# Patient Record
Sex: MALE | Race: WHITE | NOT HISPANIC OR LATINO | Employment: FULL TIME | URBAN - METROPOLITAN AREA
[De-identification: names, ages, dates, MRNs, and addresses within clinical notes are randomized per-mention and may not be internally consistent; named-entity substitution may affect disease eponyms.]

---

## 2023-07-07 LAB
ALANINE AMINOTRANSFERASE (SGPT) (U/L) IN SER/PLAS: 55 U/L (ref 10–52)
ALBUMIN (G/DL) IN SER/PLAS: 4.2 G/DL (ref 3.4–5)
ALKALINE PHOSPHATASE (U/L) IN SER/PLAS: 76 U/L (ref 33–120)
ASPARTATE AMINOTRANSFERASE (SGOT) (U/L) IN SER/PLAS: 50 U/L (ref 9–39)
BILIRUBIN DIRECT (MG/DL) IN SER/PLAS: 0.2 MG/DL (ref 0–0.3)
BILIRUBIN TOTAL (MG/DL) IN SER/PLAS: 1.4 MG/DL (ref 0–1.2)
C REACTIVE PROTEIN (MG/L) IN SER/PLAS: 1.13 MG/DL
CREATININE (MG/DL) IN SER/PLAS: 0.92 MG/DL (ref 0.5–1.3)
ERYTHROCYTE DISTRIBUTION WIDTH (RATIO) BY AUTOMATED COUNT: 12.8 % (ref 11.5–14.5)
ERYTHROCYTE MEAN CORPUSCULAR HEMOGLOBIN CONCENTRATION (G/DL) BY AUTOMATED: 33.8 G/DL (ref 32–36)
ERYTHROCYTE MEAN CORPUSCULAR VOLUME (FL) BY AUTOMATED COUNT: 92 FL (ref 80–100)
ERYTHROCYTES (10*6/UL) IN BLOOD BY AUTOMATED COUNT: 5.06 X10E12/L (ref 4.5–5.9)
GFR MALE: >90 ML/MIN/1.73M2
HEMATOCRIT (%) IN BLOOD BY AUTOMATED COUNT: 46.5 % (ref 41–52)
HEMOGLOBIN (G/DL) IN BLOOD: 15.7 G/DL (ref 13.5–17.5)
LEUKOCYTES (10*3/UL) IN BLOOD BY AUTOMATED COUNT: 5.9 X10E9/L (ref 4.4–11.3)
PLATELETS (10*3/UL) IN BLOOD AUTOMATED COUNT: 199 X10E9/L (ref 150–450)
PROTEIN TOTAL: 7.5 G/DL (ref 6.4–8.2)
UREA NITROGEN (MG/DL) IN SER/PLAS: 11 MG/DL (ref 6–23)

## 2023-08-25 ENCOUNTER — OFFICE VISIT (OUTPATIENT)
Dept: PRIMARY CARE | Facility: CLINIC | Age: 58
End: 2023-08-25
Payer: COMMERCIAL

## 2023-08-25 VITALS
WEIGHT: 315 LBS | SYSTOLIC BLOOD PRESSURE: 138 MMHG | HEART RATE: 88 BPM | DIASTOLIC BLOOD PRESSURE: 70 MMHG | RESPIRATION RATE: 18 BRPM | TEMPERATURE: 97 F | BODY MASS INDEX: 41.75 KG/M2 | HEIGHT: 73 IN

## 2023-08-25 DIAGNOSIS — E08.22 DIABETES MELLITUS DUE TO UNDERLYING CONDITION WITH DIABETIC CHRONIC KIDNEY DISEASE, UNSPECIFIED CKD STAGE, UNSPECIFIED WHETHER LONG TERM INSULIN USE (MULTI): ICD-10-CM

## 2023-08-25 DIAGNOSIS — Z00.00 PERIODIC HEALTH ASSESSMENT, GENERAL SCREENING, ADULT: Primary | ICD-10-CM

## 2023-08-25 DIAGNOSIS — Z12.11 SCREENING FOR COLORECTAL CANCER: ICD-10-CM

## 2023-08-25 DIAGNOSIS — E66.01 OBESITY, CLASS III, BMI 40-49.9 (MORBID OBESITY) (MULTI): ICD-10-CM

## 2023-08-25 DIAGNOSIS — K76.0 NAFLD (NONALCOHOLIC FATTY LIVER DISEASE): ICD-10-CM

## 2023-08-25 DIAGNOSIS — I10 ESSENTIAL HYPERTENSION, BENIGN: ICD-10-CM

## 2023-08-25 DIAGNOSIS — R73.9 ELEVATED BLOOD SUGAR: ICD-10-CM

## 2023-08-25 DIAGNOSIS — Z12.12 SCREENING FOR COLORECTAL CANCER: ICD-10-CM

## 2023-08-25 DIAGNOSIS — Z12.5 SCREENING FOR PROSTATE CANCER: ICD-10-CM

## 2023-08-25 PROBLEM — B37.9 CANDIDIASIS: Status: ACTIVE | Noted: 2023-08-25

## 2023-08-25 PROBLEM — E87.6 HYPOKALEMIA: Status: ACTIVE | Noted: 2023-08-25

## 2023-08-25 PROBLEM — M46.40 DISCITIS: Status: ACTIVE | Noted: 2023-08-25

## 2023-08-25 PROBLEM — M62.89 MUSCLE TIGHTNESS: Status: ACTIVE | Noted: 2023-08-25

## 2023-08-25 PROBLEM — D64.9 ANEMIA: Status: ACTIVE | Noted: 2023-08-25

## 2023-08-25 PROBLEM — B35.1 FUNGAL NAIL INFECTION: Status: ACTIVE | Noted: 2023-08-25

## 2023-08-25 PROBLEM — E11.9 DIABETES MELLITUS (MULTI): Status: RESOLVED | Noted: 2023-08-25 | Resolved: 2023-08-25

## 2023-08-25 PROBLEM — M46.20 OSTEOMYELITIS OF SPINE (MULTI): Status: ACTIVE | Noted: 2023-08-25

## 2023-08-25 PROBLEM — Z98.890 STATUS POST LUMBAR SPINE SURGERY FOR DECOMPRESSION OF SPINAL CORD: Status: ACTIVE | Noted: 2023-08-25

## 2023-08-25 PROBLEM — G06.2 EPIDURAL ABSCESS (HHS-HCC): Status: ACTIVE | Noted: 2023-08-25

## 2023-08-25 PROBLEM — R29.898 LOWER EXTREMITY WEAKNESS: Status: ACTIVE | Noted: 2023-08-25

## 2023-08-25 PROBLEM — M46.46 DISCITIS OF LUMBAR REGION: Status: ACTIVE | Noted: 2022-05-02

## 2023-08-25 PROBLEM — E11.9 DIABETES MELLITUS (MULTI): Status: ACTIVE | Noted: 2023-08-25

## 2023-08-25 PROBLEM — M79.674 CHRONIC PAIN OF TOE OF RIGHT FOOT: Status: ACTIVE | Noted: 2023-08-25

## 2023-08-25 PROBLEM — R30.0 DYSURIA: Status: ACTIVE | Noted: 2023-08-25

## 2023-08-25 PROBLEM — K59.09 CHRONIC CONSTIPATION: Status: ACTIVE | Noted: 2023-08-25

## 2023-08-25 PROBLEM — N30.01 ACUTE CYSTITIS WITH HEMATURIA: Status: ACTIVE | Noted: 2023-08-25

## 2023-08-25 PROBLEM — R74.8 ELEVATED LIVER ENZYMES: Status: ACTIVE | Noted: 2023-08-25

## 2023-08-25 PROBLEM — S90.31XA CONTUSION OF RIGHT FOOT: Status: ACTIVE | Noted: 2023-08-25

## 2023-08-25 PROBLEM — R00.0 SINUS TACHYCARDIA: Status: ACTIVE | Noted: 2023-08-25

## 2023-08-25 PROBLEM — I95.1 AUTONOMIC ORTHOSTATIC HYPOTENSION: Status: ACTIVE | Noted: 2023-08-25

## 2023-08-25 PROBLEM — G89.29 CHRONIC PAIN OF TOE OF LEFT FOOT: Status: ACTIVE | Noted: 2023-08-25

## 2023-08-25 PROBLEM — R19.7 DIARRHEA IN ADULT PATIENT: Status: ACTIVE | Noted: 2023-08-25

## 2023-08-25 PROBLEM — M96.1 POSTLAMINECTOMY SYNDROME, LUMBAR REGION: Status: ACTIVE | Noted: 2022-05-02

## 2023-08-25 PROBLEM — L02.91 ABSCESS: Status: ACTIVE | Noted: 2023-08-25

## 2023-08-25 PROBLEM — G89.29 CHRONIC PAIN OF TOE OF RIGHT FOOT: Status: ACTIVE | Noted: 2023-08-25

## 2023-08-25 PROBLEM — M79.605 PAIN OF LEFT LOWER EXTREMITY: Status: ACTIVE | Noted: 2023-08-25

## 2023-08-25 PROBLEM — M54.16 LUMBAR RADICULOPATHY: Status: ACTIVE | Noted: 2022-05-02

## 2023-08-25 PROBLEM — G89.29 ACUTE EXACERBATION OF CHRONIC LOW BACK PAIN: Status: ACTIVE | Noted: 2023-08-25

## 2023-08-25 PROBLEM — M54.50 ACUTE EXACERBATION OF CHRONIC LOW BACK PAIN: Status: ACTIVE | Noted: 2023-08-25

## 2023-08-25 PROBLEM — M53.3 SACROILIAC JOINT PAIN: Status: ACTIVE | Noted: 2022-05-02

## 2023-08-25 PROBLEM — M79.675 CHRONIC PAIN OF TOE OF LEFT FOOT: Status: ACTIVE | Noted: 2023-08-25

## 2023-08-25 LAB — POC HEMOGLOBIN A1C: 10.8 % (ref 4.2–6.5)

## 2023-08-25 PROCEDURE — 3075F SYST BP GE 130 - 139MM HG: CPT | Performed by: INTERNAL MEDICINE

## 2023-08-25 PROCEDURE — 83036 HEMOGLOBIN GLYCOSYLATED A1C: CPT | Performed by: INTERNAL MEDICINE

## 2023-08-25 PROCEDURE — 4010F ACE/ARB THERAPY RXD/TAKEN: CPT | Performed by: INTERNAL MEDICINE

## 2023-08-25 PROCEDURE — 4004F PT TOBACCO SCREEN RCVD TLK: CPT | Performed by: INTERNAL MEDICINE

## 2023-08-25 PROCEDURE — 99396 PREV VISIT EST AGE 40-64: CPT | Performed by: INTERNAL MEDICINE

## 2023-08-25 PROCEDURE — 3066F NEPHROPATHY DOC TX: CPT | Performed by: INTERNAL MEDICINE

## 2023-08-25 PROCEDURE — 3078F DIAST BP <80 MM HG: CPT | Performed by: INTERNAL MEDICINE

## 2023-08-25 RX ORDER — CYCLOBENZAPRINE HCL 10 MG
10 TABLET ORAL 2 TIMES DAILY PRN
COMMUNITY

## 2023-08-25 RX ORDER — CARVEDILOL 6.25 MG/1
6.25 TABLET ORAL
COMMUNITY
End: 2023-08-29

## 2023-08-25 RX ORDER — LISINOPRIL 40 MG/1
1 TABLET ORAL DAILY
COMMUNITY
Start: 2021-06-04 | End: 2023-08-25 | Stop reason: SDUPTHER

## 2023-08-25 RX ORDER — PREGABALIN 75 MG/1
75 CAPSULE ORAL DAILY
COMMUNITY

## 2023-08-25 RX ORDER — AMOXICILLIN 500 MG/1
500 CAPSULE ORAL 2 TIMES DAILY
COMMUNITY
Start: 2023-07-07 | End: 2024-01-10

## 2023-08-25 RX ORDER — LISINOPRIL 20 MG/1
20 TABLET ORAL DAILY
Qty: 90 TABLET | Refills: 3 | Status: SHIPPED | OUTPATIENT
Start: 2023-08-25 | End: 2023-12-29 | Stop reason: HOSPADM

## 2023-08-25 RX ORDER — MELOXICAM 15 MG/1
15 TABLET ORAL AS NEEDED
COMMUNITY
End: 2023-12-12 | Stop reason: HOSPADM

## 2023-08-25 RX ORDER — METFORMIN HYDROCHLORIDE 1000 MG/1
1 TABLET ORAL
COMMUNITY
Start: 2018-11-27 | End: 2023-12-29 | Stop reason: HOSPADM

## 2023-08-25 ASSESSMENT — PATIENT HEALTH QUESTIONNAIRE - PHQ9
2. FEELING DOWN, DEPRESSED OR HOPELESS: NOT AT ALL
1. LITTLE INTEREST OR PLEASURE IN DOING THINGS: NOT AT ALL
SUM OF ALL RESPONSES TO PHQ9 QUESTIONS 1 AND 2: 0

## 2023-08-25 ASSESSMENT — ENCOUNTER SYMPTOMS: BACK PAIN: 1

## 2023-08-25 NOTE — PROGRESS NOTES
"Subjective   Behzad Diaz is a 58 y.o. male who presents for Annual Exam (CPE).  CPE  Patient is been out of lisinopril for 3-4 months now  Taking metformin only 1 tab QD , his decision  Paperwork for CPE       Overall doing well overall.  He has not been particularly active, but it is something he is working on.  He denies any issues with CP,SOB or dizzy spells.  No issues with anxiety, depression or sleep related problems. Denies any issues with HA, numbness or tingling.  No issues or changes with bowel or bladder habits.  Chronic back pain issues.         Review of Systems   Musculoskeletal:  Positive for back pain.   All other systems reviewed and are negative.      Objective   Physical Exam  Constitutional:       General: He is not in acute distress.     Appearance: Normal appearance. He is not ill-appearing.   HENT:      Head: Normocephalic and atraumatic.      Nose: Nose normal.   Eyes:      Extraocular Movements: Extraocular movements intact.      Conjunctiva/sclera: Conjunctivae normal.      Pupils: Pupils are equal, round, and reactive to light.   Cardiovascular:      Rate and Rhythm: Normal rate.   Pulmonary:      Effort: Pulmonary effort is normal.   Abdominal:      General: There is no distension.   Musculoskeletal:      Cervical back: Neck supple.      Comments: Uses cane for balance   Neurological:      General: No focal deficit present.      Mental Status: He is alert.      Gait: Gait normal.   Psychiatric:         Mood and Affect: Mood normal.         Behavior: Behavior normal.       /70 (BP Location: Left arm, Patient Position: Sitting)   Pulse 88   Temp 36.1 °C (97 °F)   Resp 18   Ht 1.854 m (6' 1\")   Wt (!) 161 kg (354 lb 6.4 oz)   BMI 46.76 kg/m²       Assessment/Plan   Problem List Items Addressed This Visit       RESOLVED: Diabetes mellitus (CMS/[facility])    Relevant Medications    semaglutide 0.25 mg or 0.5 mg (2 mg/3 mL) pen injector    semaglutide 0.25 mg or 0.5 mg (2 mg/3 mL) pen " injector    semaglutide (OZEMPIC) 1 mg/dose (4 mg/3 mL) pen injector    Essential hypertension, benign    Relevant Medications    lisinopril 20 mg tablet    Obesity, Class III, BMI 40-49.9 (morbid obesity) (CMS/HCC)     Other Visit Diagnoses       Periodic health assessment, general screening, adult    -  Primary    Relevant Orders    Comprehensive Metabolic Panel    Lipid Panel    Thyroid Stimulating Hormone    Hemoglobin A1C    Elevated blood sugar        Relevant Orders    POCT glycosylated hemoglobin (Hb A1C) manually resulted (Completed)    NAFLD (nonalcoholic fatty liver disease)        Screening for colorectal cancer        Relevant Orders    Cologuard® colon cancer screening    Screening for prostate cancer        Relevant Orders    Prostate Specific Antigen        We reviewed and discussed all of the above.    Physical exam is unremarkable.  We reviewed and discussed all the above.  We discussed current medications as well as most recent test results.  He is due for metabolic risk assessment as well as colorectal cancer screening.    We discussed the importance and benefits of a healthy diet that is low in sugars and low in carbohydrates.  We reviewed and discussed the benefits of regular physical exercise especially when at or above a level of 150 minutes/week.  We also discussed the importance of stress management and good sleep hygiene.  We will add Ozempic to his metformin and continue to work on lifestyle improvements and follow-up in 3 months, sooner if any issues should arise.

## 2023-08-29 DIAGNOSIS — I10 ESSENTIAL (PRIMARY) HYPERTENSION: ICD-10-CM

## 2023-08-29 RX ORDER — CARVEDILOL 6.25 MG/1
6.25 TABLET ORAL
Qty: 180 TABLET | Refills: 3 | Status: SHIPPED | OUTPATIENT
Start: 2023-08-29 | End: 2023-12-29 | Stop reason: HOSPADM

## 2023-11-17 ENCOUNTER — APPOINTMENT (OUTPATIENT)
Dept: PRIMARY CARE | Facility: CLINIC | Age: 58
End: 2023-11-17
Payer: COMMERCIAL

## 2023-11-20 PROCEDURE — 87086 URINE CULTURE/COLONY COUNT: CPT

## 2023-11-21 ENCOUNTER — LAB REQUISITION (OUTPATIENT)
Dept: LAB | Facility: HOSPITAL | Age: 58
End: 2023-11-21
Payer: COMMERCIAL

## 2023-11-21 DIAGNOSIS — N39.0 URINARY TRACT INFECTION, SITE NOT SPECIFIED: ICD-10-CM

## 2023-11-22 LAB — BACTERIA UR CULT: NORMAL

## 2023-12-06 PROCEDURE — 87086 URINE CULTURE/COLONY COUNT: CPT

## 2023-12-06 PROCEDURE — 87186 SC STD MICRODIL/AGAR DIL: CPT

## 2023-12-07 ENCOUNTER — LAB REQUISITION (OUTPATIENT)
Dept: LAB | Facility: HOSPITAL | Age: 58
End: 2023-12-07
Payer: COMMERCIAL

## 2023-12-07 DIAGNOSIS — N39.0 URINARY TRACT INFECTION, SITE NOT SPECIFIED: ICD-10-CM

## 2023-12-09 LAB — BACTERIA UR CULT: ABNORMAL

## 2023-12-10 ENCOUNTER — HOSPITAL ENCOUNTER (INPATIENT)
Facility: HOSPITAL | Age: 58
LOS: 2 days | Discharge: ANOTHER HOSPITAL | DRG: 281 | End: 2023-12-12
Attending: EMERGENCY MEDICINE | Admitting: INTERNAL MEDICINE
Payer: COMMERCIAL

## 2023-12-10 ENCOUNTER — APPOINTMENT (OUTPATIENT)
Dept: RADIOLOGY | Facility: HOSPITAL | Age: 58
DRG: 281 | End: 2023-12-10
Payer: COMMERCIAL

## 2023-12-10 DIAGNOSIS — I21.4 NSTEMI (NON-ST ELEVATED MYOCARDIAL INFARCTION) (MULTI): Primary | ICD-10-CM

## 2023-12-10 DIAGNOSIS — I24.9 ACS (ACUTE CORONARY SYNDROME) (MULTI): ICD-10-CM

## 2023-12-10 DIAGNOSIS — R07.9 ACUTE CHEST PAIN: ICD-10-CM

## 2023-12-10 PROBLEM — K76.0 NAFLD (NONALCOHOLIC FATTY LIVER DISEASE): Status: ACTIVE | Noted: 2023-12-10

## 2023-12-10 PROBLEM — N39.0 COMPLICATED UTI (URINARY TRACT INFECTION): Status: ACTIVE | Noted: 2023-12-10

## 2023-12-10 LAB
ALBUMIN SERPL BCP-MCNC: 4.3 G/DL (ref 3.4–5)
ALP SERPL-CCNC: 68 U/L (ref 33–120)
ALT SERPL W P-5'-P-CCNC: 50 U/L (ref 10–52)
ANION GAP SERPL CALC-SCNC: 18 MMOL/L (ref 10–20)
APTT PPP: 33 SECONDS (ref 27–38)
AST SERPL W P-5'-P-CCNC: 43 U/L (ref 9–39)
BASOPHILS # BLD AUTO: 0.05 X10*3/UL (ref 0–0.1)
BASOPHILS NFR BLD AUTO: 0.7 %
BILIRUB SERPL-MCNC: 1.2 MG/DL (ref 0–1.2)
BUN SERPL-MCNC: 17 MG/DL (ref 6–23)
CALCIUM SERPL-MCNC: 9 MG/DL (ref 8.6–10.3)
CARDIAC TROPONIN I PNL SERPL HS: 1626 NG/L (ref 0–20)
CARDIAC TROPONIN I PNL SERPL HS: 322 NG/L (ref 0–20)
CHLORIDE SERPL-SCNC: 97 MMOL/L (ref 98–107)
CHOLEST SERPL-MCNC: 145 MG/DL (ref 0–199)
CHOLESTEROL/HDL RATIO: 5.8
CO2 SERPL-SCNC: 21 MMOL/L (ref 21–32)
CREAT SERPL-MCNC: 1.15 MG/DL (ref 0.5–1.3)
EOSINOPHIL # BLD AUTO: 0.16 X10*3/UL (ref 0–0.7)
EOSINOPHIL NFR BLD AUTO: 2.3 %
ERYTHROCYTE [DISTWIDTH] IN BLOOD BY AUTOMATED COUNT: 12 % (ref 11.5–14.5)
EST. AVERAGE GLUCOSE BLD GHB EST-MCNC: 269 MG/DL
GFR SERPL CREATININE-BSD FRML MDRD: 75 ML/MIN/1.73M*2
GLUCOSE BLD MANUAL STRIP-MCNC: 221 MG/DL (ref 74–99)
GLUCOSE BLD MANUAL STRIP-MCNC: 272 MG/DL (ref 74–99)
GLUCOSE BLD MANUAL STRIP-MCNC: 285 MG/DL (ref 74–99)
GLUCOSE BLD MANUAL STRIP-MCNC: 352 MG/DL (ref 74–99)
GLUCOSE SERPL-MCNC: 353 MG/DL (ref 74–99)
HBA1C MFR BLD: 11 %
HCT VFR BLD AUTO: 47.8 % (ref 41–52)
HDLC SERPL-MCNC: 25.2 MG/DL
HGB BLD-MCNC: 16.3 G/DL (ref 13.5–17.5)
HOLD SPECIMEN: NORMAL
HOLD SPECIMEN: NORMAL
IMM GRANULOCYTES # BLD AUTO: 0.02 X10*3/UL (ref 0–0.7)
IMM GRANULOCYTES NFR BLD AUTO: 0.3 % (ref 0–0.9)
LDLC SERPL CALC-MCNC: 51 MG/DL
LYMPHOCYTES # BLD AUTO: 2.64 X10*3/UL (ref 1.2–4.8)
LYMPHOCYTES NFR BLD AUTO: 38.3 %
MAGNESIUM SERPL-MCNC: 1.63 MG/DL (ref 1.6–2.4)
MCH RBC QN AUTO: 30.6 PG (ref 26–34)
MCHC RBC AUTO-ENTMCNC: 34.1 G/DL (ref 32–36)
MCV RBC AUTO: 90 FL (ref 80–100)
MONOCYTES # BLD AUTO: 0.41 X10*3/UL (ref 0.1–1)
MONOCYTES NFR BLD AUTO: 6 %
NEUTROPHILS # BLD AUTO: 3.61 X10*3/UL (ref 1.2–7.7)
NEUTROPHILS NFR BLD AUTO: 52.4 %
NON HDL CHOLESTEROL: 120 MG/DL (ref 0–149)
NRBC BLD-RTO: 0 /100 WBCS (ref 0–0)
PLATELET # BLD AUTO: 228 X10*3/UL (ref 150–450)
POTASSIUM SERPL-SCNC: 4.7 MMOL/L (ref 3.5–5.3)
PROT SERPL-MCNC: 7.3 G/DL (ref 6.4–8.2)
RBC # BLD AUTO: 5.33 X10*6/UL (ref 4.5–5.9)
SODIUM SERPL-SCNC: 131 MMOL/L (ref 136–145)
TRIGL SERPL-MCNC: 343 MG/DL (ref 0–149)
UFH PPP CHRO-ACNC: 0.1 IU/ML
UFH PPP CHRO-ACNC: 0.3 IU/ML
UFH PPP CHRO-ACNC: <0.1 IU/ML
UFH PPP CHRO-ACNC: >2 IU/ML
VLDL: 69 MG/DL (ref 0–40)
WBC # BLD AUTO: 6.9 X10*3/UL (ref 4.4–11.3)

## 2023-12-10 PROCEDURE — 96366 THER/PROPH/DIAG IV INF ADDON: CPT

## 2023-12-10 PROCEDURE — 83036 HEMOGLOBIN GLYCOSYLATED A1C: CPT | Mod: STJLAB | Performed by: INTERNAL MEDICINE

## 2023-12-10 PROCEDURE — 2060000001 HC INTERMEDIATE ICU ROOM DAILY

## 2023-12-10 PROCEDURE — 84484 ASSAY OF TROPONIN QUANT: CPT | Performed by: EMERGENCY MEDICINE

## 2023-12-10 PROCEDURE — 36415 COLL VENOUS BLD VENIPUNCTURE: CPT | Performed by: EMERGENCY MEDICINE

## 2023-12-10 PROCEDURE — 2500000002 HC RX 250 W HCPCS SELF ADMINISTERED DRUGS (ALT 637 FOR MEDICARE OP, ALT 636 FOR OP/ED): Performed by: INTERNAL MEDICINE

## 2023-12-10 PROCEDURE — 2500000002 HC RX 250 W HCPCS SELF ADMINISTERED DRUGS (ALT 637 FOR MEDICARE OP, ALT 636 FOR OP/ED): Performed by: STUDENT IN AN ORGANIZED HEALTH CARE EDUCATION/TRAINING PROGRAM

## 2023-12-10 PROCEDURE — 85520 HEPARIN ASSAY: CPT | Performed by: STUDENT IN AN ORGANIZED HEALTH CARE EDUCATION/TRAINING PROGRAM

## 2023-12-10 PROCEDURE — 99291 CRITICAL CARE FIRST HOUR: CPT | Mod: 25 | Performed by: EMERGENCY MEDICINE

## 2023-12-10 PROCEDURE — 99285 EMERGENCY DEPT VISIT HI MDM: CPT | Mod: 25 | Performed by: EMERGENCY MEDICINE

## 2023-12-10 PROCEDURE — 2500000004 HC RX 250 GENERAL PHARMACY W/ HCPCS (ALT 636 FOR OP/ED): Performed by: EMERGENCY MEDICINE

## 2023-12-10 PROCEDURE — 2500000004 HC RX 250 GENERAL PHARMACY W/ HCPCS (ALT 636 FOR OP/ED): Performed by: INTERNAL MEDICINE

## 2023-12-10 PROCEDURE — 71045 X-RAY EXAM CHEST 1 VIEW: CPT | Performed by: RADIOLOGY

## 2023-12-10 PROCEDURE — 80053 COMPREHEN METABOLIC PANEL: CPT | Performed by: EMERGENCY MEDICINE

## 2023-12-10 PROCEDURE — 96372 THER/PROPH/DIAG INJ SC/IM: CPT | Mod: 25

## 2023-12-10 PROCEDURE — 99223 1ST HOSP IP/OBS HIGH 75: CPT | Performed by: STUDENT IN AN ORGANIZED HEALTH CARE EDUCATION/TRAINING PROGRAM

## 2023-12-10 PROCEDURE — 83735 ASSAY OF MAGNESIUM: CPT | Performed by: EMERGENCY MEDICINE

## 2023-12-10 PROCEDURE — 93010 ELECTROCARDIOGRAM REPORT: CPT | Performed by: INTERNAL MEDICINE

## 2023-12-10 PROCEDURE — 2500000001 HC RX 250 WO HCPCS SELF ADMINISTERED DRUGS (ALT 637 FOR MEDICARE OP): Performed by: INTERNAL MEDICINE

## 2023-12-10 PROCEDURE — 80061 LIPID PANEL: CPT | Performed by: INTERNAL MEDICINE

## 2023-12-10 PROCEDURE — 85025 COMPLETE CBC W/AUTO DIFF WBC: CPT | Performed by: EMERGENCY MEDICINE

## 2023-12-10 PROCEDURE — 93010 ELECTROCARDIOGRAM REPORT: CPT | Performed by: EMERGENCY MEDICINE

## 2023-12-10 PROCEDURE — 82947 ASSAY GLUCOSE BLOOD QUANT: CPT

## 2023-12-10 PROCEDURE — 99291 CRITICAL CARE FIRST HOUR: CPT | Performed by: EMERGENCY MEDICINE

## 2023-12-10 PROCEDURE — 2500000001 HC RX 250 WO HCPCS SELF ADMINISTERED DRUGS (ALT 637 FOR MEDICARE OP)

## 2023-12-10 PROCEDURE — 71045 X-RAY EXAM CHEST 1 VIEW: CPT | Mod: FY

## 2023-12-10 PROCEDURE — 85730 THROMBOPLASTIN TIME PARTIAL: CPT | Performed by: EMERGENCY MEDICINE

## 2023-12-10 PROCEDURE — 96376 TX/PRO/DX INJ SAME DRUG ADON: CPT

## 2023-12-10 PROCEDURE — 85520 HEPARIN ASSAY: CPT | Performed by: INTERNAL MEDICINE

## 2023-12-10 PROCEDURE — 2500000001 HC RX 250 WO HCPCS SELF ADMINISTERED DRUGS (ALT 637 FOR MEDICARE OP): Performed by: STUDENT IN AN ORGANIZED HEALTH CARE EDUCATION/TRAINING PROGRAM

## 2023-12-10 PROCEDURE — 96365 THER/PROPH/DIAG IV INF INIT: CPT

## 2023-12-10 RX ORDER — HEPARIN SODIUM 10000 [USP'U]/100ML
0-4000 INJECTION, SOLUTION INTRAVENOUS CONTINUOUS
Status: DISCONTINUED | OUTPATIENT
Start: 2023-12-10 | End: 2023-12-12 | Stop reason: HOSPADM

## 2023-12-10 RX ORDER — HEPARIN SODIUM 5000 [USP'U]/ML
3000-4000 INJECTION, SOLUTION INTRAVENOUS; SUBCUTANEOUS EVERY 4 HOURS PRN
Status: DISCONTINUED | OUTPATIENT
Start: 2023-12-10 | End: 2023-12-12 | Stop reason: HOSPADM

## 2023-12-10 RX ORDER — PREGABALIN 75 MG/1
75 CAPSULE ORAL DAILY
Status: DISCONTINUED | OUTPATIENT
Start: 2023-12-10 | End: 2023-12-12 | Stop reason: HOSPADM

## 2023-12-10 RX ORDER — POLYETHYLENE GLYCOL 3350 17 G/17G
17 POWDER, FOR SOLUTION ORAL DAILY PRN
Status: DISCONTINUED | OUTPATIENT
Start: 2023-12-10 | End: 2023-12-12 | Stop reason: HOSPADM

## 2023-12-10 RX ORDER — ACETAMINOPHEN 325 MG/1
650 TABLET ORAL EVERY 4 HOURS PRN
Status: DISCONTINUED | OUTPATIENT
Start: 2023-12-10 | End: 2023-12-12 | Stop reason: HOSPADM

## 2023-12-10 RX ORDER — DEXTROSE MONOHYDRATE 100 MG/ML
0.3 INJECTION, SOLUTION INTRAVENOUS ONCE AS NEEDED
Status: DISCONTINUED | OUTPATIENT
Start: 2023-12-10 | End: 2023-12-12 | Stop reason: HOSPADM

## 2023-12-10 RX ORDER — INSULIN LISPRO 100 [IU]/ML
0-10 INJECTION, SOLUTION INTRAVENOUS; SUBCUTANEOUS
Status: DISCONTINUED | OUTPATIENT
Start: 2023-12-10 | End: 2023-12-11

## 2023-12-10 RX ORDER — ACETAMINOPHEN 160 MG/5ML
650 SOLUTION ORAL EVERY 4 HOURS PRN
Status: DISCONTINUED | OUTPATIENT
Start: 2023-12-10 | End: 2023-12-12 | Stop reason: HOSPADM

## 2023-12-10 RX ORDER — NAPROXEN SODIUM 220 MG/1
81 TABLET, FILM COATED ORAL DAILY
Status: DISCONTINUED | OUTPATIENT
Start: 2023-12-10 | End: 2023-12-12 | Stop reason: HOSPADM

## 2023-12-10 RX ORDER — DEXTROSE 50 % IN WATER (D50W) INTRAVENOUS SYRINGE
25
Status: DISCONTINUED | OUTPATIENT
Start: 2023-12-10 | End: 2023-12-12 | Stop reason: HOSPADM

## 2023-12-10 RX ORDER — CIPROFLOXACIN 500 MG/1
500 TABLET ORAL 2 TIMES DAILY
Status: DISCONTINUED | OUTPATIENT
Start: 2023-12-10 | End: 2023-12-11

## 2023-12-10 RX ORDER — CIPROFLOXACIN 500 MG/1
500 TABLET ORAL 2 TIMES DAILY
COMMUNITY
Start: 2023-12-06 | End: 2023-12-12 | Stop reason: HOSPADM

## 2023-12-10 RX ORDER — LISINOPRIL 20 MG/1
20 TABLET ORAL DAILY
Status: DISCONTINUED | OUTPATIENT
Start: 2023-12-10 | End: 2023-12-12 | Stop reason: HOSPADM

## 2023-12-10 RX ORDER — METOCLOPRAMIDE 10 MG/1
10 TABLET ORAL EVERY 6 HOURS PRN
Status: DISCONTINUED | OUTPATIENT
Start: 2023-12-10 | End: 2023-12-12 | Stop reason: HOSPADM

## 2023-12-10 RX ORDER — ACETAMINOPHEN 650 MG/1
650 SUPPOSITORY RECTAL EVERY 4 HOURS PRN
Status: DISCONTINUED | OUTPATIENT
Start: 2023-12-10 | End: 2023-12-12 | Stop reason: HOSPADM

## 2023-12-10 RX ORDER — SODIUM CHLORIDE 9 MG/ML
75 INJECTION, SOLUTION INTRAVENOUS CONTINUOUS
Status: ACTIVE | OUTPATIENT
Start: 2023-12-10 | End: 2023-12-10

## 2023-12-10 RX ORDER — ONDANSETRON 4 MG/1
4 TABLET, ORALLY DISINTEGRATING ORAL EVERY 8 HOURS PRN
Status: DISCONTINUED | OUTPATIENT
Start: 2023-12-10 | End: 2023-12-12 | Stop reason: HOSPADM

## 2023-12-10 RX ORDER — INSULIN LISPRO 100 [IU]/ML
0-10 INJECTION, SOLUTION INTRAVENOUS; SUBCUTANEOUS EVERY 4 HOURS
Status: DISCONTINUED | OUTPATIENT
Start: 2023-12-10 | End: 2023-12-10

## 2023-12-10 RX ORDER — CARVEDILOL 6.25 MG/1
6.25 TABLET ORAL
Status: DISCONTINUED | OUTPATIENT
Start: 2023-12-10 | End: 2023-12-12 | Stop reason: HOSPADM

## 2023-12-10 RX ORDER — PANTOPRAZOLE SODIUM 40 MG/1
40 TABLET, DELAYED RELEASE ORAL
Status: DISCONTINUED | OUTPATIENT
Start: 2023-12-11 | End: 2023-12-12 | Stop reason: HOSPADM

## 2023-12-10 RX ORDER — ATORVASTATIN CALCIUM 80 MG/1
80 TABLET, FILM COATED ORAL NIGHTLY
Status: DISCONTINUED | OUTPATIENT
Start: 2023-12-10 | End: 2023-12-12 | Stop reason: HOSPADM

## 2023-12-10 RX ORDER — AMOXICILLIN 500 MG/1
500 CAPSULE ORAL EVERY 12 HOURS SCHEDULED
Status: DISCONTINUED | OUTPATIENT
Start: 2023-12-10 | End: 2023-12-12 | Stop reason: HOSPADM

## 2023-12-10 RX ORDER — CLOPIDOGREL BISULFATE 300 MG/1
300 TABLET, FILM COATED ORAL ONCE
Status: COMPLETED | OUTPATIENT
Start: 2023-12-10 | End: 2023-12-10

## 2023-12-10 RX ORDER — HEPARIN SODIUM 5000 [USP'U]/ML
4000 INJECTION, SOLUTION INTRAVENOUS; SUBCUTANEOUS ONCE
Status: COMPLETED | OUTPATIENT
Start: 2023-12-10 | End: 2023-12-10

## 2023-12-10 RX ORDER — METOCLOPRAMIDE HYDROCHLORIDE 5 MG/ML
10 INJECTION INTRAMUSCULAR; INTRAVENOUS EVERY 6 HOURS PRN
Status: DISCONTINUED | OUTPATIENT
Start: 2023-12-10 | End: 2023-12-12 | Stop reason: HOSPADM

## 2023-12-10 RX ORDER — PANTOPRAZOLE SODIUM 40 MG/10ML
40 INJECTION, POWDER, LYOPHILIZED, FOR SOLUTION INTRAVENOUS
Status: DISCONTINUED | OUTPATIENT
Start: 2023-12-11 | End: 2023-12-12 | Stop reason: HOSPADM

## 2023-12-10 RX ORDER — ONDANSETRON HYDROCHLORIDE 2 MG/ML
4 INJECTION, SOLUTION INTRAVENOUS EVERY 8 HOURS PRN
Status: DISCONTINUED | OUTPATIENT
Start: 2023-12-10 | End: 2023-12-12 | Stop reason: HOSPADM

## 2023-12-10 RX ORDER — TALC
3 POWDER (GRAM) TOPICAL NIGHTLY PRN
Status: DISCONTINUED | OUTPATIENT
Start: 2023-12-10 | End: 2023-12-12 | Stop reason: HOSPADM

## 2023-12-10 RX ADMIN — INSULIN LISPRO 6 UNITS: 100 INJECTION, SOLUTION INTRAVENOUS; SUBCUTANEOUS at 16:51

## 2023-12-10 RX ADMIN — AMOXICILLIN 500 MG: 500 CAPSULE ORAL at 20:42

## 2023-12-10 RX ADMIN — HEPARIN SODIUM 4000 UNITS: 5000 INJECTION INTRAVENOUS; SUBCUTANEOUS at 06:07

## 2023-12-10 RX ADMIN — CIPROFLOXACIN 500 MG: 500 TABLET, FILM COATED ORAL at 20:43

## 2023-12-10 RX ADMIN — CARVEDILOL 6.25 MG: 6.25 TABLET, FILM COATED ORAL at 16:51

## 2023-12-10 RX ADMIN — SODIUM CHLORIDE 75 ML/HR: 9 INJECTION, SOLUTION INTRAVENOUS at 11:58

## 2023-12-10 RX ADMIN — HEPARIN SODIUM 1400 UNITS/HR: 10000 INJECTION, SOLUTION INTRAVENOUS at 14:47

## 2023-12-10 RX ADMIN — HEPARIN SODIUM 3000 UNITS: 5000 INJECTION INTRAVENOUS; SUBCUTANEOUS at 22:11

## 2023-12-10 RX ADMIN — ASPIRIN 81 MG CHEWABLE TABLET 81 MG: 81 TABLET CHEWABLE at 11:59

## 2023-12-10 RX ADMIN — ATORVASTATIN CALCIUM 80 MG: 80 TABLET, FILM COATED ORAL at 20:42

## 2023-12-10 RX ADMIN — INSULIN LISPRO 10 UNITS: 100 INJECTION, SOLUTION INTRAVENOUS; SUBCUTANEOUS at 08:13

## 2023-12-10 RX ADMIN — LISINOPRIL 20 MG: 20 TABLET ORAL at 15:45

## 2023-12-10 RX ADMIN — CIPROFLOXACIN 500 MG: 500 TABLET, FILM COATED ORAL at 15:45

## 2023-12-10 RX ADMIN — INSULIN LISPRO 4 UNITS: 100 INJECTION, SOLUTION INTRAVENOUS; SUBCUTANEOUS at 11:59

## 2023-12-10 RX ADMIN — AMOXICILLIN 500 MG: 500 CAPSULE ORAL at 15:45

## 2023-12-10 RX ADMIN — HEPARIN SODIUM 4000 UNITS: 5000 INJECTION INTRAVENOUS; SUBCUTANEOUS at 14:46

## 2023-12-10 RX ADMIN — HEPARIN SODIUM 1000 UNITS/HR: 10000 INJECTION, SOLUTION INTRAVENOUS at 06:10

## 2023-12-10 RX ADMIN — CLOPIDOGREL BISULFATE 300 MG: 300 TABLET, FILM COATED ORAL at 08:06

## 2023-12-10 SDOH — SOCIAL STABILITY: SOCIAL INSECURITY: HAVE YOU HAD THOUGHTS OF HARMING ANYONE ELSE?: NO

## 2023-12-10 SDOH — ECONOMIC STABILITY: HOUSING INSECURITY
IN THE LAST 12 MONTHS, WAS THERE A TIME WHEN YOU DID NOT HAVE A STEADY PLACE TO SLEEP OR SLEPT IN A SHELTER (INCLUDING NOW)?: NO

## 2023-12-10 SDOH — ECONOMIC STABILITY: INCOME INSECURITY: IN THE LAST 12 MONTHS, WAS THERE A TIME WHEN YOU WERE NOT ABLE TO PAY THE MORTGAGE OR RENT ON TIME?: NO

## 2023-12-10 SDOH — ECONOMIC STABILITY: TRANSPORTATION INSECURITY
IN THE PAST 12 MONTHS, HAS THE LACK OF TRANSPORTATION KEPT YOU FROM MEDICAL APPOINTMENTS OR FROM GETTING MEDICATIONS?: NO

## 2023-12-10 SDOH — SOCIAL STABILITY: SOCIAL INSECURITY: DO YOU FEEL ANYONE HAS EXPLOITED OR TAKEN ADVANTAGE OF YOU FINANCIALLY OR OF YOUR PERSONAL PROPERTY?: NO

## 2023-12-10 SDOH — SOCIAL STABILITY: SOCIAL INSECURITY: ARE YOU OR HAVE YOU BEEN THREATENED OR ABUSED PHYSICALLY, EMOTIONALLY, OR SEXUALLY BY ANYONE?: NO

## 2023-12-10 SDOH — ECONOMIC STABILITY: TRANSPORTATION INSECURITY
IN THE PAST 12 MONTHS, HAS LACK OF TRANSPORTATION KEPT YOU FROM MEETINGS, WORK, OR FROM GETTING THINGS NEEDED FOR DAILY LIVING?: NO

## 2023-12-10 SDOH — SOCIAL STABILITY: SOCIAL INSECURITY: ABUSE: ADULT

## 2023-12-10 SDOH — SOCIAL STABILITY: SOCIAL INSECURITY: WERE YOU ABLE TO COMPLETE ALL THE BEHAVIORAL HEALTH SCREENINGS?: YES

## 2023-12-10 SDOH — ECONOMIC STABILITY: INCOME INSECURITY: HOW HARD IS IT FOR YOU TO PAY FOR THE VERY BASICS LIKE FOOD, HOUSING, MEDICAL CARE, AND HEATING?: NOT HARD AT ALL

## 2023-12-10 SDOH — SOCIAL STABILITY: SOCIAL INSECURITY: ARE THERE ANY APPARENT SIGNS OF INJURIES/BEHAVIORS THAT COULD BE RELATED TO ABUSE/NEGLECT?: NO

## 2023-12-10 SDOH — SOCIAL STABILITY: SOCIAL INSECURITY: DO YOU FEEL UNSAFE GOING BACK TO THE PLACE WHERE YOU ARE LIVING?: NO

## 2023-12-10 SDOH — HEALTH STABILITY: PHYSICAL HEALTH: ON AVERAGE, HOW MANY DAYS PER WEEK DO YOU ENGAGE IN MODERATE TO STRENUOUS EXERCISE (LIKE A BRISK WALK)?: 0 DAYS

## 2023-12-10 SDOH — ECONOMIC STABILITY: HOUSING INSECURITY: IN THE LAST 12 MONTHS, HOW MANY PLACES HAVE YOU LIVED?: 1

## 2023-12-10 SDOH — SOCIAL STABILITY: SOCIAL INSECURITY: HAS ANYONE EVER THREATENED TO HURT YOUR FAMILY OR YOUR PETS?: NO

## 2023-12-10 SDOH — SOCIAL STABILITY: SOCIAL INSECURITY: DOES ANYONE TRY TO KEEP YOU FROM HAVING/CONTACTING OTHER FRIENDS OR DOING THINGS OUTSIDE YOUR HOME?: NO

## 2023-12-10 ASSESSMENT — ACTIVITIES OF DAILY LIVING (ADL)
LACK_OF_TRANSPORTATION: NO
PATIENT'S MEMORY ADEQUATE TO SAFELY COMPLETE DAILY ACTIVITIES?: YES
HEARING - RIGHT EAR: FUNCTIONAL
TOILETING: INDEPENDENT
ADEQUATE_TO_COMPLETE_ADL: YES
JUDGMENT_ADEQUATE_SAFELY_COMPLETE_DAILY_ACTIVITIES: YES
DRESSING YOURSELF: INDEPENDENT
FEEDING YOURSELF: INDEPENDENT
BATHING: INDEPENDENT
HEARING - LEFT EAR: FUNCTIONAL
WALKS IN HOME: INDEPENDENT
LACK_OF_TRANSPORTATION: NO
GROOMING: INDEPENDENT

## 2023-12-10 ASSESSMENT — COGNITIVE AND FUNCTIONAL STATUS - GENERAL
PATIENT BASELINE BEDBOUND: NO
MOBILITY SCORE: 24
DAILY ACTIVITIY SCORE: 24

## 2023-12-10 ASSESSMENT — PAIN SCALES - GENERAL
PAINLEVEL_OUTOF10: 0 - NO PAIN
PAINLEVEL_OUTOF10: 1
PAINLEVEL_OUTOF10: 0 - NO PAIN

## 2023-12-10 ASSESSMENT — LIFESTYLE VARIABLES
HOW OFTEN DO YOU HAVE A DRINK CONTAINING ALCOHOL: MONTHLY OR LESS
HAVE YOU EVER FELT YOU SHOULD CUT DOWN ON YOUR DRINKING: NO
AUDIT-C TOTAL SCORE: 1
SKIP TO QUESTIONS 9-10: 1
EVER FELT BAD OR GUILTY ABOUT YOUR DRINKING: NO
AUDIT-C TOTAL SCORE: 1
HAVE PEOPLE ANNOYED YOU BY CRITICIZING YOUR DRINKING: NO
EVER HAD A DRINK FIRST THING IN THE MORNING TO STEADY YOUR NERVES TO GET RID OF A HANGOVER: NO
HOW OFTEN DO YOU HAVE 6 OR MORE DRINKS ON ONE OCCASION: NEVER
HOW MANY STANDARD DRINKS CONTAINING ALCOHOL DO YOU HAVE ON A TYPICAL DAY: 1 OR 2
REASON UNABLE TO ASSESS: NO

## 2023-12-10 ASSESSMENT — PAIN - FUNCTIONAL ASSESSMENT
PAIN_FUNCTIONAL_ASSESSMENT: 0-10

## 2023-12-10 ASSESSMENT — PATIENT HEALTH QUESTIONNAIRE - PHQ9
SUM OF ALL RESPONSES TO PHQ9 QUESTIONS 1 & 2: 0
1. LITTLE INTEREST OR PLEASURE IN DOING THINGS: NOT AT ALL
2. FEELING DOWN, DEPRESSED OR HOPELESS: NOT AT ALL

## 2023-12-10 ASSESSMENT — PAIN DESCRIPTION - DESCRIPTORS
DESCRIPTORS: ACHING
DESCRIPTORS: TIGHTNESS

## 2023-12-10 ASSESSMENT — COLUMBIA-SUICIDE SEVERITY RATING SCALE - C-SSRS
6. HAVE YOU EVER DONE ANYTHING, STARTED TO DO ANYTHING, OR PREPARED TO DO ANYTHING TO END YOUR LIFE?: NO
1. IN THE PAST MONTH, HAVE YOU WISHED YOU WERE DEAD OR WISHED YOU COULD GO TO SLEEP AND NOT WAKE UP?: NO
2. HAVE YOU ACTUALLY HAD ANY THOUGHTS OF KILLING YOURSELF?: NO

## 2023-12-10 NOTE — CONSULTS
Consults  History Of Present Illness:    Behzad Diaz is a 58 y.o. male presenting with acute onset chest pain this morning.  Patient lying in bed in the morning when he developed chest pressure radiating to the left arm.  Pain was persistent later nausea diaphoresis developed had similar episode 2 weeks ago which resolved rapidly.    In the ER troponin 322 jessica to 1600 EKG with ST depressions 2 3 aVF V5 V6 cardiology was notified as well as interventional cardiology who did not feel urgent catheterization required he was started on aspirin Plavix heparin drip admitted for non-STEMI myocardial infarction.    Reviewing care elsewhere he is followed by Jacqueline Feldman type 2 diabetes known to Dr. Dumont infectious disease has been seen for minor issues [facility] and [facility].  Was treated for acute lumbar discitis per Dr. Dumont January 2022 also goes to pain management clinic I did not see any past  or [facility] cardiology encounters does have a history of thoracic disc with myelopathy seen by Dr. George in the spine clinic and [facility] also some cervical spondylopathy myelopathy issues    He has been managed with amlodipine 5 mg lisinopril 5 for hypertension metformin 1000 twice daily for diabetes as needed silendafil for ED    More recently he establish care with Dr. Tomer Arce and is currently on lisinopril 40 Ozempic 1 mg weekly he has a diagnosis of fatty liver blood sugar obesity diabetes hypertension.  He is 350 pounds with a BMI greater than 40 we will continue current medicines ordered an echo and consult interventional cardiology for tomorrow today's interventional cardiologist did not feel urgent cath needed is currently pain-free he has been started on Coreg aspirin high-dose atorvastatin Plavix on the recommendations of interventional cardiology and IV heparin now pain-free no VT arrhythmias Dr. Lim will evaluate for cath tomorrow interventional today  did not feel cath today needed    History reviewed. No pertinent past medical history.    Past Surgical History:   Procedure Laterality Date    CT GUIDED PERCUTANEOUS BIOPSY BONE DEEP  9/10/2021    CT GUIDED PERCUTANEOUS BIOPSY BONE DEEP 9/10/2021 [facility]    OTHER SURGICAL HISTORY  07/30/2020    Scrotal surgery    OTHER SURGICAL HISTORY  07/30/2020    Back surgery       Social History     Socioeconomic History    Marital status: Single     Spouse name: Not on file    Number of children: Not on file    Years of education: Not on file    Highest education level: Not on file   Occupational History    Not on file   Tobacco Use    Smoking status: Some Days     Types: Cigars    Smokeless tobacco: Former   Vaping Use    Vaping Use: Never used   Substance and Sexual Activity    Alcohol use: Yes     Comment: occasional    Drug use: Never    Sexual activity: Not on file   Other Topics Concern    Not on file   Social History Narrative    Not on file     Social Determinants of Health     Financial Resource Strain: Low Risk  (12/10/2023)    Overall Financial Resource Strain (CARDIA)     Difficulty of Paying Living Expenses: Not hard at all   Food Insecurity: Not on file   Transportation Needs: No Transportation Needs (12/10/2023)    PRAPARE - Transportation     Lack of Transportation (Medical): No     Lack of Transportation (Non-Medical): No   Physical Activity: Unknown (12/10/2023)    Exercise Vital Sign     Days of Exercise per Week: 0 days     Minutes of Exercise per Session: Not on file   Stress: Not on file   Social Connections: Not on file   Intimate Partner Violence: Not on file   Housing Stability: Low Risk  (12/10/2023)    Housing Stability Vital Sign     Unable to Pay for Housing in the Last Year: No     Number of Places Lived in the Last Year: 1     Unstable Housing in the Last Year: No       Patient Active Problem List   Diagnosis    Anemia    Type 2 diabetes mellitus, without long-term current use of  "insulin (CMS/HCC)    Epidural abscess    Muscle tightness    Osteomyelitis of spine (CMS/HCC)    Status post lumbar spine surgery for decompression of spinal cord    Abscess    Acute cystitis with hematuria    Autonomic orthostatic hypotension    Candidiasis    Chronic constipation    Acute exacerbation of chronic low back pain    Chronic pain of toe of left foot    Lumbago    Chronic pain of toe of right foot    Pain of left lower extremity    Contusion of right foot    Diarrhea in adult patient    Discitis of lumbar region    Discitis    Dysuria    Elevated liver enzymes    Essential hypertension, benign    Fungal nail infection    Hypokalemia    Lower extremity weakness    Lumbar radiculopathy    Obesity, Class III, BMI 40-49.9 (morbid obesity) (CMS/HCC)    Postlaminectomy syndrome, lumbar region    Sacroiliac joint pain    Sinus tachycardia    Umbilical hernia    ACS (acute coronary syndrome) (CMS/HCC)    NAFLD (nonalcoholic fatty liver disease)    NSTEMI (non-ST elevated myocardial infarction) (CMS/HCC)    Complicated UTI (urinary tract infection)          Last Recorded Vitals:  Vitals:    12/10/23 1130 12/10/23 1200 12/10/23 1300 12/10/23 1609   BP: 116/67 116/57 124/67 (!) 144/93   BP Location:    Left arm   Patient Position:       Pulse: 65 68 79 77   Resp: 18 16 17 20   Temp:  36.4 °C (97.5 °F)  36 °C (96.8 °F)   TempSrc:  Tympanic  Temporal   SpO2: 96% 96% 96% 98%   Weight:    (!) 159 kg (351 lb 6.6 oz)   Height:    1.854 m (6' 0.99\")       Last Labs:  CBC - 12/10/2023:  4:21 AM  6.9 16.3 228    47.8      CMP - 12/10/2023:  4:21 AM  9.0 7.3 43 --- 1.2   _ 4.3 50 68      PTT - 12/10/2023:  4:21 AM  _   _ 33     Troponin I, High Sensitivity   Date/Time Value Ref Range Status   12/10/2023 05:35 AM 1,626 () 0 - 20 ng/L Final     Comment:     Previous result verified on 12/10/2023 0505 on specimen/case [id number] called with component [facility] for procedure Troponin I, High Sensitivity, Initial with value " 322 ng/L.   12/10/2023 04:21  (HH) 0 - 20 ng/L Final     POC HEMOGLOBIN A1c   Date/Time Value Ref Range Status   08/25/2023 10:11 AM 10.8 (A) 4.2 - 6.5 % Final     Hemoglobin A1C   Date/Time Value Ref Range Status   08/27/2021 01:29 PM 6.4 (A) % Final     Comment:          Diagnosis of Diabetes-Adults   Non-Diabetic: < or = 5.6%   Increased risk for developing diabetes: 5.7-6.4%   Diagnostic of diabetes: > or = 6.5%  .       Monitoring of Diabetes                Age (y)     Therapeutic Goal (%)   Adults:          >18           <7.0   Pediatrics:    13-18           <7.5                   7-12           <8.0                   0- 6            7.5-8.5   American Diabetes Association. Diabetes Care 33(S1), Jan 2010.     11/01/2020 07:55 AM 6.0 % Final     Comment:          Diagnosis of Diabetes-Adults   Non-Diabetic: < or = 5.6%   Increased risk for developing diabetes: 5.7-6.4%   Diagnostic of diabetes: > or = 6.5%  .       Monitoring of Diabetes                Age (y)     Therapeutic Goal (%)   Adults:          >18           <7.0   Pediatrics:    13-18           <7.5                   7-12           <8.0                   0- 6            7.5-8.5   American Diabetes Association. Diabetes Care 33(S1), Jan 2010.       LDL Calculated   Date/Time Value Ref Range Status   12/10/2023 05:35 AM 51 <=99 mg/dL Final     Comment:                                 Near   Borderline      AGE      Desirable  Optimal    High     High     Very High     0-19 Y     0 - 109     ---    110-129   >/= 130     ----    20-24 Y     0 - 119     ---    120-159   >/= 160     ----      >24 Y     0 -  99   100-129  130-159   160-189     >/=190       VLDL   Date/Time Value Ref Range Status   12/10/2023 05:35 AM 69 (H) 0 - 40 mg/dL Final   08/27/2021 01:29 PM 24 0 - 40 mg/dL Final   09/17/2020 11:56 AM 30 0 - 40 mg/dL Final      Last I/O:  No intake/output data recorded.    Past Cardiology Tests (Last 3 Years):  EKG:  No results found for this or  "any previous visit from the past 1095 days.    Echo:  No results found for this or any previous visit from the past 1095 days.    Ejection Fractions:  No results found for: \"EF\"  Cath:  No results found for this or any previous visit from the past 1095 days.    Stress Test:  No results found for this or any previous visit from the past 1095 days.    Cardiac Imaging:  No results found for this or any previous visit from the past 1095 days.      Past Medical History:  He has no past medical history on file.    Past Surgical History:  He has a past surgical history that includes Other surgical history (07/30/2020); Other surgical history (07/30/2020); and CT guided percutaneous biopsy bone deep (9/10/2021).      Social History:  He reports that he has been smoking cigars. He has quit using smokeless tobacco. He reports current alcohol use. He reports that he does not use drugs.    Family History:  No family history on file.     Allergies:  Patient has no known allergies.    Inpatient Medications:  Scheduled medications   Medication Dose Route Frequency    amoxicillin  500 mg oral q12h ISAIAH    aspirin  81 mg oral Daily    atorvastatin  80 mg oral Nightly    carvedilol  6.25 mg oral BID with meals    ciprofloxacin  500 mg oral BID    insulin lispro  0-10 Units subcutaneous q4h    insulin lispro  0-10 Units subcutaneous TID with meals    lisinopril  20 mg oral Daily    [START ON 12/11/2023] pantoprazole  40 mg oral Daily before breakfast    Or    [START ON 12/11/2023] pantoprazole  40 mg intravenous Daily before breakfast    perflutren lipid microspheres  0.5-10 mL of dilution intravenous Once in imaging    perflutren protein A microsphere  0.5 mL intravenous Once in imaging    pregabalin  75 mg oral Daily    sulfur hexafluoride microsphr  2 mL intravenous Once in imaging     PRN medications   Medication    acetaminophen    Or    acetaminophen    Or    acetaminophen    acetaminophen    Or    acetaminophen    Or    " acetaminophen    dextrose 10 % in water (D10W)    dextrose    glucagon    heparin    melatonin    metoclopramide    Or    metoclopramide    ondansetron ODT    Or    ondansetron    polyethylene glycol     Continuous Medications   Medication Dose Last Rate    heparin  0-4,000 Units/hr 1,400 Units/hr (12/10/23 1447)    sodium chloride 0.9%  75 mL/hr 75 mL/hr (12/10/23 1158)     Outpatient Medications:  Current Outpatient Medications   Medication Instructions    amoxicillin (AMOXIL) 500 mg, oral, 2 times daily    carvedilol (COREG) 6.25 mg, oral, 2 times daily with meals    ciprofloxacin (CIPRO) 500 mg, oral, 2 times daily    cyclobenzaprine (FLEXERIL) 10 mg, oral, 2 times daily PRN    lisinopril 20 mg, oral, Daily    meloxicam (Mobic) 15 mg tablet Take 1 tablet (15 mg) by mouth if needed for mild pain (1 - 3).    metFORMIN (Glucophage) 1,000 mg tablet 1 tablet, oral, Daily with breakfast, Patient choice    pregabalin (LYRICA) 75 mg, oral, Daily, Per patient statement    semaglutide (OZEMPIC) 1 mg, subcutaneous, Weekly    semaglutide 0.25 mg, subcutaneous, Weekly    semaglutide 0.5 mg, subcutaneous, Weekly     Results for orders placed or performed during the hospital encounter of 12/10/23 (from the past 96 hour(s))   CBC and Auto Differential   Result Value Ref Range    WBC 6.9 4.4 - 11.3 x10*3/uL    nRBC 0.0 0.0 - 0.0 /100 WBCs    RBC 5.33 4.50 - 5.90 x10*6/uL    Hemoglobin 16.3 13.5 - 17.5 g/dL    Hematocrit 47.8 41.0 - 52.0 %    MCV 90 80 - 100 fL    MCH 30.6 26.0 - 34.0 pg    MCHC 34.1 32.0 - 36.0 g/dL    RDW 12.0 11.5 - 14.5 %    Platelets 228 150 - 450 x10*3/uL    Neutrophils % 52.4 40.0 - 80.0 %    Immature Granulocytes %, Automated 0.3 0.0 - 0.9 %    Lymphocytes % 38.3 13.0 - 44.0 %    Monocytes % 6.0 2.0 - 10.0 %    Eosinophils % 2.3 0.0 - 6.0 %    Basophils % 0.7 0.0 - 2.0 %    Neutrophils Absolute 3.61 1.20 - 7.70 x10*3/uL    Immature Granulocytes Absolute, Automated 0.02 0.00 - 0.70 x10*3/uL    Lymphocytes  Absolute 2.64 1.20 - 4.80 x10*3/uL    Monocytes Absolute 0.41 0.10 - 1.00 x10*3/uL    Eosinophils Absolute 0.16 0.00 - 0.70 x10*3/uL    Basophils Absolute 0.05 0.00 - 0.10 x10*3/uL   Comprehensive Metabolic Panel   Result Value Ref Range    Glucose 353 (H) 74 - 99 mg/dL    Sodium 131 (L) 136 - 145 mmol/L    Potassium 4.7 3.5 - 5.3 mmol/L    Chloride 97 (L) 98 - 107 mmol/L    Bicarbonate 21 21 - 32 mmol/L    Anion Gap 18 10 - 20 mmol/L    Urea Nitrogen 17 6 - 23 mg/dL    Creatinine 1.15 0.50 - 1.30 mg/dL    eGFR 75 >60 mL/min/1.73m*2    Calcium 9.0 8.6 - 10.3 mg/dL    Albumin 4.3 3.4 - 5.0 g/dL    Alkaline Phosphatase 68 33 - 120 U/L    Total Protein 7.3 6.4 - 8.2 g/dL    AST 43 (H) 9 - 39 U/L    Bilirubin, Total 1.2 0.0 - 1.2 mg/dL    ALT 50 10 - 52 U/L   Magnesium   Result Value Ref Range    Magnesium 1.63 1.60 - 2.40 mg/dL   Troponin I, High Sensitivity, Initial   Result Value Ref Range    Troponin I, High Sensitivity 322 (HH) 0 - 20 ng/L   Light Blue Top   Result Value Ref Range    Extra Tube Hold for add-ons.    APTT   Result Value Ref Range    aPTT 33 27 - 38 seconds   Troponin, High Sensitivity, 1 Hour   Result Value Ref Range    Troponin I, High Sensitivity 1,626 (HH) 0 - 20 ng/L   Lipid panel   Result Value Ref Range    Cholesterol 145 0 - 199 mg/dL    HDL-Cholesterol 25.2 mg/dL    Cholesterol/HDL Ratio 5.8     LDL Calculated 51 <=99 mg/dL    VLDL 69 (H) 0 - 40 mg/dL    Triglycerides 343 (H) 0 - 149 mg/dL    Non HDL Cholesterol 120 0 - 149 mg/dL   Light Blue Top   Result Value Ref Range    Extra Tube Hold for add-ons.    POCT GLUCOSE   Result Value Ref Range    POCT Glucose 352 (H) 74 - 99 mg/dL   Heparin Assay, UFH   Result Value Ref Range    Heparin Unfractionated >2.0 (HH) See Comment Below for Therapeutic Ranges IU/mL   POCT GLUCOSE   Result Value Ref Range    POCT Glucose 221 (H) 74 - 99 mg/dL   Heparin Assay, UFH   Result Value Ref Range    Heparin Unfractionated <0.1 See Comment Below for Therapeutic  Ranges IU/mL   POCT GLUCOSE   Result Value Ref Range    POCT Glucose 272 (H) 74 - 99 mg/dL          Physical Exam:  Subjective:   Examination:   General Examination:   General Appearance: Well developed, well nourished, in no acute distress. Morbid obesity  Head: normocephalic, atraumatic   Eyes: Anicteric sclera. Pupils are equally round and reactive to light.  Extraocular movements are intact.    Ears: normal   Oral: Cavity: mucosa moist.   Throat: clear.   Neck/Thyroid: neck supple, full range of motion, no cervical lymphadenopathy.   Skin: warm and dry, no suspicious lesions.    Heart: regular rate and rhythm, S1, S2 normal, no murmurs.   Lungs: clear to auscultation bilaterally.   Abdomen: soft, non-tender, non-distended, bowl sound present, normal.   Extremities: no clubbing, no cyanosis, no edema.   Neuro: non-focal, motor strength normal upper lower extremities, sensory exam intact.       Assessment/Plan   I24.2 Non-STEMI myocardial infarction with morbid obesity diabetes hypertension discussed with interventional cardiology on aspirin and Plavix IV heparin will consult interventional cardiology again tomorrow and order echocardiogram  I10.0 Hypertension on lisinopril previously amlodipine  E66.02 morbid obesity  E11.9 type 2 diabetes       Code Status:  Full Code    I spent 45 minutes in the professional and overall care of this patient.        Ramos Post MD

## 2023-12-10 NOTE — H&P (VIEW-ONLY)
Consults  History Of Present Illness:    Behzad Diaz is a 58 y.o. male presenting with acute onset chest pain this morning.  Patient lying in bed in the morning when he developed chest pressure radiating to the left arm.  Pain was persistent later nausea diaphoresis developed had similar episode 2 weeks ago which resolved rapidly.    In the ER troponin 322 jessica to 1600 EKG with ST depressions 2 3 aVF V5 V6 cardiology was notified as well as interventional cardiology who did not feel urgent catheterization required he was started on aspirin Plavix heparin drip admitted for non-STEMI myocardial infarction.    Reviewing care elsewhere he is followed by Jacqueline Feldman type 2 diabetes known to Dr. Dumont infectious disease has been seen for minor issues [facility] and [facility].  Was treated for acute lumbar discitis per Dr. Dumont January 2022 also goes to pain management clinic I did not see any past  or [facility] cardiology encounters does have a history of thoracic disc with myelopathy seen by Dr. George in the spine clinic and [facility] also some cervical spondylopathy myelopathy issues    He has been managed with amlodipine 5 mg lisinopril 5 for hypertension metformin 1000 twice daily for diabetes as needed silendafil for ED    More recently he establish care with Dr. Tomer Arce and is currently on lisinopril 40 Ozempic 1 mg weekly he has a diagnosis of fatty liver blood sugar obesity diabetes hypertension.  He is 350 pounds with a BMI greater than 40 we will continue current medicines ordered an echo and consult interventional cardiology for tomorrow today's interventional cardiologist did not feel urgent cath needed is currently pain-free he has been started on Coreg aspirin high-dose atorvastatin Plavix on the recommendations of interventional cardiology and IV heparin now pain-free no VT arrhythmias Dr. Lim will evaluate for cath tomorrow interventional today  did not feel cath today needed    History reviewed. No pertinent past medical history.    Past Surgical History:   Procedure Laterality Date    CT GUIDED PERCUTANEOUS BIOPSY BONE DEEP  9/10/2021    CT GUIDED PERCUTANEOUS BIOPSY BONE DEEP 9/10/2021 [facility] CLINICAL LEGACY    OTHER SURGICAL HISTORY  07/30/2020    Scrotal surgery    OTHER SURGICAL HISTORY  07/30/2020    Back surgery       Social History     Socioeconomic History    Marital status: Single     Spouse name: Not on file    Number of children: Not on file    Years of education: Not on file    Highest education level: Not on file   Occupational History    Not on file   Tobacco Use    Smoking status: Some Days     Types: Cigars    Smokeless tobacco: Former   Vaping Use    Vaping Use: Never used   Substance and Sexual Activity    Alcohol use: Yes     Comment: occasional    Drug use: Never    Sexual activity: Not on file   Other Topics Concern    Not on file   Social History Narrative    Not on file     Social Determinants of Health     Financial Resource Strain: Low Risk  (12/10/2023)    Overall Financial Resource Strain (CARDIA)     Difficulty of Paying Living Expenses: Not hard at all   Food Insecurity: Not on file   Transportation Needs: No Transportation Needs (12/10/2023)    PRAPARE - Transportation     Lack of Transportation (Medical): No     Lack of Transportation (Non-Medical): No   Physical Activity: Unknown (12/10/2023)    Exercise Vital Sign     Days of Exercise per Week: 0 days     Minutes of Exercise per Session: Not on file   Stress: Not on file   Social Connections: Not on file   Intimate Partner Violence: Not on file   Housing Stability: Low Risk  (12/10/2023)    Housing Stability Vital Sign     Unable to Pay for Housing in the Last Year: No     Number of Places Lived in the Last Year: 1     Unstable Housing in the Last Year: No       Patient Active Problem List   Diagnosis    Anemia    Type 2 diabetes mellitus, without long-term current use of  "insulin (CMS/HCC)    Epidural abscess    Muscle tightness    Osteomyelitis of spine (CMS/HCC)    Status post lumbar spine surgery for decompression of spinal cord    Abscess    Acute cystitis with hematuria    Autonomic orthostatic hypotension    Candidiasis    Chronic constipation    Acute exacerbation of chronic low back pain    Chronic pain of toe of left foot    Lumbago    Chronic pain of toe of right foot    Pain of left lower extremity    Contusion of right foot    Diarrhea in adult patient    Discitis of lumbar region    Discitis    Dysuria    Elevated liver enzymes    Essential hypertension, benign    Fungal nail infection    Hypokalemia    Lower extremity weakness    Lumbar radiculopathy    Obesity, Class III, BMI 40-49.9 (morbid obesity) (CMS/HCC)    Postlaminectomy syndrome, lumbar region    Sacroiliac joint pain    Sinus tachycardia    Umbilical hernia    ACS (acute coronary syndrome) (CMS/HCC)    NAFLD (nonalcoholic fatty liver disease)    NSTEMI (non-ST elevated myocardial infarction) (CMS/HCC)    Complicated UTI (urinary tract infection)          Last Recorded Vitals:  Vitals:    12/10/23 1130 12/10/23 1200 12/10/23 1300 12/10/23 1609   BP: 116/67 116/57 124/67 (!) 144/93   BP Location:    Left arm   Patient Position:       Pulse: 65 68 79 77   Resp: 18 16 17 20   Temp:  36.4 °C (97.5 °F)  36 °C (96.8 °F)   TempSrc:  Tympanic  Temporal   SpO2: 96% 96% 96% 98%   Weight:    (!) 159 kg (351 lb 6.6 oz)   Height:    1.854 m (6' 0.99\")       Last Labs:  CBC - 12/10/2023:  4:21 AM  6.9 16.3 228    47.8      CMP - 12/10/2023:  4:21 AM  9.0 7.3 43 --- 1.2   _ 4.3 50 68      PTT - 12/10/2023:  4:21 AM  _   _ 33     Troponin I, High Sensitivity   Date/Time Value Ref Range Status   12/10/2023 05:35 AM 1,626 () 0 - 20 ng/L Final     Comment:     Previous result verified on 12/10/2023 0505 on specimen/case [id number] called with component [facility] for procedure Troponin I, High Sensitivity, Initial with value " 322 ng/L.   12/10/2023 04:21  (HH) 0 - 20 ng/L Final     POC HEMOGLOBIN A1c   Date/Time Value Ref Range Status   08/25/2023 10:11 AM 10.8 (A) 4.2 - 6.5 % Final     Hemoglobin A1C   Date/Time Value Ref Range Status   08/27/2021 01:29 PM 6.4 (A) % Final     Comment:          Diagnosis of Diabetes-Adults   Non-Diabetic: < or = 5.6%   Increased risk for developing diabetes: 5.7-6.4%   Diagnostic of diabetes: > or = 6.5%  .       Monitoring of Diabetes                Age (y)     Therapeutic Goal (%)   Adults:          >18           <7.0   Pediatrics:    13-18           <7.5                   7-12           <8.0                   0- 6            7.5-8.5   American Diabetes Association. Diabetes Care 33(S1), Jan 2010.     11/01/2020 07:55 AM 6.0 % Final     Comment:          Diagnosis of Diabetes-Adults   Non-Diabetic: < or = 5.6%   Increased risk for developing diabetes: 5.7-6.4%   Diagnostic of diabetes: > or = 6.5%  .       Monitoring of Diabetes                Age (y)     Therapeutic Goal (%)   Adults:          >18           <7.0   Pediatrics:    13-18           <7.5                   7-12           <8.0                   0- 6            7.5-8.5   American Diabetes Association. Diabetes Care 33(S1), Jan 2010.       LDL Calculated   Date/Time Value Ref Range Status   12/10/2023 05:35 AM 51 <=99 mg/dL Final     Comment:                                 Near   Borderline      AGE      Desirable  Optimal    High     High     Very High     0-19 Y     0 - 109     ---    110-129   >/= 130     ----    20-24 Y     0 - 119     ---    120-159   >/= 160     ----      >24 Y     0 -  99   100-129  130-159   160-189     >/=190       VLDL   Date/Time Value Ref Range Status   12/10/2023 05:35 AM 69 (H) 0 - 40 mg/dL Final   08/27/2021 01:29 PM 24 0 - 40 mg/dL Final   09/17/2020 11:56 AM 30 0 - 40 mg/dL Final      Last I/O:  No intake/output data recorded.    Past Cardiology Tests (Last 3 Years):  EKG:  No results found for this or  "any previous visit from the past 1095 days.    Echo:  No results found for this or any previous visit from the past 1095 days.    Ejection Fractions:  No results found for: \"EF\"  Cath:  No results found for this or any previous visit from the past 1095 days.    Stress Test:  No results found for this or any previous visit from the past 1095 days.    Cardiac Imaging:  No results found for this or any previous visit from the past 1095 days.      Past Medical History:  He has no past medical history on file.    Past Surgical History:  He has a past surgical history that includes Other surgical history (07/30/2020); Other surgical history (07/30/2020); and CT guided percutaneous biopsy bone deep (9/10/2021).      Social History:  He reports that he has been smoking cigars. He has quit using smokeless tobacco. He reports current alcohol use. He reports that he does not use drugs.    Family History:  No family history on file.     Allergies:  Patient has no known allergies.    Inpatient Medications:  Scheduled medications   Medication Dose Route Frequency    amoxicillin  500 mg oral q12h ISAIAH    aspirin  81 mg oral Daily    atorvastatin  80 mg oral Nightly    carvedilol  6.25 mg oral BID with meals    ciprofloxacin  500 mg oral BID    insulin lispro  0-10 Units subcutaneous q4h    insulin lispro  0-10 Units subcutaneous TID with meals    lisinopril  20 mg oral Daily    [START ON 12/11/2023] pantoprazole  40 mg oral Daily before breakfast    Or    [START ON 12/11/2023] pantoprazole  40 mg intravenous Daily before breakfast    perflutren lipid microspheres  0.5-10 mL of dilution intravenous Once in imaging    perflutren protein A microsphere  0.5 mL intravenous Once in imaging    pregabalin  75 mg oral Daily    sulfur hexafluoride microsphr  2 mL intravenous Once in imaging     PRN medications   Medication    acetaminophen    Or    acetaminophen    Or    acetaminophen    acetaminophen    Or    acetaminophen    Or    " acetaminophen    dextrose 10 % in water (D10W)    dextrose    glucagon    heparin    melatonin    metoclopramide    Or    metoclopramide    ondansetron ODT    Or    ondansetron    polyethylene glycol     Continuous Medications   Medication Dose Last Rate    heparin  0-4,000 Units/hr 1,400 Units/hr (12/10/23 1447)    sodium chloride 0.9%  75 mL/hr 75 mL/hr (12/10/23 1158)     Outpatient Medications:  Current Outpatient Medications   Medication Instructions    amoxicillin (AMOXIL) 500 mg, oral, 2 times daily    carvedilol (COREG) 6.25 mg, oral, 2 times daily with meals    ciprofloxacin (CIPRO) 500 mg, oral, 2 times daily    cyclobenzaprine (FLEXERIL) 10 mg, oral, 2 times daily PRN    lisinopril 20 mg, oral, Daily    meloxicam (Mobic) 15 mg tablet Take 1 tablet (15 mg) by mouth if needed for mild pain (1 - 3).    metFORMIN (Glucophage) 1,000 mg tablet 1 tablet, oral, Daily with breakfast, Patient choice    pregabalin (LYRICA) 75 mg, oral, Daily, Per patient statement    semaglutide (OZEMPIC) 1 mg, subcutaneous, Weekly    semaglutide 0.25 mg, subcutaneous, Weekly    semaglutide 0.5 mg, subcutaneous, Weekly     Results for orders placed or performed during the hospital encounter of 12/10/23 (from the past 96 hour(s))   CBC and Auto Differential   Result Value Ref Range    WBC 6.9 4.4 - 11.3 x10*3/uL    nRBC 0.0 0.0 - 0.0 /100 WBCs    RBC 5.33 4.50 - 5.90 x10*6/uL    Hemoglobin 16.3 13.5 - 17.5 g/dL    Hematocrit 47.8 41.0 - 52.0 %    MCV 90 80 - 100 fL    MCH 30.6 26.0 - 34.0 pg    MCHC 34.1 32.0 - 36.0 g/dL    RDW 12.0 11.5 - 14.5 %    Platelets 228 150 - 450 x10*3/uL    Neutrophils % 52.4 40.0 - 80.0 %    Immature Granulocytes %, Automated 0.3 0.0 - 0.9 %    Lymphocytes % 38.3 13.0 - 44.0 %    Monocytes % 6.0 2.0 - 10.0 %    Eosinophils % 2.3 0.0 - 6.0 %    Basophils % 0.7 0.0 - 2.0 %    Neutrophils Absolute 3.61 1.20 - 7.70 x10*3/uL    Immature Granulocytes Absolute, Automated 0.02 0.00 - 0.70 x10*3/uL    Lymphocytes  Absolute 2.64 1.20 - 4.80 x10*3/uL    Monocytes Absolute 0.41 0.10 - 1.00 x10*3/uL    Eosinophils Absolute 0.16 0.00 - 0.70 x10*3/uL    Basophils Absolute 0.05 0.00 - 0.10 x10*3/uL   Comprehensive Metabolic Panel   Result Value Ref Range    Glucose 353 (H) 74 - 99 mg/dL    Sodium 131 (L) 136 - 145 mmol/L    Potassium 4.7 3.5 - 5.3 mmol/L    Chloride 97 (L) 98 - 107 mmol/L    Bicarbonate 21 21 - 32 mmol/L    Anion Gap 18 10 - 20 mmol/L    Urea Nitrogen 17 6 - 23 mg/dL    Creatinine 1.15 0.50 - 1.30 mg/dL    eGFR 75 >60 mL/min/1.73m*2    Calcium 9.0 8.6 - 10.3 mg/dL    Albumin 4.3 3.4 - 5.0 g/dL    Alkaline Phosphatase 68 33 - 120 U/L    Total Protein 7.3 6.4 - 8.2 g/dL    AST 43 (H) 9 - 39 U/L    Bilirubin, Total 1.2 0.0 - 1.2 mg/dL    ALT 50 10 - 52 U/L   Magnesium   Result Value Ref Range    Magnesium 1.63 1.60 - 2.40 mg/dL   Troponin I, High Sensitivity, Initial   Result Value Ref Range    Troponin I, High Sensitivity 322 (HH) 0 - 20 ng/L   Light Blue Top   Result Value Ref Range    Extra Tube Hold for add-ons.    APTT   Result Value Ref Range    aPTT 33 27 - 38 seconds   Troponin, High Sensitivity, 1 Hour   Result Value Ref Range    Troponin I, High Sensitivity 1,626 (HH) 0 - 20 ng/L   Lipid panel   Result Value Ref Range    Cholesterol 145 0 - 199 mg/dL    HDL-Cholesterol 25.2 mg/dL    Cholesterol/HDL Ratio 5.8     LDL Calculated 51 <=99 mg/dL    VLDL 69 (H) 0 - 40 mg/dL    Triglycerides 343 (H) 0 - 149 mg/dL    Non HDL Cholesterol 120 0 - 149 mg/dL   Light Blue Top   Result Value Ref Range    Extra Tube Hold for add-ons.    POCT GLUCOSE   Result Value Ref Range    POCT Glucose 352 (H) 74 - 99 mg/dL   Heparin Assay, UFH   Result Value Ref Range    Heparin Unfractionated >2.0 (HH) See Comment Below for Therapeutic Ranges IU/mL   POCT GLUCOSE   Result Value Ref Range    POCT Glucose 221 (H) 74 - 99 mg/dL   Heparin Assay, UFH   Result Value Ref Range    Heparin Unfractionated <0.1 See Comment Below for Therapeutic  Ranges IU/mL   POCT GLUCOSE   Result Value Ref Range    POCT Glucose 272 (H) 74 - 99 mg/dL          Physical Exam:  Subjective:   Examination:   General Examination:   General Appearance: Well developed, well nourished, in no acute distress. Morbid obesity  Head: normocephalic, atraumatic   Eyes: Anicteric sclera. Pupils are equally round and reactive to light.  Extraocular movements are intact.    Ears: normal   Oral: Cavity: mucosa moist.   Throat: clear.   Neck/Thyroid: neck supple, full range of motion, no cervical lymphadenopathy.   Skin: warm and dry, no suspicious lesions.    Heart: regular rate and rhythm, S1, S2 normal, no murmurs.   Lungs: clear to auscultation bilaterally.   Abdomen: soft, non-tender, non-distended, bowl sound present, normal.   Extremities: no clubbing, no cyanosis, no edema.   Neuro: non-focal, motor strength normal upper lower extremities, sensory exam intact.       Assessment/Plan   I24.2 Non-STEMI myocardial infarction with morbid obesity diabetes hypertension discussed with interventional cardiology on aspirin and Plavix IV heparin will consult interventional cardiology again tomorrow and order echocardiogram  I10.0 Hypertension on lisinopril previously amlodipine  E66.02 morbid obesity  E11.9 type 2 diabetes       Code Status:  Full Code    I spent 45 minutes in the professional and overall care of this patient.        Ramos Post MD

## 2023-12-10 NOTE — H&P
History Of Present Illness  Behzad Diaz is a 58 y.o. male presenting with acute onset chest pain this AM. Patient states she was laying in bed this AM, suddenly developed chest pressure with radiation to the left arm this AM. He said pain was persistent, initially no other associated symptoms, but later developed nausea and diaphoresis. Patient states he had similar symptoms about 2 weeks ago, but resolved quickly and spontaneously, so he did not seek medical attention then. Patient denies any fever, chills, SOB, abd pain, diarrhea, or any other recent illness.     While in the ER, vitals were stable. Labs significant for trop of 322 --> 1600. EKG showed ST depression in II, III, AVF, V5 and V6. Case was discussed with cardiology, recommend asp/plavix/heparin drip. Patient will be admitted to the hospital for NSTEMI workup. Patient was chest pain free at the time of my evaluation.      Past Medical History  He has no past medical history on file.    Surgical History  He has a past surgical history that includes Other surgical history (07/30/2020); Other surgical history (07/30/2020); and CT guided percutaneous biopsy bone deep (9/10/2021).     Social History  He reports that he has been smoking cigars. He has quit using smokeless tobacco. He reports current alcohol use. He reports that he does not use drugs.    Family History  No family history on file.     Allergies  Patient has no known allergies.    Review of Systems   ROS: 12 systems reviewed and negative except per HPI above     Physical Exam by System:     Constitutional: Well developed, awake/alert/oriented x3, no distress, alert and cooperative, morbid obese   ENMT: mucous membranes moist   Head/Neck: Neck supple   Respiratory/Thorax: Patent airways, CTAB, normal breath sounds with good chest expansion, thorax symmetric   Cardiovascular: Regular, rate and rhythm, no murmurs, 2+ equal pulses of the extremities, normal S 1and S 2   Gastrointestinal:  "Nondistended, soft, non-tender, no rebound tenderness    Musculoskeletal: ROM intact, no joint swelling, normal strength   Extremities: normal extremities   Neurological: alert and oriented x3, intact senses, motor       Last Recorded Vitals  Blood pressure 116/57, pulse 68, temperature 36.4 °C (97.5 °F), temperature source Tympanic, resp. rate 16, height 1.854 m (6' 1\"), weight (!) 159 kg (350 lb), SpO2 96 %.    Relevant Results  Results for orders placed or performed during the hospital encounter of 12/10/23 (from the past 24 hour(s))   CBC and Auto Differential   Result Value Ref Range    WBC 6.9 4.4 - 11.3 x10*3/uL    nRBC 0.0 0.0 - 0.0 /100 WBCs    RBC 5.33 4.50 - 5.90 x10*6/uL    Hemoglobin 16.3 13.5 - 17.5 g/dL    Hematocrit 47.8 41.0 - 52.0 %    MCV 90 80 - 100 fL    MCH 30.6 26.0 - 34.0 pg    MCHC 34.1 32.0 - 36.0 g/dL    RDW 12.0 11.5 - 14.5 %    Platelets 228 150 - 450 x10*3/uL    Neutrophils % 52.4 40.0 - 80.0 %    Immature Granulocytes %, Automated 0.3 0.0 - 0.9 %    Lymphocytes % 38.3 13.0 - 44.0 %    Monocytes % 6.0 2.0 - 10.0 %    Eosinophils % 2.3 0.0 - 6.0 %    Basophils % 0.7 0.0 - 2.0 %    Neutrophils Absolute 3.61 1.20 - 7.70 x10*3/uL    Immature Granulocytes Absolute, Automated 0.02 0.00 - 0.70 x10*3/uL    Lymphocytes Absolute 2.64 1.20 - 4.80 x10*3/uL    Monocytes Absolute 0.41 0.10 - 1.00 x10*3/uL    Eosinophils Absolute 0.16 0.00 - 0.70 x10*3/uL    Basophils Absolute 0.05 0.00 - 0.10 x10*3/uL   Comprehensive Metabolic Panel   Result Value Ref Range    Glucose 353 (H) 74 - 99 mg/dL    Sodium 131 (L) 136 - 145 mmol/L    Potassium 4.7 3.5 - 5.3 mmol/L    Chloride 97 (L) 98 - 107 mmol/L    Bicarbonate 21 21 - 32 mmol/L    Anion Gap 18 10 - 20 mmol/L    Urea Nitrogen 17 6 - 23 mg/dL    Creatinine 1.15 0.50 - 1.30 mg/dL    eGFR 75 >60 mL/min/1.73m*2    Calcium 9.0 8.6 - 10.3 mg/dL    Albumin 4.3 3.4 - 5.0 g/dL    Alkaline Phosphatase 68 33 - 120 U/L    Total Protein 7.3 6.4 - 8.2 g/dL    AST 43 " (H) 9 - 39 U/L    Bilirubin, Total 1.2 0.0 - 1.2 mg/dL    ALT 50 10 - 52 U/L   Magnesium   Result Value Ref Range    Magnesium 1.63 1.60 - 2.40 mg/dL   Troponin I, High Sensitivity, Initial   Result Value Ref Range    Troponin I, High Sensitivity 322 (HH) 0 - 20 ng/L   Light Blue Top   Result Value Ref Range    Extra Tube Hold for add-ons.    APTT   Result Value Ref Range    aPTT 33 27 - 38 seconds   Troponin, High Sensitivity, 1 Hour   Result Value Ref Range    Troponin I, High Sensitivity 1,626 (HH) 0 - 20 ng/L   Lipid panel   Result Value Ref Range    Cholesterol 145 0 - 199 mg/dL    HDL-Cholesterol 25.2 mg/dL    Cholesterol/HDL Ratio 5.8     LDL Calculated 51 <=99 mg/dL    VLDL 69 (H) 0 - 40 mg/dL    Triglycerides 343 (H) 0 - 149 mg/dL    Non HDL Cholesterol 120 0 - 149 mg/dL   Light Blue Top   Result Value Ref Range    Extra Tube Hold for add-ons.    POCT GLUCOSE   Result Value Ref Range    POCT Glucose 352 (H) 74 - 99 mg/dL   Heparin Assay, UFH   Result Value Ref Range    Heparin Unfractionated >2.0 (HH) See Comment Below for Therapeutic Ranges IU/mL   POCT GLUCOSE   Result Value Ref Range    POCT Glucose 221 (H) 74 - 99 mg/dL      Scheduled medications  aspirin, 81 mg, oral, Daily  atorvastatin, 80 mg, oral, Nightly  insulin lispro, 0-10 Units, subcutaneous, q4h  [START ON 12/11/2023] pantoprazole, 40 mg, oral, Daily before breakfast   Or  [START ON 12/11/2023] pantoprazole, 40 mg, intravenous, Daily before breakfast  perflutren lipid microspheres, 0.5-10 mL of dilution, intravenous, Once in imaging  perflutren protein A microsphere, 0.5 mL, intravenous, Once in imaging  sulfur hexafluoride microsphr, 2 mL, intravenous, Once in imaging      Continuous medications  heparin, 0-4,000 Units/hr, Last Rate: Stopped (12/10/23 1215)  sodium chloride 0.9%, 75 mL/hr, Last Rate: 75 mL/hr (12/10/23 1158)      PRN medications  PRN medications: acetaminophen **OR** acetaminophen **OR** acetaminophen, acetaminophen **OR**  acetaminophen **OR** acetaminophen, dextrose 10 % in water (D10W), dextrose, glucagon, heparin, melatonin, metoclopramide **OR** metoclopramide, ondansetron ODT **OR** ondansetron, polyethylene glycol          Assessment/Plan   Principal Problem:    ACS (acute coronary syndrome) (CMS/[facility])  Active Problems:    Type 2 diabetes mellitus, without long-term current use of insulin (CMS/[facility])    Osteomyelitis of spine (CMS/[facility])    Discitis    Dysuria    Essential hypertension, benign    Obesity, Class III, BMI 40-49.9 (morbid obesity) (CMS/[facility])    NAFLD (nonalcoholic fatty liver disease)    NSTEMI (non-ST elevated myocardial infarction) (CMS/[facility])    Complicated UTI (urinary tract infection)      Plan  -trop 322---> 1626, EKG showed ST depression in II, III, AvF / V5 and V6  -continue asp/statin per cardiology recommendation  -obtain echo  -cardiology consulted, appreciate recommendations  -patient may need LFC for definitive workup  -continue heparin drip  -patient was plavix loaded in ED  -continue SSI coverage  -continue home Amoxicillin (Chronic discitis following ID), and Cipro (complicated UTI)  -other home meds resumed  -patient is full code  -dvt prophylaxis on heparin drip already         I spent 65 minutes in the professional and overall care of this patient.    SIGNATURE: Reg Anton MD PATIENT NAME: Behzad Daiz   DATE: December 10, 2023 MRN: [id number]   TIME: 12:59 PM

## 2023-12-10 NOTE — ED PROVIDER NOTES
EMERGENCY DEPARTMENT ENCOUNTER      Pt Name: Behzad Diaz  MRN: [id number]  Birthdate 1965  Date of evaluation: 12/10/2023    HISTORY OF PRESENT ILLNESS    Behzad Diaz is an 58 y.o. male with history including chronic discitis, hypertension, hyperlipidemia, type 2 diabetes presenting to the emergency department for acute chest pain.  Patient states chest pain started approximately 45 minutes prior to arrival.  He was laying in bed at the time not exerting himself.  He describes it as pressure on the left side of his chest.  Pain radiated to his left arm and up into his jaw.  Patient states that he called EMS because he started to have diaphoresis and felt nauseous.  He looked it up online and was concerned he had signs of a heart attack.  By the time EMS arrived his pain had improved.  Patient was given aspirin and nitro by EMS prior to arrival.  He states the nitro improved his pain now 1/10.  Patient did have an episode of this chest pressure a week ago but only lasted a few seconds so was not concerned at that time.  He has a history of cigar use.  Patient's mother had a heart attack at 61 requiring stents.      PAST MEDICAL HISTORY   History reviewed. No pertinent past medical history.    SURGICAL HISTORY       Past Surgical History:   Procedure Laterality Date    CT GUIDED PERCUTANEOUS BIOPSY BONE DEEP  9/10/2021    CT GUIDED PERCUTANEOUS BIOPSY BONE DEEP 9/10/2021 [facility] CLINICAL LEGACY    OTHER SURGICAL HISTORY  07/30/2020    Scrotal surgery    OTHER SURGICAL HISTORY  07/30/2020    Back surgery       CURRENT MEDICATIONS       Current Discharge Medication List        CONTINUE these medications which have NOT CHANGED    Details   amoxicillin (Amoxil) 500 mg capsule Take 1 capsule (500 mg) by mouth twice a day.      carvedilol (Coreg) 6.25 mg tablet TAKE 1 TABLET BY MOUTH TWICE A DAY WITH MEALS  Qty: 180 tablet, Refills: 3    Associated Diagnoses: Essential (primary) hypertension      ciprofloxacin (Cipro) 500  mg tablet Take 1 tablet (500 mg) by mouth 2 times a day.      cyclobenzaprine (Flexeril) 10 mg tablet Take 1 tablet (10 mg) by mouth 2 times a day as needed for muscle spasms.      lisinopril 20 mg tablet Take 1 tablet (20 mg) by mouth once daily.  Qty: 90 tablet, Refills: 3    Associated Diagnoses: Essential hypertension, benign      meloxicam (Mobic) 15 mg tablet Take 1 tablet (15 mg) by mouth if needed for mild pain (1 - 3).      metFORMIN (Glucophage) 1,000 mg tablet Take 1 tablet (1,000 mg) by mouth once daily with a meal. Patient choice      pregabalin (Lyrica) 75 mg capsule Take 1 capsule (75 mg) by mouth once daily. Per patient statement      semaglutide (OZEMPIC) 1 mg/dose (4 mg/3 mL) pen injector Inject 1 mg under the skin 1 (one) time per week.  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  Qty: 2 mL, Refills: 0    Associated Diagnoses: Diabetes mellitus due to underlying condition with diabetic chronic kidney disease, unspecified CKD stage, unspecified whether long term insulin use (CMS/HCC)      semaglutide 0.25 mg or 0.5 mg (2 mg/3 mL) pen injector Inject 0.5 mg under the skin 1 (one) time per week.  Qty: 2 mL, Refills: 0    Associated Diagnoses: Diabetes mellitus due to underlying condition with diabetic chronic kidney disease, unspecified CKD stage, unspecified whether long term insulin use (CMS/HCC)             ALLERGIES     Patient has no known allergies.    FAMILY HISTORY     No family history on file.     SOCIAL HISTORY       Social History     Socioeconomic History    Marital status: Single     Spouse name: None    Number of children: None    Years of education: None    Highest education level: None   Occupational History     None   Tobacco Use    Smoking status: Some Days     Types: Cigars    Smokeless tobacco: Former   Vaping Use    Vaping Use: Never used   Substance and Sexual Activity    Alcohol use: Yes     Comment: occasional    Drug use: Never    Sexual activity: None   Other Topics Concern    None   Social History Narrative    None     Social Determinants of Health     Financial Resource Strain: Low Risk  (12/10/2023)    Overall Financial Resource Strain (CARDIA)     Difficulty of Paying Living Expenses: Not hard at all   Food Insecurity: Not on file   Transportation Needs: No Transportation Needs (12/10/2023)    PRAPARE - Transportation     Lack of Transportation (Medical): No     Lack of Transportation (Non-Medical): No   Physical Activity: Unknown (12/10/2023)    Exercise Vital Sign     Days of Exercise per Week: 0 days     Minutes of Exercise per Session: Not on file   Stress: Not on file   Social Connections: Not on file   Intimate Partner Violence: Not on file   Housing Stability: Low Risk  (12/10/2023)    Housing Stability Vital Sign     Unable to Pay for Housing in the Last Year: No     Number of Places Lived in the Last Year: 1     Unstable Housing in the Last Year: No       PHYSICAL EXAM       ED Triage Vitals   Temp Heart Rate Resp BP   12/10/23 0406 12/10/23 0409 12/10/23 0409 12/10/23 0409   35.3 °C (95.5 °F) 98 20 132/79      SpO2 Temp Source Heart Rate Source Patient Position   12/10/23 0409 12/10/23 0406 12/10/23 0409 12/10/23 0409   97 % Temporal Monitor Lying      BP Location FiO2 (%)     12/10/23 0409 --     Left arm        Physical Exam  Vitals and nursing note reviewed.   Constitutional:       General: He is not in acute distress.     Appearance: He is well-developed. He is obese.   HENT:      Head: Normocephalic and atraumatic.   Eyes:      Conjunctiva/sclera: Conjunctivae normal.   Cardiovascular:      Rate and Rhythm: Normal rate and regular rhythm.      Pulses:           Radial pulses are 2+ on the right  side and 2+ on the left side.      Heart sounds: No murmur heard.  Pulmonary:      Effort: Pulmonary effort is normal. No respiratory distress.      Breath sounds: Normal breath sounds.   Chest:      Chest wall: No tenderness.   Abdominal:      Palpations: Abdomen is soft.      Tenderness: There is no abdominal tenderness.   Musculoskeletal:         General: No swelling.      Cervical back: Neck supple.   Skin:     General: Skin is warm and dry.      Capillary Refill: Capillary refill takes less than 2 seconds.   Neurological:      General: No focal deficit present.      Mental Status: He is alert and oriented to person, place, and time.   Psychiatric:         Mood and Affect: Mood normal.          DIAGNOSTIC RESULTS     LABS:  Labs Reviewed   COMPREHENSIVE METABOLIC PANEL - Abnormal       Result Value    Glucose 353 (*)     Sodium 131 (*)     Potassium 4.7      Chloride 97 (*)     Bicarbonate 21      Anion Gap 18      Urea Nitrogen 17      Creatinine 1.15      eGFR 75      Calcium 9.0      Albumin 4.3      Alkaline Phosphatase 68      Total Protein 7.3      AST 43 (*)     Bilirubin, Total 1.2      ALT 50     SERIAL TROPONIN-INITIAL - Abnormal    Troponin I, High Sensitivity 322 (*)     Narrative:     Less than 99th percentile of normal range cutoff-  Female and children under 18 years old <14 ng/L; Male <21 ng/L: Negative  Repeat testing should be performed if clinically indicated.     Female and children under 18 years old 14-50 ng/L; Male 21-50 ng/L:  Consistent with possible cardiac damage and possible increased clinical   risk. Serial measurements may help to assess extent of myocardial damage.     >50 ng/L: Consistent with cardiac damage, increased clinical risk and  myocardial infarction. Serial measurements may help assess extent of   myocardial damage.      NOTE: Children less than 1 year old may have higher baseline troponin   levels and results should be interpreted in conjunction with the overall    clinical context.     NOTE: Troponin I testing is performed using a different   testing methodology at [facility] than at other   [facility]. Direct result comparisons should only   be made within the same method.   SERIAL TROPONIN, 1 HOUR - Abnormal    Troponin I, High Sensitivity 1,626 (*)     Narrative:     Less than 99th percentile of normal range cutoff-  Female and children under 18 years old <14 ng/L; Male <21 ng/L: Negative  Repeat testing should be performed if clinically indicated.     Female and children under 18 years old 14-50 ng/L; Male 21-50 ng/L:  Consistent with possible cardiac damage and possible increased clinical   risk. Serial measurements may help to assess extent of myocardial damage.     >50 ng/L: Consistent with cardiac damage, increased clinical risk and  myocardial infarction. Serial measurements may help assess extent of   myocardial damage.      NOTE: Children less than 1 year old may have higher baseline troponin   levels and results should be interpreted in conjunction with the overall   clinical context.     NOTE: Troponin I testing is performed using a different   testing methodology at [facility] than at other   [facility]. Direct result comparisons should only   be made within the same method.   LIPID PANEL - Abnormal    Cholesterol 145      HDL-Cholesterol 25.2      Cholesterol/HDL Ratio 5.8      LDL Calculated 51      VLDL 69 (*)     Triglycerides 343 (*)     Non HDL Cholesterol 120     HEPARIN ASSAY - Abnormal    Heparin Unfractionated >2.0 (*)     Narrative:     The therapeutic reference range for UFH may be either 0.3-0.6 IU/mL or 0.3-0.7 IU/mL based on the clinical setting for anticoagulant therapy and the associated nomogram used. For Heparin dosing guidelines based on clinical scenario and Heparin Assay results, please refer to local Pharmacy and the [facility] Guidelines for Anticoagulation Therapy available on the [facility]  intranet at: [email or web address].\[facility] [place] [facility]\"".[facility]/Pharmacy/[email or web address]_Guidelines_for_Anticoagu.aspx   POCT GLUCOSE - Abnormal    POCT Glucose 352 (*)    POCT GLUCOSE - Abnormal    POCT Glucose 221 (*)    POCT GLUCOSE - Abnormal    POCT Glucose 272 (*)    MAGNESIUM - Normal    Magnesium 1.63     APTT - Normal    aPTT 33      Narrative:     The APTT is no longer used for monitoring Unfractionated Heparin Therapy. For monitoring Heparin Therapy, use the Heparin Assay.   HEPARIN ASSAY - Normal    Heparin Unfractionated <0.1      Narrative:     The therapeutic reference range for UFH may be either 0.3-0.6 IU/mL or 0.3-0.7 IU/mL based on the clinical setting for anticoagulant therapy and the associated nomogram used. For Heparin dosing guidelines based on clinical scenario and Heparin Assay results, please refer to local Pharmacy and the [facility] Guidelines for Anticoagulation Therapy available on the [facility] intranet at: [email or web address].\[facility] [place] [facility]\"".[facility]/Pharmacy/[email or web address]_Guidelines_for_Anticoagu.aspx   HEPARIN ASSAY - Normal    Heparin Unfractionated 0.3      Narrative:     The therapeutic reference range for UFH may be either 0.3-0.6 IU/mL or 0.3-0.7 IU/mL based on the clinical setting for anticoagulant therapy and the associated nomogram used. For Heparin dosing guidelines based on clinical scenario and Heparin Assay results, please refer to local Pharmacy and the [facility] Guidelines for Anticoagulation Therapy available on the [facility] intranet at: [email or web address].\[facility] [place] [facility]\"".[email or web address].[email or web address]   TROPONIN SERIES- (INITIAL, 1 HR)    Narrative:     The following orders were created for panel order Troponin I Series, High Sensitivity (0, 1 HR).  Procedure                               Abnormality         Status                     ---------                               -----------         ------                      Troponin I, High Sensiti...[[id number]]  Abnormal            Final result               Troponin, High Sensitivi...[[id number]]  Abnormal            Final result                 Please view results for these tests on the individual orders.   CBC WITH AUTO DIFFERENTIAL    WBC 6.9      nRBC 0.0      RBC 5.33      Hemoglobin 16.3      Hematocrit 47.8      MCV 90      MCH 30.6      MCHC 34.1      RDW 12.0      Platelets 228      Neutrophils % 52.4      Immature Granulocytes %, Automated 0.3      Lymphocytes % 38.3      Monocytes % 6.0      Eosinophils % 2.3      Basophils % 0.7      Neutrophils Absolute 3.61      Immature Granulocytes Absolute, Automated 0.02      Lymphocytes Absolute 2.64      Monocytes Absolute 0.41      Eosinophils Absolute 0.16      Basophils Absolute 0.05     HEMOGLOBIN A1C   POCT GLUCOSE   POCT GLUCOSE   POCT GLUCOSE   POCT GLUCOSE       All other labs were within normal range or not returned as of this dictation.    Imaging  XR chest 1 view   Final Result   No radiographic evidence of acute cardiopulmonary pathology.        MACRO:   None.        Signed by: Evan Finkelstein 12/10/2023 5:01 AM   Dictation workstation:   [id number]      Transthoracic Echo (TTE) Complete    (Results Pending)   Transthoracic Echo (TTE) Complete    (Results Pending)        Procedures  Critical Care    Performed by: Ramos Holly DO  Authorized by: Ramos Holly DO    Critical care provider statement:     Critical care time (minutes):  40    Critical care time was exclusive of:  Separately billable procedures and treating other patients and teaching time    Critical care was necessary to treat or prevent imminent or life-threatening deterioration of the following conditions:  Cardiac failure    Critical care was time spent personally by me on the following activities:  Development of treatment plan with patient or surrogate, discussions with consultants, discussions with primary provider, evaluation of patient's  response to treatment, ordering and performing treatments and interventions, ordering and review of laboratory studies, ordering and review of radiographic studies and re-evaluation of patient's condition       EMERGENCY DEPARTMENT COURSE/MDM:   Medical Decision Making  Behzad Diaz is an 58 y.o. male with history including chronic discitis, hypertension, hyperlipidemia, type 2 diabetes presenting to the emergency department for acute chest pain.  Patient's history is concerning for ACS.  EKG and cardiac workup with troponins ordered.  Patient had already been given aspirin and a dose of nitro by EMS.    EKG shows changes in the inferior leads there are some slight depression in 2 3 and aVF.  Patient's lab workup reviewed and interpreted independently.Patient's initial lab workup is concerning for troponin of 322 patient has no major electrolyte abnormalities.  His glucose is slightly elevated at 353 has a slight elevation of AST.  This is consistent with patient's history of diabetes and nonalcoholic fatty liver disease.  Repeat EKG and troponin reviewed.  Patient's repeat troponin has bumped to 1626.  Patient was then started on heparin at this time and call was placed to Dr. Spencer.  Patient's EKG still shows those changes and the previous leads mentioned consistent with inferior involvement.    Consult with Dr. Spencer: Discussed case physical exam EKG and lab results.  He agrees with medications already started at this time.  Recommend starting Plavix on patient.  He request interventional cardiology to be consulted in case patient needs a catheterization later today.  Call out was made to Dr. Hickman.  Did not speak to Dr. Hickman.  Informed admitting team Dr. Israel who will relay information to the day team that interventional cardiology has been paged but have not heard back from them at this time.  They will follow-up on this interaction.        ED Course as of 12/10/23 1828   Sun Dec 10, 2023   0419 Sinus  94 bpm, [place] interval 158, QTc 455, baseline artifact, ST changes in the lateral leads [SK]      ED Course User Index  [SK] Umu Veloz,          Diagnoses as of 12/10/23 1828   NSTEMI (non-ST elevated myocardial infarction) (CMS/[facility])   Acute chest pain        External records reviewed: recent inpatient, clinic, and prior ED notes  Labs and Diagnostic imaging independently reviewed/interpreted by me.    Patient plan, care, lab results and imaging were all discussed with attending.    ED Medications administered this visit:    Medications   heparin 25,000 Units in dextrose 5% 250 mL (100 Units/mL) infusion (premix) (1,400 Units/hr intravenous New Bag 12/10/23 1447)   heparin (porcine) injection 3,000-4,000 Units (4,000 Units intravenous Given 12/10/23 1446)   pantoprazole (ProtoNix) EC tablet 40 mg (has no administration in time range)     Or   pantoprazole (ProtoNix) injection 40 mg (has no administration in time range)   acetaminophen (Tylenol) tablet 650 mg (has no administration in time range)     Or   acetaminophen (Tylenol) oral liquid 650 mg (has no administration in time range)     Or   acetaminophen (Tylenol) suppository 650 mg (has no administration in time range)   acetaminophen (Tylenol) tablet 650 mg (has no administration in time range)     Or   acetaminophen (Tylenol) oral liquid 650 mg (has no administration in time range)     Or   acetaminophen (Tylenol) suppository 650 mg (has no administration in time range)   ondansetron ODT (Zofran-ODT) disintegrating tablet 4 mg (has no administration in time range)     Or   ondansetron (Zofran) injection 4 mg (has no administration in time range)   metoclopramide (Reglan) tablet 10 mg (has no administration in time range)     Or   metoclopramide (Reglan) injection 10 mg (has no administration in time range)   melatonin tablet 3 mg (has no administration in time range)   sodium chloride 0.9% infusion (0 mL/hr intravenous Stopped 12/10/23 1800)   polyethylene  glycol (Glycolax, Miralax) packet 17 g (has no administration in time range)   aspirin chewable tablet 81 mg (81 mg oral Given 12/10/23 1159)   atorvastatin (Lipitor) tablet 80 mg (has no administration in time range)   perflutren lipid microspheres (Definity) injection 0.5-10 mL of dilution (has no administration in time range)   sulfur hexafluoride microsphr (Lumason) injection 24.28 mg (has no administration in time range)   perflutren protein A microsphere (Optison) injection 0.5 mL (has no administration in time range)   dextrose 50 % injection 25 g (has no administration in time range)   glucagon (Glucagen) injection 1 mg (has no administration in time range)   dextrose 10 % in water (D10W) infusion (has no administration in time range)   amoxicillin (Amoxil) capsule 500 mg (500 mg oral Given 12/10/23 1545)   ciprofloxacin (Cipro) tablet 500 mg (500 mg oral Given 12/10/23 1545)   carvedilol (Coreg) tablet 6.25 mg (6.25 mg oral Given 12/10/23 1651)   lisinopril tablet 20 mg (20 mg oral Given 12/10/23 1545)   pregabalin (Lyrica) capsule 75 mg (75 mg oral Not Given 12/10/23 1800)   insulin lispro (HumaLOG) injection 0-10 Units (6 Units subcutaneous Given 12/10/23 1651)   heparin (porcine) injection 4,000 Units (4,000 Units intravenous Given 12/10/23 0607)   clopidogrel (Plavix) tablet 300 mg (300 mg oral Given 12/10/23 0806)     New Prescriptions from this visit:    Current Discharge Medication List          (Please note that portions of this note were completed with a voice recognition program.  Efforts were made to edit the dictations but occasionally words are mis-transcribed.)     Umu Veloz DO  Resident  12/10/23 5634    The patient was seen by the resident/fellow.  I have personally performed a substantive portion of the encounter.  I have seen and examined the patient; agree with the workup, evaluation, MDM, management and diagnosis.  The care plan has been discussed with the resident; I have reviewed  the resident’s note and agree with the documented findings.         Ramos Holly, DO  12/14/23 1246

## 2023-12-10 NOTE — ED TRIAGE NOTES
BIB EMS from home 2/2 midsternal CP onset at rest 4 hrs PTA radiating to left arm continuous tightness + diaphoretic & nausea denies SOB denies any aggravating factors, alleviated by NTG x 1 received en route.  Denies cardiac PMH

## 2023-12-10 NOTE — CARE PLAN
Problem: Pain - Adult  Goal: Verbalizes/displays adequate comfort level or baseline comfort level  Outcome: Progressing     Problem: Safety - Adult  Goal: Free from fall injury  Outcome: Progressing     Problem: Discharge Planning  Goal: Discharge to home or other facility with appropriate resources  Outcome: Progressing     Problem: Chronic Conditions and Co-morbidities  Goal: Patient's chronic conditions and co-morbidity symptoms are monitored and maintained or improved  Outcome: Progressing     Problem: Fall/Injury  Goal: Not fall by end of shift  Outcome: Progressing  Goal: Be free from injury by end of the shift  Outcome: Progressing  Goal: Verbalize understanding of personal risk factors for fall in the hospital  Outcome: Progressing  Goal: Verbalize understanding of risk factor reduction measures to prevent injury from fall in the home  Outcome: Progressing  Goal: Use assistive devices by end of the shift  Outcome: Progressing  Goal: Pace activities to prevent fatigue by end of the shift  Outcome: Progressing     Problem: Diabetes  Goal: Achieve decreasing blood glucose levels by end of shift  Outcome: Progressing  Goal: Increase stability of blood glucose readings by end of shift  Outcome: Progressing  Goal: Decrease in ketones present in urine by end of shift  Outcome: Progressing  Goal: Maintain electrolyte levels within acceptable range throughout shift  Outcome: Progressing  Goal: Maintain glucose levels >70mg/dl to <250mg/dl throughout shift  Outcome: Progressing  Goal: No changes in neurological exam by end of shift  Outcome: Progressing  Goal: Learn about and adhere to nutrition recommendations by end of shift  Outcome: Progressing  Goal: Vital signs within normal range for age by end of shift  Outcome: Progressing  Goal: Increase self care and/or family involovement by end of shift  Outcome: Progressing  Goal: Receive DSME education by end of shift  Outcome: Progressing   The patient's goals for  the shift include rule out heart attack    The clinical goals for the shift include rule out heart attack    Pt arrived to the unit after 1600  seen by Dr Spencer  he is going to go NPO at midnight and will have a cardiac cath in the am.  Pt does not have much medical history.

## 2023-12-11 ENCOUNTER — APPOINTMENT (OUTPATIENT)
Dept: CARDIOLOGY | Facility: HOSPITAL | Age: 58
DRG: 281 | End: 2023-12-11
Payer: COMMERCIAL

## 2023-12-11 LAB
ALBUMIN SERPL BCP-MCNC: 3.6 G/DL (ref 3.4–5)
ALP SERPL-CCNC: 52 U/L (ref 33–120)
ALT SERPL W P-5'-P-CCNC: 41 U/L (ref 10–52)
ANION GAP SERPL CALC-SCNC: 12 MMOL/L (ref 10–20)
AST SERPL W P-5'-P-CCNC: 57 U/L (ref 9–39)
BILIRUB SERPL-MCNC: 1.2 MG/DL (ref 0–1.2)
BUN SERPL-MCNC: 11 MG/DL (ref 6–23)
CALCIUM SERPL-MCNC: 8.6 MG/DL (ref 8.6–10.3)
CHLORIDE SERPL-SCNC: 101 MMOL/L (ref 98–107)
CO2 SERPL-SCNC: 23 MMOL/L (ref 21–32)
CREAT SERPL-MCNC: 0.84 MG/DL (ref 0.5–1.3)
ERYTHROCYTE [DISTWIDTH] IN BLOOD BY AUTOMATED COUNT: 12.1 % (ref 11.5–14.5)
ERYTHROCYTE [DISTWIDTH] IN BLOOD BY AUTOMATED COUNT: 12.2 % (ref 11.5–14.5)
GFR SERPL CREATININE-BSD FRML MDRD: >90 ML/MIN/1.73M*2
GLUCOSE BLD MANUAL STRIP-MCNC: 272 MG/DL (ref 74–99)
GLUCOSE BLD MANUAL STRIP-MCNC: 286 MG/DL (ref 74–99)
GLUCOSE BLD MANUAL STRIP-MCNC: 287 MG/DL (ref 74–99)
GLUCOSE BLD MANUAL STRIP-MCNC: 327 MG/DL (ref 74–99)
GLUCOSE SERPL-MCNC: 282 MG/DL (ref 74–99)
HCT VFR BLD AUTO: 42.2 % (ref 41–52)
HCT VFR BLD AUTO: 42.4 % (ref 41–52)
HGB BLD-MCNC: 14.3 G/DL (ref 13.5–17.5)
HGB BLD-MCNC: 14.5 G/DL (ref 13.5–17.5)
MCH RBC QN AUTO: 30.2 PG (ref 26–34)
MCH RBC QN AUTO: 30.5 PG (ref 26–34)
MCHC RBC AUTO-ENTMCNC: 33.9 G/DL (ref 32–36)
MCHC RBC AUTO-ENTMCNC: 34.2 G/DL (ref 32–36)
MCV RBC AUTO: 89 FL (ref 80–100)
MCV RBC AUTO: 89 FL (ref 80–100)
NRBC BLD-RTO: 0 /100 WBCS (ref 0–0)
NRBC BLD-RTO: 0 /100 WBCS (ref 0–0)
PLATELET # BLD AUTO: 206 X10*3/UL (ref 150–450)
PLATELET # BLD AUTO: 209 X10*3/UL (ref 150–450)
POTASSIUM SERPL-SCNC: 4 MMOL/L (ref 3.5–5.3)
PROT SERPL-MCNC: 6.5 G/DL (ref 6.4–8.2)
RBC # BLD AUTO: 4.74 X10*6/UL (ref 4.5–5.9)
RBC # BLD AUTO: 4.75 X10*6/UL (ref 4.5–5.9)
SODIUM SERPL-SCNC: 132 MMOL/L (ref 136–145)
UFH PPP CHRO-ACNC: 0.1 IU/ML
UFH PPP CHRO-ACNC: 0.1 IU/ML
UFH PPP CHRO-ACNC: 0.2 IU/ML
UFH PPP CHRO-ACNC: 0.3 IU/ML
UFH PPP CHRO-ACNC: 0.3 IU/ML
WBC # BLD AUTO: 6.7 X10*3/UL (ref 4.4–11.3)
WBC # BLD AUTO: 7.8 X10*3/UL (ref 4.4–11.3)

## 2023-12-11 PROCEDURE — 36415 COLL VENOUS BLD VENIPUNCTURE: CPT | Performed by: INTERNAL MEDICINE

## 2023-12-11 PROCEDURE — 2060000001 HC INTERMEDIATE ICU ROOM DAILY

## 2023-12-11 PROCEDURE — 99152 MOD SED SAME PHYS/QHP 5/>YRS: CPT | Performed by: INTERNAL MEDICINE

## 2023-12-11 PROCEDURE — 2500000004 HC RX 250 GENERAL PHARMACY W/ HCPCS (ALT 636 FOR OP/ED): Performed by: INTERNAL MEDICINE

## 2023-12-11 PROCEDURE — 2500000005 HC RX 250 GENERAL PHARMACY W/O HCPCS: Performed by: INTERNAL MEDICINE

## 2023-12-11 PROCEDURE — 85520 HEPARIN ASSAY: CPT | Performed by: INTERNAL MEDICINE

## 2023-12-11 PROCEDURE — B2111ZZ FLUOROSCOPY OF MULTIPLE CORONARY ARTERIES USING LOW OSMOLAR CONTRAST: ICD-10-PCS | Performed by: INTERNAL MEDICINE

## 2023-12-11 PROCEDURE — 2720000007 HC OR 272 NO HCPCS: Performed by: INTERNAL MEDICINE

## 2023-12-11 PROCEDURE — 85520 HEPARIN ASSAY: CPT | Performed by: STUDENT IN AN ORGANIZED HEALTH CARE EDUCATION/TRAINING PROGRAM

## 2023-12-11 PROCEDURE — 85027 COMPLETE CBC AUTOMATED: CPT | Performed by: INTERNAL MEDICINE

## 2023-12-11 PROCEDURE — 2500000001 HC RX 250 WO HCPCS SELF ADMINISTERED DRUGS (ALT 637 FOR MEDICARE OP): Performed by: STUDENT IN AN ORGANIZED HEALTH CARE EDUCATION/TRAINING PROGRAM

## 2023-12-11 PROCEDURE — 2500000001 HC RX 250 WO HCPCS SELF ADMINISTERED DRUGS (ALT 637 FOR MEDICARE OP): Performed by: INTERNAL MEDICINE

## 2023-12-11 PROCEDURE — 4A023N7 MEASUREMENT OF CARDIAC SAMPLING AND PRESSURE, LEFT HEART, PERCUTANEOUS APPROACH: ICD-10-PCS | Performed by: INTERNAL MEDICINE

## 2023-12-11 PROCEDURE — 93458 L HRT ARTERY/VENTRICLE ANGIO: CPT | Performed by: INTERNAL MEDICINE

## 2023-12-11 PROCEDURE — 2500000002 HC RX 250 W HCPCS SELF ADMINISTERED DRUGS (ALT 637 FOR MEDICARE OP, ALT 636 FOR OP/ED): Performed by: STUDENT IN AN ORGANIZED HEALTH CARE EDUCATION/TRAINING PROGRAM

## 2023-12-11 PROCEDURE — 84075 ASSAY ALKALINE PHOSPHATASE: CPT | Performed by: INTERNAL MEDICINE

## 2023-12-11 PROCEDURE — 99239 HOSP IP/OBS DSCHRG MGMT >30: CPT | Performed by: STUDENT IN AN ORGANIZED HEALTH CARE EDUCATION/TRAINING PROGRAM

## 2023-12-11 PROCEDURE — 82947 ASSAY GLUCOSE BLOOD QUANT: CPT

## 2023-12-11 PROCEDURE — C1894 INTRO/SHEATH, NON-LASER: HCPCS | Performed by: INTERNAL MEDICINE

## 2023-12-11 PROCEDURE — 93306 TTE W/DOPPLER COMPLETE: CPT

## 2023-12-11 PROCEDURE — B2151ZZ FLUOROSCOPY OF LEFT HEART USING LOW OSMOLAR CONTRAST: ICD-10-PCS | Performed by: INTERNAL MEDICINE

## 2023-12-11 RX ORDER — NITROGLYCERIN 5 MG/ML
INJECTION, SOLUTION INTRAVENOUS AS NEEDED
Status: DISCONTINUED | OUTPATIENT
Start: 2023-12-11 | End: 2023-12-11 | Stop reason: HOSPADM

## 2023-12-11 RX ORDER — FENTANYL CITRATE 50 UG/ML
INJECTION, SOLUTION INTRAMUSCULAR; INTRAVENOUS AS NEEDED
Status: DISCONTINUED | OUTPATIENT
Start: 2023-12-11 | End: 2023-12-11 | Stop reason: HOSPADM

## 2023-12-11 RX ORDER — ATORVASTATIN CALCIUM 80 MG/1
80 TABLET, FILM COATED ORAL NIGHTLY
Qty: 30 TABLET | Refills: 1 | Status: ON HOLD | OUTPATIENT
Start: 2023-12-11 | End: 2023-12-26

## 2023-12-11 RX ORDER — NAPROXEN SODIUM 220 MG/1
81 TABLET, FILM COATED ORAL DAILY
Qty: 30 TABLET | Refills: 1 | Status: ON HOLD | OUTPATIENT
Start: 2023-12-12 | End: 2023-12-26

## 2023-12-11 RX ORDER — INSULIN LISPRO 100 [IU]/ML
0-10 INJECTION, SOLUTION INTRAVENOUS; SUBCUTANEOUS
Status: DISCONTINUED | OUTPATIENT
Start: 2023-12-12 | End: 2023-12-12 | Stop reason: HOSPADM

## 2023-12-11 RX ORDER — MIDAZOLAM HYDROCHLORIDE 1 MG/ML
INJECTION INTRAMUSCULAR; INTRAVENOUS AS NEEDED
Status: DISCONTINUED | OUTPATIENT
Start: 2023-12-11 | End: 2023-12-11 | Stop reason: HOSPADM

## 2023-12-11 RX ORDER — LIDOCAINE HYDROCHLORIDE 20 MG/ML
INJECTION, SOLUTION INFILTRATION; PERINEURAL AS NEEDED
Status: DISCONTINUED | OUTPATIENT
Start: 2023-12-11 | End: 2023-12-11 | Stop reason: HOSPADM

## 2023-12-11 RX ADMIN — LISINOPRIL 20 MG: 20 TABLET ORAL at 08:17

## 2023-12-11 RX ADMIN — ASPIRIN 81 MG CHEWABLE TABLET 81 MG: 81 TABLET CHEWABLE at 08:17

## 2023-12-11 RX ADMIN — CIPROFLOXACIN 500 MG: 500 TABLET, FILM COATED ORAL at 08:17

## 2023-12-11 RX ADMIN — ATORVASTATIN CALCIUM 80 MG: 80 TABLET, FILM COATED ORAL at 20:41

## 2023-12-11 RX ADMIN — AMOXICILLIN 500 MG: 500 CAPSULE ORAL at 08:17

## 2023-12-11 RX ADMIN — HEPARIN SODIUM 3000 UNITS: 5000 INJECTION INTRAVENOUS; SUBCUTANEOUS at 17:39

## 2023-12-11 RX ADMIN — PERFLUTREN 10 ML OF DILUTION: 6.52 INJECTION, SUSPENSION INTRAVENOUS at 11:49

## 2023-12-11 RX ADMIN — HEPARIN SODIUM 2300 UNITS/HR: 10000 INJECTION, SOLUTION INTRAVENOUS at 17:39

## 2023-12-11 RX ADMIN — HEPARIN SODIUM 3000 UNITS: 5000 INJECTION INTRAVENOUS; SUBCUTANEOUS at 10:41

## 2023-12-11 RX ADMIN — PANTOPRAZOLE SODIUM 40 MG: 40 TABLET, DELAYED RELEASE ORAL at 06:10

## 2023-12-11 RX ADMIN — CARVEDILOL 6.25 MG: 6.25 TABLET, FILM COATED ORAL at 08:17

## 2023-12-11 RX ADMIN — INSULIN LISPRO 8 UNITS: 100 INJECTION, SOLUTION INTRAVENOUS; SUBCUTANEOUS at 17:26

## 2023-12-11 RX ADMIN — CARVEDILOL 6.25 MG: 6.25 TABLET, FILM COATED ORAL at 17:23

## 2023-12-11 RX ADMIN — PREGABALIN 75 MG: 75 CAPSULE ORAL at 08:16

## 2023-12-11 RX ADMIN — HEPARIN SODIUM 1700 UNITS/HR: 10000 INJECTION, SOLUTION INTRAVENOUS at 03:06

## 2023-12-11 RX ADMIN — INSULIN LISPRO 6 UNITS: 100 INJECTION, SOLUTION INTRAVENOUS; SUBCUTANEOUS at 08:17

## 2023-12-11 RX ADMIN — AMOXICILLIN 500 MG: 500 CAPSULE ORAL at 20:41

## 2023-12-11 ASSESSMENT — PAIN - FUNCTIONAL ASSESSMENT
PAIN_FUNCTIONAL_ASSESSMENT: 0-10

## 2023-12-11 ASSESSMENT — PAIN SCALES - GENERAL
PAINLEVEL_OUTOF10: 0 - NO PAIN

## 2023-12-11 NOTE — PROGRESS NOTES
"Met with patient. Introduced self and role. Patient lives alone , works and is independent. Patient has not been checking his blood sugars , however plans to start checking his blood sugars. Patient reported \" I did not get the Ozempic due to cost\". Patient has an appointment with Dr. Arce in January. Plan to return home when discharged.  "

## 2023-12-11 NOTE — PROGRESS NOTES
Care Transition  To cardiac cath lab. Occluded  LAD and Lcx with severe disease . Plan to initiate transfer to [facility] for CABG evaluation.  Amparo Ye RN

## 2023-12-11 NOTE — POST-PROCEDURE NOTE
Physician Transition of Care Summary  Invasive Cardiovascular Lab    Procedure Date: 12/11/2023  Attending:    * Kaleb Lim - Primary  Resident/Fellow/Other Assistant: Surgeon(s) and Role:    Indications:   Pre-op Diagnosis     * NSTEMI (non-ST elevated myocardial infarction) (CMS/[facility]) [I21.4]    Post-procedure diagnosis:   Post-op Diagnosis     * NSTEMI (non-ST elevated myocardial infarction) (CMS/[facility]) [I21.4]    Procedure(s):     * Left Heart Cath, With LV      Procedure Findings:   Occluded LAD and LCx, severe diagonal disease, and severe mid dominant RCA.  LVEDP 11 mmHg.  No aortic stenosis.    Description of the Procedure:   Right radial, 6 Polish sheath.  TR band placed at the end of procedure with 15 mm air.    Complications:   None    Estimated Blood Loss:   5 mL    Anesthesia: Moderate Sedation Anesthesia Staff: No anesthesia staff entered.    Any Specimen(s) Removed:   No specimens collected during this procedure.    Disposition:   Initiate transfer to [facility] for CABG evaluation      Electronically signed by: Kaleb Lim MD, 12/11/2023 12:37 PM

## 2023-12-11 NOTE — CARE PLAN
The patient's goals for the shift include rule out heart attack    The clinical goals for the shift include Patient will not complain of any chest pain by end of shift 12/11/23 at 0700.    Patient remains hemodynamically stable and safe throughout shift. No complaints of pain/chest pain overnight. Hourly rounding was performed and patient needs were met. No other acute events, will continue to monitor.     Patient is to undergo a cardiac cath at some point today. No other acute events.

## 2023-12-11 NOTE — DISCHARGE SUMMARY
Discharge Diagnosis  ACS (acute coronary syndrome) (CMS/[facility])    Issues Requiring Follow-Up  Transfer to [facility] for CABG evaluation    Discharge Meds     Your medication list        START taking these medications        Instructions Last Dose Given Next Dose Due   aspirin 81 mg chewable tablet  Start taking on: December 12, 2023      Chew 1 tablet (81 mg) once daily. Do not start before December 12, 2023.       atorvastatin 80 mg tablet  Commonly known as: Lipitor      Take 1 tablet (80 mg) by mouth once daily at bedtime.              CONTINUE taking these medications        Instructions Last Dose Given Next Dose Due   amoxicillin 500 mg capsule  Commonly known as: Amoxil           carvedilol 6.25 mg tablet  Commonly known as: Coreg      TAKE 1 TABLET BY MOUTH TWICE A DAY WITH MEALS       cyclobenzaprine 10 mg tablet  Commonly known as: Flexeril           lisinopril 20 mg tablet      Take 1 tablet (20 mg) by mouth once daily.       metFORMIN 1,000 mg tablet  Commonly known as: Glucophage           pregabalin 75 mg capsule  Commonly known as: Lyrica                  STOP taking these medications      ciprofloxacin 500 mg tablet  Commonly known as: Cipro        meloxicam 15 mg tablet  Commonly known as: Mobic        semaglutide 0.25 mg or 0.5 mg (2 mg/3 mL) pen injector        semaglutide 1 mg/dose (4 mg/3 mL) pen injector  Commonly known as: OZEMPIC                  Where to Get Your Medications        These medications were sent to [facility]/pharmacy #[id number] [address] AT [address] [place] [address]      Phone: [phone]   aspirin 81 mg chewable tablet  atorvastatin 80 mg tablet         Test Results Pending At Discharge  Pending Labs       No current pending labs.            Hospital Course     Behzad Diaz is a 58 y.o. male presenting with acute onset chest pain this AM. Patient states she was laying in bed this AM, suddenly developed chest pressure with radiation to the left  arm this AM. He said pain was persistent, initially no other associated symptoms, but later developed nausea and diaphoresis. Patient states he had similar symptoms about 2 weeks ago, but resolved quickly and spontaneously, so he did not seek medical attention then. Patient denies any fever, chills, SOB, abd pain, diarrhea, or any other recent illness.      While in the ER, vitals were stable. Labs significant for trop of 322 --> 1600. EKG showed ST depression in II, III, AVF, V5 and V6. Case was discussed with cardiology, recommend asp/plavix/heparin drip. Patient will be admitted to the hospital for NSTEMI workup. Patient was chest pain free at the time of my evaluation.        Hospital course  Over the course of hospitalization, patient remained clinically stable.  Chest pain resolved since initial admission.  Patient was seen and evaluated by cardiology team.  Ultimately patient had cardiac cath by Dr. Kaleb Lim on 12/11, which showed occluded LAD and left circumflex with severe diagonal disease, severe mid dominant RCA.  Due to multivessel disease, Dr. Lim reach out to Dr. Tony Krishnan at [facility], who accepted patient for transfer.  Patient will be transferred to [facility] for CABG evaluation pending bed availability.    I have spent > 30min discharging the pt, care time spent include discharge planning, medication reconciliation and discussion of discharge instruction/follow up with the patient.       Pertinent Physical Exam At Time of Discharge    Constitutional: Well developed, awake/alert/oriented x3, no distress, alert and cooperative, morbid obese   ENMT: mucous membranes moist   Head/Neck: Neck supple   Respiratory/Thorax: Patent airways, CTAB, normal breath sounds with good chest expansion, thorax symmetric   Cardiovascular: Regular, rate and rhythm, no murmurs, 2+ equal pulses of the extremities, normal S 1and S 2   Gastrointestinal: Nondistended, soft, non-tender, no rebound tenderness     Musculoskeletal: ROM intact, no joint swelling, normal strength   Extremities: normal extremities   Neurological: alert and oriented x3, intact senses, motor        Outpatient Follow-Up  Future Appointments   Date Time Provider Department Center   1/10/2024  2:40 PM Tomer Arce DO [id number] Diego Anton MD

## 2023-12-12 ENCOUNTER — HOSPITAL ENCOUNTER (INPATIENT)
Facility: HOSPITAL | Age: 58
LOS: 17 days | Discharge: NURSING, REHAB OR LONG-TERM CARE | DRG: 236 | End: 2023-12-29
Attending: INTERNAL MEDICINE | Admitting: INTERNAL MEDICINE
Payer: COMMERCIAL

## 2023-12-12 ENCOUNTER — APPOINTMENT (OUTPATIENT)
Dept: CARDIOLOGY | Facility: HOSPITAL | Age: 58
DRG: 281 | End: 2023-12-12
Payer: COMMERCIAL

## 2023-12-12 VITALS
DIASTOLIC BLOOD PRESSURE: 74 MMHG | RESPIRATION RATE: 22 BRPM | SYSTOLIC BLOOD PRESSURE: 121 MMHG | BODY MASS INDEX: 41.75 KG/M2 | HEIGHT: 73 IN | OXYGEN SATURATION: 96 % | WEIGHT: 315 LBS | HEART RATE: 76 BPM | TEMPERATURE: 96.8 F

## 2023-12-12 DIAGNOSIS — Z01.818 ENCOUNTER FOR OTHER PREPROCEDURAL EXAMINATION: ICD-10-CM

## 2023-12-12 DIAGNOSIS — Z79.4 TYPE 2 DIABETES MELLITUS WITH HYPERGLYCEMIA, WITH LONG-TERM CURRENT USE OF INSULIN (MULTI): ICD-10-CM

## 2023-12-12 DIAGNOSIS — I21.4 NSTEMI (NON-ST ELEVATED MYOCARDIAL INFARCTION) (MULTI): ICD-10-CM

## 2023-12-12 DIAGNOSIS — E11.65 TYPE 2 DIABETES MELLITUS WITH HYPERGLYCEMIA, WITH LONG-TERM CURRENT USE OF INSULIN (MULTI): ICD-10-CM

## 2023-12-12 DIAGNOSIS — Z03.89 CORONARY ARTERY DISEASE (CAD) EXCLUDED: Primary | ICD-10-CM

## 2023-12-12 DIAGNOSIS — R09.89 OTHER SPECIFIED SYMPTOMS AND SIGNS INVOLVING THE CIRCULATORY AND RESPIRATORY SYSTEMS: ICD-10-CM

## 2023-12-12 DIAGNOSIS — Z95.1 S/P CABG X 4: ICD-10-CM

## 2023-12-12 DIAGNOSIS — E11.59 TYPE 2 DIABETES MELLITUS WITH OTHER CIRCULATORY COMPLICATION, WITHOUT LONG-TERM CURRENT USE OF INSULIN (MULTI): ICD-10-CM

## 2023-12-12 DIAGNOSIS — I24.9 ACS (ACUTE CORONARY SYNDROME) (MULTI): ICD-10-CM

## 2023-12-12 DIAGNOSIS — I25.709 ATHEROSCLEROSIS OF CORONARY ARTERY BYPASS GRAFT(S), UNSPECIFIED, WITH UNSPECIFIED ANGINA PECTORIS (CMS-HCC): ICD-10-CM

## 2023-12-12 PROBLEM — I25.5 ISCHEMIC CARDIOMYOPATHY: Status: ACTIVE | Noted: 2023-12-12

## 2023-12-12 PROBLEM — E78.5 HLD (HYPERLIPIDEMIA): Status: ACTIVE | Noted: 2023-12-12

## 2023-12-12 LAB
AORTIC VALVE MEAN GRADIENT: 2
AORTIC VALVE PEAK VELOCITY: 0.83
AV PEAK GRADIENT: 2.7
AVA (PEAK VEL): 3.48
AVA (VTI): 3.69
EJECTION FRACTION APICAL 4 CHAMBER: 33.9
EJECTION FRACTION: 42
GLUCOSE BLD MANUAL STRIP-MCNC: 259 MG/DL (ref 74–99)
GLUCOSE BLD MANUAL STRIP-MCNC: 280 MG/DL (ref 74–99)
GLUCOSE BLD MANUAL STRIP-MCNC: 287 MG/DL (ref 74–99)
GLUCOSE BLD MANUAL STRIP-MCNC: 299 MG/DL (ref 74–99)
LEFT ATRIUM VOLUME AREA LENGTH INDEX BSA: 14
LEFT VENTRICLE INTERNAL DIMENSION DIASTOLE: 4.63 (ref 3.5–6)
LEFT VENTRICULAR OUTFLOW TRACT DIAMETER: 2.5
MITRAL VALVE E/A RATIO: 0.85
MITRAL VALVE E/E' RATIO: 9.92
RIGHT VENTRICLE FREE WALL PEAK S': 7.51
RIGHT VENTRICLE PEAK SYSTOLIC PRESSURE: 5
TRICUSPID ANNULAR PLANE SYSTOLIC EXCURSION: 2
UFH PPP CHRO-ACNC: 0.4 IU/ML

## 2023-12-12 PROCEDURE — 2500000004 HC RX 250 GENERAL PHARMACY W/ HCPCS (ALT 636 FOR OP/ED): Performed by: INTERNAL MEDICINE

## 2023-12-12 PROCEDURE — 82947 ASSAY GLUCOSE BLOOD QUANT: CPT

## 2023-12-12 PROCEDURE — 2500000001 HC RX 250 WO HCPCS SELF ADMINISTERED DRUGS (ALT 637 FOR MEDICARE OP): Performed by: INTERNAL MEDICINE

## 2023-12-12 PROCEDURE — 85520 HEPARIN ASSAY: CPT | Performed by: STUDENT IN AN ORGANIZED HEALTH CARE EDUCATION/TRAINING PROGRAM

## 2023-12-12 PROCEDURE — 0210093 BYPASS CORONARY ARTERY, ONE ARTERY FROM CORONARY ARTERY WITH AUTOLOGOUS VENOUS TISSUE, OPEN APPROACH: ICD-10-PCS | Performed by: STUDENT IN AN ORGANIZED HEALTH CARE EDUCATION/TRAINING PROGRAM

## 2023-12-12 PROCEDURE — 99232 SBSQ HOSP IP/OBS MODERATE 35: CPT | Performed by: STUDENT IN AN ORGANIZED HEALTH CARE EDUCATION/TRAINING PROGRAM

## 2023-12-12 PROCEDURE — 02100Z9 BYPASS CORONARY ARTERY, ONE ARTERY FROM LEFT INTERNAL MAMMARY, OPEN APPROACH: ICD-10-PCS | Performed by: STUDENT IN AN ORGANIZED HEALTH CARE EDUCATION/TRAINING PROGRAM

## 2023-12-12 PROCEDURE — 36415 COLL VENOUS BLD VENIPUNCTURE: CPT | Performed by: STUDENT IN AN ORGANIZED HEALTH CARE EDUCATION/TRAINING PROGRAM

## 2023-12-12 PROCEDURE — 2500000001 HC RX 250 WO HCPCS SELF ADMINISTERED DRUGS (ALT 637 FOR MEDICARE OP)

## 2023-12-12 PROCEDURE — 2500000001 HC RX 250 WO HCPCS SELF ADMINISTERED DRUGS (ALT 637 FOR MEDICARE OP): Performed by: STUDENT IN AN ORGANIZED HEALTH CARE EDUCATION/TRAINING PROGRAM

## 2023-12-12 PROCEDURE — 2500000002 HC RX 250 W HCPCS SELF ADMINISTERED DRUGS (ALT 637 FOR MEDICARE OP, ALT 636 FOR OP/ED): Performed by: INTERNAL MEDICINE

## 2023-12-12 PROCEDURE — 06BQ4ZZ EXCISION OF LEFT SAPHENOUS VEIN, PERCUTANEOUS ENDOSCOPIC APPROACH: ICD-10-PCS | Performed by: STUDENT IN AN ORGANIZED HEALTH CARE EDUCATION/TRAINING PROGRAM

## 2023-12-12 PROCEDURE — 2500000004 HC RX 250 GENERAL PHARMACY W/ HCPCS (ALT 636 FOR OP/ED)

## 2023-12-12 PROCEDURE — 93005 ELECTROCARDIOGRAM TRACING: CPT

## 2023-12-12 PROCEDURE — 1200000002 HC GENERAL ROOM WITH TELEMETRY DAILY

## 2023-12-12 PROCEDURE — 021109W BYPASS CORONARY ARTERY, TWO ARTERIES FROM AORTA WITH AUTOLOGOUS VENOUS TISSUE, OPEN APPROACH: ICD-10-PCS | Performed by: STUDENT IN AN ORGANIZED HEALTH CARE EDUCATION/TRAINING PROGRAM

## 2023-12-12 PROCEDURE — 2500000004 HC RX 250 GENERAL PHARMACY W/ HCPCS (ALT 636 FOR OP/ED): Performed by: STUDENT IN AN ORGANIZED HEALTH CARE EDUCATION/TRAINING PROGRAM

## 2023-12-12 RX ORDER — PANTOPRAZOLE SODIUM 40 MG/1
40 TABLET, DELAYED RELEASE ORAL
Status: DISCONTINUED | OUTPATIENT
Start: 2023-12-13 | End: 2023-12-20

## 2023-12-12 RX ORDER — ACETAMINOPHEN 160 MG/5ML
650 SOLUTION ORAL EVERY 4 HOURS PRN
Status: DISCONTINUED | OUTPATIENT
Start: 2023-12-12 | End: 2023-12-20

## 2023-12-12 RX ORDER — POLYETHYLENE GLYCOL 3350 17 G/17G
17 POWDER, FOR SOLUTION ORAL DAILY
Status: DISCONTINUED | OUTPATIENT
Start: 2023-12-12 | End: 2023-12-20

## 2023-12-12 RX ORDER — ACETAMINOPHEN 650 MG/1
650 SUPPOSITORY RECTAL EVERY 4 HOURS PRN
Status: DISCONTINUED | OUTPATIENT
Start: 2023-12-12 | End: 2023-12-20

## 2023-12-12 RX ORDER — PANTOPRAZOLE SODIUM 40 MG/10ML
40 INJECTION, POWDER, LYOPHILIZED, FOR SOLUTION INTRAVENOUS
Status: DISCONTINUED | OUTPATIENT
Start: 2023-12-13 | End: 2023-12-20

## 2023-12-12 RX ORDER — AMOXICILLIN 500 MG/1
500 CAPSULE ORAL EVERY 12 HOURS SCHEDULED
Status: DISCONTINUED | OUTPATIENT
Start: 2023-12-12 | End: 2023-12-20

## 2023-12-12 RX ORDER — PREGABALIN 75 MG/1
75 CAPSULE ORAL DAILY
Status: DISCONTINUED | OUTPATIENT
Start: 2023-12-13 | End: 2023-12-20

## 2023-12-12 RX ORDER — NAPROXEN SODIUM 220 MG/1
81 TABLET, FILM COATED ORAL DAILY
Status: DISCONTINUED | OUTPATIENT
Start: 2023-12-13 | End: 2023-12-20

## 2023-12-12 RX ORDER — ACETAMINOPHEN 325 MG/1
650 TABLET ORAL EVERY 4 HOURS PRN
Status: DISCONTINUED | OUTPATIENT
Start: 2023-12-12 | End: 2023-12-20

## 2023-12-12 RX ORDER — POLYETHYLENE GLYCOL 3350 17 G/17G
17 POWDER, FOR SOLUTION ORAL DAILY
Status: CANCELLED | OUTPATIENT
Start: 2023-12-12

## 2023-12-12 RX ORDER — HEPARIN SODIUM 10000 [USP'U]/100ML
0-4000 INJECTION, SOLUTION INTRAVENOUS CONTINUOUS
Status: DISCONTINUED | OUTPATIENT
Start: 2023-12-12 | End: 2023-12-17

## 2023-12-12 RX ORDER — ATORVASTATIN CALCIUM 80 MG/1
80 TABLET, FILM COATED ORAL NIGHTLY
Status: DISCONTINUED | OUTPATIENT
Start: 2023-12-12 | End: 2023-12-20

## 2023-12-12 RX ORDER — CARVEDILOL 3.12 MG/1
6.25 TABLET ORAL
Status: DISCONTINUED | OUTPATIENT
Start: 2023-12-13 | End: 2023-12-20

## 2023-12-12 RX ADMIN — AMOXICILLIN 500 MG: 500 CAPSULE ORAL at 09:41

## 2023-12-12 RX ADMIN — PANTOPRAZOLE SODIUM 40 MG: 40 TABLET, DELAYED RELEASE ORAL at 06:11

## 2023-12-12 RX ADMIN — LISINOPRIL 20 MG: 20 TABLET ORAL at 09:41

## 2023-12-12 RX ADMIN — HEPARIN SODIUM 2300 UNITS/HR: 10000 INJECTION, SOLUTION INTRAVENOUS at 13:50

## 2023-12-12 RX ADMIN — PREGABALIN 75 MG: 75 CAPSULE ORAL at 09:41

## 2023-12-12 RX ADMIN — INSULIN LISPRO 6 UNITS: 100 INJECTION, SOLUTION INTRAVENOUS; SUBCUTANEOUS at 13:48

## 2023-12-12 RX ADMIN — ASPIRIN 81 MG CHEWABLE TABLET 81 MG: 81 TABLET CHEWABLE at 09:41

## 2023-12-12 RX ADMIN — CARVEDILOL 6.25 MG: 6.25 TABLET, FILM COATED ORAL at 09:41

## 2023-12-12 RX ADMIN — HEPARIN SODIUM 2300 UNITS/HR: 10000 INJECTION, SOLUTION INTRAVENOUS at 02:53

## 2023-12-12 RX ADMIN — AMOXICILLIN 500 MG: 500 CAPSULE ORAL at 23:08

## 2023-12-12 RX ADMIN — HEPARIN SODIUM 2300 UNITS/HR: 10000 INJECTION, SOLUTION INTRAVENOUS at 23:08

## 2023-12-12 RX ADMIN — HEPARIN SODIUM 2300 UNITS/HR: 10000 INJECTION, SOLUTION INTRAVENOUS at 20:29

## 2023-12-12 RX ADMIN — INSULIN LISPRO 6 UNITS: 100 INJECTION, SOLUTION INTRAVENOUS; SUBCUTANEOUS at 09:41

## 2023-12-12 RX ADMIN — ATORVASTATIN CALCIUM 80 MG: 80 TABLET, FILM COATED ORAL at 23:07

## 2023-12-12 RX ADMIN — INSULIN LISPRO 6 UNITS: 100 INJECTION, SOLUTION INTRAVENOUS; SUBCUTANEOUS at 18:08

## 2023-12-12 RX ADMIN — CARVEDILOL 6.25 MG: 6.25 TABLET, FILM COATED ORAL at 18:08

## 2023-12-12 SDOH — SOCIAL STABILITY: SOCIAL INSECURITY: HAVE YOU HAD THOUGHTS OF HARMING ANYONE ELSE?: NO

## 2023-12-12 SDOH — SOCIAL STABILITY: SOCIAL NETWORK: ARE YOU MARRIED, WIDOWED, DIVORCED, SEPARATED, NEVER MARRIED, OR LIVING WITH A PARTNER?: NEVER MARRIED

## 2023-12-12 SDOH — SOCIAL STABILITY: SOCIAL INSECURITY: DOES ANYONE TRY TO KEEP YOU FROM HAVING/CONTACTING OTHER FRIENDS OR DOING THINGS OUTSIDE YOUR HOME?: NO

## 2023-12-12 SDOH — ECONOMIC STABILITY: FOOD INSECURITY: WITHIN THE PAST 12 MONTHS, YOU WORRIED THAT YOUR FOOD WOULD RUN OUT BEFORE YOU GOT MONEY TO BUY MORE.: NEVER TRUE

## 2023-12-12 SDOH — ECONOMIC STABILITY: FOOD INSECURITY: WITHIN THE PAST 12 MONTHS, THE FOOD YOU BOUGHT JUST DIDN'T LAST AND YOU DIDN'T HAVE MONEY TO GET MORE.: NEVER TRUE

## 2023-12-12 SDOH — SOCIAL STABILITY: SOCIAL NETWORK
DO YOU BELONG TO ANY CLUBS OR ORGANIZATIONS SUCH AS CHURCH GROUPS UNIONS, FRATERNAL OR ATHLETIC GROUPS, OR SCHOOL GROUPS?: NO

## 2023-12-12 SDOH — HEALTH STABILITY: PHYSICAL HEALTH: ON AVERAGE, HOW MANY MINUTES DO YOU ENGAGE IN EXERCISE AT THIS LEVEL?: 0 MIN

## 2023-12-12 SDOH — SOCIAL STABILITY: SOCIAL NETWORK
IN A TYPICAL WEEK, HOW MANY TIMES DO YOU TALK ON THE PHONE WITH FAMILY, FRIENDS, OR NEIGHBORS?: MORE THAN THREE TIMES A WEEK

## 2023-12-12 SDOH — HEALTH STABILITY: PHYSICAL HEALTH: ON AVERAGE, HOW MANY DAYS PER WEEK DO YOU ENGAGE IN MODERATE TO STRENUOUS EXERCISE (LIKE A BRISK WALK)?: 0 DAYS

## 2023-12-12 SDOH — HEALTH STABILITY: MENTAL HEALTH
STRESS IS WHEN SOMEONE FEELS TENSE, NERVOUS, ANXIOUS, OR CAN'T SLEEP AT NIGHT BECAUSE THEIR MIND IS TROUBLED. HOW STRESSED ARE YOU?: TO SOME EXTENT

## 2023-12-12 SDOH — SOCIAL STABILITY: SOCIAL NETWORK: HOW OFTEN DO YOU ATTEND CHURCH OR RELIGIOUS SERVICES?: NEVER

## 2023-12-12 SDOH — SOCIAL STABILITY: SOCIAL INSECURITY: ARE THERE ANY APPARENT SIGNS OF INJURIES/BEHAVIORS THAT COULD BE RELATED TO ABUSE/NEGLECT?: NO

## 2023-12-12 SDOH — SOCIAL STABILITY: SOCIAL INSECURITY: WITHIN THE LAST YEAR, HAVE YOU BEEN HUMILIATED OR EMOTIONALLY ABUSED IN OTHER WAYS BY YOUR PARTNER OR EX-PARTNER?: NO

## 2023-12-12 SDOH — SOCIAL STABILITY: SOCIAL INSECURITY
WITHIN THE LAST YEAR, HAVE TO BEEN RAPED OR FORCED TO HAVE ANY KIND OF SEXUAL ACTIVITY BY YOUR PARTNER OR EX-PARTNER?: NO

## 2023-12-12 SDOH — SOCIAL STABILITY: SOCIAL INSECURITY: DO YOU FEEL ANYONE HAS EXPLOITED OR TAKEN ADVANTAGE OF YOU FINANCIALLY OR OF YOUR PERSONAL PROPERTY?: NO

## 2023-12-12 SDOH — SOCIAL STABILITY: SOCIAL INSECURITY: WITHIN THE LAST YEAR, HAVE YOU BEEN AFRAID OF YOUR PARTNER OR EX-PARTNER?: NO

## 2023-12-12 SDOH — ECONOMIC STABILITY: INCOME INSECURITY: IN THE LAST 12 MONTHS, WAS THERE A TIME WHEN YOU WERE NOT ABLE TO PAY THE MORTGAGE OR RENT ON TIME?: NO

## 2023-12-12 SDOH — SOCIAL STABILITY: SOCIAL NETWORK: HOW OFTEN DO YOU ATTENT MEETINGS OF THE CLUB OR ORGANIZATION YOU BELONG TO?: NEVER

## 2023-12-12 SDOH — SOCIAL STABILITY: SOCIAL INSECURITY: DO YOU FEEL UNSAFE GOING BACK TO THE PLACE WHERE YOU ARE LIVING?: NO

## 2023-12-12 SDOH — SOCIAL STABILITY: SOCIAL INSECURITY: HAS ANYONE EVER THREATENED TO HURT YOUR FAMILY OR YOUR PETS?: NO

## 2023-12-12 SDOH — SOCIAL STABILITY: SOCIAL INSECURITY: ARE YOU OR HAVE YOU BEEN THREATENED OR ABUSED PHYSICALLY, EMOTIONALLY, OR SEXUALLY BY ANYONE?: NO

## 2023-12-12 SDOH — ECONOMIC STABILITY: INCOME INSECURITY: HOW HARD IS IT FOR YOU TO PAY FOR THE VERY BASICS LIKE FOOD, HOUSING, MEDICAL CARE, AND HEATING?: NOT HARD AT ALL

## 2023-12-12 SDOH — SOCIAL STABILITY: SOCIAL NETWORK

## 2023-12-12 SDOH — SOCIAL STABILITY: SOCIAL INSECURITY
WITHIN THE LAST YEAR, HAVE YOU BEEN KICKED, HIT, SLAPPED, OR OTHERWISE PHYSICALLY HURT BY YOUR PARTNER OR EX-PARTNER?: NO

## 2023-12-12 SDOH — SOCIAL STABILITY: SOCIAL INSECURITY: ABUSE: ADULT

## 2023-12-12 ASSESSMENT — COLUMBIA-SUICIDE SEVERITY RATING SCALE - C-SSRS
1. IN THE PAST MONTH, HAVE YOU WISHED YOU WERE DEAD OR WISHED YOU COULD GO TO SLEEP AND NOT WAKE UP?: NO
6. HAVE YOU EVER DONE ANYTHING, STARTED TO DO ANYTHING, OR PREPARED TO DO ANYTHING TO END YOUR LIFE?: NO
2. HAVE YOU ACTUALLY HAD ANY THOUGHTS OF KILLING YOURSELF?: NO

## 2023-12-12 ASSESSMENT — PATIENT HEALTH QUESTIONNAIRE - PHQ9
2. FEELING DOWN, DEPRESSED OR HOPELESS: NOT AT ALL
1. LITTLE INTEREST OR PLEASURE IN DOING THINGS: NOT AT ALL
SUM OF ALL RESPONSES TO PHQ9 QUESTIONS 1 & 2: 0

## 2023-12-12 ASSESSMENT — COGNITIVE AND FUNCTIONAL STATUS - GENERAL
MOBILITY SCORE: 24
MOBILITY SCORE: 24
DAILY ACTIVITIY SCORE: 24
PATIENT BASELINE BEDBOUND: NO
DAILY ACTIVITIY SCORE: 24
MOBILITY SCORE: 24
MOBILITY SCORE: 24
DAILY ACTIVITIY SCORE: 24
DAILY ACTIVITIY SCORE: 24

## 2023-12-12 ASSESSMENT — LIFESTYLE VARIABLES
HOW OFTEN DO YOU HAVE 6 OR MORE DRINKS ON ONE OCCASION: LESS THAN MONTHLY
AUDIT-C TOTAL SCORE: 3
AUDIT-C TOTAL SCORE: 3
SUBSTANCE_ABUSE_PAST_12_MONTHS: NO
HAS A RELATIVE, FRIEND, DOCTOR, OR ANOTHER HEALTH PROFESSIONAL EXPRESSED CONCERN ABOUT YOUR DRINKING OR SUGGESTED YOU CUT DOWN: NO
HOW OFTEN DURING THE LAST YEAR HAVE YOU NEEDED AN ALCOHOLIC DRINK FIRST THING IN THE MORNING TO GET YOURSELF GOING AFTER A NIGHT OF HEAVY DRINKING: NEVER
HOW OFTEN DO YOU HAVE A DRINK CONTAINING ALCOHOL: 2-4 TIMES A MONTH
AUDIT TOTAL SCORE: 3
HOW OFTEN DURING THE LAST YEAR HAVE YOU BEEN UNABLE TO REMEMBER WHAT HAPPENED THE NIGHT BEFORE BECAUSE YOU HAD BEEN DRINKING: NEVER
HAVE YOU OR SOMEONE ELSE BEEN INJURED AS A RESULT OF YOUR DRINKING: NO
PRESCIPTION_ABUSE_PAST_12_MONTHS: NO
AUDIT TOTAL SCORE: 0
SKIP TO QUESTIONS 9-10: 0
HOW MANY STANDARD DRINKS CONTAINING ALCOHOL DO YOU HAVE ON A TYPICAL DAY: 1 OR 2
HOW OFTEN DURING THE LAST YEAR HAVE YOU FAILED TO DO WHAT WAS NORMALLY EXPECTED FROM YOU BECAUSE OF DRINKING: NEVER
HOW OFTEN DURING THE LAST YEAR HAVE YOU FOUND THAT YOU WERE NOT ABLE TO STOP DRINKING ONCE YOU HAD STARTED: NEVER
HOW OFTEN DURING THE LAST YEAR HAVE YOU HAD A FEELING OF GUILT OR REMORSE AFTER DRINKING: NEVER

## 2023-12-12 ASSESSMENT — ENCOUNTER SYMPTOMS
FEVER: 0
NAUSEA: 0
PALPITATIONS: 0
FATIGUE: 0
CONSTIPATION: 0
VOMITING: 0
COUGH: 0
CHEST TIGHTNESS: 0
DIARRHEA: 0
CHILLS: 0
LIGHT-HEADEDNESS: 0
CHOKING: 0

## 2023-12-12 ASSESSMENT — ACTIVITIES OF DAILY LIVING (ADL)
JUDGMENT_ADEQUATE_SAFELY_COMPLETE_DAILY_ACTIVITIES: YES
TOILETING: INDEPENDENT
WALKS IN HOME: INDEPENDENT
PATIENT'S MEMORY ADEQUATE TO SAFELY COMPLETE DAILY ACTIVITIES?: YES
BATHING: INDEPENDENT
HEARING - RIGHT EAR: FUNCTIONAL
ADEQUATE_TO_COMPLETE_ADL: NO
GROOMING: INDEPENDENT
LACK_OF_TRANSPORTATION: NO
FEEDING YOURSELF: INDEPENDENT
DRESSING YOURSELF: INDEPENDENT
HEARING - LEFT EAR: FUNCTIONAL

## 2023-12-12 ASSESSMENT — PAIN - FUNCTIONAL ASSESSMENT: PAIN_FUNCTIONAL_ASSESSMENT: 0-10

## 2023-12-12 ASSESSMENT — PAIN SCALES - GENERAL
PAINLEVEL_OUTOF10: 0 - NO PAIN

## 2023-12-12 NOTE — CARE PLAN
Problem: Pain - Adult  Goal: Verbalizes/displays adequate comfort level or baseline comfort level  Outcome: Progressing  Flowsheets (Taken 12/12/2023 1103)  Verbalizes/displays adequate comfort level or baseline comfort level: Encourage patient to monitor pain and request assistance     Problem: Safety - Adult  Goal: Free from fall injury  Outcome: Progressing  Flowsheets (Taken 12/12/2023 1103)  Free from fall injury: Instruct family/caregiver on patient safety     Problem: Discharge Planning  Goal: Discharge to home or other facility with appropriate resources  Outcome: Progressing  Flowsheets (Taken 12/12/2023 1103)  Discharge to home or other facility with appropriate resources: Identify discharge learning needs (meds, wound care, etc)     Problem: Chronic Conditions and Co-morbidities  Goal: Patient's chronic conditions and co-morbidity symptoms are monitored and maintained or improved  Outcome: Progressing  Flowsheets (Taken 12/12/2023 1103)  Care Plan - Patient's Chronic Conditions and Co-Morbidity Symptoms are Monitored and Maintained or Improved: Monitor and assess patient's chronic conditions and comorbid symptoms for stability, deterioration, or improvement     Problem: Fall/Injury  Goal: Not fall by end of shift  Outcome: Progressing  Goal: Be free from injury by end of the shift  Outcome: Progressing  Goal: Verbalize understanding of personal risk factors for fall in the hospital  Outcome: Progressing  Goal: Verbalize understanding of risk factor reduction measures to prevent injury from fall in the home  Outcome: Progressing  Goal: Use assistive devices by end of the shift  Outcome: Progressing  Goal: Pace activities to prevent fatigue by end of the shift  Outcome: Progressing     Problem: Diabetes  Goal: Achieve decreasing blood glucose levels by end of shift  Outcome: Progressing  Flowsheets (Taken 12/12/2023 1103)  Achieve decreasing blood glucose levels by end of shift: Med  administration/monitoring of effect  Goal: Increase stability of blood glucose readings by end of shift  Outcome: Progressing  Flowsheets (Taken 12/12/2023 1103)  Increase stability of blood glucose readings by end of shift: Med administration/monitoring of effect  Goal: Decrease in ketones present in urine by end of shift  Outcome: Progressing  Flowsheets (Taken 12/12/2023 1103)  Decrease in ketones present in urine by end of shift: Med administration/monitoring of effect  Goal: Maintain electrolyte levels within acceptable range throughout shift  Outcome: Progressing  Flowsheets (Taken 12/12/2023 1103)  Maintain electrolyte levels within acceptable range throughout shift: Med administration/monitoring of effect  Goal: Maintain glucose levels >70mg/dl to <250mg/dl throughout shift  Outcome: Progressing  Flowsheets (Taken 12/12/2023 1103)  Maintain glucose levels >70mg/dl to <250mg/dl throughout shift: Med administration/monitoring of effect  Goal: No changes in neurological exam by end of shift  Outcome: Progressing  Goal: Learn about and adhere to nutrition recommendations by end of shift  Outcome: Progressing  Goal: Vital signs within normal range for age by end of shift  Outcome: Progressing  Flowsheets (Taken 12/12/2023 1103)  Vital signs within normal range for age by end of shift: Med administration/monitoring of effect  Goal: Increase self care and/or family involovement by end of shift  Outcome: Progressing  Flowsheets (Taken 12/12/2023 1103)  Increase self care and/or family involovement by end of shift: Self monitor blood glucose with staff oversight  Goal: Receive DSME education by end of shift  Outcome: Progressing     Problem: Skin  Goal: Decreased wound size/increased tissue granulation at next dressing change  Outcome: Progressing  Goal: Participates in plan/prevention/treatment measures  Outcome: Progressing  Goal: Promote skin healing  Outcome: Progressing   The patient's goals for the shift  include rule out heart attack    The clinical goals for the shift include patient will remain chest pain free this shift    Over the shift, the patient did not make progress toward the following goals. Barriers to progression include waiting for transfer to tertiary facility. Recommendations to address these barriers include bed availability.

## 2023-12-12 NOTE — CARE PLAN
The patient's goals for the shift include   Problem: Diabetes  Goal: Achieve decreasing blood glucose levels by end of shift  Outcome: Progressing       The clinical goals for the shift include no chest pain.     Pt. Went down for a cardiac cath this shift. Pt. To go [place] for open heart. Awaiting bed. Pt. Blood sugar is elevated this shift.

## 2023-12-12 NOTE — PROGRESS NOTES
Care Transition  Patient has been accepted by Dr. Tony Krishnan at [facility] for CABG eval and transfer to [facility] when bed available.        Amparo Ye RN

## 2023-12-12 NOTE — PROGRESS NOTES
Spiritual Care Visit    Clinical Encounter Type  Visited With: Patient  Routine Visit: Introduction  Continue Visiting: No                                            Taxonomy  Intended Effects: Build relationship of care and support, Convey a calming presence, Preserve dignity and respect, Promote sense of peace  Methods: Offer spiritual/Voodoo support  Interventions: Active listening, Share words of hope and inspiration, [place]    Patient shared he is [employer] but does not go to a [employer].  Patient shared his story and the  listened.   prayed at his request.

## 2023-12-12 NOTE — PROGRESS NOTES
"Behzad Diaz is a 58 y.o. male on day 2 of admission presenting with ACS (acute coronary syndrome) (CMS/[facility]).    Subjective   Patient seen and examined, no acute events overnight, laying in bed comfortably denying any chest pain. Aware of cath result and plan to transfer to [facility] for CABG eval.        Objective       Constitutional: Well developed, awake/alert/oriented x3, no distress, alert and cooperative, morbid obese   ENMT: mucous membranes moist   Head/Neck: Neck supple   Respiratory/Thorax: Patent airways, CTAB, normal breath sounds with good chest expansion, thorax symmetric   Cardiovascular: Regular, rate and rhythm, no murmurs, 2+ equal pulses of the extremities, normal S 1and S 2   Gastrointestinal: Nondistended, soft, non-tender, no rebound tenderness    Musculoskeletal: ROM intact, no joint swelling, normal strength   Extremities: normal extremities   Neurological: alert and oriented x3, intact senses, motor       Last Recorded Vitals  Blood pressure 98/53, pulse 66, temperature 35.7 °C (96.3 °F), temperature source Tympanic, resp. rate 18, height 1.854 m (6' 0.99\"), weight 148 kg (325 lb 9.9 oz), SpO2 99 %.  Intake/Output last 3 Shifts:  I/O last 3 completed shifts:  In: 300 (2 mL/kg) [P.O.:300]  Out: 2155 (14.6 mL/kg) [Urine:2150 (0.4 mL/kg/hr); Blood:5]  Weight: 147.7 kg     Relevant Results  Scheduled medications  amoxicillin, 500 mg, oral, q12h ISAIAH  aspirin, 81 mg, oral, Daily  atorvastatin, 80 mg, oral, Nightly  carvedilol, 6.25 mg, oral, BID with meals  insulin lispro, 0-10 Units, subcutaneous, With meals & nightly  lisinopril, 20 mg, oral, Daily  pantoprazole, 40 mg, oral, Daily before breakfast   Or  pantoprazole, 40 mg, intravenous, Daily before breakfast  perflutren lipid microspheres, 0.5-10 mL of dilution, intravenous, Once in imaging  perflutren protein A microsphere, 0.5 mL, intravenous, Once in imaging  perflutren protein A microsphere, 0.5 mL, intravenous, Once in imaging  pregabalin, 75 " mg, oral, Daily  sulfur hexafluoride microsphr, 2 mL, intravenous, Once in imaging  sulfur hexafluoride microsphr, 2 mL, intravenous, Once in imaging      Continuous medications  heparin, 0-4,000 Units/hr, Last Rate: 2,300 Units/hr (12/12/23 0253)      PRN medications  PRN medications: acetaminophen **OR** acetaminophen **OR** acetaminophen, acetaminophen **OR** acetaminophen **OR** acetaminophen, dextrose 10 % in water (D10W), dextrose, glucagon, heparin, melatonin, metoclopramide **OR** metoclopramide, ondansetron ODT **OR** ondansetron, polyethylene glycol  Results from last 7 days   Lab Units 12/11/23  2300 12/11/23  0553 12/10/23  0421   WBC AUTO x10*3/uL 7.8 6.7 6.9   RBC AUTO x10*6/uL 4.74 4.75 5.33   HEMOGLOBIN g/dL 14.3 14.5 16.3     Results from last 7 days   Lab Units 12/11/23  0553 12/10/23  0421   SODIUM mmol/L 132* 131*   POTASSIUM mmol/L 4.0 4.7   CHLORIDE mmol/L 101 97*   CO2 mmol/L 23 21   BUN mg/dL 11 17   CREATININE mg/dL 0.84 1.15   CALCIUM mg/dL 8.6 9.0   MAGNESIUM mg/dL  --  1.63   BILIRUBIN TOTAL mg/dL 1.2 1.2   ALT U/L 41 50   AST U/L 57* 43*         Assessment/Plan   Principal Problem:    ACS (acute coronary syndrome) (CMS/[facility])  Active Problems:    Type 2 diabetes mellitus, without long-term current use of insulin (CMS/[facility])    Osteomyelitis of spine (CMS/[facility])    Discitis    Dysuria    Essential hypertension, benign    Obesity, Class III, BMI 40-49.9 (morbid obesity) (CMS/[facility])    NAFLD (nonalcoholic fatty liver disease)    NSTEMI (non-ST elevated myocardial infarction) (CMS/[facility])    Complicated UTI (urinary tract infection)      -trop 322---> 1626, EKG showed ST depression in II, III, AvF / V5 and V6  -continue asp/statin per cardiology recommendation  -obtain echo, result pending read  -cardiology consulted, appreciate recommendations  -Cath done on 12/11, which showed occluded LAD and left circumflex with severe diagonal disease, severe mid dominant RCA.  Due to multivessel disease,   Raphael reach out to Dr. Tony Krishnan at [facility], who accepted patient for transfer.  Patient will be transferred to [facility] for CABG evaluation pending bed availability.   -continue heparin drip  -patient was plavix loaded in ED  -continue SSI coverage  -continue home Amoxicillin (Chronic discitis following ID).   -finished cipro for UTI on 12/11  -other home meds resumed  -patient is full code  -dvt prophylaxis on heparin drip already     Dispo: awaiting for tele bed at [facility] for transfer for CABG evaluation.          I spent 25 minutes in the professional and overall care of this patient.      Reg Anton MD

## 2023-12-12 NOTE — CARE PLAN
The patient's goals for the shift include rule out heart attack    The clinical goals for the shift include pt will remain chest pain free

## 2023-12-12 NOTE — CARE PLAN
Problem: Pain - Adult  Goal: Verbalizes/displays adequate comfort level or baseline comfort level  Outcome: Progressing     Problem: Safety - Adult  Goal: Free from fall injury  Outcome: Progressing     Problem: Discharge Planning  Goal: Discharge to home or other facility with appropriate resources  Outcome: Progressing     Problem: Chronic Conditions and Co-morbidities  Goal: Patient's chronic conditions and co-morbidity symptoms are monitored and maintained or improved  Outcome: Progressing     Problem: Fall/Injury  Goal: Not fall by end of shift  Outcome: Progressing  Goal: Be free from injury by end of the shift  Outcome: Progressing  Goal: Verbalize understanding of personal risk factors for fall in the hospital  Outcome: Progressing  Goal: Verbalize understanding of risk factor reduction measures to prevent injury from fall in the home  Outcome: Progressing  Goal: Use assistive devices by end of the shift  Outcome: Progressing  Goal: Pace activities to prevent fatigue by end of the shift  Outcome: Progressing     Problem: Diabetes  Goal: Achieve decreasing blood glucose levels by end of shift  Outcome: Progressing  Goal: Increase stability of blood glucose readings by end of shift  Outcome: Progressing  Goal: Decrease in ketones present in urine by end of shift  Outcome: Progressing  Goal: Maintain electrolyte levels within acceptable range throughout shift  Outcome: Progressing  Goal: Maintain glucose levels >70mg/dl to <250mg/dl throughout shift  Outcome: Progressing  Goal: No changes in neurological exam by end of shift  Outcome: Progressing  Goal: Learn about and adhere to nutrition recommendations by end of shift  Outcome: Progressing  Goal: Vital signs within normal range for age by end of shift  Outcome: Progressing  Goal: Increase self care and/or family involovement by end of shift  Outcome: Progressing  Goal: Receive DSME education by end of shift  Outcome: Progressing     Problem: Skin  Goal:  Decreased wound size/increased tissue granulation at next dressing change  Outcome: Progressing  Goal: Participates in plan/prevention/treatment measures  Outcome: Progressing  Goal: Promote skin healing  Outcome: Progressing   The patient's goals for the shift include rule out heart attack    The clinical goals for the shift include pt will remain chest pain free

## 2023-12-12 NOTE — NURSING NOTE
Report given to transport team, IV heparin drip continued on their pump. Patient's home meds retrieved from pharmacy and placed in transfer envelope, patient aware.  Patient took his cell phone, Ipad and two chargers.

## 2023-12-13 ENCOUNTER — APPOINTMENT (OUTPATIENT)
Dept: RADIOLOGY | Facility: HOSPITAL | Age: 58
DRG: 236 | End: 2023-12-13
Payer: COMMERCIAL

## 2023-12-13 ENCOUNTER — APPOINTMENT (OUTPATIENT)
Dept: VASCULAR MEDICINE | Facility: HOSPITAL | Age: 58
DRG: 236 | End: 2023-12-13
Payer: COMMERCIAL

## 2023-12-13 LAB
ABO GROUP (TYPE) IN BLOOD: NORMAL
ALBUMIN SERPL BCP-MCNC: 3.4 G/DL (ref 3.4–5)
ALP SERPL-CCNC: 58 U/L (ref 33–120)
ALT SERPL W P-5'-P-CCNC: 26 U/L (ref 10–52)
ANION GAP SERPL CALC-SCNC: 13 MMOL/L (ref 10–20)
ANTIBODY SCREEN: NORMAL
APPEARANCE UR: CLEAR
APTT PPP: 86 SECONDS (ref 27–38)
AST SERPL W P-5'-P-CCNC: 26 U/L (ref 9–39)
BASOPHILS # BLD AUTO: 0.05 X10*3/UL (ref 0–0.1)
BASOPHILS NFR BLD AUTO: 0.7 %
BILIRUB SERPL-MCNC: 1.3 MG/DL (ref 0–1.2)
BILIRUB UR STRIP.AUTO-MCNC: NEGATIVE MG/DL
BNP SERPL-MCNC: 91 PG/ML (ref 0–99)
BUN SERPL-MCNC: 16 MG/DL (ref 6–23)
CALCIUM SERPL-MCNC: 8.8 MG/DL (ref 8.6–10.6)
CARDIAC TROPONIN I PNL SERPL HS: 5268 NG/L (ref 0–53)
CARDIAC TROPONIN I PNL SERPL HS: 5667 NG/L (ref 0–53)
CHLORIDE SERPL-SCNC: 101 MMOL/L (ref 98–107)
CO2 SERPL-SCNC: 24 MMOL/L (ref 21–32)
COLOR UR: YELLOW
CREAT SERPL-MCNC: 0.93 MG/DL (ref 0.5–1.3)
EOSINOPHIL # BLD AUTO: 0.14 X10*3/UL (ref 0–0.7)
EOSINOPHIL NFR BLD AUTO: 1.9 %
ERYTHROCYTE [DISTWIDTH] IN BLOOD BY AUTOMATED COUNT: 12.1 % (ref 11.5–14.5)
GFR SERPL CREATININE-BSD FRML MDRD: >90 ML/MIN/1.73M*2
GLUCOSE BLD MANUAL STRIP-MCNC: 212 MG/DL (ref 74–99)
GLUCOSE BLD MANUAL STRIP-MCNC: 239 MG/DL (ref 74–99)
GLUCOSE BLD MANUAL STRIP-MCNC: 278 MG/DL (ref 74–99)
GLUCOSE BLD MANUAL STRIP-MCNC: 297 MG/DL (ref 74–99)
GLUCOSE BLD MANUAL STRIP-MCNC: 336 MG/DL (ref 74–99)
GLUCOSE SERPL-MCNC: 271 MG/DL (ref 74–99)
GLUCOSE UR STRIP.AUTO-MCNC: ABNORMAL MG/DL
HCT VFR BLD AUTO: 41.1 % (ref 41–52)
HGB BLD-MCNC: 13.9 G/DL (ref 13.5–17.5)
HOLD SPECIMEN: NORMAL
IMM GRANULOCYTES # BLD AUTO: 0.03 X10*3/UL (ref 0–0.7)
IMM GRANULOCYTES NFR BLD AUTO: 0.4 % (ref 0–0.9)
INR PPP: 1.2 (ref 0.9–1.1)
KETONES UR STRIP.AUTO-MCNC: NEGATIVE MG/DL
LEUKOCYTE ESTERASE UR QL STRIP.AUTO: NEGATIVE
LYMPHOCYTES # BLD AUTO: 3.56 X10*3/UL (ref 1.2–4.8)
LYMPHOCYTES NFR BLD AUTO: 48.8 %
MAGNESIUM SERPL-MCNC: 1.65 MG/DL (ref 1.6–2.4)
MCH RBC QN AUTO: 30.5 PG (ref 26–34)
MCHC RBC AUTO-ENTMCNC: 33.8 G/DL (ref 32–36)
MCV RBC AUTO: 90 FL (ref 80–100)
MONOCYTES # BLD AUTO: 0.46 X10*3/UL (ref 0.1–1)
MONOCYTES NFR BLD AUTO: 6.3 %
NEUTROPHILS # BLD AUTO: 3.06 X10*3/UL (ref 1.2–7.7)
NEUTROPHILS NFR BLD AUTO: 41.9 %
NITRITE UR QL STRIP.AUTO: NEGATIVE
NRBC BLD-RTO: 0 /100 WBCS (ref 0–0)
PH UR STRIP.AUTO: 5 [PH]
PHOSPHATE SERPL-MCNC: 3.4 MG/DL (ref 2.5–4.9)
PLATELET # BLD AUTO: 196 X10*3/UL (ref 150–450)
POTASSIUM SERPL-SCNC: 3.9 MMOL/L (ref 3.5–5.3)
PROT SERPL-MCNC: 6.5 G/DL (ref 6.4–8.2)
PROT UR STRIP.AUTO-MCNC: NEGATIVE MG/DL
PROTHROMBIN TIME: 13.3 SECONDS (ref 9.8–12.8)
RBC # BLD AUTO: 4.56 X10*6/UL (ref 4.5–5.9)
RBC # UR STRIP.AUTO: NEGATIVE /UL
RH FACTOR (ANTIGEN D): NORMAL
SODIUM SERPL-SCNC: 134 MMOL/L (ref 136–145)
SP GR UR STRIP.AUTO: 1.02
UROBILINOGEN UR STRIP.AUTO-MCNC: <2 MG/DL
WBC # BLD AUTO: 7.3 X10*3/UL (ref 4.4–11.3)

## 2023-12-13 PROCEDURE — 2500000004 HC RX 250 GENERAL PHARMACY W/ HCPCS (ALT 636 FOR OP/ED)

## 2023-12-13 PROCEDURE — 85025 COMPLETE CBC W/AUTO DIFF WBC: CPT

## 2023-12-13 PROCEDURE — 93922 UPR/L XTREMITY ART 2 LEVELS: CPT | Performed by: INTERNAL MEDICINE

## 2023-12-13 PROCEDURE — 93880 EXTRACRANIAL BILAT STUDY: CPT

## 2023-12-13 PROCEDURE — 2500000002 HC RX 250 W HCPCS SELF ADMINISTERED DRUGS (ALT 637 FOR MEDICARE OP, ALT 636 FOR OP/ED)

## 2023-12-13 PROCEDURE — 93922 UPR/L XTREMITY ART 2 LEVELS: CPT

## 2023-12-13 PROCEDURE — 99223 1ST HOSP IP/OBS HIGH 75: CPT | Performed by: PHYSICIAN ASSISTANT

## 2023-12-13 PROCEDURE — 83735 ASSAY OF MAGNESIUM: CPT

## 2023-12-13 PROCEDURE — 71046 X-RAY EXAM CHEST 2 VIEWS: CPT | Performed by: RADIOLOGY

## 2023-12-13 PROCEDURE — 93970 EXTREMITY STUDY: CPT | Performed by: INTERNAL MEDICINE

## 2023-12-13 PROCEDURE — 82947 ASSAY GLUCOSE BLOOD QUANT: CPT

## 2023-12-13 PROCEDURE — 81003 URINALYSIS AUTO W/O SCOPE: CPT | Performed by: PHYSICIAN ASSISTANT

## 2023-12-13 PROCEDURE — 85610 PROTHROMBIN TIME: CPT

## 2023-12-13 PROCEDURE — 84484 ASSAY OF TROPONIN QUANT: CPT

## 2023-12-13 PROCEDURE — 83880 ASSAY OF NATRIURETIC PEPTIDE: CPT

## 2023-12-13 PROCEDURE — 87081 CULTURE SCREEN ONLY: CPT | Performed by: PHYSICIAN ASSISTANT

## 2023-12-13 PROCEDURE — 99223 1ST HOSP IP/OBS HIGH 75: CPT | Performed by: INTERNAL MEDICINE

## 2023-12-13 PROCEDURE — 36415 COLL VENOUS BLD VENIPUNCTURE: CPT

## 2023-12-13 PROCEDURE — 1200000002 HC GENERAL ROOM WITH TELEMETRY DAILY

## 2023-12-13 PROCEDURE — 93970 EXTREMITY STUDY: CPT

## 2023-12-13 PROCEDURE — 86901 BLOOD TYPING SEROLOGIC RH(D): CPT

## 2023-12-13 PROCEDURE — 80053 COMPREHEN METABOLIC PANEL: CPT

## 2023-12-13 PROCEDURE — 71250 CT THORAX DX C-: CPT | Performed by: STUDENT IN AN ORGANIZED HEALTH CARE EDUCATION/TRAINING PROGRAM

## 2023-12-13 PROCEDURE — 2500000001 HC RX 250 WO HCPCS SELF ADMINISTERED DRUGS (ALT 637 FOR MEDICARE OP): Performed by: PHYSICIAN ASSISTANT

## 2023-12-13 PROCEDURE — 84100 ASSAY OF PHOSPHORUS: CPT

## 2023-12-13 PROCEDURE — 71250 CT THORAX DX C-: CPT

## 2023-12-13 PROCEDURE — 93010 ELECTROCARDIOGRAM REPORT: CPT | Performed by: INTERNAL MEDICINE

## 2023-12-13 PROCEDURE — 93880 EXTRACRANIAL BILAT STUDY: CPT | Performed by: INTERNAL MEDICINE

## 2023-12-13 PROCEDURE — 71046 X-RAY EXAM CHEST 2 VIEWS: CPT

## 2023-12-13 PROCEDURE — 2500000001 HC RX 250 WO HCPCS SELF ADMINISTERED DRUGS (ALT 637 FOR MEDICARE OP)

## 2023-12-13 RX ORDER — INSULIN GLARGINE 100 [IU]/ML
30 INJECTION, SOLUTION SUBCUTANEOUS NIGHTLY
Status: DISCONTINUED | OUTPATIENT
Start: 2023-12-13 | End: 2023-12-14

## 2023-12-13 RX ORDER — INSULIN LISPRO 100 [IU]/ML
0-10 INJECTION, SOLUTION INTRAVENOUS; SUBCUTANEOUS
Status: DISCONTINUED | OUTPATIENT
Start: 2023-12-13 | End: 2023-12-13

## 2023-12-13 RX ORDER — DEXTROSE MONOHYDRATE 100 MG/ML
0.3 INJECTION, SOLUTION INTRAVENOUS ONCE AS NEEDED
Status: DISCONTINUED | OUTPATIENT
Start: 2023-12-13 | End: 2023-12-20

## 2023-12-13 RX ORDER — INSULIN LISPRO 100 [IU]/ML
0-10 INJECTION, SOLUTION INTRAVENOUS; SUBCUTANEOUS EVERY 4 HOURS
Status: DISCONTINUED | OUTPATIENT
Start: 2023-12-13 | End: 2023-12-14

## 2023-12-13 RX ORDER — MUPIROCIN 20 MG/G
0.5 OINTMENT TOPICAL 2 TIMES DAILY
Status: COMPLETED | OUTPATIENT
Start: 2023-12-13 | End: 2023-12-17

## 2023-12-13 RX ORDER — DEXTROSE 50 % IN WATER (D50W) INTRAVENOUS SYRINGE
25
Status: DISCONTINUED | OUTPATIENT
Start: 2023-12-13 | End: 2023-12-20

## 2023-12-13 RX ADMIN — INSULIN LISPRO 4 UNITS: 100 INJECTION, SOLUTION INTRAVENOUS; SUBCUTANEOUS at 23:05

## 2023-12-13 RX ADMIN — ATORVASTATIN CALCIUM 80 MG: 80 TABLET, FILM COATED ORAL at 20:37

## 2023-12-13 RX ADMIN — INSULIN LISPRO 8 UNITS: 100 INJECTION, SOLUTION INTRAVENOUS; SUBCUTANEOUS at 18:47

## 2023-12-13 RX ADMIN — ASPIRIN 81 MG CHEWABLE TABLET 81 MG: 81 TABLET CHEWABLE at 09:41

## 2023-12-13 RX ADMIN — CARVEDILOL 6.25 MG: 6.25 TABLET, FILM COATED ORAL at 18:46

## 2023-12-13 RX ADMIN — MUPIROCIN 0.5 APPLICATION: 20 OINTMENT TOPICAL at 20:37

## 2023-12-13 RX ADMIN — HEPARIN SODIUM 2300 UNITS/HR: 10000 INJECTION, SOLUTION INTRAVENOUS at 13:20

## 2023-12-13 RX ADMIN — AMOXICILLIN 500 MG: 500 CAPSULE ORAL at 20:37

## 2023-12-13 RX ADMIN — AMOXICILLIN 500 MG: 500 CAPSULE ORAL at 09:41

## 2023-12-13 RX ADMIN — MUPIROCIN 0.5 APPLICATION: 20 OINTMENT TOPICAL at 10:35

## 2023-12-13 RX ADMIN — INSULIN GLARGINE 30 UNITS: 100 INJECTION, SOLUTION SUBCUTANEOUS at 20:36

## 2023-12-13 RX ADMIN — INSULIN LISPRO 6 UNITS: 100 INJECTION, SOLUTION INTRAVENOUS; SUBCUTANEOUS at 10:19

## 2023-12-13 RX ADMIN — PREGABALIN 75 MG: 75 CAPSULE ORAL at 09:41

## 2023-12-13 ASSESSMENT — PAIN - FUNCTIONAL ASSESSMENT: PAIN_FUNCTIONAL_ASSESSMENT: 0-10

## 2023-12-13 ASSESSMENT — ENCOUNTER SYMPTOMS
ALLERGIC/IMMUNOLOGIC NEGATIVE: 1
RESPIRATORY NEGATIVE: 1
CONSTITUTIONAL NEGATIVE: 1
ENDOCRINE NEGATIVE: 1
MUSCULOSKELETAL NEGATIVE: 1
GASTROINTESTINAL NEGATIVE: 1
NEUROLOGICAL NEGATIVE: 1
EYES NEGATIVE: 1
CARDIOVASCULAR NEGATIVE: 1

## 2023-12-13 ASSESSMENT — COGNITIVE AND FUNCTIONAL STATUS - GENERAL
MOBILITY SCORE: 24
DAILY ACTIVITIY SCORE: 24

## 2023-12-13 ASSESSMENT — PAIN SCALES - GENERAL
PAINLEVEL_OUTOF10: 0 - NO PAIN

## 2023-12-13 NOTE — CONSULTS
Reason for Consult: CABG evaluation    CARDIAC SURGERY CONSULT NOTE    HISTORY OF PRESENT ILLNESS  Mr Wen 57 yo M with PMHx HTN,HLD, T2DM (HgbA1c 11%), L4-L5 Discitis with epidural abscess s/p debridement, who was transferred from [facility] to [facility] for CABG evaluation.      The patient presented to [facility] ED 12/10 with left sided chest pain at rest that radiated to his Lt arm, it was associated with diaphoresis and nausea. It was relieved by Nitro, he was given ASA load. Upon presentation to [facility] ED pt was HDS, afebrile, EKG with T-wave inversion infero-lateral leads, Trops 322 with repeat up to 1626, the patient was started on Heparin gtt, and Plavix and admitted to medicine for ACS (NSTEMI), TTE 10/12 with EF 40-45% with global hypokinesis of LV. LHC 12/11 revealed 100% stenosis in mid LAD, 95% stenosis proximal to mid first diagonal branch, occluded LCx after early first OM, OM1 with occluded mid portion, OM2 occluded, and 95% stenosis mid RCA. The patient remained HDS throughout his stay, his chest pain resolved since initial admission, the patient is being transferred to [facility] for CABG eval.     This morning 12/13 troponin up to 5,667.     Cardiac surgery is consulted for CABG evaluation.     Subjective   Past Medical History:   Diagnosis Date    Discitis     Discitis     HLD (hyperlipidemia)     HTN (hypertension)     T2DM (type 2 diabetes mellitus) (CMS/[facility])      Past Surgical History:   Procedure Laterality Date    BACK SURGERY      CARDIAC CATHETERIZATION N/A 12/11/2023    Procedure: Left Heart Cath, With LV;  Surgeon: Kaleb Lim MD;  Location: [facility] Cardiac Cath Lab;  Service: Cardiovascular;  Laterality: N/A;    CT GUIDED PERCUTANEOUS BIOPSY BONE DEEP  09/10/2021    CT GUIDED PERCUTANEOUS BIOPSY BONE DEEP 9/10/2021 [facility] CLINICAL LEGACY    OTHER SURGICAL HISTORY  07/30/2020    Scrotal surgery    OTHER SURGICAL HISTORY  07/30/2020    Back surgery     Social History     Tobacco Use    Smoking status: Some  Days     Types: Cigars    Smokeless tobacco: Former   Vaping Use    Vaping Use: Never used   Substance Use Topics    Alcohol use: Yes     Comment: occasional    Drug use: Never     Family History   Problem Relation Name Age of Onset    Coronary artery disease Mother         Patient has no known allergies.    Prior to Admission medications    Medication Sig Start Date End Date Taking? Authorizing Provider   amoxicillin (Amoxil) 500 mg capsule Take 1 capsule (500 mg) by mouth twice a day. 7/7/23 1/3/24  Historical Provider, MD   aspirin 81 mg chewable tablet Chew 1 tablet (81 mg) once daily. Do not start before December 12, 2023. 12/12/23 2/10/24  Reg Anton MD   atorvastatin (Lipitor) 80 mg tablet Take 1 tablet (80 mg) by mouth once daily at bedtime. 12/11/23 2/9/24  Reg Anton MD   carvedilol (Coreg) 6.25 mg tablet TAKE 1 TABLET BY MOUTH TWICE A DAY WITH MEALS 8/29/23   Tomer Arce DO   cyclobenzaprine (Flexeril) 10 mg tablet Take 1 tablet (10 mg) by mouth 2 times a day as needed for muscle spasms.    Historical Provider, MD   lisinopril 20 mg tablet Take 1 tablet (20 mg) by mouth once daily. 8/25/23 8/24/24  Tomer Arce DO   metFORMIN (Glucophage) 1,000 mg tablet Take 1 tablet (1,000 mg) by mouth once daily with a meal. Patient choice 11/27/18   Historical Provider, MD   pregabalin (Lyrica) 75 mg capsule Take 1 capsule (75 mg) by mouth once daily. Per patient statement    Historical Provider, MD   ciprofloxacin (Cipro) 500 mg tablet Take 1 tablet (500 mg) by mouth 2 times a day. 12/6/23 12/12/23  Historical Provider, MD   meloxicam (Mobic) 15 mg tablet Take 1 tablet (15 mg) by mouth if needed for mild pain (1 - 3).  12/12/23  Historical Provider, MD   semaglutide (OZEMPIC) 1 mg/dose (4 mg/3 mL) pen injector Inject 1 mg under the skin 1 (one) time per week.  Patient not taking: Reported on 12/10/2023 8/25/23 12/12/23  Tomer Arce DO   semaglutide 0.25 mg or 0.5 mg (2 mg/3 mL) pen injector  "Inject 0.25 mg under the skin 1 (one) time per week.  Patient not taking: Reported on 12/10/2023 8/25/23 12/12/23  Tomer Arce DO   semaglutide 0.25 mg or 0.5 mg (2 mg/3 mL) pen injector Inject 0.5 mg under the skin 1 (one) time per week.  Patient not taking: Reported on 12/10/2023 8/25/23 12/12/23  Tomer Arce DO       Review of Systems  Review of Systems   Constitutional: Negative.    HENT: Negative.     Eyes: Negative.    Respiratory: Negative.     Cardiovascular: Negative.    Gastrointestinal: Negative.    Endocrine: Negative.    Genitourinary: Negative.    Musculoskeletal: Negative.    Allergic/Immunologic: Negative.    Neurological: Negative.            Objective   BP 92/60 (BP Location: Right arm, Patient Position: Lying)   Pulse 70   Temp 36.5 °C (97.7 °F) (Temporal)   Resp 16   Ht 1.854 m (6' 0.99\")   Wt (!) 157 kg (345 lb 11.2 oz)   SpO2 96%   BMI 45.62 kg/m²   Pain Score: 0 - No pain   Vitals:    12/12/23 2000   Weight: (!) 157 kg (345 lb 11.2 oz)        No intake or output data in the 24 hours ending 12/13/23 0917    Physical Exam  Physical Exam  Constitutional:       General: He is not in acute distress.     Appearance: He is obese. He is not ill-appearing.   HENT:      Head: Normocephalic.      Mouth/Throat:      Mouth: Mucous membranes are moist.   Cardiovascular:      Rate and Rhythm: Normal rate and regular rhythm.      Heart sounds: No murmur heard.  Pulmonary:      Effort: Pulmonary effort is normal.      Breath sounds: Normal breath sounds.   Abdominal:      General: Bowel sounds are normal. There is no distension.      Palpations: Abdomen is soft.      Tenderness: There is no abdominal tenderness.   Musculoskeletal:         General: Normal range of motion.      Cervical back: Neck supple.      Right lower leg: No edema.      Left lower leg: No edema.   Skin:     General: Skin is warm and dry.   Neurological:      General: No focal deficit present.      Mental Status: He " is alert and oriented to person, place, and time.   Psychiatric:         Mood and Affect: Mood normal.         Behavior: Behavior normal.         Medications  Scheduled medications  amoxicillin, 500 mg, oral, q12h ISAIAH  aspirin, 81 mg, oral, Daily  atorvastatin, 80 mg, oral, Nightly  carvedilol, 6.25 mg, oral, BID with meals  insulin lispro, 0-10 Units, subcutaneous, TID with meals  pantoprazole, 40 mg, oral, Daily before breakfast   Or  pantoprazole, 40 mg, intravenous, Daily before breakfast  polyethylene glycol, 17 g, oral, Daily  pregabalin, 75 mg, oral, Daily    Continuous medications  heparin, 0-4,000 Units/hr, Last Rate: 2,300 Units/hr (12/12/23 2305)    PRN medications  PRN medications: acetaminophen **OR** acetaminophen **OR** acetaminophen, dextrose 10 % in water (D10W), dextrose, glucagon    Labs  Results for orders placed or performed during the hospital encounter of 12/12/23 (from the past 24 hour(s))   POCT GLUCOSE   Result Value Ref Range    POCT Glucose 259 (H) 74 - 99 mg/dL   Comprehensive metabolic panel   Result Value Ref Range    Glucose 271 (H) 74 - 99 mg/dL    Sodium 134 (L) 136 - 145 mmol/L    Potassium 3.9 3.5 - 5.3 mmol/L    Chloride 101 98 - 107 mmol/L    Bicarbonate 24 21 - 32 mmol/L    Anion Gap 13 10 - 20 mmol/L    Urea Nitrogen 16 6 - 23 mg/dL    Creatinine 0.93 0.50 - 1.30 mg/dL    eGFR >90 >60 mL/min/1.73m*2    Calcium 8.8 8.6 - 10.6 mg/dL    Albumin 3.4 3.4 - 5.0 g/dL    Alkaline Phosphatase 58 33 - 120 U/L    Total Protein 6.5 6.4 - 8.2 g/dL    AST 26 9 - 39 U/L    Bilirubin, Total 1.3 (H) 0.0 - 1.2 mg/dL    ALT 26 10 - 52 U/L   Magnesium   Result Value Ref Range    Magnesium 1.65 1.60 - 2.40 mg/dL   CBC and Auto Differential   Result Value Ref Range    WBC 7.3 4.4 - 11.3 x10*3/uL    nRBC 0.0 0.0 - 0.0 /100 WBCs    RBC 4.56 4.50 - 5.90 x10*6/uL    Hemoglobin 13.9 13.5 - 17.5 g/dL    Hematocrit 41.1 41.0 - 52.0 %    MCV 90 80 - 100 fL    MCH 30.5 26.0 - 34.0 pg    MCHC 33.8 32.0 -  36.0 g/dL    RDW 12.1 11.5 - 14.5 %    Platelets 196 150 - 450 x10*3/uL    Neutrophils % 41.9 40.0 - 80.0 %    Immature Granulocytes %, Automated 0.4 0.0 - 0.9 %    Lymphocytes % 48.8 13.0 - 44.0 %    Monocytes % 6.3 2.0 - 10.0 %    Eosinophils % 1.9 0.0 - 6.0 %    Basophils % 0.7 0.0 - 2.0 %    Neutrophils Absolute 3.06 1.20 - 7.70 x10*3/uL    Immature Granulocytes Absolute, Automated 0.03 0.00 - 0.70 x10*3/uL    Lymphocytes Absolute 3.56 1.20 - 4.80 x10*3/uL    Monocytes Absolute 0.46 0.10 - 1.00 x10*3/uL    Eosinophils Absolute 0.14 0.00 - 0.70 x10*3/uL    Basophils Absolute 0.05 0.00 - 0.10 x10*3/uL   Troponin I, High Sensitivity   Result Value Ref Range    Troponin I, High Sensitivity 5,667 (HH) 0 - 53 ng/L   B-type natriuretic peptide   Result Value Ref Range    BNP 91 0 - 99 pg/mL   Phosphorus   Result Value Ref Range    Phosphorus 3.4 2.5 - 4.9 mg/dL   Coagulation Screen   Result Value Ref Range    Protime 13.3 (H) 9.8 - 12.8 seconds    INR 1.2 (H) 0.9 - 1.1    aPTT 86 (H) 27 - 38 seconds   POCT GLUCOSE   Result Value Ref Range    POCT Glucose 297 (H) 74 - 99 mg/dL       Cardiac Testing  [facility]: 12/11/23  Coronary Lesion Summary:  Vessel         Stenosis    Vessel Segment  LAD          100% stenosis       mid  1st Diagonal 95% stenosis  proximal to mid  RCA          95% stenosis        mid    Echo: 12/11/23  CONCLUSIONS:   1. Left ventricular systolic function is mildly decreased with a 40-45% estimated ejection fraction.   2. Multiple segmental abnormalities exist. See findings.   3. Increased LV mass.   4. Aortic valve stenosis is not present.   5. There is global hypokinesis of the left ventricle with minor regional variations.            ASSESSMENT & PLAN  Mr Wen 59 yo M with PMHx HTN,HLD, T2DM (HgbA1c 11%), L4-L5 Discitis with epidural abscess s/p debridement, who was transferred from [facility] to [facility] for CABG evaluation.      The patient presented to [facility] ED 12/10 with left sided chest pain at rest that  radiated to his Lt arm, it was associated with diaphoresis and nausea. It was relieved by Nitro, he was given ASA load. Upon presentation to [facility] ED pt was HDS, afebrile, EKG with T-wave inversion infero-lateral leads, Trops 322 with repeat up to 1626, the patient was started on Heparin gtt, and Plavix and admitted to medicine for ACS (NSTEMI), TTE 10/12 with EF 40-45% with global hypokinesis of LV. LHC 12/11 revealed 100% stenosis in mid LAD, 95% stenosis proximal to mid first diagonal branch, occluded LCx after early first OM, OM1 with occluded mid portion, OM2 occluded, and 95% stenosis mid RCA. The patient remained HDS throughout his stay, his chest pain resolved since initial admission, the patient is being transferred to [facility] for CABG eval.     This morning 12/13 troponin up to 5,667.     Cardiac surgery is consulted for CABG evaluation.       RECOMMENDATIONS  - Dr. Vasquez aware of patient and reviewing imaging and data  - Medical optimization per primary team  - Preop risk stratification studies/labs/UA/PFTs and vascular ultrasounds ordered in EMR by our team  - Dental evaluation not indicated for isolated CAB surgeries   - Recommend endocrinology consult given HgbA1c 11%  - Continue ASA, high-intensity statin, and BB (or document contraindications for use)    When/if goes to surgery:  - No ACEi/ARBs in the pre-op period (at least 48 hours)  - Hold any SGLT2 inhibitors (Farxiga, Jardiance, etc.) for at least 3 days prior to cardiac surgery to prevent euglycemic DKA  - No antiplatelets other than ASA, no anticoagulants other than Heparin  - NPO after midnight, blood on hold/T&S, and preop scrubs only to be ordered once OR date is established    Will continue to follow along.  Thank you for the consultation.   Patient educated and all questions answered.  Please page the cardiac surgery consult pager [phone] with any questions or changes in patient condition.      Patricia Solis PA-C  Cardiac Surgery  Consult GUNNAR  [facility]  Cardiac Surgery Consult Pager [phone]     12/13/2023  9:17 AM

## 2023-12-13 NOTE — CARE PLAN
Problem: Diabetes  Goal: Achieve decreasing blood glucose levels by end of shift  Outcome: Progressing  Goal: Increase stability of blood glucose readings by end of shift  Outcome: Progressing  Goal: Decrease in ketones present in urine by end of shift  Outcome: Progressing  Goal: Maintain electrolyte levels within acceptable range throughout shift  Outcome: Progressing  Goal: Maintain glucose levels >70mg/dl to <250mg/dl throughout shift  Outcome: Progressing  Goal: No changes in neurological exam by end of shift  Outcome: Progressing  Goal: Learn about and adhere to nutrition recommendations by end of shift  Outcome: Progressing  Goal: Vital signs within normal range for age by end of shift  Outcome: Progressing  Goal: Increase self care and/or family involovement by end of shift  Outcome: Progressing  Goal: Receive DSME education by end of shift  Outcome: Progressing   The patient's goals for the shift include Keep informed

## 2023-12-13 NOTE — PROGRESS NOTES
"Behzad Diaz is a 58 y.o. male on day 1 of admission presenting with Coronary artery disease (CAD) excluded.    Subjective   No acute events overnight. Patient denied, nausea, vomiting, fever, and cough. Patient denies chest pain, dyspnea, and palpitations.         Objective     Physical Exam  Constitutional: Well-developed male in no acute distress.  HEENT: Normocephalic, atraumatic. PERRL. EOMI. No cervical lymphadenopathy.  Respiratory: CTA bilaterally. No wheezes, rales, or rhonchi. Normal respiratory effort.  Cardiovascular: RRR. No murmurs, gallops, or rubs. No JVD. Radial pulses 2+.  Abdominal: Soft, nondistended, nontender to palpation. Bowel sounds present. No hepatosplenomegaly or masses. No CVA tenderness. Significantly Obese patient.  Neuro: CN II-XII intact. UE and LE strength 5/5 bilaterally and sensation intact. Normal FTN testing.  MSK: No LE edema bilaterally.  Skin: Warm, dry. No rashes or wounds.  Psych: Appropriate mood and affect.    Last Recorded Vitals  Blood pressure 107/73, pulse 73, temperature 36.4 °C (97.5 °F), temperature source Temporal, resp. rate 18, height 1.854 m (6' 0.99\"), weight (!) 157 kg (345 lb 11.2 oz), SpO2 96 %.  Intake/Output last 3 Shifts:  No intake/output data recorded.    Relevant Results              Active Medications  Scheduled medications  amoxicillin, 500 mg, oral, q12h ISAIAH  aspirin, 81 mg, oral, Daily  atorvastatin, 80 mg, oral, Nightly  carvedilol, 6.25 mg, oral, BID with meals  pantoprazole, 40 mg, oral, Daily before breakfast   Or  pantoprazole, 40 mg, intravenous, Daily before breakfast  polyethylene glycol, 17 g, oral, Daily  pregabalin, 75 mg, oral, Daily      Continuous medications  heparin, 0-4,000 Units/hr, Last Rate: 2,300 Units/hr (12/12/23 2308)      PRN medications  PRN medications: acetaminophen **OR** acetaminophen **OR** acetaminophen     Recent Labs  Results for orders placed or performed during the hospital encounter of 12/12/23 (from the past 24 " hour(s))   POCT GLUCOSE   Result Value Ref Range    POCT Glucose 259 (H) 74 - 99 mg/dL   Comprehensive metabolic panel   Result Value Ref Range    Glucose 271 (H) 74 - 99 mg/dL    Sodium 134 (L) 136 - 145 mmol/L    Potassium 3.9 3.5 - 5.3 mmol/L    Chloride 101 98 - 107 mmol/L    Bicarbonate 24 21 - 32 mmol/L    Anion Gap 13 10 - 20 mmol/L    Urea Nitrogen 16 6 - 23 mg/dL    Creatinine 0.93 0.50 - 1.30 mg/dL    eGFR >90 >60 mL/min/1.73m*2    Calcium 8.8 8.6 - 10.6 mg/dL    Albumin 3.4 3.4 - 5.0 g/dL    Alkaline Phosphatase 58 33 - 120 U/L    Total Protein 6.5 6.4 - 8.2 g/dL    AST 26 9 - 39 U/L    Bilirubin, Total 1.3 (H) 0.0 - 1.2 mg/dL    ALT 26 10 - 52 U/L   Magnesium   Result Value Ref Range    Magnesium 1.65 1.60 - 2.40 mg/dL   CBC and Auto Differential   Result Value Ref Range    WBC 7.3 4.4 - 11.3 x10*3/uL    nRBC 0.0 0.0 - 0.0 /100 WBCs    RBC 4.56 4.50 - 5.90 x10*6/uL    Hemoglobin 13.9 13.5 - 17.5 g/dL    Hematocrit 41.1 41.0 - 52.0 %    MCV 90 80 - 100 fL    MCH 30.5 26.0 - 34.0 pg    MCHC 33.8 32.0 - 36.0 g/dL    RDW 12.1 11.5 - 14.5 %    Platelets 196 150 - 450 x10*3/uL    Neutrophils % 41.9 40.0 - 80.0 %    Immature Granulocytes %, Automated 0.4 0.0 - 0.9 %    Lymphocytes % 48.8 13.0 - 44.0 %    Monocytes % 6.3 2.0 - 10.0 %    Eosinophils % 1.9 0.0 - 6.0 %    Basophils % 0.7 0.0 - 2.0 %    Neutrophils Absolute 3.06 1.20 - 7.70 x10*3/uL    Immature Granulocytes Absolute, Automated 0.03 0.00 - 0.70 x10*3/uL    Lymphocytes Absolute 3.56 1.20 - 4.80 x10*3/uL    Monocytes Absolute 0.46 0.10 - 1.00 x10*3/uL    Eosinophils Absolute 0.14 0.00 - 0.70 x10*3/uL    Basophils Absolute 0.05 0.00 - 0.10 x10*3/uL   Troponin I, High Sensitivity   Result Value Ref Range    Troponin I, High Sensitivity 5,667 (HH) 0 - 53 ng/L   B-type natriuretic peptide   Result Value Ref Range    BNP 91 0 - 99 pg/mL   Phosphorus   Result Value Ref Range    Phosphorus 3.4 2.5 - 4.9 mg/dL   Coagulation Screen   Result Value Ref Range     Protime 13.3 (H) 9.8 - 12.8 seconds    INR 1.2 (H) 0.9 - 1.1    aPTT 86 (H) 27 - 38 seconds   POCT GLUCOSE   Result Value Ref Range    POCT Glucose 297 (H) 74 - 99 mg/dL       Imaging  Transthoracic Echo (TTE) Complete    Result Date: 12/12/2023            [facility] - [place], [place] [address] [facility], [facility] [address]    Tel [phone] Fax [phone] TRANSTHORACIC ECHOCARDIOGRAM REPORT  Patient Name:     DEANNE Oropeza Physician:  Phillip Gomez MD Study Date:       12/11/2023          Ordering Provider:  [id number] GOMEZ MRN/PID:          [id number]            Fellow: Accession#:       [id number]        Nurse: Date of           1965 / 58      Sonographer:        Erika Maldonado RDCS Birth/Age:        years Gender:           M                   Additional Staff: Height:           182.88 cm           Admit Date:         12/9/2023 Weight:           158.76 kg           Admission Status:   Inpatient - Routine BSA:              2.70 m2             Department          [facility] CCU                                       Location: Blood Pressure: 138 /83 mmHg Study Type:    TRANSTHORACIC ECHO (TTE) COMPLETE Diagnosis/ICD: Non ST elevation (NSTEMI) myocardial infarction-I21.4 Indication:    NSTEMI CPT Codes:     Echo Complete w Full Doppler-16785  Study Detail: The following Echo studies were performed: 2D, M-Mode, Doppler and               color flow. Technically challenging study due to poor acoustic               windows and body habitus. Definity used as a contrast agent for               endocardial border definition. Total contrast used for this               procedure was 2 mL via IV push.  PHYSICIAN INTERPRETATION: Left Ventricle: Left ventricular systolic function is mildly decreased, with an estimated ejection fraction of 40-45%. Estimated left ventricular mass is  increased. There is global hypokinesis of the left ventricle with minor regional variations. The left ventricular cavity size is upper limits of normal. The left ventricular septal wall thickness is mildly increased. There is mildly increased left ventricular posterior wall thickness. Left ventricular diastolic filling was indeterminate. There are normal left ventricular filling pressures. LV Wall Scoring: There is diffuse hypokinesis. Left Atrium: The left atrium is upper limits of normal in size. Right Ventricle: The right ventricle is normal in size. There is normal right ventricular global systolic function. Right Atrium: The right atrium is normal in size. Aortic Valve: The aortic valve is trileaflet. There is no evidence of aortic valve stenosis. There is no evidence of aortic valve regurgitation. The peak instantaneous gradient of the aortic valve is 2.7 mmHg. The mean gradient of the aortic valve is 2.0 mmHg. Mitral Valve: The mitral valve is normal in structure. There is trace mitral valve regurgitation. Tricuspid Valve: The tricuspid valve is structurally normal. There is trace tricuspid regurgitation. The right ventricular systolic pressure is unable to be estimated. Pulmonic Valve: The pulmonic valve was not assessed. The pulmonic valve regurgitation was not assessed. Pericardium: There is no pericardial effusion noted. Aorta: The aortic root is normal. Systemic Veins: The inferior vena cava appears to be of normal size. The hepatic vein appears to be of normal size. There is IVC inspiratory collapse greater than 50%.  CONCLUSIONS:  1. Left ventricular systolic function is mildly decreased with a 40-45% estimated ejection fraction.  2. Multiple segmental abnormalities exist. See findings.  3. Increased LV mass.  4. Aortic valve stenosis is not present.  5. There is global hypokinesis of the left ventricle with minor regional variations. QUANTITATIVE DATA SUMMARY: 2D MEASUREMENTS:                           Normal Ranges: IVSd:          1.42 cm   (0.6-1.1cm) LVPWd:         1.39 cm   (0.6-1.1cm) LVIDd:         4.63 cm   (3.9-5.9cm) LVIDs:         3.63 cm LV Mass Index: 96.5 g/m2 LV % FS        21.6 % LA VOLUME:                               Normal Ranges: LA Vol A4C:        40.0 ml    (22+/-6mL/m2) LA Vol A2C:        35.5 ml LA Vol BP:         37.8 ml LA Vol Index A4C:  14.8ml/m2 LA Vol Index A2C:  13.1 ml/m2 LA Vol Index BP:   14.0 ml/m2 LA Area A4C:       16.7 cm2 LA Area A2C:       15.7 cm2 LA Major Axis A4C: 5.9 cm LA Major Axis A2C: 5.9 cm LA Volume Index:   13.0 ml/m2 LA Vol A4C:        37.0 ml LA Vol A2C:        34.0 ml LV SYSTOLIC FUNCTION BY 2D PLANIMETRY (MOD):                     Normal Ranges: EF-A4C View: 33.9 % (>=55%) EF-A2C View: 47.8 % EF-Biplane:  42.0 % LV DIASTOLIC FUNCTION:                        Normal Ranges: MV Peak E:    0.44 m/s (0.7-1.2 m/s) MV Peak A:    0.51 m/s (0.42-0.7 m/s) E/A Ratio:    0.85     (1.0-2.2) MV e'         0.04 m/s (>8.0) MV lateral e' 0.06 m/s MV medial e'  0.03 m/s E/e' Ratio:   9.92     (<8.0) MITRAL VALVE:                 Normal Ranges: MV DT: 211 msec (150-240msec) AORTIC VALVE:                                   Normal Ranges: AoV Vmax:                0.83 m/s (<=1.7m/s) AoV Peak P.7 mmHg (<20mmHg) AoV Mean P.0 mmHg (1.7-11.5mmHg) LVOT Max Sergio:            0.59 m/s (<=1.1m/s) AoV VTI:                 16.50 cm (18-25cm) LVOT VTI:                12.40 cm LVOT Diameter:           2.50 cm  (1.8-2.4cm) AoV Area, VTI:           3.69 cm2 (2.5-5.5cm2) AoV Area,Vmax:           3.48 cm2 (2.5-4.5cm2) AoV Dimensionless Index: 0.75  RIGHT VENTRICLE: RV Basal 2.97 cm RV Mid   2.58 cm RV Major 8.0 cm TAPSE:   20.3 mm RV s'    0.08 m/s TRICUSPID VALVE/RVSP:                            Normal Ranges: Peak TR Velocity: 0.71 m/s RV Syst Pressure: 5.0 mmHg (< 30mmHg)  [id number] Ramos Post MD Electronically signed on 2023 at 5:36:23 PM  Wall Scoring  **  Final **     ECG 12 lead    Result Date: 12/12/2023  Normal sinus rhythm Possible Left atrial enlargement ST & T wave abnormality, consider inferior ischemia Abnormal ECG When compared with ECG of 09-AUG-2021 20:45, QRS voltage has decreased Non-specific change in ST segment in Inferior leads ST now depressed in Anterior leads T wave inversion now evident in Inferior leads    ECG 12 lead    Result Date: 12/12/2023  Normal sinus rhythm ST & T wave abnormality, consider inferior ischemia Abnormal ECG When compared with ECG of 09-AUG-2021 20:45, ST now depressed in Inferior leads ST now depressed in Anterior leads T wave inversion now evident in Inferior leads    Cardiac catheterization - coronary    Result Date: 12/12/2023   [facility] [place]      [address] [address] Cardiovascular Catheterization Report Patient Name:      DEANNE MAYORGA         Performing           [id number] Kaleb Lim                                        Physician:           MD Study Date:        12/11/2023          Verifying Physician: Jessica Lim MD MRN/PID:           [id number]            Cardiologist: Accession#:        [id number]        Ordering Provider:   [id number] LIM Date of Birth/Age: 1965 / 58      Fellow:                    years Gender:            M                   Fellow: Encounter#:        [id number]  Study: Left Heart Cath  Indications: DEANNE MAYORGA is a 58 year old male who presents with dyslipidemia, obesity, hypertension and a chest pain assessment of typical angina. NSTE - ACS.  Procedure Description: After infiltration with 2% Lidocaine, the right radial artery was cannulated with a modified Seldinger technique. Subsequently a 6 Nigerian sheath was placed in the right radial artery. Selective coronary catheterization was performed using a 5 Fr catheter(s) exchanged over a guide wire to cannulate the coronary  arteries. Multiple injections of contrast were made into the left and right coronary arteries with angiograms recorded in multiple projections. After completion of the procedure, the arterial sheath was pulled and a TR Band Radial Compression Device was utilized to obtain patent hemostasis.  Coronary Angiography: The coronary circulation is right dominant.  Left Main Coronary Artery: The left main coronary artery is a normal caliber vessel. The left main arises normally from the left coronary sinus of Valsalva and bifurcates into the LAD and circumflex coronary arteries. The left main coronary artery showed no significant disease or stenosis greater than 30%.  Left Anterior Descending Coronary Artery Distribution: The left anterior descending coronary artery is a normal caliber vessel. The LAD arises normally from the left main coronary artery and gives rise to the 1st diagonal branch. The mid left anterior descending coronary artery showed 100% stenosis. The 1st diagonal branch is a normal caliber vessel. The proximal to mid 1st diagonal branch revealed 95% stenosis.  Circumflex Coronary Artery Distribution: The circumflex coronary artery is a normal caliber vessel. The circumflex arises normally from the left main coronary artery, terminates in the AV groove, giving rise to the first obtuse marginal and second obtuse marginal. LCx occluded after early first OM. OM1 with occluded mid portion with faint left to left collaterals. OM2 ocluded with left to left collaterals.  Right Coronary Artery Distribution: The right coronary artery is a normal caliber vessel. The RCA arises normally from the right sinus of Valsalva, supplies the right posterolateral descending artery and supplies the posterolateral system. The mid right coronary artery showed 95% stenosis.  Valve Findings: No aortic valve stenosis is visualized.  Coronary Lesion Summary: Vessel         Stenosis    Vessel Segment LAD          100% stenosis       mid  1st Diagonal 95% stenosis  proximal to mid RCA          95% stenosis        mid  Hemo Personnel: +----------------+---------+ Name            Duty      +----------------+---------+ Kaleb Lim MD 1 +----------------+---------+  Hemodynamic Pressures:  +----+---------------+----------+-------------+--------------+-------+---------+ Site   Date Time   Phase NameSystolic mmHgDiastolic mmHgED mmHgMean mmHg +----+---------------+----------+-------------+--------------+-------+---------+   LV     12/11/2023  AIR REST          110             1      8                  12:25:29 PM                                                      +----+---------------+----------+-------------+--------------+-------+---------+   LV     12/11/2023  AIR REST          106             2     11                  12:25:38 PM                                                      +----+---------------+----------+-------------+--------------+-------+---------+  LVp     12/11/2023  AIR REST          105            -2      7                  12:25:44 PM                                                      +----+---------------+----------+-------------+--------------+-------+---------+  AOp     12/11/2023  AIR REST          104             0              85         12:25:51 PM                                                      +----+---------------+----------+-------------+--------------+-------+---------+   AO     12/11/2023  AIR REST          111            62              84         12:26:26 PM                                                      +----+---------------+----------+-------------+--------------+-------+---------+   AO     12/11/2023  AIR REST          119            80              93         12:29:00 PM                                                       +----+---------------+----------+-------------+--------------+-------+---------+  Complications: No in-lab complications observed.  Cardiac Cath Post Procedure Notes: Post Procedure Diagnosis: Triple vessel disease. Blood Loss:               Estimated blood loss during the procedure was 0 mls. Specimens Removed:        Number of specimen(s) removed: none.  Recommendations: Maximize medical therapy. Agressive risk factor modification efforts. Consider referral to cardiac rehabilitation. Coronary artery bypass graft surgery. ____________________________________________________________________________________ CONCLUSIONS:  1. Severe 3 vessel CAD in this right dominant system.  2. Normal LVEDP 11mmHg with no AS.  3. Consider for multivessel CABG. ICD 10 Codes: Non ST elevation (NSTEMI) myocardial infarction-I21.4  CPT Codes: Left Heart Cath (visualization of coronaries) and LV-45087  [id number] Kaleb Lim MD Performing Physician Electronically signed by [id number] Kaleb Lim MD on 12/12/2023 at 1:53:57 PM  ** Final **          Assessment/Plan   Principal Problem:    Coronary artery disease (CAD) excluded  Active Problems:    Type 2 diabetes mellitus, without long-term current use of insulin (CMS/[facility])    HTN (hypertension)    NSTEMI (non-ST elevated myocardial infarction) (CMS/[facility])    Ischemic cardiomyopathy    HLD (hyperlipidemia)    Mr Wen 59 yo M with PMHx HTN,HLD, T2DM, L4-L5 Discitis with epidural abscess  s/p debridement, who was transferred from [facility] to [facility] for CABG evaluation. The patient presented with chest pain radiated to Lt arm that occurred at rest improved with NTG, was found to have NSTEMI at [facility] ED (EKG with inferolateral ischemia), trops 322 --1626, was started on ASA,Plavix and Heparin gtt for NSTEMI, LHC with multivessel disease, transferred here for CABG evaluation. Pt currently HDS, pain free, will continue with heparin gtt, ASA, and statin,CT surgery consulted.    Updates 12/13/23   - CT Surgery  consulted pending recs  - Plavix and ACE will be held  - Endocrine consulted per CT surgery recs      #ACS -NSTEMI  #Multivessel CAD   #HTN   #HLD  #ICM   :Trops at [facility] 12/10: 322--1626.   :TTE 12/10: EF 40-45%, no valvular disease, global hypokinesis.      :[facility] 12/11: 100% stenosis in mid LAD, 95% stenosis proximal to mid first diagonal branch, occluded LCx after early first OM, OM1 with occluded mid portion, OM2 occluded, and 95% stenosis mid RCA.  :Pt s/p ASA, Plavix and Heparin gtt since 10/12   :Home Lisniopril 20mg, Atorvastatin 80   -Continue with Heparin gtt, ASA  -Continue with Correg 6.25mg BID  -Continue with Atorvastatin 80mg daily.   -Cardiothoracic surgery consulted for CABG evaluation.   -Will repeat Trops, CBC, CMP   -Will hold ACE/ARBs (Lisinopril) and clopidogrel  i/s/o possible CABG.     #Uncontrolled T2DM   :HgA1c 12/10: 11  :Home Metformin 1000mg  -Holding home meds for now.   -ISS moderate scale.   - Will revise need for up titration tomorrow        #Hx of L4-L5 Discitis c/b epidural abscess   :Home Amoxacillin 500mg BID ppx, follow up with ID    - Continue home Amoxicillin 500mg BID         Disposition: Home   Follow-ups: PCP, Cardiology, Endocrine      N: Cradiac  A: PIV  DVT ppx: Heparin gtt  GI ppx: PPI    Code Status: Full code (confirmed on admission)  Surrogate Medical Decision-maker: Babar Son ([phone])           Lai Calixto MD

## 2023-12-13 NOTE — H&P
History Of Present Illness  Mr Wen 57 yo M with PMHx HTN,HLD, T2DM, L4-L5 Discitis with epidural abscess  s/p debridement, who was transferred from [facility] to [facility] for CABG evaluation.     The patient presented to [facility] ED 12/10 with left sided chest pain at rest that radiated to his Lt arm, it was associated with diaphoresis and nausea. It was relieved by Nitro, he was given ASA load, mother heart attack, smokes cigar, Upon presentation to [facility] ED pt was HDS, afebrile, EKG with T-wave inversion infero-lateral leads, CBC unremarkable, RFP with high BS (353), Cr 1.15, Trops 322 with repeat up to 1626, the patient was started on Heparin gtt, and Plavix and admitted to medicine for ACS (NSTEMI), TTE 10/12 with EF 40-45% with global hypokinesis of LV. LHC 12/11 revealed 100% stenosis in mid LAD, 95% stenosis proximal to mid first diagonal branch, occluded LCx after early first OM, OM1 with occluded mid portion, OM2 occluded, and 95% stenosis mid RCA. The patient remained HDS throughout his stay, his chest pain resolved since initial admission, the patient is being transferred to [facility] for CABG eval. Currently, the patient is HDS, afebrile, he denied any chest pain, SOB, palpitations, nausea, fever, or chills.       Past Medical History  Past Medical History:   Diagnosis Date    Discitis     Discitis     HLD (hyperlipidemia)     HTN (hypertension)     T2DM (type 2 diabetes mellitus) (CMS/[facility])        Surgical History  Past Surgical History:   Procedure Laterality Date    BACK SURGERY      CARDIAC CATHETERIZATION N/A 12/11/2023    Procedure: Left Heart Cath, With LV;  Surgeon: Kaleb Lim MD;  Location: [facility] Cardiac Cath Lab;  Service: Cardiovascular;  Laterality: N/A;    CT GUIDED PERCUTANEOUS BIOPSY BONE DEEP  09/10/2021    CT GUIDED PERCUTANEOUS BIOPSY BONE DEEP 9/10/2021 [facility] CLINICAL LEGACY    OTHER SURGICAL HISTORY  07/30/2020    Scrotal surgery    OTHER SURGICAL HISTORY  07/30/2020    Back surgery        Social  History  He reports that he has been smoking cigars. He has quit using smokeless tobacco. He reports current alcohol use. He reports that he does not use drugs.    Family History  Family History   Problem Relation Name Age of Onset    Coronary artery disease Mother          Allergies  Patient has no known allergies.    Review of Systems   Constitutional:  Negative for chills, fatigue and fever.   Respiratory:  Negative for cough, choking and chest tightness.    Cardiovascular:  Negative for chest pain, palpitations and leg swelling.   Gastrointestinal:  Negative for constipation, diarrhea, nausea and vomiting.   Neurological:  Negative for light-headedness.        Physical Exam  Constitutional:       Appearance: Normal appearance. He is obese.   HENT:      Head: Normocephalic and atraumatic.      Nose: Nose normal.      Mouth/Throat:      Mouth: Mucous membranes are moist.   Eyes:      Extraocular Movements: Extraocular movements intact.      Pupils: Pupils are equal, round, and reactive to light.   Cardiovascular:      Rate and Rhythm: Normal rate and regular rhythm.      Pulses: Normal pulses.      Heart sounds: No murmur heard.  Pulmonary:      Effort: Pulmonary effort is normal. No respiratory distress.      Breath sounds: Normal breath sounds. No rales.   Abdominal:      General: There is distension.      Palpations: Abdomen is soft.      Tenderness: There is no abdominal tenderness.   Musculoskeletal:      Cervical back: Normal range of motion.      Right lower leg: No edema.      Left lower leg: No edema.   Skin:     General: Skin is warm.      Capillary Refill: Capillary refill takes less than 2 seconds.   Neurological:      General: No focal deficit present.      Mental Status: He is alert and oriented to person, place, and time.   Psychiatric:         Mood and Affect: Mood normal.          Last Recorded Vitals  Blood pressure 116/74, pulse 73, temperature 36.5 °C (97.7 °F), resp. rate 16, weight (!) 157 kg  (345 lb 11.2 oz), SpO2 97 %.    Relevant Results    Transthoracic Echo (TTE) Complete    Result Date: 12/12/2023            [facility] [address] [facility], [facility] [address]    Tel [phone] Fax [phone] TRANSTHORACIC ECHOCARDIOGRAM REPORT  Patient Name:     DEANNE MAYORGA         Reading Physician:  Phillip Gomez MD Study Date:       12/11/2023          Ordering Provider:  [id number] TONY GOMEZ MRN/PID:          [id number]            Fellow: Accession#:       [id number]        Nurse: Date of           1965 / 58      Sonographer:        Erika Maldonado RDCS Birth/Age:        years Gender:           M                   Additional Staff: Height:           182.88 cm           Admit Date:         12/9/2023 Weight:           158.76 kg           Admission Status:   Inpatient - Routine BSA:              2.70 m2             Department          [facility] CCU                                       Location: Blood Pressure: 138 /83 mmHg Study Type:    TRANSTHORACIC ECHO (TTE) COMPLETE Diagnosis/ICD: Non ST elevation (NSTEMI) myocardial infarction-I21.4 Indication:    NSTEMI CPT Codes:     Echo Complete w Full Doppler-83326  Study Detail: The following Echo studies were performed: 2D, M-Mode, Doppler and               color flow. Technically challenging study due to poor acoustic               windows and body habitus. Definity used as a contrast agent for               endocardial border definition. Total contrast used for this               procedure was 2 mL via IV push.  PHYSICIAN INTERPRETATION: Left Ventricle: Left ventricular systolic function is mildly decreased, with an estimated ejection fraction of 40-45%. Estimated left ventricular mass is increased. There is global hypokinesis of the left ventricle with minor regional variations. The left ventricular cavity size is upper limits  of normal. The left ventricular septal wall thickness is mildly increased. There is mildly increased left ventricular posterior wall thickness. Left ventricular diastolic filling was indeterminate. There are normal left ventricular filling pressures. LV Wall Scoring: There is diffuse hypokinesis. Left Atrium: The left atrium is upper limits of normal in size. Right Ventricle: The right ventricle is normal in size. There is normal right ventricular global systolic function. Right Atrium: The right atrium is normal in size. Aortic Valve: The aortic valve is trileaflet. There is no evidence of aortic valve stenosis. There is no evidence of aortic valve regurgitation. The peak instantaneous gradient of the aortic valve is 2.7 mmHg. The mean gradient of the aortic valve is 2.0 mmHg. Mitral Valve: The mitral valve is normal in structure. There is trace mitral valve regurgitation. Tricuspid Valve: The tricuspid valve is structurally normal. There is trace tricuspid regurgitation. The right ventricular systolic pressure is unable to be estimated. Pulmonic Valve: The pulmonic valve was not assessed. The pulmonic valve regurgitation was not assessed. Pericardium: There is no pericardial effusion noted. Aorta: The aortic root is normal. Systemic Veins: The inferior vena cava appears to be of normal size. The hepatic vein appears to be of normal size. There is IVC inspiratory collapse greater than 50%.  CONCLUSIONS:  1. Left ventricular systolic function is mildly decreased with a 40-45% estimated ejection fraction.  2. Multiple segmental abnormalities exist. See findings.  3. Increased LV mass.  4. Aortic valve stenosis is not present.  5. There is global hypokinesis of the left ventricle with minor regional variations. QUANTITATIVE DATA SUMMARY: 2D MEASUREMENTS:                          Normal Ranges: IVSd:          1.42 cm   (0.6-1.1cm) LVPWd:         1.39 cm   (0.6-1.1cm) LVIDd:         4.63 cm   (3.9-5.9cm) LVIDs:          3.63 cm LV Mass Index: 96.5 g/m2 LV % FS        21.6 % LA VOLUME:                               Normal Ranges: LA Vol A4C:        40.0 ml    (22+/-6mL/m2) LA Vol A2C:        35.5 ml LA Vol BP:         37.8 ml LA Vol Index A4C:  14.8ml/m2 LA Vol Index A2C:  13.1 ml/m2 LA Vol Index BP:   14.0 ml/m2 LA Area A4C:       16.7 cm2 LA Area A2C:       15.7 cm2 LA Major Axis A4C: 5.9 cm LA Major Axis A2C: 5.9 cm LA Volume Index:   13.0 ml/m2 LA Vol A4C:        37.0 ml LA Vol A2C:        34.0 ml LV SYSTOLIC FUNCTION BY 2D PLANIMETRY (MOD):                     Normal Ranges: EF-A4C View: 33.9 % (>=55%) EF-A2C View: 47.8 % EF-Biplane:  42.0 % LV DIASTOLIC FUNCTION:                        Normal Ranges: MV Peak E:    0.44 m/s (0.7-1.2 m/s) MV Peak A:    0.51 m/s (0.42-0.7 m/s) E/A Ratio:    0.85     (1.0-2.2) MV e'         0.04 m/s (>8.0) MV lateral e' 0.06 m/s MV medial e'  0.03 m/s E/e' Ratio:   9.92     (<8.0) MITRAL VALVE:                 Normal Ranges: MV DT: 211 msec (150-240msec) AORTIC VALVE:                                   Normal Ranges: AoV Vmax:                0.83 m/s (<=1.7m/s) AoV Peak P.7 mmHg (<20mmHg) AoV Mean P.0 mmHg (1.7-11.5mmHg) LVOT Max Sergio:            0.59 m/s (<=1.1m/s) AoV VTI:                 16.50 cm (18-25cm) LVOT VTI:                12.40 cm LVOT Diameter:           2.50 cm  (1.8-2.4cm) AoV Area, VTI:           3.69 cm2 (2.5-5.5cm2) AoV Area,Vmax:           3.48 cm2 (2.5-4.5cm2) AoV Dimensionless Index: 0.75  RIGHT VENTRICLE: RV Basal 2.97 cm RV Mid   2.58 cm RV Major 8.0 cm TAPSE:   20.3 mm RV s'    0.08 m/s TRICUSPID VALVE/RVSP:                            Normal Ranges: Peak TR Velocity: 0.71 m/s RV Syst Pressure: 5.0 mmHg (< 30mmHg)  [id number] Ramos Post MD Electronically signed on 2023 at 5:36:23 PM  Wall Scoring  ** Final **     ECG 12 lead    Result Date: 2023  Normal sinus rhythm Possible Left atrial enlargement ST & T wave abnormality, consider  inferior ischemia Abnormal ECG When compared with ECG of 09-AUG-2021 20:45, QRS voltage has decreased Non-specific change in ST segment in Inferior leads ST now depressed in Anterior leads T wave inversion now evident in Inferior leads    ECG 12 lead    Result Date: 12/12/2023  Normal sinus rhythm ST & T wave abnormality, consider inferior ischemia Abnormal ECG When compared with ECG of 09-AUG-2021 20:45, ST now depressed in Inferior leads ST now depressed in Anterior leads T wave inversion now evident in Inferior leads    Cardiac catheterization - coronary    Result Date: 12/12/2023   [facility]      [id number] [address] Cardiovascular Catheterization Report Patient Name:      DEANNE MAYORGA         Performing           [id number] Kaleb Lim                                        Physician:           MD Study Date:        12/11/2023          Verifying Physician: Jessica Lim MD MRN/PID:           [id number]            Cardiologist: Accession#:        [id number]        Ordering Provider:   [id number] LIM Date of Birth/Age: 1965 / 58      Fellow:                    years Gender:            M                   Fellow: Encounter#:        [id number]  Study: Left Heart Cath  Indications: DEANNE MAYORGA is a 58 year old male who presents with dyslipidemia, obesity, hypertension and a chest pain assessment of typical angina. NSTE - ACS.  Procedure Description: After infiltration with 2% Lidocaine, the right radial artery was cannulated with a modified Seldinger technique. Subsequently a 6 Italian sheath was placed in the right radial artery. Selective coronary catheterization was performed using a 5 Fr catheter(s) exchanged over a guide wire to cannulate the coronary arteries. Multiple injections of contrast were made into the left and right coronary arteries with angiograms recorded in multiple  projections. After completion of the procedure, the arterial sheath was pulled and a TR Band Radial Compression Device was utilized to obtain patent hemostasis.  Coronary Angiography: The coronary circulation is right dominant.  Left Main Coronary Artery: The left main coronary artery is a normal caliber vessel. The left main arises normally from the left coronary sinus of Valsalva and bifurcates into the LAD and circumflex coronary arteries. The left main coronary artery showed no significant disease or stenosis greater than 30%.  Left Anterior Descending Coronary Artery Distribution: The left anterior descending coronary artery is a normal caliber vessel. The LAD arises normally from the left main coronary artery and gives rise to the 1st diagonal branch. The mid left anterior descending coronary artery showed 100% stenosis. The 1st diagonal branch is a normal caliber vessel. The proximal to mid 1st diagonal branch revealed 95% stenosis.  Circumflex Coronary Artery Distribution: The circumflex coronary artery is a normal caliber vessel. The circumflex arises normally from the left main coronary artery, terminates in the AV groove, giving rise to the first obtuse marginal and second obtuse marginal. LCx occluded after early first OM. OM1 with occluded mid portion with faint left to left collaterals. OM2 ocluded with left to left collaterals.  Right Coronary Artery Distribution: The right coronary artery is a normal caliber vessel. The RCA arises normally from the right sinus of Valsalva, supplies the right posterolateral descending artery and supplies the posterolateral system. The mid right coronary artery showed 95% stenosis.  Valve Findings: No aortic valve stenosis is visualized.  Coronary Lesion Summary: Vessel         Stenosis    Vessel Segment LAD          100% stenosis       mid 1st Diagonal 95% stenosis  proximal to mid RCA          95% stenosis        mid  Hemo Personnel: +----------------+---------+  Name            Duty      +----------------+---------+ Kaleb Lim MD 1 +----------------+---------+  Hemodynamic Pressures:  +----+---------------+----------+-------------+--------------+-------+---------+ Site   Date Time   Phase NameSystolic mmHgDiastolic mmHgED mmHgMean mmHg +----+---------------+----------+-------------+--------------+-------+---------+   LV     12/11/2023  AIR REST          110             1      8                  12:25:29 PM                                                      +----+---------------+----------+-------------+--------------+-------+---------+   LV     12/11/2023  AIR REST          106             2     11                  12:25:38 PM                                                      +----+---------------+----------+-------------+--------------+-------+---------+  LVp     12/11/2023  AIR REST          105            -2      7                  12:25:44 PM                                                      +----+---------------+----------+-------------+--------------+-------+---------+  AOp     12/11/2023  AIR REST          104             0              85         12:25:51 PM                                                      +----+---------------+----------+-------------+--------------+-------+---------+   AO     12/11/2023  AIR REST          111            62              84         12:26:26 PM                                                      +----+---------------+----------+-------------+--------------+-------+---------+   AO     12/11/2023  AIR REST          119            80              93         12:29:00 PM                                                      +----+---------------+----------+-------------+--------------+-------+---------+  Complications: No in-lab complications observed.  Cardiac Cath Post Procedure Notes: Post Procedure  Diagnosis: Triple vessel disease. Blood Loss:               Estimated blood loss during the procedure was 0 mls. Specimens Removed:        Number of specimen(s) removed: none.  Recommendations: Maximize medical therapy. Agressive risk factor modification efforts. Consider referral to cardiac rehabilitation. Coronary artery bypass graft surgery. ____________________________________________________________________________________ CONCLUSIONS:  1. Severe 3 vessel CAD in this right dominant system.  2. Normal LVEDP 11mmHg with no AS.  3. Consider for multivessel CABG. ICD 10 Codes: Non ST elevation (NSTEMI) myocardial infarction-I21.4  CPT Codes: Left Heart Cath (visualization of coronaries) and LV-53990  [id number] Kaleb Lim MD Performing Physician Electronically signed by [id number] Kaleb Lim MD on 12/12/2023 at 1:53:57 PM  ** Final **       Results for orders placed or performed during the hospital encounter of 12/12/23 (from the past 96 hour(s))   POCT GLUCOSE   Result Value Ref Range    POCT Glucose 259 (H) 74 - 99 mg/dL         Current Outpatient Medications   Medication Instructions    amoxicillin (AMOXIL) 500 mg, oral, 2 times daily    aspirin 81 mg, oral, Daily    atorvastatin (LIPITOR) 80 mg, oral, Nightly    carvedilol (COREG) 6.25 mg, oral, 2 times daily with meals    cyclobenzaprine (FLEXERIL) 10 mg, oral, 2 times daily PRN    lisinopril 20 mg, oral, Daily    metFORMIN (Glucophage) 1,000 mg tablet 1 tablet, oral, Daily with breakfast, Patient choice    pregabalin (LYRICA) 75 mg, oral, Daily, Per patient statement         Assessment/Plan   Principal Problem:    Coronary artery disease (CAD) excluded  Active Problems:    NSTEMI (non-ST elevated myocardial infarction) (CMS/HCC)    Type 2 diabetes mellitus, without long-term current use of insulin (CMS/HCC)    HTN (hypertension)    Ischemic cardiomyopathy    HLD (hyperlipidemia)    Mr Wen 59 yo M with PMHx HTN,HLD, T2DM, L4-L5 Discitis with epidural abscess  s/p  debridement, who was transferred from [facility] to [facility] for CABG evaluation. The patient presented with chest pain radiated to Lt arm that occurred at rest improved with NTG, was found to have NSTEMI at [facility] ED (EKG with inferolateral ischemia), trops 322 --1626, was started on ASA,Plavix and Heparin gtt for NSTEMI, [facility] with multivessel disease, transferred here for CABG evaluation. Pt currently HDS, pain free, will continue with heparin gtt, plavix, ASA, and statin, will hold ACE/ARBs, will consult cardiothoracic surgery AM (likely will need cMRI, PFTs, and vascular mapping as a part of the work up).     #ACS -NSTEMI  #Multivessel CAD   #HTN   #HLD  #ICM   :Trops at [facility] 12/10: 322--1626.   :TTE 12/10: EF 40-45%, no valvular disease, global hypokinesis.      :[facility] 12/11: 100% stenosis in mid LAD, 95% stenosis proximal to mid first diagonal branch, occluded LCx after early first OM, OM1 with occluded mid portion, OM2 occluded, and 95% stenosis mid RCA.  :Pt s/p ASA, Plavix and Heparin gtt since 10/12   :Home Lisniopril 20mg, Atorvastatin 80   -Admit to medicine with tele.   -Continue with Heparin gtt, ASA, Plavix for now (will likely discuss discontinue Plavix i/s/o possible CABG).  -Will hold ACE/ARBs (Lisinopril) i/s/o possible CABG.  -Continue with Correg 6.25mg BID  -Contineu with Atorvastatin 80mg daily.   -Consult Cardiothoracic AM for CABG evaluation.   -Will likely need cMRI for viability, vascular mapping study, PFTs for eval.   -Will repeat Trops, CBC, CMP     #Uncontrolled T2DM   :HgA1c 12/10: 11  :Home Metformin 1000mg  -Holding home meds for now.   -ISS moderate scale.        #Hx of L4-L5 Discitis c/b epidural abscess   :Home Amoxacillin 500mg BID ppx, follow up with ID    :Contineu to monitor   :Contineu home Amoxicillin 500mg BID       Disposition: Home   Follow-ups: PCP, Cardiology, Endocrine     N: Cradiac  A: PIV  DVT ppx: Heparin gtt  GI ppx: PPI    Code Status: Full code (confirmed on  admission)  Surrogate Medical Decision-maker: Babar Chery ([phone])      I spent 60 minutes in the professional and overall care of this patient.    The patient will be staffed with attending in the AM.     Ganesh Olivares MD  PGY-2 IM resident.

## 2023-12-13 NOTE — PROGRESS NOTES
Subjective Data:  Patient seen on call admitted by me for non-STEMI infarction with inferior ST changes  Troponin 322 escalated to 1626 now pain-free  Cath done by my colleague Kaleb Lim 1211 showed occluded LAD left circumflex with severe disease and severe diagonal disease  Severe mid dominant RCA  Multivessel coronary disease  Echo with 43% ejection fraction  Patient on heparin drip Plavix started in the ER  On aspirin  BMI 40-50 history of osteomyelitis discitis diabetes type 2 on insulin  Due to high risk profile and need for bypass patient being transferred to [facility] pending bed availability    Overnight Events:    No new changes     Objective Data:  Last Recorded Vitals:  There were no vitals filed for this visit.    Last Labs:  CBC - 12/11/2023: 11:00 PM  7.8 14.3 209    42.2      CMP - 12/11/2023:  5:53 AM  8.6 6.5 57 --- 1.2   _ 3.6 41 52      PTT - 12/10/2023:  4:21 AM  _   _ 33     TROPHS   Date/Time Value Ref Range Status   12/10/2023 05:35 AM 1,626 0 - 20 ng/L Final     Comment:     Previous result verified on 12/10/2023 0505 on specimen/case [id number] called with component [facility] for procedure Troponin I, High Sensitivity, Initial with value 322 ng/L.   12/10/2023 04:21  0 - 20 ng/L Final     HGBA1C   Date/Time Value Ref Range Status   12/10/2023 04:21 AM 11.0 see below % Final   08/25/2023 10:11 AM 10.8 4.2 - 6.5 % Final   08/27/2021 01:29 PM 6.4 % Final     Comment:          Diagnosis of Diabetes-Adults   Non-Diabetic: < or = 5.6%   Increased risk for developing diabetes: 5.7-6.4%   Diagnostic of diabetes: > or = 6.5%  .       Monitoring of Diabetes                Age (y)     Therapeutic Goal (%)   Adults:          >18           <7.0   Pediatrics:    13-18           <7.5                   7-12           <8.0                   0- 6            7.5-8.5   American Diabetes Association. Diabetes Care 33(S1), Jan 2010.     11/01/2020 07:55 AM 6.0 % Final     Comment:          Diagnosis of  Diabetes-Adults   Non-Diabetic: < or = 5.6%   Increased risk for developing diabetes: 5.7-6.4%   Diagnostic of diabetes: > or = 6.5%  .       Monitoring of Diabetes                Age (y)     Therapeutic Goal (%)   Adults:          >18           <7.0   Pediatrics:    13-18           <7.5                   7-12           <8.0                   0- 6            7.5-8.5   American Diabetes Association. Diabetes Care 33(S1), Jan 2010.       LDLCALC   Date/Time Value Ref Range Status   12/10/2023 05:35 AM 51 <=99 mg/dL Final     Comment:                                 Near   Borderline      AGE      Desirable  Optimal    High     High     Very High     0-19 Y     0 - 109     ---    110-129   >/= 130     ----    20-24 Y     0 - 119     ---    120-159   >/= 160     ----      >24 Y     0 -  99   100-129  130-159   160-189     >/=190       VLDL   Date/Time Value Ref Range Status   12/10/2023 05:35 AM 69 0 - 40 mg/dL Final   08/27/2021 01:29 PM 24 0 - 40 mg/dL Final   09/17/2020 11:56 AM 30 0 - 40 mg/dL Final      Last I/O:  No intake/output data recorded.    Past Cardiology Tests (Last 3 Years):  EKG:  ECG 12 lead 12/12/2023 (Preliminary)      ECG 12 lead 12/12/2023 (Preliminary)    Echo:  Transthoracic Echo (TTE) Complete 12/11/2023    Ejection Fractions:  EF   Date/Time Value Ref Range Status   12/11/2023 12:18 PM 42       Cath:  Cardiac catheterization - coronary 12/11/2023    Stress Test:  No results found for this or any previous visit from the past 1095 days.    Cardiac Imaging:  No results found for this or any previous visit from the past 1095 days.      Inpatient Medications:  Scheduled medications   Medication Dose Route Frequency    polyethylene glycol  17 g oral Daily     PRN medications   Medication     Continuous Medications   Medication Dose Last Rate    heparin  0-4,000 Units/hr       Results for orders placed or performed during the hospital encounter of 12/10/23 (from the past 96 hour(s))   CBC and Auto  Differential   Result Value Ref Range    WBC 6.9 4.4 - 11.3 x10*3/uL    nRBC 0.0 0.0 - 0.0 /100 WBCs    RBC 5.33 4.50 - 5.90 x10*6/uL    Hemoglobin 16.3 13.5 - 17.5 g/dL    Hematocrit 47.8 41.0 - 52.0 %    MCV 90 80 - 100 fL    MCH 30.6 26.0 - 34.0 pg    MCHC 34.1 32.0 - 36.0 g/dL    RDW 12.0 11.5 - 14.5 %    Platelets 228 150 - 450 x10*3/uL    Neutrophils % 52.4 40.0 - 80.0 %    Immature Granulocytes %, Automated 0.3 0.0 - 0.9 %    Lymphocytes % 38.3 13.0 - 44.0 %    Monocytes % 6.0 2.0 - 10.0 %    Eosinophils % 2.3 0.0 - 6.0 %    Basophils % 0.7 0.0 - 2.0 %    Neutrophils Absolute 3.61 1.20 - 7.70 x10*3/uL    Immature Granulocytes Absolute, Automated 0.02 0.00 - 0.70 x10*3/uL    Lymphocytes Absolute 2.64 1.20 - 4.80 x10*3/uL    Monocytes Absolute 0.41 0.10 - 1.00 x10*3/uL    Eosinophils Absolute 0.16 0.00 - 0.70 x10*3/uL    Basophils Absolute 0.05 0.00 - 0.10 x10*3/uL   Comprehensive Metabolic Panel   Result Value Ref Range    Glucose 353 (H) 74 - 99 mg/dL    Sodium 131 (L) 136 - 145 mmol/L    Potassium 4.7 3.5 - 5.3 mmol/L    Chloride 97 (L) 98 - 107 mmol/L    Bicarbonate 21 21 - 32 mmol/L    Anion Gap 18 10 - 20 mmol/L    Urea Nitrogen 17 6 - 23 mg/dL    Creatinine 1.15 0.50 - 1.30 mg/dL    eGFR 75 >60 mL/min/1.73m*2    Calcium 9.0 8.6 - 10.3 mg/dL    Albumin 4.3 3.4 - 5.0 g/dL    Alkaline Phosphatase 68 33 - 120 U/L    Total Protein 7.3 6.4 - 8.2 g/dL    AST 43 (H) 9 - 39 U/L    Bilirubin, Total 1.2 0.0 - 1.2 mg/dL    ALT 50 10 - 52 U/L   Magnesium   Result Value Ref Range    Magnesium 1.63 1.60 - 2.40 mg/dL   Troponin I, High Sensitivity, Initial   Result Value Ref Range    Troponin I, High Sensitivity 322 (HH) 0 - 20 ng/L   Light Blue Top   Result Value Ref Range    Extra Tube Hold for add-ons.    APTT   Result Value Ref Range    aPTT 33 27 - 38 seconds   Hemoglobin A1c   Result Value Ref Range    Hemoglobin A1C 11.0 (H) see below %    Estimated Average Glucose 269 Not Established mg/dL   Troponin, High  Sensitivity, 1 Hour   Result Value Ref Range    Troponin I, High Sensitivity 1,626 (HH) 0 - 20 ng/L   Lipid panel   Result Value Ref Range    Cholesterol 145 0 - 199 mg/dL    HDL-Cholesterol 25.2 mg/dL    Cholesterol/HDL Ratio 5.8     LDL Calculated 51 <=99 mg/dL    VLDL 69 (H) 0 - 40 mg/dL    Triglycerides 343 (H) 0 - 149 mg/dL    Non HDL Cholesterol 120 0 - 149 mg/dL   Light Blue Top   Result Value Ref Range    Extra Tube Hold for add-ons.    POCT GLUCOSE   Result Value Ref Range    POCT Glucose 352 (H) 74 - 99 mg/dL   Heparin Assay, UFH   Result Value Ref Range    Heparin Unfractionated >2.0 (HH) See Comment Below for Therapeutic Ranges IU/mL   POCT GLUCOSE   Result Value Ref Range    POCT Glucose 221 (H) 74 - 99 mg/dL   Heparin Assay, UFH   Result Value Ref Range    Heparin Unfractionated <0.1 See Comment Below for Therapeutic Ranges IU/mL   POCT GLUCOSE   Result Value Ref Range    POCT Glucose 272 (H) 74 - 99 mg/dL   Heparin Assay   Result Value Ref Range    Heparin Unfractionated 0.3 See Comment Below for Therapeutic Ranges IU/mL   Heparin Assay, UFH   Result Value Ref Range    Heparin Unfractionated 0.1 See Comment Below for Therapeutic Ranges IU/mL   POCT GLUCOSE   Result Value Ref Range    POCT Glucose 285 (H) 74 - 99 mg/dL   Heparin Assay   Result Value Ref Range    Heparin Unfractionated 0.3 See Comment Below for Therapeutic Ranges IU/mL   CBC   Result Value Ref Range    WBC 6.7 4.4 - 11.3 x10*3/uL    nRBC 0.0 0.0 - 0.0 /100 WBCs    RBC 4.75 4.50 - 5.90 x10*6/uL    Hemoglobin 14.5 13.5 - 17.5 g/dL    Hematocrit 42.4 41.0 - 52.0 %    MCV 89 80 - 100 fL    MCH 30.5 26.0 - 34.0 pg    MCHC 34.2 32.0 - 36.0 g/dL    RDW 12.1 11.5 - 14.5 %    Platelets 206 150 - 450 x10*3/uL   Comprehensive metabolic panel   Result Value Ref Range    Glucose 282 (H) 74 - 99 mg/dL    Sodium 132 (L) 136 - 145 mmol/L    Potassium 4.0 3.5 - 5.3 mmol/L    Chloride 101 98 - 107 mmol/L    Bicarbonate 23 21 - 32 mmol/L    Anion Gap 12  10 - 20 mmol/L    Urea Nitrogen 11 6 - 23 mg/dL    Creatinine 0.84 0.50 - 1.30 mg/dL    eGFR >90 >60 mL/min/1.73m*2    Calcium 8.6 8.6 - 10.3 mg/dL    Albumin 3.6 3.4 - 5.0 g/dL    Alkaline Phosphatase 52 33 - 120 U/L    Total Protein 6.5 6.4 - 8.2 g/dL    AST 57 (H) 9 - 39 U/L    Bilirubin, Total 1.2 0.0 - 1.2 mg/dL    ALT 41 10 - 52 U/L   Heparin Assay   Result Value Ref Range    Heparin Unfractionated 0.1 See Comment Below for Therapeutic Ranges IU/mL   POCT GLUCOSE   Result Value Ref Range    POCT Glucose 287 (H) 74 - 99 mg/dL   POCT GLUCOSE   Result Value Ref Range    POCT Glucose 272 (H) 74 - 99 mg/dL   Transthoracic Echo (TTE) Complete   Result Value Ref Range    AV pk yissel 0.83     LV biplane EF 42     LVOT diam 2.50     MV E/A ratio 0.85     MV avg E/e' ratio 9.92     Tricuspid annular plane systolic excursion 2.0     LA vol index A/L 14.0     AV mn grad 2.0     RV free wall pk S' 7.51     RVSP 5.0     LVIDd 4.63     Aortic Valve Area by Continuity of Peak Velocity 3.48     AV pk grad 2.7     Aortic Valve Area by Continuity of VTI 3.69     LV A4C EF 33.9    Heparin Assay   Result Value Ref Range    Heparin Unfractionated 0.1 See Comment Below for Therapeutic Ranges IU/mL   POCT GLUCOSE   Result Value Ref Range    POCT Glucose 327 (H) 74 - 99 mg/dL   Heparin Assay   Result Value Ref Range    Heparin Unfractionated 0.2 See Comment Below for Therapeutic Ranges IU/mL   POCT GLUCOSE   Result Value Ref Range    POCT Glucose 286 (H) 74 - 99 mg/dL   Heparin Assay   Result Value Ref Range    Heparin Unfractionated 0.3 See Comment Below for Therapeutic Ranges IU/mL   CBC   Result Value Ref Range    WBC 7.8 4.4 - 11.3 x10*3/uL    nRBC 0.0 0.0 - 0.0 /100 WBCs    RBC 4.74 4.50 - 5.90 x10*6/uL    Hemoglobin 14.3 13.5 - 17.5 g/dL    Hematocrit 42.2 41.0 - 52.0 %    MCV 89 80 - 100 fL    MCH 30.2 26.0 - 34.0 pg    MCHC 33.9 32.0 - 36.0 g/dL    RDW 12.2 11.5 - 14.5 %    Platelets 209 150 - 450 x10*3/uL   Heparin Assay    Result Value Ref Range    Heparin Unfractionated 0.4 See Comment Below for Therapeutic Ranges IU/mL   POCT GLUCOSE   Result Value Ref Range    POCT Glucose 287 (H) 74 - 99 mg/dL   POCT GLUCOSE   Result Value Ref Range    POCT Glucose 299 (H) 74 - 99 mg/dL   ECG 12 lead   Result Value Ref Range    Ventricular Rate 75 BPM    Atrial Rate 75 BPM    AL Interval 182 ms    QRS Duration 90 ms    QT Interval 366 ms    QTC Calculation(Bazett) 408 ms    P Axis 49 degrees    R Axis 57 degrees    T Axis 259 degrees    QRS Count 13 beats    Q Onset 222 ms    P Onset 131 ms    P Offset 191 ms    T Offset 405 ms    QTC Fredericia 394 ms   ECG 12 lead   Result Value Ref Range    Ventricular Rate 94 BPM    Atrial Rate 94 BPM    AL Interval 158 ms    QRS Duration 104 ms    QT Interval 364 ms    QTC Calculation(Bazett) 455 ms    P Axis 56 degrees    R Axis 39 degrees    T Axis -86 degrees    QRS Count 16 beats    Q Onset 227 ms    P Onset 148 ms    P Offset 199 ms    T Offset 409 ms    QTC Fredericia 422 ms   POCT GLUCOSE   Result Value Ref Range    POCT Glucose 280 (H) 74 - 99 mg/dL       Physical Exam:  Subjective:   Examination:   General Examination:   General Appearance: Well developed, well nourished, in no acute distress.  Very obese  Head: normocephalic, atraumatic   Eyes: Anicteric sclera. Pupils are equally round and reactive to light.  Extraocular movements are intact.    Ears: normal   Oral: Cavity: mucosa moist.   Throat: clear.   Neck/Thyroid: neck supple, full range of motion, no cervical lymphadenopathy.   Skin: warm and dry, no suspicious lesions.    Heart: regular rate and rhythm, S1, S2 normal, no murmurs.   Lungs: clear to auscultation bilaterally.   Abdomen: soft, non-tender, non-distended, bowl sound present, normal.   Extremities: no clubbing, no cyanosis, no edema.   Neuro: non-focal, motor strength normal upper lower extremities, sensory exam intact.       Assessment/Plan   I25.1 Severe multivessel  disease  I20 4.1 non-STEMI myocardial infarction  E 11.9 type 2 diabetes  E78.5 Type 2 diabetes Beatties with dyslipidemia  E66 start O2 obesity 40-50 BMI    Code Status:  Full Code    I spent 30 minutes in the professional and overall care of this patient.        Ramos Post MD

## 2023-12-13 NOTE — H&P (VIEW-ONLY)
Reason for Consult: CABG evaluation    CARDIAC SURGERY CONSULT NOTE    HISTORY OF PRESENT ILLNESS  Mr Wen 59 yo M with PMHx HTN,HLD, T2DM (HgbA1c 11%), L4-L5 Discitis with epidural abscess s/p debridement, who [facility].      The patient presented to [facility] ED 12/10 with left sided chest pain at rest that radiated to his Lt arm, it was associated with diaphoresis and nausea. It was relieved by Nitro, he was given ASA load. Upon presentation to [facility] ED pt was HDS, afebrile, EKG with T-wave inversion infero-lateral leads, Trops 322 with repeat up to 1626, the patient was started on Heparin gtt, and Plavix and admitted to medicine for ACS (NSTEMI), TTE 10/12 with EF 40-45% with global hypokinesis of LV. LHC 12/11 revealed 100% stenosis in mid LAD, 95% stenosis proximal to mid first diagonal branch, occluded LCx after early first OM, OM1 with occluded mid portion, OM2 occluded, and 95% stenosis mid RCA. The patient remained HDS throughout his stay, his chest pain resolved since initial admission, [facility]     This morning 12/13 troponin up to 5,667.     Cardiac surgery is consulted for CABG evaluation.     Subjective   Past Medical History:   Diagnosis Date    Discitis     Discitis     HLD (hyperlipidemia)     HTN (hypertension)     T2DM (type 2 diabetes mellitus) (CMS/[facility])      Past Surgical History:   Procedure Laterality Date    BACK SURGERY      CARDIAC CATHETERIZATION N/A 12/11/2023    Procedure: Left Heart Cath, With LV;  Surgeon: Kaleb Lim MD;  Location: [facility] Cardiac Cath Lab;  Service: Cardiovascular;  Laterality: N/A;    CT GUIDED PERCUTANEOUS BIOPSY BONE DEEP  09/10/2021    CT GUIDED PERCUTANEOUS BIOPSY BONE DEEP 9/10/2021 [facility] CLINICAL LEGACY    OTHER SURGICAL HISTORY  07/30/2020    Scrotal surgery    OTHER SURGICAL HISTORY  07/30/2020    Back surgery     Social History     Tobacco Use    Smoking status: Some  Days     Types: Cigars    Smokeless tobacco: Former   Vaping Use    Vaping Use: Never used   Substance Use Topics    Alcohol use: Yes     Comment: occasional    Drug use: Never     Family History   Problem Relation Name Age of Onset    Coronary artery disease Mother         Patient has no known allergies.    Prior to Admission medications    Medication Sig Start Date End Date Taking? Authorizing Provider   amoxicillin (Amoxil) 500 mg capsule Take 1 capsule (500 mg) by mouth twice a day. 7/7/23 1/3/24  Historical Provider, MD   aspirin 81 mg chewable tablet Chew 1 tablet (81 mg) once daily. Do not start before December 12, 2023. 12/12/23 2/10/24  Reg Anton MD   atorvastatin (Lipitor) 80 mg tablet Take 1 tablet (80 mg) by mouth once daily at bedtime. 12/11/23 2/9/24  Reg Anton MD   carvedilol (Coreg) 6.25 mg tablet TAKE 1 TABLET BY MOUTH TWICE A DAY WITH MEALS 8/29/23   Tomer Arce DO   cyclobenzaprine (Flexeril) 10 mg tablet Take 1 tablet (10 mg) by mouth 2 times a day as needed for muscle spasms.    Historical Provider, MD   lisinopril 20 mg tablet Take 1 tablet (20 mg) by mouth once daily. 8/25/23 8/24/24  Tomer Arce DO   metFORMIN (Glucophage) 1,000 mg tablet Take 1 tablet (1,000 mg) by mouth once daily with a meal. Patient choice 11/27/18   Historical Provider, MD   pregabalin (Lyrica) 75 mg capsule Take 1 capsule (75 mg) by mouth once daily. Per patient statement    Historical Provider, MD   ciprofloxacin (Cipro) 500 mg tablet Take 1 tablet (500 mg) by mouth 2 times a day. 12/6/23 12/12/23  Historical Provider, MD   meloxicam (Mobic) 15 mg tablet Take 1 tablet (15 mg) by mouth if needed for mild pain (1 - 3).  12/12/23  Historical Provider, MD   semaglutide (OZEMPIC) 1 mg/dose (4 mg/3 mL) pen injector Inject 1 mg under the skin 1 (one) time per week.  Patient not taking: Reported on 12/10/2023 8/25/23 12/12/23  Tomer Arce DO   semaglutide 0.25 mg or 0.5 mg (2 mg/3 mL) pen injector  "Inject 0.25 mg under the skin 1 (one) time per week.  Patient not taking: Reported on 12/10/2023 8/25/23 12/12/23  Tomer Arce DO   semaglutide 0.25 mg or 0.5 mg (2 mg/3 mL) pen injector Inject 0.5 mg under the skin 1 (one) time per week.  Patient not taking: Reported on 12/10/2023 8/25/23 12/12/23  Tomer Arce DO       Review of Systems  Review of Systems   Constitutional: Negative.    HENT: Negative.     Eyes: Negative.    Respiratory: Negative.     Cardiovascular: Negative.    Gastrointestinal: Negative.    Endocrine: Negative.    Genitourinary: Negative.    Musculoskeletal: Negative.    Allergic/Immunologic: Negative.    Neurological: Negative.            Objective   BP 92/60 (BP Location: Right arm, Patient Position: Lying)   Pulse 70   Temp 36.5 °C (97.7 °F) (Temporal)   Resp 16   Ht 1.854 m (6' 0.99\")   Wt (!) 157 kg (345 lb 11.2 oz)   SpO2 96%   BMI 45.62 kg/m²   Pain Score: 0 - No pain   Vitals:    12/12/23 2000   Weight: (!) 157 kg (345 lb 11.2 oz)        No intake or output data in the 24 hours ending 12/13/23 0917    Physical Exam  Physical Exam  Constitutional:       General: He is not in acute distress.     Appearance: He is obese. He is not ill-appearing.   HENT:      Head: Normocephalic.      Mouth/Throat:      Mouth: Mucous membranes are moist.   Cardiovascular:      Rate and Rhythm: Normal rate and regular rhythm.      Heart sounds: No murmur heard.  Pulmonary:      Effort: Pulmonary effort is normal.      Breath sounds: Normal breath sounds.   Abdominal:      General: Bowel sounds are normal. There is no distension.      Palpations: Abdomen is soft.      Tenderness: There is no abdominal tenderness.   Musculoskeletal:         General: Normal range of motion.      Cervical back: Neck supple.      Right lower leg: No edema.      Left lower leg: No edema.   Skin:     General: Skin is warm and dry.   Neurological:      General: No focal deficit present.      Mental Status: He " is alert and oriented to person, place, and time.   Psychiatric:         Mood and Affect: Mood normal.         Behavior: Behavior normal.         Medications  Scheduled medications  amoxicillin, 500 mg, oral, q12h ISAIAH  aspirin, 81 mg, oral, Daily  atorvastatin, 80 mg, oral, Nightly  carvedilol, 6.25 mg, oral, BID with meals  insulin lispro, 0-10 Units, subcutaneous, TID with meals  pantoprazole, 40 mg, oral, Daily before breakfast   Or  pantoprazole, 40 mg, intravenous, Daily before breakfast  polyethylene glycol, 17 g, oral, Daily  pregabalin, 75 mg, oral, Daily    Continuous medications  heparin, 0-4,000 Units/hr, Last Rate: 2,300 Units/hr (12/12/23 2300)    PRN medications  PRN medications: acetaminophen **OR** acetaminophen **OR** acetaminophen, dextrose 10 % in water (D10W), dextrose, glucagon    Labs  Results for orders placed or performed during the hospital encounter of 12/12/23 (from the past 24 hour(s))   POCT GLUCOSE   Result Value Ref Range    POCT Glucose 259 (H) 74 - 99 mg/dL   Comprehensive metabolic panel   Result Value Ref Range    Glucose 271 (H) 74 - 99 mg/dL    Sodium 134 (L) 136 - 145 mmol/L    Potassium 3.9 3.5 - 5.3 mmol/L    Chloride 101 98 - 107 mmol/L    Bicarbonate 24 21 - 32 mmol/L    Anion Gap 13 10 - 20 mmol/L    Urea Nitrogen 16 6 - 23 mg/dL    Creatinine 0.93 0.50 - 1.30 mg/dL    eGFR >90 >60 mL/min/1.73m*2    Calcium 8.8 8.6 - 10.6 mg/dL    Albumin 3.4 3.4 - 5.0 g/dL    Alkaline Phosphatase 58 33 - 120 U/L    Total Protein 6.5 6.4 - 8.2 g/dL    AST 26 9 - 39 U/L    Bilirubin, Total 1.3 (H) 0.0 - 1.2 mg/dL    ALT 26 10 - 52 U/L   Magnesium   Result Value Ref Range    Magnesium 1.65 1.60 - 2.40 mg/dL   CBC and Auto Differential   Result Value Ref Range    WBC 7.3 4.4 - 11.3 x10*3/uL    nRBC 0.0 0.0 - 0.0 /100 WBCs    RBC 4.56 4.50 - 5.90 x10*6/uL    Hemoglobin 13.9 13.5 - 17.5 g/dL    Hematocrit 41.1 41.0 - 52.0 %    MCV 90 80 - 100 fL    MCH 30.5 26.0 - 34.0 pg    MCHC 33.8 32.0 -  36.0 g/dL    RDW 12.1 11.5 - 14.5 %    Platelets 196 150 - 450 x10*3/uL    Neutrophils % 41.9 40.0 - 80.0 %    Immature Granulocytes %, Automated 0.4 0.0 - 0.9 %    Lymphocytes % 48.8 13.0 - 44.0 %    Monocytes % 6.3 2.0 - 10.0 %    Eosinophils % 1.9 0.0 - 6.0 %    Basophils % 0.7 0.0 - 2.0 %    Neutrophils Absolute 3.06 1.20 - 7.70 x10*3/uL    Immature Granulocytes Absolute, Automated 0.03 0.00 - 0.70 x10*3/uL    Lymphocytes Absolute 3.56 1.20 - 4.80 x10*3/uL    Monocytes Absolute 0.46 0.10 - 1.00 x10*3/uL    Eosinophils Absolute 0.14 0.00 - 0.70 x10*3/uL    Basophils Absolute 0.05 0.00 - 0.10 x10*3/uL   Troponin I, High Sensitivity   Result Value Ref Range    Troponin I, High Sensitivity 5,667 (HH) 0 - 53 ng/L   B-type natriuretic peptide   Result Value Ref Range    BNP 91 0 - 99 pg/mL   Phosphorus   Result Value Ref Range    Phosphorus 3.4 2.5 - 4.9 mg/dL   Coagulation Screen   Result Value Ref Range    Protime 13.3 (H) 9.8 - 12.8 seconds    INR 1.2 (H) 0.9 - 1.1    aPTT 86 (H) 27 - 38 seconds   POCT GLUCOSE   Result Value Ref Range    POCT Glucose 297 (H) 74 - 99 mg/dL       Cardiac Testing  [facility]: 12/11/23  Coronary Lesion Summary:  Vessel         Stenosis    Vessel Segment  LAD          100% stenosis       mid  1st Diagonal 95% stenosis  proximal to mid  RCA          95% stenosis        mid    Echo: 12/11/23  CONCLUSIONS:   1. Left ventricular systolic function is mildly decreased with a 40-45% estimated ejection fraction.   2. Multiple segmental abnormalities exist. See findings.   3. Increased LV mass.   4. Aortic valve stenosis is not present.   5. There is global hypokinesis of the left ventricle with minor regional variations.            ASSESSMENT & PLAN  Mr Wen 57 yo M with PMHx HTN,HLD, T2DM (HgbA1c 11%), L4-L5 Discitis with epidural abscess s/p debridement, who was transferred from [facility] to [facility] for CABG evaluation.      The patient presented to [facility] ED 12/10 with left sided chest pain at rest that  radiated to his Lt arm, it was associated with diaphoresis and nausea. It was relieved by Nitro, he was given ASA load. Upon presentation to [facility] ED pt was HDS, afebrile, EKG with T-wave inversion infero-lateral leads, Trops 322 with repeat up to 1626, the patient was started on Heparin gtt, and Plavix and admitted to medicine for ACS (NSTEMI), TTE 10/12 with EF 40-45% with global hypokinesis of LV. LHC 12/11 revealed 100% stenosis in mid LAD, 95% stenosis proximal to mid first diagonal branch, occluded LCx after early first OM, OM1 with occluded mid portion, OM2 occluded, and 95% stenosis mid RCA. The patient remained HDS throughout his stay, his chest pain resolved since initial admission, the patient is being transferred to [facility] for CABG eval.     This morning 12/13 troponin up to 5,667.     Cardiac surgery is consulted for CABG evaluation.       RECOMMENDATIONS  - Dr. Vasquez aware of patient and reviewing imaging and data  - Medical optimization per primary team  - Preop risk stratification studies/labs/UA/PFTs and vascular ultrasounds ordered in EMR by our team  - Dental evaluation not indicated for isolated CAB surgeries   - Recommend endocrinology consult given HgbA1c 11%  - Continue ASA, high-intensity statin, and BB (or document contraindications for use)    When/if goes to surgery:  - No ACEi/ARBs in the pre-op period (at least 48 hours)  - Hold any SGLT2 inhibitors (Farxiga, Jardiance, etc.) for at least 3 days prior to cardiac surgery to prevent euglycemic DKA  - No antiplatelets other than ASA, no anticoagulants other than Heparin  - NPO after midnight, blood on hold/T&S, and preop scrubs only to be ordered once OR date is established    Will continue to follow along.  Thank you for the consultation.   Patient educated and all questions answered.  Please page the cardiac surgery consult pager [phone] with any questions or changes in patient condition.      Patricia Solis PA-C  Cardiac Surgery  Consult GUNNAR  [facility]  Cardiac Surgery Consult Pager [phone]     12/13/2023  9:17 AM

## 2023-12-13 NOTE — ED NOTES
Pharmacy Medication History Review    Behzad Diaz is a 58 y.o. male admitted for Coronary artery disease (CAD) excluded. Pharmacy reviewed the patient's [id number] medications and allergies for accuracy.    The list below reflects the updated PTA list. Comments regarding how patient may be taking medications differently can be found in the Admit Orders Activity  Prior to Admission Medications   Prescriptions Last Dose Informant Patient Reported? Taking?   amoxicillin (Amoxil) 500 mg capsule 12/12/2023 Self Yes Yes   Sig: Take 1 capsule (500 mg) by mouth twice a day.   aspirin 81 mg chewable tablet Unknown  No Yes   Sig: Chew 1 tablet (81 mg) once daily. Do not start before December 12, 2023.   atorvastatin (Lipitor) 80 mg tablet Unknown  No Yes   Sig: Take 1 tablet (80 mg) by mouth once daily at bedtime.   carvedilol (Coreg) 6.25 mg tablet Taking differently: Taking 1 tablet by mouth once a day Self Yes Yes   Sig: TAKE 1 TABLET BY MOUTH TWICE A DAY WITH MEALS   cyclobenzaprine (Flexeril) 10 mg tablet Unknown Self Yes Yes   Sig: Take 1 tablet (10 mg) by mouth 2 times a day as needed for muscle spasms.   lisinopril 20 mg tablet 12/12/2023 Self Yes Yes   Sig: Take 1 tablet (20 mg) by mouth once daily.   metFORMIN (Glucophage) 1,000 mg tablet 12/12/2023 Self Yes Yes   Sig: Take 1 tablet (1,000 mg) by mouth once daily with a meal. Patient choice   pregabalin (Lyrica) 75 mg capsule 12/12/2023 Self Yes Yes   Sig: Take 1 capsule (75 mg) by mouth once daily. Per patient statement      Facility-Administered Medications: None        The list below reflects the updated allergy list. Please review each documented allergy for additional clarification and justification.  Allergies  Reviewed by Kaylynn Gregory CPhT on 12/13/2023   No Known Allergies         Patient declines M2B at discharge. Pharmacy has been updated to [facility]/Pharmacy #[id number].    Sources used to complete the med history include:  - [employer] medication  dispense history  - Care Everywhere   - Discharged Summary 12/11/23  - Patient interview    Below are additional concerns with the patient's PTA list.  - Patient was a good historian. He was able to identify each medication dose and frequency with the provided list.  - Patient reported that he was taking carvedilol 6.25 mg differently: Taking 1 tablet by mouth once daily. The appropriate frequency was twice daily with meals.  - Newly prescribed medications include aspirin 81 mg and atorvastatin 80 mg (last fill 12/12/23). Patient reported that he has not started taking these medications.    Kaylynn Gregory   PGY-1 Pharmacy Resident    Meds Amb[facility] Services  Please reach out via [employer] Secure Chat for questions, or if no response call [employer] or [employer] “[facility]Rec”

## 2023-12-14 ENCOUNTER — APPOINTMENT (OUTPATIENT)
Dept: CARDIOLOGY | Facility: HOSPITAL | Age: 58
End: 2023-12-14
Payer: COMMERCIAL

## 2023-12-14 ENCOUNTER — APPOINTMENT (OUTPATIENT)
Dept: RESPIRATORY THERAPY | Facility: HOSPITAL | Age: 58
DRG: 236 | End: 2023-12-14
Payer: COMMERCIAL

## 2023-12-14 LAB
ANION GAP BLDA CALCULATED.4IONS-SCNC: 12 MMO/L (ref 10–25)
ANION GAP SERPL CALC-SCNC: 13 MMOL/L (ref 10–20)
ATRIAL RATE: 75 BPM
ATRIAL RATE: 94 BPM
BASE EXCESS BLDA CALC-SCNC: -1.5 MMOL/L (ref -2–3)
BASOPHILS # BLD AUTO: 0.05 X10*3/UL (ref 0–0.1)
BASOPHILS NFR BLD AUTO: 0.7 %
BODY TEMPERATURE: 37 DEGREES CELSIUS
BUN SERPL-MCNC: 14 MG/DL (ref 6–23)
CA-I BLDA-SCNC: 1.22 MMOL/L (ref 1.1–1.33)
CALCIUM SERPL-MCNC: 8.8 MG/DL (ref 8.6–10.6)
CHLORIDE BLDA-SCNC: 101 MMOL/L (ref 98–107)
CHLORIDE SERPL-SCNC: 100 MMOL/L (ref 98–107)
CO2 SERPL-SCNC: 25 MMOL/L (ref 21–32)
COHGB MFR BLDA: 1.5 %
CREAT SERPL-MCNC: 0.78 MG/DL (ref 0.5–1.3)
DO-HGB MFR BLDA: 1.5 % (ref 0–5)
EOSINOPHIL # BLD AUTO: 0.17 X10*3/UL (ref 0–0.7)
EOSINOPHIL NFR BLD AUTO: 2.5 %
ERYTHROCYTE [DISTWIDTH] IN BLOOD BY AUTOMATED COUNT: 11.9 % (ref 11.5–14.5)
GFR SERPL CREATININE-BSD FRML MDRD: >90 ML/MIN/1.73M*2
GLUCOSE BLD MANUAL STRIP-MCNC: 216 MG/DL (ref 74–99)
GLUCOSE BLD MANUAL STRIP-MCNC: 219 MG/DL (ref 74–99)
GLUCOSE BLD MANUAL STRIP-MCNC: 225 MG/DL (ref 74–99)
GLUCOSE BLD MANUAL STRIP-MCNC: 239 MG/DL (ref 74–99)
GLUCOSE BLD MANUAL STRIP-MCNC: 244 MG/DL (ref 74–99)
GLUCOSE BLDA-MCNC: 251 MG/DL (ref 74–99)
GLUCOSE SERPL-MCNC: 215 MG/DL (ref 74–99)
HCO3 BLDA-SCNC: 22.9 MMOL/L (ref 22–26)
HCT VFR BLD AUTO: 39.7 % (ref 41–52)
HCT VFR BLD EST: 44 % (ref 41–52)
HGB BLD-MCNC: 13.8 G/DL (ref 13.5–17.5)
HGB BLDA-MCNC: 14.6 G/DL (ref 13.5–17.5)
HGB BLDA-MCNC: 14.6 G/DL (ref 13.5–17.5)
IMM GRANULOCYTES # BLD AUTO: 0.03 X10*3/UL (ref 0–0.7)
IMM GRANULOCYTES NFR BLD AUTO: 0.4 % (ref 0–0.9)
LACTATE BLDA-SCNC: 1.3 MMOL/L (ref 0.4–2)
LYMPHOCYTES # BLD AUTO: 3.18 X10*3/UL (ref 1.2–4.8)
LYMPHOCYTES NFR BLD AUTO: 46.8 %
MAGNESIUM SERPL-MCNC: 1.74 MG/DL (ref 1.6–2.4)
MCH RBC QN AUTO: 30.9 PG (ref 26–34)
MCHC RBC AUTO-ENTMCNC: 34.8 G/DL (ref 32–36)
MCV RBC AUTO: 89 FL (ref 80–100)
METHGB MFR BLDA: 0.5 % (ref 0–1.5)
MONOCYTES # BLD AUTO: 0.41 X10*3/UL (ref 0.1–1)
MONOCYTES NFR BLD AUTO: 6 %
NEUTROPHILS # BLD AUTO: 2.95 X10*3/UL (ref 1.2–7.7)
NEUTROPHILS NFR BLD AUTO: 43.6 %
NRBC BLD-RTO: 0 /100 WBCS (ref 0–0)
OXYHGB MFR BLDA: 96.5 % (ref 94–98)
OXYHGB MFR BLDA: 96.5 % (ref 94–98)
P AXIS: 49 DEGREES
P AXIS: 56 DEGREES
P OFFSET: 191 MS
P OFFSET: 199 MS
P ONSET: 131 MS
P ONSET: 148 MS
PCO2 BLDA: 37 MM HG (ref 38–42)
PH BLDA: 7.4 PH (ref 7.38–7.42)
PHOSPHATE SERPL-MCNC: 3.7 MG/DL (ref 2.5–4.9)
PLATELET # BLD AUTO: 205 X10*3/UL (ref 150–450)
PO2 BLDA: 94 MM HG (ref 85–95)
POTASSIUM BLDA-SCNC: 4.7 MMOL/L (ref 3.5–5.3)
POTASSIUM SERPL-SCNC: 3.8 MMOL/L (ref 3.5–5.3)
PR INTERVAL: 158 MS
PR INTERVAL: 182 MS
Q ONSET: 222 MS
Q ONSET: 227 MS
QRS COUNT: 13 BEATS
QRS COUNT: 16 BEATS
QRS DURATION: 104 MS
QRS DURATION: 90 MS
QT INTERVAL: 364 MS
QT INTERVAL: 366 MS
QTC CALCULATION(BAZETT): 408 MS
QTC CALCULATION(BAZETT): 455 MS
QTC FREDERICIA: 394 MS
QTC FREDERICIA: 422 MS
R AXIS: 39 DEGREES
R AXIS: 57 DEGREES
RBC # BLD AUTO: 4.46 X10*6/UL (ref 4.5–5.9)
SAO2 % BLDA: 99 % (ref 94–100)
SODIUM BLDA-SCNC: 131 MMOL/L (ref 136–145)
SODIUM SERPL-SCNC: 134 MMOL/L (ref 136–145)
T AXIS: -86 DEGREES
T AXIS: 259 DEGREES
T OFFSET: 405 MS
T OFFSET: 409 MS
T4 FREE SERPL-MCNC: 1.08 NG/DL (ref 0.78–1.48)
TSH SERPL-ACNC: 5.81 MIU/L (ref 0.44–3.98)
UFH PPP CHRO-ACNC: 0.3 IU/ML
VENTRICULAR RATE: 75 BPM
VENTRICULAR RATE: 94 BPM
WBC # BLD AUTO: 6.8 X10*3/UL (ref 4.4–11.3)

## 2023-12-14 PROCEDURE — 2500000004 HC RX 250 GENERAL PHARMACY W/ HCPCS (ALT 636 FOR OP/ED)

## 2023-12-14 PROCEDURE — 84443 ASSAY THYROID STIM HORMONE: CPT | Performed by: PHYSICIAN ASSISTANT

## 2023-12-14 PROCEDURE — 2500000002 HC RX 250 W HCPCS SELF ADMINISTERED DRUGS (ALT 637 FOR MEDICARE OP, ALT 636 FOR OP/ED): Performed by: PHYSICIAN ASSISTANT

## 2023-12-14 PROCEDURE — 82375 ASSAY CARBOXYHB QUANT: CPT

## 2023-12-14 PROCEDURE — 84100 ASSAY OF PHOSPHORUS: CPT

## 2023-12-14 PROCEDURE — 94010 BREATHING CAPACITY TEST: CPT | Performed by: INTERNAL MEDICINE

## 2023-12-14 PROCEDURE — 99232 SBSQ HOSP IP/OBS MODERATE 35: CPT | Performed by: INTERNAL MEDICINE

## 2023-12-14 PROCEDURE — 84439 ASSAY OF FREE THYROXINE: CPT | Performed by: PHYSICIAN ASSISTANT

## 2023-12-14 PROCEDURE — 2500000001 HC RX 250 WO HCPCS SELF ADMINISTERED DRUGS (ALT 637 FOR MEDICARE OP)

## 2023-12-14 PROCEDURE — 36600 WITHDRAWAL OF ARTERIAL BLOOD: CPT

## 2023-12-14 PROCEDURE — 93005 ELECTROCARDIOGRAM TRACING: CPT

## 2023-12-14 PROCEDURE — 80048 BASIC METABOLIC PNL TOTAL CA: CPT

## 2023-12-14 PROCEDURE — 85520 HEPARIN ASSAY: CPT

## 2023-12-14 PROCEDURE — 85025 COMPLETE CBC W/AUTO DIFF WBC: CPT

## 2023-12-14 PROCEDURE — 1200000002 HC GENERAL ROOM WITH TELEMETRY DAILY

## 2023-12-14 PROCEDURE — 83735 ASSAY OF MAGNESIUM: CPT

## 2023-12-14 PROCEDURE — 84132 ASSAY OF SERUM POTASSIUM: CPT

## 2023-12-14 PROCEDURE — 99221 1ST HOSP IP/OBS SF/LOW 40: CPT | Performed by: STUDENT IN AN ORGANIZED HEALTH CARE EDUCATION/TRAINING PROGRAM

## 2023-12-14 PROCEDURE — 82947 ASSAY GLUCOSE BLOOD QUANT: CPT

## 2023-12-14 PROCEDURE — 36415 COLL VENOUS BLD VENIPUNCTURE: CPT

## 2023-12-14 PROCEDURE — 97161 PT EVAL LOW COMPLEX 20 MIN: CPT | Mod: GP

## 2023-12-14 PROCEDURE — 94010 BREATHING CAPACITY TEST: CPT

## 2023-12-14 RX ORDER — INSULIN LISPRO 100 [IU]/ML
6 INJECTION, SOLUTION INTRAVENOUS; SUBCUTANEOUS
Status: DISCONTINUED | OUTPATIENT
Start: 2023-12-14 | End: 2023-12-14

## 2023-12-14 RX ORDER — INSULIN GLARGINE 100 [IU]/ML
40 INJECTION, SOLUTION SUBCUTANEOUS NIGHTLY
Status: DISCONTINUED | OUTPATIENT
Start: 2023-12-14 | End: 2023-12-15

## 2023-12-14 RX ORDER — INSULIN LISPRO 100 [IU]/ML
0-10 INJECTION, SOLUTION INTRAVENOUS; SUBCUTANEOUS
Status: DISCONTINUED | OUTPATIENT
Start: 2023-12-14 | End: 2023-12-17

## 2023-12-14 RX ORDER — INSULIN LISPRO 100 [IU]/ML
6 INJECTION, SOLUTION INTRAVENOUS; SUBCUTANEOUS
Status: DISCONTINUED | OUTPATIENT
Start: 2023-12-14 | End: 2023-12-15

## 2023-12-14 RX ORDER — POTASSIUM CHLORIDE 750 MG/1
10 TABLET, FILM COATED, EXTENDED RELEASE ORAL ONCE
Status: COMPLETED | OUTPATIENT
Start: 2023-12-14 | End: 2023-12-14

## 2023-12-14 RX ADMIN — CARVEDILOL 6.25 MG: 6.25 TABLET, FILM COATED ORAL at 18:00

## 2023-12-14 RX ADMIN — INSULIN LISPRO 4 UNITS: 100 INJECTION, SOLUTION INTRAVENOUS; SUBCUTANEOUS at 20:34

## 2023-12-14 RX ADMIN — HEPARIN SODIUM 2300 UNITS/HR: 10000 INJECTION, SOLUTION INTRAVENOUS at 23:15

## 2023-12-14 RX ADMIN — HEPARIN SODIUM 2300 UNITS/HR: 10000 INJECTION, SOLUTION INTRAVENOUS at 01:00

## 2023-12-14 RX ADMIN — INSULIN GLARGINE 40 UNITS: 100 INJECTION, SOLUTION SUBCUTANEOUS at 20:34

## 2023-12-14 RX ADMIN — PREGABALIN 75 MG: 75 CAPSULE ORAL at 08:28

## 2023-12-14 RX ADMIN — INSULIN LISPRO 4 UNITS: 100 INJECTION, SOLUTION INTRAVENOUS; SUBCUTANEOUS at 06:27

## 2023-12-14 RX ADMIN — AMOXICILLIN 500 MG: 500 CAPSULE ORAL at 08:28

## 2023-12-14 RX ADMIN — CARVEDILOL 6.25 MG: 6.25 TABLET, FILM COATED ORAL at 08:28

## 2023-12-14 RX ADMIN — HEPARIN SODIUM 2300 UNITS/HR: 10000 INJECTION, SOLUTION INTRAVENOUS at 12:16

## 2023-12-14 RX ADMIN — INSULIN LISPRO 4 UNITS: 100 INJECTION, SOLUTION INTRAVENOUS; SUBCUTANEOUS at 02:29

## 2023-12-14 RX ADMIN — AMOXICILLIN 500 MG: 500 CAPSULE ORAL at 20:35

## 2023-12-14 RX ADMIN — POTASSIUM CHLORIDE 10 MEQ: 750 TABLET, FILM COATED, EXTENDED RELEASE ORAL at 08:28

## 2023-12-14 RX ADMIN — INSULIN LISPRO 6 UNITS: 100 INJECTION, SOLUTION INTRAVENOUS; SUBCUTANEOUS at 18:00

## 2023-12-14 RX ADMIN — INSULIN LISPRO 6 UNITS: 100 INJECTION, SOLUTION INTRAVENOUS; SUBCUTANEOUS at 13:30

## 2023-12-14 RX ADMIN — ASPIRIN 81 MG CHEWABLE TABLET 81 MG: 81 TABLET CHEWABLE at 08:28

## 2023-12-14 RX ADMIN — MUPIROCIN 0.5 APPLICATION: 20 OINTMENT TOPICAL at 08:29

## 2023-12-14 RX ADMIN — ATORVASTATIN CALCIUM 80 MG: 80 TABLET, FILM COATED ORAL at 20:35

## 2023-12-14 RX ADMIN — PANTOPRAZOLE SODIUM 40 MG: 40 TABLET, DELAYED RELEASE ORAL at 06:27

## 2023-12-14 RX ADMIN — INSULIN LISPRO 4 UNITS: 100 INJECTION, SOLUTION INTRAVENOUS; SUBCUTANEOUS at 13:29

## 2023-12-14 RX ADMIN — MUPIROCIN 0.5 APPLICATION: 20 OINTMENT TOPICAL at 20:35

## 2023-12-14 ASSESSMENT — COGNITIVE AND FUNCTIONAL STATUS - GENERAL
MOVING FROM LYING ON BACK TO SITTING ON SIDE OF FLAT BED WITH BEDRAILS: A LITTLE
STANDING UP FROM CHAIR USING ARMS: A LITTLE
TURNING FROM BACK TO SIDE WHILE IN FLAT BAD: A LITTLE
MOVING TO AND FROM BED TO CHAIR: A LITTLE
CLIMB 3 TO 5 STEPS WITH RAILING: A LITTLE
MOBILITY SCORE: 18
WALKING IN HOSPITAL ROOM: A LITTLE

## 2023-12-14 ASSESSMENT — PAIN SCALES - GENERAL
PAINLEVEL_OUTOF10: 0 - NO PAIN

## 2023-12-14 ASSESSMENT — PAIN - FUNCTIONAL ASSESSMENT
PAIN_FUNCTIONAL_ASSESSMENT: 0-10
PAIN_FUNCTIONAL_ASSESSMENT: 0-10

## 2023-12-14 ASSESSMENT — ACTIVITIES OF DAILY LIVING (ADL): ADL_ASSISTANCE: INDEPENDENT

## 2023-12-14 NOTE — CARE PLAN
The patient's goals for the shift include remain safe throughout shift.    The clinical goals for the shift include patient will remain HDS throughout shift.      Problem: Diabetes  Goal: Achieve decreasing blood glucose levels by end of shift  12/14/2023 0741 by Lynne Mckinnon RN  Outcome: Progressing  12/14/2023 0741 by Lynne Mckinnon RN  Outcome: Progressing  Goal: Increase stability of blood glucose readings by end of shift  12/14/2023 0741 by Lynne Mckinnon RN  Outcome: Progressing  12/14/2023 0741 by Lynne Mckinnon RN  Outcome: Progressing  Goal: Decrease in ketones present in urine by end of shift  12/14/2023 0741 by Lynne Mckinnon RN  Outcome: Progressing  12/14/2023 0741 by Lynne Mckinnon RN  Outcome: Progressing  Goal: Maintain electrolyte levels within acceptable range throughout shift  12/14/2023 0741 by Lynne Mckinnon RN  Outcome: Progressing  12/14/2023 0741 by Lynne Mckinnon RN  Outcome: Progressing  Goal: Maintain glucose levels >70mg/dl to <250mg/dl throughout shift  12/14/2023 0741 by Lynne Mckinnon RN  Outcome: Progressing  12/14/2023 0741 by Lynne Mckinnon RN  Outcome: Progressing  Goal: No changes in neurological exam by end of shift  12/14/2023 0741 by Lynne Mckinnon RN  Outcome: Progressing  12/14/2023 0741 by Lynne Mckinnon RN  Outcome: Progressing  Goal: Learn about and adhere to nutrition recommendations by end of shift  12/14/2023 0741 by Lynne Mckinnon RN  Outcome: Progressing  12/14/2023 0741 by Lynne Mckinnon RN  Outcome: Progressing  Goal: Vital signs within normal range for age by end of shift  12/14/2023 0741 by Lynne Mckinnon RN  Outcome: Progressing  12/14/2023 0741 by Lynne Mckinnon RN  Outcome: Progressing  Goal: Increase self care and/or family involovement by end of shift  12/14/2023 0741 by Lynne Mckinnon RN  Outcome: Progressing  12/14/2023 0741 by Lynne Heinbaugh, RN  Outcome: Progressing  Goal: Receive DSME education by  end of shift  12/14/2023 0741 by Lynne Mckinnon RN  Outcome: Progressing  12/14/2023 0741 by Lynne Mckinnon RN  Outcome: Progressing     Problem: Safety  Goal: Patient will be injury free during hospitalization  Outcome: Progressing  Goal: I will remain free of falls  Outcome: Progressing

## 2023-12-14 NOTE — PROGRESS NOTES
"Behzad Diaz is a 58 y.o. male on day 2 of admission presenting with Coronary artery disease (CAD) excluded.    Subjective   No acute events overnight. Patient denied, nausea, vomiting, fever, and cough. Patient denies chest pain, dyspnea, and palpitations.         Objective     Physical Exam  Constitutional: Well-developed male in no acute distress.  HEENT: Normocephalic, atraumatic. PERRL. EOMI. No cervical lymphadenopathy.  Respiratory: CTA bilaterally. No wheezes, rales, or rhonchi. Normal respiratory effort.  Cardiovascular: RRR. No murmurs, gallops, or rubs. No JVD. Radial pulses 2+.  Abdominal: Soft, nondistended, nontender to palpation. Bowel sounds present. No hepatosplenomegaly or masses. No CVA tenderness. Significantly Obese patient.  Neuro: CN II-XII intact. UE and LE strength 5/5 bilaterally and sensation intact. Normal FTN testing.  MSK: No LE edema bilaterally.  Skin: Warm, dry. No rashes or wounds.  Psych: Appropriate mood and affect.    Last Recorded Vitals  Blood pressure 102/65, pulse 70, temperature 36.6 °C (97.9 °F), temperature source Temporal, resp. rate 19, height 1.854 m (6' 0.99\"), weight (!) 157 kg (345 lb 14.4 oz), SpO2 98 %.  Intake/Output last 3 Shifts:  I/O last 3 completed shifts:  In: - (0 mL/kg)   Out: 300 (1.9 mL/kg) [Urine:300 (0.1 mL/kg/hr)]  Weight: 156.9 kg     Relevant Results              Active Medications  Scheduled medications  amoxicillin, 500 mg, oral, q12h ISAIAH  aspirin, 81 mg, oral, Daily  atorvastatin, 80 mg, oral, Nightly  carvedilol, 6.25 mg, oral, BID with meals  insulin glargine, 30 Units, subcutaneous, Nightly  insulin lispro, 0-10 Units, subcutaneous, q4h  mupirocin, 0.5 Application, Topical, BID  pantoprazole, 40 mg, oral, Daily before breakfast   Or  pantoprazole, 40 mg, intravenous, Daily before breakfast  polyethylene glycol, 17 g, oral, Daily  pregabalin, 75 mg, oral, Daily      Continuous medications  heparin, 0-4,000 Units/hr, Last Rate: 2,300 Units/hr " (12/14/23 0100)      PRN medications  PRN medications: acetaminophen **OR** acetaminophen **OR** acetaminophen, dextrose 10 % in water (D10W), dextrose, glucagon     Recent Labs  Results for orders placed or performed during the hospital encounter of 12/12/23 (from the past 24 hour(s))   POCT GLUCOSE   Result Value Ref Range    POCT Glucose 297 (H) 74 - 99 mg/dL   Troponin I, High Sensitivity   Result Value Ref Range    Troponin I, High Sensitivity 5,268 (HH) 0 - 53 ng/L   POCT GLUCOSE   Result Value Ref Range    POCT Glucose 278 (H) 74 - 99 mg/dL   Vascular US ankle brachial index (KHADIJAH) without exercise   Result Value Ref Range    BSA 2.84 m2   Vascular US carotid artery duplex bilateral   Result Value Ref Range    BSA 2.84 m2   Vascular US lower extremity vein mapping bilateral   Result Value Ref Range    BSA 2.84 m2   Urinalysis with Reflex Microscopic and Culture   Result Value Ref Range    Color, Urine Yellow Straw, Yellow    Appearance, Urine Clear Clear    Specific Gravity, Urine 1.023 1.005 - 1.035    pH, Urine 5.0 5.0, 5.5, 6.0, 6.5, 7.0, 7.5, 8.0    Protein, Urine NEGATIVE NEGATIVE mg/dL    Glucose, Urine >=500 (3+) (A) NEGATIVE mg/dL    Blood, Urine NEGATIVE NEGATIVE    Ketones, Urine NEGATIVE NEGATIVE mg/dL    Bilirubin, Urine NEGATIVE NEGATIVE    Urobilinogen, Urine <2.0 <2.0 mg/dL    Nitrite, Urine NEGATIVE NEGATIVE    Leukocyte Esterase, Urine NEGATIVE NEGATIVE   Extra Urine Gray Tube   Result Value Ref Range    Extra Tube Hold for add-ons.    POCT GLUCOSE   Result Value Ref Range    POCT Glucose 336 (H) 74 - 99 mg/dL   POCT GLUCOSE   Result Value Ref Range    POCT Glucose 239 (H) 74 - 99 mg/dL   POCT GLUCOSE   Result Value Ref Range    POCT Glucose 212 (H) 74 - 99 mg/dL   POCT GLUCOSE   Result Value Ref Range    POCT Glucose 225 (H) 74 - 99 mg/dL   CBC and Auto Differential   Result Value Ref Range    WBC 6.8 4.4 - 11.3 x10*3/uL    nRBC 0.0 0.0 - 0.0 /100 WBCs    RBC 4.46 (L) 4.50 - 5.90 x10*6/uL     Hemoglobin 13.8 13.5 - 17.5 g/dL    Hematocrit 39.7 (L) 41.0 - 52.0 %    MCV 89 80 - 100 fL    MCH 30.9 26.0 - 34.0 pg    MCHC 34.8 32.0 - 36.0 g/dL    RDW 11.9 11.5 - 14.5 %    Platelets 205 150 - 450 x10*3/uL    Neutrophils % 43.6 40.0 - 80.0 %    Immature Granulocytes %, Automated 0.4 0.0 - 0.9 %    Lymphocytes % 46.8 13.0 - 44.0 %    Monocytes % 6.0 2.0 - 10.0 %    Eosinophils % 2.5 0.0 - 6.0 %    Basophils % 0.7 0.0 - 2.0 %    Neutrophils Absolute 2.95 1.20 - 7.70 x10*3/uL    Immature Granulocytes Absolute, Automated 0.03 0.00 - 0.70 x10*3/uL    Lymphocytes Absolute 3.18 1.20 - 4.80 x10*3/uL    Monocytes Absolute 0.41 0.10 - 1.00 x10*3/uL    Eosinophils Absolute 0.17 0.00 - 0.70 x10*3/uL    Basophils Absolute 0.05 0.00 - 0.10 x10*3/uL   Basic Metabolic Panel   Result Value Ref Range    Glucose 215 (H) 74 - 99 mg/dL    Sodium 134 (L) 136 - 145 mmol/L    Potassium 3.8 3.5 - 5.3 mmol/L    Chloride 100 98 - 107 mmol/L    Bicarbonate 25 21 - 32 mmol/L    Anion Gap 13 10 - 20 mmol/L    Urea Nitrogen 14 6 - 23 mg/dL    Creatinine 0.78 0.50 - 1.30 mg/dL    eGFR >90 >60 mL/min/1.73m*2    Calcium 8.8 8.6 - 10.6 mg/dL   Magnesium   Result Value Ref Range    Magnesium 1.74 1.60 - 2.40 mg/dL   Phosphorus   Result Value Ref Range    Phosphorus 3.7 2.5 - 4.9 mg/dL   TSH with reflex to Free T4 if abnormal   Result Value Ref Range    Thyroid Stimulating Hormone 5.81 (H) 0.44 - 3.98 mIU/L   Heparin Assay   Result Value Ref Range    Heparin Unfractionated 0.3 See Comment Below for Therapeutic Ranges IU/mL   Thyroxine, Free   Result Value Ref Range    Thyroxine, Free 1.08 0.78 - 1.48 ng/dL   POCT GLUCOSE   Result Value Ref Range    POCT Glucose 216 (H) 74 - 99 mg/dL       Imaging  Transthoracic Echo (TTE) Complete    Result Date: 12/12/2023            [facility] [address], [facility] [address]    Tel [phone] Fax [phone] TRANSTHORACIC ECHOCARDIOGRAM REPORT  Patient Name:     DEANNE MAYORGA          Reading Physician:  [id number] Gomez MD Study Date:       12/11/2023          Ordering Provider:  [id number] TONY GOMEZ MRN/PID:          [id number]            Fellow: Accession#:       [id number]        Nurse: Date of           1965 / 58      Sonographer:        Erika Maldonado RDCS Birth/Age:        years Gender:           M                   Additional Staff: Height:           182.88 cm           Admit Date:         12/9/2023 Weight:           158.76 kg           Admission Status:   Inpatient - Routine BSA:              2.70 m2             Department          [facility] CCU                                       Location: Blood Pressure: 138 /83 mmHg Study Type:    TRANSTHORACIC ECHO (TTE) COMPLETE Diagnosis/ICD: Non ST elevation (NSTEMI) myocardial infarction-I21.4 Indication:    NSTEMI CPT Codes:     Echo Complete w Full Doppler-02968  Study Detail: The following Echo studies were performed: 2D, M-Mode, Doppler and               color flow. Technically challenging study due to poor acoustic               windows and body habitus. Definity used as a contrast agent for               endocardial border definition. Total contrast used for this               procedure was 2 mL via IV push.  PHYSICIAN INTERPRETATION: Left Ventricle: Left ventricular systolic function is mildly decreased, with an estimated ejection fraction of 40-45%. Estimated left ventricular mass is increased. There is global hypokinesis of the left ventricle with minor regional variations. The left ventricular cavity size is upper limits of normal. The left ventricular septal wall thickness is mildly increased. There is mildly increased left ventricular posterior wall thickness. Left ventricular diastolic filling was indeterminate. There are normal left ventricular filling pressures. LV Wall Scoring: There is diffuse hypokinesis.  Left Atrium: The left atrium is upper limits of normal in size. Right Ventricle: The right ventricle is normal in size. There is normal right ventricular global systolic function. Right Atrium: The right atrium is normal in size. Aortic Valve: The aortic valve is trileaflet. There is no evidence of aortic valve stenosis. There is no evidence of aortic valve regurgitation. The peak instantaneous gradient of the aortic valve is 2.7 mmHg. The mean gradient of the aortic valve is 2.0 mmHg. Mitral Valve: The mitral valve is normal in structure. There is trace mitral valve regurgitation. Tricuspid Valve: The tricuspid valve is structurally normal. There is trace tricuspid regurgitation. The right ventricular systolic pressure is unable to be estimated. Pulmonic Valve: The pulmonic valve was not assessed. The pulmonic valve regurgitation was not assessed. Pericardium: There is no pericardial effusion noted. Aorta: The aortic root is normal. Systemic Veins: The inferior vena cava appears to be of normal size. The hepatic vein appears to be of normal size. There is IVC inspiratory collapse greater than 50%.  CONCLUSIONS:  1. Left ventricular systolic function is mildly decreased with a 40-45% estimated ejection fraction.  2. Multiple segmental abnormalities exist. See findings.  3. Increased LV mass.  4. Aortic valve stenosis is not present.  5. There is global hypokinesis of the left ventricle with minor regional variations. QUANTITATIVE DATA SUMMARY: 2D MEASUREMENTS:                          Normal Ranges: IVSd:          1.42 cm   (0.6-1.1cm) LVPWd:         1.39 cm   (0.6-1.1cm) LVIDd:         4.63 cm   (3.9-5.9cm) LVIDs:         3.63 cm LV Mass Index: 96.5 g/m2 LV % FS        21.6 % LA VOLUME:                               Normal Ranges: LA Vol A4C:        40.0 ml    (22+/-6mL/m2) LA Vol A2C:        35.5 ml LA Vol BP:         37.8 ml LA Vol Index A4C:  14.8ml/m2 LA Vol Index A2C:  13.1 ml/m2 LA Vol Index BP:   14.0 ml/m2  LA Area A4C:       16.7 cm2 LA Area A2C:       15.7 cm2 LA Major Axis A4C: 5.9 cm LA Major Axis A2C: 5.9 cm LA Volume Index:   13.0 ml/m2 LA Vol A4C:        37.0 ml LA Vol A2C:        34.0 ml LV SYSTOLIC FUNCTION BY 2D PLANIMETRY (MOD):                     Normal Ranges: EF-A4C View: 33.9 % (>=55%) EF-A2C View: 47.8 % EF-Biplane:  42.0 % LV DIASTOLIC FUNCTION:                        Normal Ranges: MV Peak E:    0.44 m/s (0.7-1.2 m/s) MV Peak A:    0.51 m/s (0.42-0.7 m/s) E/A Ratio:    0.85     (1.0-2.2) MV e'         0.04 m/s (>8.0) MV lateral e' 0.06 m/s MV medial e'  0.03 m/s E/e' Ratio:   9.92     (<8.0) MITRAL VALVE:                 Normal Ranges: MV DT: 211 msec (150-240msec) AORTIC VALVE:                                   Normal Ranges: AoV Vmax:                0.83 m/s (<=1.7m/s) AoV Peak P.7 mmHg (<20mmHg) AoV Mean P.0 mmHg (1.7-11.5mmHg) LVOT Max Sergio:            0.59 m/s (<=1.1m/s) AoV VTI:                 16.50 cm (18-25cm) LVOT VTI:                12.40 cm LVOT Diameter:           2.50 cm  (1.8-2.4cm) AoV Area, VTI:           3.69 cm2 (2.5-5.5cm2) AoV Area,Vmax:           3.48 cm2 (2.5-4.5cm2) AoV Dimensionless Index: 0.75  RIGHT VENTRICLE: RV Basal 2.97 cm RV Mid   2.58 cm RV Major 8.0 cm TAPSE:   20.3 mm RV s'    0.08 m/s TRICUSPID VALVE/RVSP:                            Normal Ranges: Peak TR Velocity: 0.71 m/s RV Syst Pressure: 5.0 mmHg (< 30mmHg)  [id number] Ramos Post MD Electronically signed on 2023 at 5:36:23 PM  Wall Scoring  ** Final **     ECG 12 lead    Result Date: 2023  Normal sinus rhythm Possible Left atrial enlargement ST & T wave abnormality, consider inferior ischemia Abnormal ECG When compared with ECG of 09-AUG-2021 20:45, QRS voltage has decreased Non-specific change in ST segment in Inferior leads ST now depressed in Anterior leads T wave inversion now evident in Inferior leads    ECG 12 lead    Result Date: 2023  Normal sinus rhythm  ST & T wave abnormality, consider inferior ischemia Abnormal ECG When compared with ECG of 09-AUG-2021 20:45, ST now depressed in Inferior leads ST now depressed in Anterior leads T wave inversion now evident in Inferior leads    Cardiac catheterization - coronary    Result Date: 12/12/2023   [facility], Cath Lab      [address] [address], [place] [address] Cardiovascular Catheterization Report Patient Name:      DEANNE MAYORGA         Performing           [id number] Kaleb Lim                                        Physician:           MD Study Date:        12/11/2023          Verifying Physician: Jessica Lim MD MRN/PID:           [id number]            Cardiologist: Accession#:        [id number]        Ordering Provider:   [id number] LIM Date of Birth/Age: 1965 / 58      Fellow:                    years Gender:            M                   Fellow: Encounter#:        [id number]  Study: Left Heart Cath  Indications: DEANNE MAYORGA is a 58 year old male who presents with dyslipidemia, obesity, hypertension and a chest pain assessment of typical angina. NSTE - ACS.  Procedure Description: After infiltration with 2% Lidocaine, the right radial artery was cannulated with a modified Seldinger technique. Subsequently a 6 Croatian sheath was placed in the right radial artery. Selective coronary catheterization was performed using a 5 Fr catheter(s) exchanged over a guide wire to cannulate the coronary arteries. Multiple injections of contrast were made into the left and right coronary arteries with angiograms recorded in multiple projections. After completion of the procedure, the arterial sheath was pulled and a TR Band Radial Compression Device was utilized to obtain patent hemostasis.  Coronary Angiography: The coronary circulation is right dominant.  Left Main Coronary Artery: The left main coronary artery is a normal caliber  vessel. The left main arises normally from the left coronary sinus of Valsalva and bifurcates into the LAD and circumflex coronary arteries. The left main coronary artery showed no significant disease or stenosis greater than 30%.  Left Anterior Descending Coronary Artery Distribution: The left anterior descending coronary artery is a normal caliber vessel. The LAD arises normally from the left main coronary artery and gives rise to the 1st diagonal branch. The mid left anterior descending coronary artery showed 100% stenosis. The 1st diagonal branch is a normal caliber vessel. The proximal to mid 1st diagonal branch revealed 95% stenosis.  Circumflex Coronary Artery Distribution: The circumflex coronary artery is a normal caliber vessel. The circumflex arises normally from the left main coronary artery, terminates in the AV groove, giving rise to the first obtuse marginal and second obtuse marginal. LCx occluded after early first OM. OM1 with occluded mid portion with faint left to left collaterals. OM2 ocluded with left to left collaterals.  Right Coronary Artery Distribution: The right coronary artery is a normal caliber vessel. The RCA arises normally from the right sinus of Valsalva, supplies the right posterolateral descending artery and supplies the posterolateral system. The mid right coronary artery showed 95% stenosis.  Valve Findings: No aortic valve stenosis is visualized.  Coronary Lesion Summary: Vessel         Stenosis    Vessel Segment LAD          100% stenosis       mid 1st Diagonal 95% stenosis  proximal to mid RCA          95% stenosis        mid  Hemo Personnel: +----------------+---------+ Name            Duty      +----------------+---------+ Kaleb Lim MD 1 +----------------+---------+  Hemodynamic Pressures:  +----+---------------+----------+-------------+--------------+-------+---------+ Site   Date Time   Phase NameSystolic mmHgDiastolic mmHgED mmHgMean mmHg  +----+---------------+----------+-------------+--------------+-------+---------+   LV     12/11/2023  AIR REST          110             1      8                  12:25:29 PM                                                      +----+---------------+----------+-------------+--------------+-------+---------+   LV     12/11/2023  AIR REST          106             2     11                  12:25:38 PM                                                      +----+---------------+----------+-------------+--------------+-------+---------+  LVp     12/11/2023  AIR REST          105            -2      7                  12:25:44 PM                                                      +----+---------------+----------+-------------+--------------+-------+---------+  AOp     12/11/2023  AIR REST          104             0              85         12:25:51 PM                                                      +----+---------------+----------+-------------+--------------+-------+---------+   AO     12/11/2023  AIR REST          111            62              84         12:26:26 PM                                                      +----+---------------+----------+-------------+--------------+-------+---------+   AO     12/11/2023  AIR REST          119            80              93         12:29:00 PM                                                      +----+---------------+----------+-------------+--------------+-------+---------+  Complications: No in-lab complications observed.  Cardiac Cath Post Procedure Notes: Post Procedure Diagnosis: Triple vessel disease. Blood Loss:               Estimated blood loss during the procedure was 0 mls. Specimens Removed:        Number of specimen(s) removed: none.  Recommendations: Maximize medical therapy. Agressive risk factor modification efforts. Consider referral to cardiac rehabilitation.  Coronary artery bypass graft surgery. ____________________________________________________________________________________ CONCLUSIONS:  1. Severe 3 vessel CAD in this right dominant system.  2. Normal LVEDP 11mmHg with no AS.  3. Consider for multivessel CABG. ICD 10 Codes: Non ST elevation (NSTEMI) myocardial infarction-I21.4  CPT Codes: Left Heart Cath (visualization of coronaries) and LV-[id number]  [id number] Kaleb Lim MD Performing Physician Electronically signed by [id number] Kaleb Lim MD on 12/12/2023 at 1:53:57 PM  ** Final **          Assessment/Plan   Principal Problem:    Coronary artery disease (CAD) excluded  Active Problems:    Type 2 diabetes mellitus, without long-term current use of insulin (CMS/[facility])    HTN (hypertension)    NSTEMI (non-ST elevated myocardial infarction) (CMS/[facility])    Ischemic cardiomyopathy    HLD (hyperlipidemia)    Mr Wen 57 yo M with PMHx HTN,HLD, T2DM, L4-L5 Discitis with epidural abscess  s/p debridement, who was transferred from [facility] to [facility] for CABG evaluation. The patient presented with chest pain radiated to Lt arm that occurred at rest improved with NTG, was found to have NSTEMI at [facility] ED (EKG with inferolateral ischemia), trops 322 --[phone], was started on ASA,Plavix and Heparin gtt for NSTEMI, [facility] with multivessel disease, transferred here for CABG evaluation. Pt currently HDS, pain free, will continue with heparin gtt, ASA, and statin,CT surgery consulted.    Updates 12/14/23   - CT Surgery consulted and case will be discussed with Dr. Vasquez   - Endocrine consulted and recommended 30 units glargine nightly and lispro corrective scale #2  - TSH 5.81 but thyroxin normal ( 1.08)  - CT chest w/ contrast read pending    #ACS -NSTEMI  #Multivessel CAD   #HTN   #HLD  #ICM   :Trops at [facility] 12/10: 322--1626.   :TTE 12/10: EF 40-45%, no valvular disease, global hypokinesis.      :LHC 12/11: 100% stenosis in mid LAD, 95% stenosis proximal to mid first diagonal branch, occluded LCx  after early first OM, OM1 with occluded mid portion, OM2 occluded, and 95% stenosis mid RCA.  :Pt s/p ASA, Plavix and Heparin gtt since 10/12   :Home Lisniopril 20mg, Atorvastatin 80   -Continue with Heparin gtt, ASA  -Continue with Correg 6.25mg BID  -Continue with Atorvastatin 80mg daily.   -Cardiothoracic surgery consulted for CABG evaluation.   -Will repeat Trops, CBC, CMP   -Will hold ACE/ARBs (Lisinopril) and clopidogrel  i/s/o possible CABG.     #Uncontrolled T2DM   :HgA1c 12/10: 11  :Home Metformin 1000mg  -Holding home meds for now.   - Endocrine consulted and recommended 30 units glargine nightly and lispro corrective scale #2  - Endo following  - TSH 5.81 but thyroxin normal ( 1.08)        #Hx of L4-L5 Discitis c/b epidural abscess   :Home Amoxacillin 500mg BID ppx, follow up with ID    - Continue home Amoxicillin 500mg BID         Disposition: Home   Follow-ups: PCP, Cardiology, Endocrine      N: Cradiac  A: PIV  DVT ppx: Heparin gtt  GI ppx: PPI    Code Status: Full code (confirmed on admission)  Surrogate Medical Decision-maker: Babar Chery ([phone])           Lai Calixto MD

## 2023-12-14 NOTE — PROGRESS NOTES
Physical Therapy    Physical Therapy Evaluation    Patient Name: Behzad Diaz  MRN: [id number]  Today's Date: 12/14/2023   Time Calculation  Start Time: 1211  Stop Time: 1231  Time Calculation (min): 20 min    Assessment/Plan   PT Assessment  PT Assessment Results: Decreased strength, Decreased range of motion, Decreased endurance, Impaired balance, Decreased mobility  Rehab Prognosis: Good  Barriers to Discharge: none  Evaluation/Treatment Tolerance: Patient limited by fatigue  Medical Staff Made Aware: Yes  Strengths: Attitude of self, Coping skills  Barriers to Participation:  (none)  End of Session Communication: Bedside nurse  Assessment Comment: The pt demonstrated safe, but slightly unsteady mobility without an assistive device.  End of Session Patient Position: Bed, 2 rail up, Alarm off, not on at start of session  IP OR SWING BED PT PLAN  Inpatient or Swing Bed: Inpatient  PT Plan  Treatment/Interventions: Bed mobility, Transfer training, Gait training, Balance training, Strengthening, Endurance training, Therapeutic exercise, Therapeutic activity, Home exercise program  PT Plan: Skilled PT  PT Frequency: 3 times per week  PT Discharge Recommendations: Low intensity level of continued care (outpatient physical therapy)  Equipment Recommended upon Discharge:  (none)  PT Recommended Transfer Status: Stand by assist  PT - OK to Discharge: Yes      Subjective   General Visit Information:  General  Reason for Referral: NSTEMI s/p left heart catheterization with LV  Past Medical History Relevant to Rehab: none  Prior to Session Communication: Bedside nurse  Patient Position Received: Bed, 2 rail up, Alarm off, not on at start of session  Preferred Learning Style: verbal, visual, written  General Comment: The pt was pleasant, cooperative and willing to participate in therapy.  Home Living:  Home Living  Type of Home: Apartment  Lives With: Alone  Home Adaptive Equipment: Walker rolling or standard, Cane  (rollator)  Home Layout: One level  Home Access: No concerns  Prior Level of Function:  Prior Function Per Pt/Caregiver Report  Level of [place]: Independent with ADLs and functional transfers, Independent with homemaking with ambulation  Receives Help From: Family  ADL Assistance: Independent  Homemaking Assistance: Independent  Ambulatory Assistance: Independent  Prior Function Comments: The pt is independent using a standard cane as needed in the community and no device in the household.  Precautions:  Precautions  Hearing/Visual Limitations: hearing WFL and vision WFL with reading glasses  Medical Precautions: Fall precautions  Precautions Comment: Pt in compliance with precautions throughout PT evaluation.     Objective   Pain:  Pain Assessment  Pain Assessment: 0-10  Pain Score: 0 - No pain  Cognition:  Cognition  Overall Cognitive Status: Within Functional Limits    General Assessments:  General Observation  General Observation: The pt presented with good sitting posture and slightly impaired standing posture without an assistive device.     Activity Tolerance  Endurance: Decreased tolerance for upright activites    Coordination  Movements are Fluid and Coordinated: Yes    Postural Control  Postural Control: Within Functional Limits    Static Sitting Balance  Static Sitting-Balance Support: Bilateral upper extremity supported, Feet supported  Static Sitting-Level of Assistance: Independent  Dynamic Sitting Balance  Dynamic Sitting-Balance Support: Bilateral upper extremity supported, Feet supported  Dynamic Sitting-Comments: independent    Static Standing Balance  Static Standing-Balance Support: No upper extremity supported  Static Standing-Level of Assistance: Close supervision  Static Standing-Comment/Number of Minutes: no device  Dynamic Standing Balance  Dynamic Standing-Balance Support: No upper extremity supported  Dynamic Standing-Comments: SBA no device  Functional Assessments:  Bed  Mobility  Bed Mobility: Yes  Bed Mobility 1  Bed Mobility 1: Supine to sitting  Level of Assistance 1: Distant supervision  Bed Mobility Comments 1: HOB flat    Transfers  Transfer: Yes  Transfer 1  Transfer From 1: Sit to  Transfer to 1: Stand  Transfer Device 1:  (none)  Transfer Level of Assistance 1: Close supervision  Transfers 2  Transfer From 2: Stand to  Transfer to 2: Sit  Transfer Device 2:  (none)  Transfer Level of Assistance 2: Close supervision    Ambulation/Gait Training  Ambulation/Gait Training Performed: Yes  Ambulation/Gait Training 1  Surface 1: Level tile  Device 1: No device  Assistance 1: Close supervision  Quality of Gait 1: Decreased step length (slightly unsteady, decreased madelaine, decreased endurance)  Comments/Distance (ft) 1: 125ft  Extremity/Trunk Assessments:  Cervical Spine   Cervical Spine: Exceptions to Functional Limits  Cervical Spine Comment: decreased strength and ROM  Lumbar Spine   Lumbar Spine : Exceptions to Funtional Limits  Lumbar Spine Comment: decreased strength and ROM    RUE   RUE : Within Functional Limits  LUE   LUE: Within Functional Limits  RLE   RLE : Exceptions to WFL  AROM RLE (degrees)  RLE AROM Comment: WFL  Strength RLE  R Hip Flexion: 4/5  R Knee Flexion: 4/5  R Knee Extension: 4/5  R Ankle Dorsiflexion: 5/5  R Ankle Plantar Flexion: 5/5  LLE   LLE : Exceptions to WFL  AROM LLE (degrees)  LLE AROM Comment: WFL  Strength LLE  L Hip Flexion: 4/5  L Knee Flexion: 4/5  L Knee Extension: 4/5  L Ankle Dorsiflexion: 5/5  L Ankle Plantar Flexion: 5/5  Outcome Measures:  [facility] Basic Mobility  Turning from your back to your side while in a flat bed without using bedrails: A little  Moving from lying on your back to sitting on the side of a flat bed without using bedrails: A little  Moving to and from bed to chair (including a wheelchair): A little  Standing up from a chair using your arms (e.g. wheelchair or bedside chair): A little  To walk in hospital room: A  little  Climbing 3-5 steps with railing: A little  Basic Mobility - Total Score: 18    Encounter Problems       Encounter Problems (Active)       Balance       STG - Maintains independent dynamic standing balance with upper extremity support using a standard cane. (Progressing)       Start:  12/14/23    Expected End:  12/28/23            STG - Maintains independent static standing balance with upper extremity support using a standard cane. (Progressing)       Start:  12/14/23    Expected End:  12/28/23               Mobility       STG - Patient will ambulate 300ft using a standard cane, independently. (Progressing)       Start:  12/14/23    Expected End:  12/28/23               Transfers       STG - Transfer from bed to chair, independently. (Progressing)       Start:  12/14/23    Expected End:  12/28/23            STG - Patient to transfer to and from sit to supine, independently. (Progressing)       Start:  12/14/23    Expected End:  12/28/23            STG - Patient will transfer sit to and from stand, independently. (Progressing)       Start:  12/14/23    Expected End:  12/28/23                   Education Documentation  Body Mechanics, taught by Nimesh Casper PT at 12/14/2023 12:56 PM.  Learner: Patient  Readiness: Acceptance  Method: Explanation, Demonstration, Handout  Response: Verbalizes Understanding, Demonstrated Understanding    Home Exercise Program, taught by Nimesh Casper PT at 12/14/2023 12:56 PM.  Learner: Patient  Readiness: Acceptance  Method: Explanation, Demonstration, Handout  Response: Verbalizes Understanding, Demonstrated Understanding    Mobility Training, taught by Nimesh Casper PT at 12/14/2023 12:56 PM.  Learner: Patient  Readiness: Acceptance  Method: Explanation, Demonstration, Handout  Response: Verbalizes Understanding, Demonstrated Understanding    Education Comments  No comments found.

## 2023-12-14 NOTE — PROGRESS NOTES
"Behzad Diaz is a 58 y.o. male on day 2 of admission presenting with Coronary artery disease (CAD) excluded.  Patient is requesting social work with questions about disability.  Reached out to the CHW for guidance.      Physical Exam    Last Recorded Vitals  Blood pressure 101/64, pulse 72, temperature 35.9 °C (96.6 °F), resp. rate 20, height 1.854 m (6' 0.99\"), weight (!) 157 kg (345 lb 14.4 oz), SpO2 96 %.  Intake/Output last 3 Shifts:  I/O last 3 completed shifts:  In: - (0 mL/kg)   Out: 300 (1.9 mL/kg) [Urine:300 (0.1 mL/kg/hr)]  Weight: 156.9 kg     R  Assessment/Plan   Principal Problem:    Coronary artery disease (CAD) excluded  Active Problems:    Type 2 diabetes mellitus, without long-term current use of insulin (CMS/HCC)    HTN (hypertension)    NSTEMI (non-ST elevated myocardial infarction) (CMS/HCC)    Ischemic cardiomyopathy    HLD (hyperlipidemia)          Carlton Alberto RN      "

## 2023-12-14 NOTE — HOSPITAL COURSE
"Mr. Behzad Diaz is a 57 yo M who presents to the CTICU s/p Off Pump CABG X4 w/ LIMA to LAD, SVG to Diag, and SVG Sequenced to OM and PDA (taken off of other vein proximally) with Dr. Phelps on 12/20/23. His PMH is significant for CAD, HTN, HLD, T2DM, L4-5 discitis with epidural abscess s/p debridement. He initially presented to [facility] ED on 12/10 with left sided chest pain with radiation to L arm as well as other symptoms suspicios for ACS. Workup revealed NSTEMI. TTE 10/12 with EF 40-45% with global hypokinesis of LV. LHC 12/11 revealed 100% stenosis in mid LAD, 95% stenosis proximal to mid first diagonal branch, occluded LCx after early first OM, OM1 with occluded mid portion, OM2 occluded, and 95% stenosis mid RCA. The patient remained HDS and was transferred to [facility] for cardiac surgery workup.     Operation Procedure  12/20/23 Dr Phelps  Off Pump CABG X4 w/ LIMA to LAD, SVG to Diag, and SVG Sequenced to OM and PDA    CTICU uneventful  Transfer to T3 12/21/23  -----------------------  Floor Course:  - Patient was diuresed for fluid volume overload post cardiac surgery; Preop weight: Weight: (!) 157 kg (345 lb 11.2 oz)kg, discharge wt:   Vitals:    12/29/23 0602   Weight: (!) 152 kg (335 lb 4.8 oz)   Kg  /60 (BP Location: Left arm, Patient Position: Lying)   Pulse 77   Temp 36 °C (96.8 °F) (Temporal)   Resp 19   Ht 1.854 m (6' 0.99\")   Wt (!) 152 kg (335 lb 4.8 oz)   SpO2 97%   BMI 44.25 kg/m²     IMPRESSION & PLAN:  POD #9 s/p CABGx4 off pump  - Increase activity/ ambulation; PT/OT  - Encourage IS, C/DB; respiratory therapy; wean O2 as [place]   - Cardiac rehab referral   - Continue cardiac meds: ASA, BB, statin   - continue LIFELONG Plavix per Dr. Phelps  - Pain and anticonstipation meds  - 2v CXR 12/23   - no wires  - Tele until discharge  - Optimize nutrition and electrolytes     Rhythm  - Tele: NSR 60s-80s  - Continue carvedilol 3.125mg PO BID   - Adjust medications as tolerated     Acute Blood Loss Anemia "             Recent Labs     23  0630 23  1259 23  0944 23  0625 23  0558 23  0701 23  1042   HGB 10.8* 11.1* 10.8* 11.1* 10.8* 10.7* 10.7*   HCT 34.1* 33.8* 33.0* 33.7* 32.8* 31.5* 31.2*      - MV, PO Iron x1mo  - Daily labs, transfuse as indicated     Volume/Electrolyte Status: Preop wt Weight: (!) 157 kg (345 lb 11.2 oz)   Vitals       Vitals:     23 0602   Weight: (!) 152 kg (335 lb 4.8 oz)      - Weight: 152, XX, XX, 155, 154kg,155, 157  - held lasix  with addition of farxiga  - Adjust diuresis as needed for postop cardiac surgery hypervolemia  - Replete electrolytes for hypokalemia/hypomagnesemia/hypophosphatemia as needed,  replace Mg & Phos , replaced K, Mg, Phos;  replaced K  - Daily weights and strict I&Os  - Daily RFP while admitted     Hypertension: home meds: lisinopril   Systolic (24hrs), Av , Min:100 , Max:122   - continue holding home lisinopril; resume as BP allows   - additional antihypertensives as needed     Hyperlipidemia: home atorvastatin         Lab Results   Component Value Date     CHOL 145 12/10/2023     HDL 25.2 12/10/2023     VLDL 69 (H) 12/10/2023     TRIG 343 (H) 12/10/2023     NHDL 120 12/10/2023   - continue atorvastatin   - follow up lipid panel with PCP/ cardiologist for ongoing lipid management     Metabolic Syndrome   - Body mass index is 44.25 kg/m².  - referral to [facility] program   - encourage lifestyle modification  - Nutrition consult       DM type II: home meds: Glucophage         Lab Results   Component Value Date     HGBA1C 11.0 (H) 12/10/2023                 Results from last 7 days   Lab Units 23  0858 23  0835 23  0127 23  2112 23  1813 23  1146 23  0817   POCT GLUCOSE mg/dL 109* 74 131* 151* 129* 122* 91      -accuchecks premeal and at bedtime  -diabetic diet  -lispro premeal corrective scale  - Endocrinology following- appreciate recs  - Lantus 25 units at  HS  - Humalog 5units TID with meals  - 12/26 started farxiga 10mg daily  - Consulted diabetic educator 12/25  - Endo Discharge recs   --- pt may be a good candidate for SGLT2i prior to discharge (suggest jardiance 25mg or farxiga 10mg)    ---  [place] Plan Pharmacist   --- email sent for scheduling in  hospital discharge diabetes clinic in [place] per pt request   --- pt would like to sample CGM prior to discharge, has iphone      Discitis Lumbar region with epidural abscess s/p debridement  - continue home pregabalin   - holding home flexeril   - resumed home amoxicillin for chronic discitis; follows with ID and is on lifelong chronic suppression     VTE Prophylaxis: SCDs/TEDs, ambulation, SQ heparin  Code Status: Full Code     Dispo  - PT/OT recs home with home care; patient would like to go to SNF ; [facility]; precert obtained 12/29/23  - Discharge today 12/29/23 1145am   - Will continue to assess discharge needs       On day of discharge, vital signs were stable and no acute distress was noted. All questions were answered. After VS and labs were reviewed it was determined the patient was stable for discharge.   Hospital day of discharge management- spent >30 minutes coordinating the discharge and counseling/educating patient and family regarding discharge instructions.  Past Medical History:  Diagnosis Date  · Discitis    · Discitis    · HLD (hyperlipidemia)    · HTN (hypertension)    · T2DM (type 2 diabetes mellitus) (CMS/[facility])        Past Surgical History:  · BACK SURGERY      · CARDIAC CATHETERIZATION N/A 12/11/2023  · CT GUIDED PERCUTANEOUS BIOPSY BONE DEEP   09/10/2021    CT GUIDED PERCUTANEOUS BIOPSY BONE DEEP 9/10/2021 [facility] CLINICAL LEGACY    Scrotal surgery         Prescriptions Prior to Admission  · amoxicillin (Amoxil) 500 mg capsule Take 1 capsule (500 mg) by mouth twice a day.       · aspirin 81 mg chewable tablet Chew 1 tablet (81 mg) once daily.   · atorvastatin (Lipitor) 80 mg  tablet Take 1 tablet (80 mg) by mouth once daily at bedtime.    · carvedilol (Coreg) 6.25 mg tablet TAKE 1 TABLET BY MOUTH TWICE A DAY WITH MEALS Taking differently: Taking 1 tablet by mouth once a day  · cyclobenzaprine (Flexeril) 10 mg tablet Take 1 tablet (10 mg) by mouth 2 times a day as needed for muscle spasms.       · lisinopril 20 mg tablet Take 1 tablet (20 mg) by mouth once daily. 90 tablet 3   · metFORMIN (Glucophage) 1,000 mg tablet Take 1 tablet (1,000 mg) by mouth once daily with a meal. Patient choice       · pregabalin (Lyrica) 75 mg capsule Take 1 capsule (75 mg) by mouth once daily. Per patient statement            Patient has no known allergies.    Social History     Tobacco Use  · Smoking status: Some Days      Types: Cigars  · Smokeless tobacco: Never  Vaping Use  · Vaping Use: Never used  Substance Use Topics  · Alcohol use: Yes      Comment: occasional  · Drug use: Never     Family History  · Coronary artery disease Mother

## 2023-12-14 NOTE — CONSULTS
Consults  Reason For Consult  Type 2 diabetes    History Of Present Illness  Behzad Diaz is a 58 y.o. male with HTN, HLD, poorly controlled DM2, obesity class III, CAD, and history of discitis and epidural abscess s/p debridement who presents to [facility] for CABG evaluation.    Endocrinology is consulted to assist in diabetes management.    Diabetes duration: 4 to 5 years   Home blood glucose checks: Patient does not check his BG at home  Hypoglycemia (y/n, threshold and symptoms): No hypoglycemia  Home diabetes regimen: Metformin 1000 mg  BID   Home diet (how many meals a day): Patient reports poor diet as he is mostly eating out because of his work  Outpatient physician managing diabetes: PCP  Macrovascular complications: CVD [yes]  CVA [no]     PVD [no]  Microvascular complications: Retinopathy [unknown, patient has not had a retinal exam recently]  neuropathy [endorses neuropathy but attributes this to prior spinal surgery]   nephropathy [no microalbumin to creatinine ratio on chart, creatinine 0.7]  Last A1c: 11%  Inpatient diabetes regimen: Lantus 30, #2 sliding scale  Inpatient diet: Cardiac diet    Patient reports that his last A1c was also elevated at above 10 (10.8 in August 2023) in response to that his primary care tried to put him on Ozempic however cost was inhibitory therefore patient remained on metformin    Results from Most Recent A1C  POC HEMOGLOBIN A1c   Date/Time Value Ref Range Status   08/25/2023 10:11 AM 10.8 (A) 4.2 - 6.5 % Final     Hemoglobin A1C   Date/Time Value Ref Range Status   12/10/2023 04:21 AM 11.0 (H) see below % Final              Past Medical History  He has a past medical history of Discitis, Discitis, HLD (hyperlipidemia), HTN (hypertension), and T2DM (type 2 diabetes mellitus) (CMS/[facility]).    Surgical History  He has a past surgical history that includes Other surgical history (07/30/2020); Other surgical history (07/30/2020); CT guided percutaneous biopsy bone deep  "(09/10/2021); Cardiac catheterization (N/A, 12/11/2023); and Back surgery.     Social History  He reports that he has been smoking cigars. He has quit using smokeless tobacco. He reports current alcohol use. He reports that he does not use drugs.    Family History  Family History   Problem Relation Name Age of Onset    Coronary artery disease Mother          Allergies  Patient has no known allergies.      ROS, PMH, FH/SH, surgical history and allergies have been reviewed.    Constitutional: Well developed, awake/alert/oriented x3, no distress, alert and cooperative, obese  Eyes: EOMI, clear sclera  Head/Neck: Neck supple, no apparent injury  Respiratory/Thorax: No increased work of breathing cardiovascular: Normal heart rate  Gastrointestinal: Obesely distended  Musculoskeletal: ROM intact, normal strength  Extremities: normal extremities, no edema  Neurological: alert and oriented x3, CN's grossly intact, normal strength  Psychological: Appropriate mood and behavior  Skin: Warm and dry, no lesions, no rashes    Last Recorded Vitals  Blood pressure 101/64, pulse 72, temperature 35.9 °C (96.6 °F), resp. rate 20, height 1.854 m (6' 0.99\"), weight (!) 157 kg (345 lb 14.4 oz), SpO2 96 %.      Lab Results   Component Value Date    POCGLU 244 (H) 12/14/2023    POCGLU 216 (H) 12/14/2023    POCGLU 225 (H) 12/14/2023    POCGLU 212 (H) 12/13/2023    POCGLU 239 (H) 12/13/2023    GLUCOSE 215 (H) 12/14/2023    GLUCOSE 271 (H) 12/13/2023    GLUCOSE 282 (H) 12/11/2023    GLUCOSE 353 (H) 12/10/2023    GLUCOSE 167 (H) 05/06/2022       Results from last 7 days   Lab Units 12/14/23  0335 12/13/23  0708   SODIUM mmol/L 134* 134*   POTASSIUM mmol/L 3.8 3.9   CHLORIDE mmol/L 100 101   CO2 mmol/L 25 24   BUN mg/dL 14 16   CREATININE mg/dL 0.78 0.93   CALCIUM mg/dL 8.8 8.8   PROTEIN TOTAL g/dL  --  6.5   BILIRUBIN TOTAL mg/dL  --  1.3*   ALK PHOS U/L  --  58   ALT U/L  --  26   AST U/L  --  26   GLUCOSE mg/dL 215* 271*     Assessment/Plan "   Principal Problem:    Coronary artery disease (CAD) excluded  Active Problems:    Type 2 diabetes mellitus, without long-term current use of insulin (CMS/[facility])    HTN (hypertension)    NSTEMI (non-ST elevated myocardial infarction) (CMS/[facility])    Ischemic cardiomyopathy    HLD (hyperlipidemia)  Behzad Diaz is a 58 y.o. male with HTN, HLD, poorly controlled DM2, obesity class III, CAD, and history of discitis and epidural abscess s/p debridement who presents to [facility] for CABG evaluation.    Endocrinology is consulted to assist in diabetes management.  Diabetes history:  Diabetes duration: 4 to 5 years   Home blood glucose checks: Patient does not check his BG at home  Hypoglycemia (y/n, threshold and symptoms): No hypoglycemia  Home diabetes regimen: Metformin 1000 mg  BID   Home diet (how many meals a day): Patient reports poor diet as he is mostly eating out because of his work  Outpatient physician managing diabetes: PCP  Macrovascular complications: CVD [yes]  CVA [no]     PVD [no]  Microvascular complications: Retinopathy [unknown, patient has not had a retinal exam recently]  neuropathy [endorses neuropathy but attributes this to prior spinal surgery]   nephropathy [no microalbumin to creatinine ratio on chart, creatinine 0.7]  Last A1c: 11%  Inpatient diabetes regimen: Lantus 30, #2 sliding scale  Inpatient diet: Cardiac diet      Recommendations:  - Increase glargine to 40 units at bedtime  - Start mealtime insulin 6 units of lispro 3 times daily AC  - Continue #2 sliding scale 3 times daily AC  - Accu-Cheks 3 times daily before meals and at bedtime  - Change diet to carb consistent diet 60  - Hypoglycemia protocol  - Please consult diabetic education  -We will follow and adjust regimen accordingly    Plan was communicated to the primary team    Rosalba King MD  Endocrinology, PGY 4  Pager [phone] or secure chat     Case discussed with Dr. Liz

## 2023-12-15 ENCOUNTER — APPOINTMENT (OUTPATIENT)
Dept: PRIMARY CARE | Facility: CLINIC | Age: 58
End: 2023-12-15
Payer: COMMERCIAL

## 2023-12-15 LAB
ANION GAP SERPL CALC-SCNC: 13 MMOL/L (ref 10–20)
BASOPHILS # BLD AUTO: 0.04 X10*3/UL (ref 0–0.1)
BASOPHILS NFR BLD AUTO: 0.6 %
BUN SERPL-MCNC: 14 MG/DL (ref 6–23)
CALCIUM SERPL-MCNC: 8.9 MG/DL (ref 8.6–10.6)
CARDIAC TROPONIN I PNL SERPL HS: 3168 NG/L (ref 0–53)
CHLORIDE SERPL-SCNC: 103 MMOL/L (ref 98–107)
CO2 SERPL-SCNC: 25 MMOL/L (ref 21–32)
CREAT SERPL-MCNC: 0.82 MG/DL (ref 0.5–1.3)
EOSINOPHIL # BLD AUTO: 0.19 X10*3/UL (ref 0–0.7)
EOSINOPHIL NFR BLD AUTO: 2.8 %
ERYTHROCYTE [DISTWIDTH] IN BLOOD BY AUTOMATED COUNT: 12.3 % (ref 11.5–14.5)
GFR SERPL CREATININE-BSD FRML MDRD: >90 ML/MIN/1.73M*2
GLUCOSE BLD MANUAL STRIP-MCNC: 199 MG/DL (ref 74–99)
GLUCOSE BLD MANUAL STRIP-MCNC: 201 MG/DL (ref 74–99)
GLUCOSE BLD MANUAL STRIP-MCNC: 229 MG/DL (ref 74–99)
GLUCOSE BLD MANUAL STRIP-MCNC: 262 MG/DL (ref 74–99)
GLUCOSE SERPL-MCNC: 200 MG/DL (ref 74–99)
HCT VFR BLD AUTO: 39.1 % (ref 41–52)
HGB BLD-MCNC: 13.6 G/DL (ref 13.5–17.5)
IMM GRANULOCYTES # BLD AUTO: 0.02 X10*3/UL (ref 0–0.7)
IMM GRANULOCYTES NFR BLD AUTO: 0.3 % (ref 0–0.9)
LYMPHOCYTES # BLD AUTO: 3.17 X10*3/UL (ref 1.2–4.8)
LYMPHOCYTES NFR BLD AUTO: 46.9 %
MAGNESIUM SERPL-MCNC: 1.87 MG/DL (ref 1.6–2.4)
MCH RBC QN AUTO: 31.1 PG (ref 26–34)
MCHC RBC AUTO-ENTMCNC: 34.8 G/DL (ref 32–36)
MCV RBC AUTO: 89 FL (ref 80–100)
MONOCYTES # BLD AUTO: 0.42 X10*3/UL (ref 0.1–1)
MONOCYTES NFR BLD AUTO: 6.2 %
NEUTROPHILS # BLD AUTO: 2.92 X10*3/UL (ref 1.2–7.7)
NEUTROPHILS NFR BLD AUTO: 43.2 %
NRBC BLD-RTO: 0 /100 WBCS (ref 0–0)
PHOSPHATE SERPL-MCNC: 3.5 MG/DL (ref 2.5–4.9)
PLATELET # BLD AUTO: 193 X10*3/UL (ref 150–450)
POTASSIUM SERPL-SCNC: 4 MMOL/L (ref 3.5–5.3)
RBC # BLD AUTO: 4.38 X10*6/UL (ref 4.5–5.9)
SODIUM SERPL-SCNC: 137 MMOL/L (ref 136–145)
STAPHYLOCOCCUS SPEC CULT: ABNORMAL
UFH PPP CHRO-ACNC: 0.4 IU/ML
WBC # BLD AUTO: 6.8 X10*3/UL (ref 4.4–11.3)

## 2023-12-15 PROCEDURE — 99232 SBSQ HOSP IP/OBS MODERATE 35: CPT | Performed by: STUDENT IN AN ORGANIZED HEALTH CARE EDUCATION/TRAINING PROGRAM

## 2023-12-15 PROCEDURE — 80048 BASIC METABOLIC PNL TOTAL CA: CPT

## 2023-12-15 PROCEDURE — 1200000002 HC GENERAL ROOM WITH TELEMETRY DAILY

## 2023-12-15 PROCEDURE — 83735 ASSAY OF MAGNESIUM: CPT

## 2023-12-15 PROCEDURE — 84484 ASSAY OF TROPONIN QUANT: CPT

## 2023-12-15 PROCEDURE — 2500000004 HC RX 250 GENERAL PHARMACY W/ HCPCS (ALT 636 FOR OP/ED)

## 2023-12-15 PROCEDURE — 85025 COMPLETE CBC W/AUTO DIFF WBC: CPT

## 2023-12-15 PROCEDURE — 99232 SBSQ HOSP IP/OBS MODERATE 35: CPT | Performed by: PHYSICIAN ASSISTANT

## 2023-12-15 PROCEDURE — 97165 OT EVAL LOW COMPLEX 30 MIN: CPT | Mod: GO

## 2023-12-15 PROCEDURE — 84100 ASSAY OF PHOSPHORUS: CPT

## 2023-12-15 PROCEDURE — 36415 COLL VENOUS BLD VENIPUNCTURE: CPT

## 2023-12-15 PROCEDURE — 2500000001 HC RX 250 WO HCPCS SELF ADMINISTERED DRUGS (ALT 637 FOR MEDICARE OP)

## 2023-12-15 PROCEDURE — 85520 HEPARIN ASSAY: CPT

## 2023-12-15 PROCEDURE — 99232 SBSQ HOSP IP/OBS MODERATE 35: CPT | Performed by: INTERNAL MEDICINE

## 2023-12-15 PROCEDURE — 82947 ASSAY GLUCOSE BLOOD QUANT: CPT

## 2023-12-15 RX ORDER — DEXTROSE 50 % IN WATER (D50W) INTRAVENOUS SYRINGE
25
Status: DISCONTINUED | OUTPATIENT
Start: 2023-12-15 | End: 2023-12-15

## 2023-12-15 RX ORDER — DEXTROSE MONOHYDRATE 100 MG/ML
0.3 INJECTION, SOLUTION INTRAVENOUS ONCE AS NEEDED
Status: DISCONTINUED | OUTPATIENT
Start: 2023-12-15 | End: 2023-12-15

## 2023-12-15 RX ORDER — INSULIN LISPRO 100 [IU]/ML
10 INJECTION, SOLUTION INTRAVENOUS; SUBCUTANEOUS
Status: DISCONTINUED | OUTPATIENT
Start: 2023-12-16 | End: 2023-12-16

## 2023-12-15 RX ORDER — INSULIN LISPRO 100 [IU]/ML
6 INJECTION, SOLUTION INTRAVENOUS; SUBCUTANEOUS
Status: DISCONTINUED | OUTPATIENT
Start: 2023-12-15 | End: 2023-12-16

## 2023-12-15 RX ORDER — CHLORHEXIDINE GLUCONATE ORAL RINSE 1.2 MG/ML
15 SOLUTION DENTAL 2 TIMES DAILY
Status: COMPLETED | OUTPATIENT
Start: 2023-12-19 | End: 2023-12-19

## 2023-12-15 RX ORDER — INSULIN GLARGINE 100 [IU]/ML
45 INJECTION, SOLUTION SUBCUTANEOUS NIGHTLY
Status: DISCONTINUED | OUTPATIENT
Start: 2023-12-15 | End: 2023-12-16

## 2023-12-15 RX ADMIN — HEPARIN SODIUM 2300 UNITS/HR: 10000 INJECTION, SOLUTION INTRAVENOUS at 10:36

## 2023-12-15 RX ADMIN — INSULIN LISPRO 6 UNITS: 100 INJECTION, SOLUTION INTRAVENOUS; SUBCUTANEOUS at 12:42

## 2023-12-15 RX ADMIN — INSULIN LISPRO 4 UNITS: 100 INJECTION, SOLUTION INTRAVENOUS; SUBCUTANEOUS at 12:42

## 2023-12-15 RX ADMIN — ASPIRIN 81 MG CHEWABLE TABLET 81 MG: 81 TABLET CHEWABLE at 08:22

## 2023-12-15 RX ADMIN — MUPIROCIN 0.5 APPLICATION: 20 OINTMENT TOPICAL at 08:22

## 2023-12-15 RX ADMIN — HEPARIN SODIUM 2300 UNITS/HR: 10000 INJECTION, SOLUTION INTRAVENOUS at 22:29

## 2023-12-15 RX ADMIN — CARVEDILOL 6.25 MG: 6.25 TABLET, FILM COATED ORAL at 08:22

## 2023-12-15 RX ADMIN — INSULIN LISPRO 2 UNITS: 100 INJECTION, SOLUTION INTRAVENOUS; SUBCUTANEOUS at 17:18

## 2023-12-15 RX ADMIN — PANTOPRAZOLE SODIUM 40 MG: 40 TABLET, DELAYED RELEASE ORAL at 06:10

## 2023-12-15 RX ADMIN — INSULIN GLARGINE 45 UNITS: 100 INJECTION, SOLUTION SUBCUTANEOUS at 20:09

## 2023-12-15 RX ADMIN — INSULIN LISPRO 6 UNITS: 100 INJECTION, SOLUTION INTRAVENOUS; SUBCUTANEOUS at 17:19

## 2023-12-15 RX ADMIN — PREGABALIN 75 MG: 75 CAPSULE ORAL at 08:22

## 2023-12-15 RX ADMIN — INSULIN LISPRO 6 UNITS: 100 INJECTION, SOLUTION INTRAVENOUS; SUBCUTANEOUS at 10:02

## 2023-12-15 RX ADMIN — AMOXICILLIN 500 MG: 500 CAPSULE ORAL at 20:10

## 2023-12-15 RX ADMIN — INSULIN LISPRO 6 UNITS: 100 INJECTION, SOLUTION INTRAVENOUS; SUBCUTANEOUS at 22:30

## 2023-12-15 RX ADMIN — MUPIROCIN 0.5 APPLICATION: 20 OINTMENT TOPICAL at 20:10

## 2023-12-15 RX ADMIN — CARVEDILOL 6.25 MG: 6.25 TABLET, FILM COATED ORAL at 17:17

## 2023-12-15 RX ADMIN — AMOXICILLIN 500 MG: 500 CAPSULE ORAL at 08:22

## 2023-12-15 RX ADMIN — ATORVASTATIN CALCIUM 80 MG: 80 TABLET, FILM COATED ORAL at 20:10

## 2023-12-15 RX ADMIN — INSULIN LISPRO 4 UNITS: 100 INJECTION, SOLUTION INTRAVENOUS; SUBCUTANEOUS at 10:03

## 2023-12-15 ASSESSMENT — COGNITIVE AND FUNCTIONAL STATUS - GENERAL: DAILY ACTIVITIY SCORE: 24

## 2023-12-15 ASSESSMENT — PAIN - FUNCTIONAL ASSESSMENT
PAIN_FUNCTIONAL_ASSESSMENT: 0-10

## 2023-12-15 ASSESSMENT — PAIN SCALES - GENERAL
PAINLEVEL_OUTOF10: 0 - NO PAIN

## 2023-12-15 ASSESSMENT — ACTIVITIES OF DAILY LIVING (ADL)
BATHING_ASSISTANCE: MODIFIED INDEPENDENT (DEVICE)
ADL_ASSISTANCE: INDEPENDENT

## 2023-12-15 NOTE — PROGRESS NOTES
Occupational Therapy    Evaluation    Patient Name: Behzad Diaz  MRN: [id number]  Today's Date: 12/15/2023  Time Calculation  Start Time: 1219  Stop Time: 1234  Time Calculation (min): 15 min    Assessment  IP OT Assessment  OT Assessment: No further skilled OT needs at this time. Pt will likely require re-eval following CABG surgery scheduled for 12/20.  Barriers to Discharge: Decreased caregiver support  Evaluation/Treatment Tolerance: Patient tolerated treatment well  Medical Staff Made Aware: Yes  End of Session Communication: Bedside nurse  End of Session Patient Position: Bed, 3 rail up, Alarm off, not on at start of session  Plan:  No Skilled OT: At baseline function  OT Frequency: OT eval only  OT Discharge Recommendations: No further acute OT, No OT needed after discharge  OT Recommended Transfer Status: Stand by assist, Assist of 1  OT - OK to Discharge: Yes    Subjective   Current Problem:  1. Coronary artery disease (CAD) excluded  Case Request Operating Room: Creation Bypass Graft Coronary Artery    Case Request Operating Room: Creation Bypass Graft Coronary Artery      2. NSTEMI (non-ST elevated myocardial infarction) (CMS/HCC)  Vascular US carotid artery duplex bilateral    Vascular US lower extremity vein mapping bilateral    Vascular US ankle brachial index (KHADIJAH) without exercise    Vascular US carotid artery duplex bilateral    Vascular US lower extremity vein mapping bilateral    Vascular US ankle brachial index (KHADIJAH) without exercise      3. Encounter for other preprocedural examination  Vascular US carotid artery duplex bilateral    Vascular US lower extremity vein mapping bilateral    Vascular US ankle brachial index (KHADIJAH) without exercise      4. Other specified symptoms and signs involving the circulatory and respiratory systems  Vascular US carotid artery duplex bilateral        General:  General  Reason for Referral: NSTEMI s/p left heart catheterization with LV  Past Medical History Relevant  to Rehab: Obesity, low back pain  Family/Caregiver Present: No  Prior to Session Communication: Bedside nurse  Patient Position Received: Bed, 3 rail up, Alarm off, not on at start of session  Preferred Learning Style: verbal, visual  General Comment: Pt pleasant and agreeable to therapy session - cleared for OT by RN  Precautions:  Medical Precautions: Fall precautions  Vital Signs:  Heart Rate: 68  Heart Rate Source: Monitor  Patient Position: Sitting  Pain:  Pain Assessment  Pain Assessment: 0-10  Pain Score: 0 - No pain    Objective   Cognition:  Overall Cognitive Status: Within Functional Limits  Orientation Level: Oriented X4    Home Living:  Type of Home: Apartment  Lives With: Alone  Home Adaptive Equipment: Walker rolling or standard, Cane (Rollator)  Home Layout: One level  Home Access: Level entry (Level entry or 1 DALTON)  Bathroom Shower/Tub: Tub/shower unit  Bathroom Toilet: Standard  Bathroom Equipment: Shower chair with back  Home Living Comments: Brother and sister in law local - limited availability to provide support   Prior Function:  Level of [place]: Independent with homemaking with ambulation, Independent with ADLs and functional transfers  ADL Assistance: Independent  Homemaking Assistance: Independent  Ambulatory Assistance: Independent  Vocational: Full time employment  Leisure: Gambling  Hand Dominance: Right  Prior Function Comments: +drives, uses cane in the community  IADL History:  Homemaking Responsibilities: Yes  Meal Prep Responsibility: Primary  Laundry Responsibility: Primary  Cleaning Responsibility: Primary  Bill Paying/Finance Responsibility: Primary  Shopping Responsibility: Primary  Current License: Yes  Mode of Transportation: Car  Occupation: Full time employment  Type of Occupation:   Leisure and Hobbies: Gambling, going to the casino, time with friends  ADL:  Eating Assistance: Independent  Eating Deficit:  (Sitting EOB to consume breakfast)  Grooming  Assistance: Independent (Anticipated)  Bathing Assistance: Modified independent (Device) (Anticipated)  UE Dressing Assistance: Minimal  UE Dressing Deficit: Pull around back, Pull down in back  LE Dressing Assistance: Stand by  LE Dressing Deficit: Supervision/safety (Standing to don B shoes, pt denies needing to sit to don shoes despite education on increasing safety)  Toileting Assistance with Device: Independent (Simulated)  Activity Tolerance:  Endurance: Endurance does not limit participation in activity  Bed Mobility/Transfers: Bed Mobility  Bed Mobility: Yes  Bed Mobility 1  Bed Mobility 1: Supine to sitting  Level of Assistance 1: Distant supervision  Bed Mobility Comments 1: SUP for safety    Transfers  Transfer: Yes  Transfer 1  Transfer From 1: Bed to  Transfer to 1: Toilet  Technique 1:  (Ambulating)  Transfer Level of Assistance 1: Close supervision  Trials/Comments 1: SBA for safety    Ambulation/Gait Training:  Ambulation/Gait Training  Ambulation/Gait Training Performed: Yes  Ambulation/Gait Training 1  Surface 1: Level tile  Device 1: IV Pole  Assistance 1: Close supervision  Comments/Distance (ft) 1: SBA for safety, >150 feet without instability or reported fatigue  Sitting Balance:  Static Sitting Balance  Static Sitting-Balance Support: No upper extremity supported  Static Sitting-Level of Assistance: Independent  Static Sitting-Comment/Number of Minutes: EOB  Standing Balance:  Static Standing Balance  Static Standing-Balance Support: No upper extremity supported  Static Standing-Level of Assistance: Distant supervision  Static Standing-Comment/Number of Minutes: SUP at EOB  Dynamic Standing Balance  Dynamic Standing-Balance Support: Left upper extremity supported  Dynamic Standing-Comments: Holding IV pole, SBA  IADL's:   Homemaking Responsibilities: Yes  Meal Prep Responsibility: Primary  Laundry Responsibility: Primary  Cleaning Responsibility: Primary  Bill Paying/Finance Responsibility:  Primary  Shopping Responsibility: Primary  Current License: Yes  Mode of Transportation: Car  Occupation: Full time employment  Type of Occupation:   Leisure and Hobbies: Gambling, going to the casino, time with friends  Vision: Vision - Basic Assessment  Current Vision: Wears glasses only for reading  Sensation:  Light Touch: No apparent deficits  Strength:  Strength Comments: WFL tyrese UEs  Perception:  Inattention/Neglect: Appears intact  Coordination:  Movements are Fluid and Coordinated: Yes   Hand Function:  Hand Function  Gross Grasp: Functional  Coordination: Functional  Extremities: RUE   RUE : Within Functional Limits and LUE   LUE: Within Functional Limits    Outcome Measures: [facility] Daily Activity  Putting on and taking off regular lower body clothing: None  Bathing (including washing, rinsing, drying): None  Putting on and taking off regular upper body clothing: None  Toileting, which includes using toilet, bedpan or urinal: None  Taking care of personal grooming such as brushing teeth: None  Eating Meals: None  Daily Activity - Total Score: 24     and OT Adult Other Outcome Measures  4AT: 4 AT -  Education Documentation  Body Mechanics, taught by Nic Dick OT at 12/15/2023 12:52 PM.  Learner: Patient  Readiness: Acceptance  Method: Explanation  Response: Verbalizes Understanding, Demonstrated Understanding    Precautions, taught by Nic Dick OT at 12/15/2023 12:52 PM.  Learner: Patient  Readiness: Acceptance  Method: Explanation  Response: Verbalizes Understanding, Demonstrated Understanding    ADL Training, taught by Nic Dick OT at 12/15/2023 12:52 PM.  Learner: Patient  Readiness: Acceptance  Method: Explanation  Response: Verbalizes Understanding, Demonstrated Understanding    Education Comments  No comments found.      Nic Dick OTCHARO/BROOKS

## 2023-12-15 NOTE — CARE PLAN
Patient would like the name of his doctor, the fax number phone number and address of hospital for claim. I was remote and relayed this information request back to SAMARIA.

## 2023-12-15 NOTE — PROGRESS NOTES
"Behzad Diaz is a 58 y.o. male on day 3 of admission presenting with Coronary artery disease (CAD) excluded.    Subjective   No acute events overnight. Patient denied, nausea, vomiting, fever, and cough. Patient denies chest pain, dyspnea, and palpitations.         Objective     Physical Exam  Constitutional: Well-developed male in no acute distress.  HEENT: Normocephalic, atraumatic. PERRL. EOMI. No cervical lymphadenopathy.  Respiratory: CTA bilaterally. No wheezes, rales, or rhonchi. Normal respiratory effort.  Cardiovascular: RRR. No murmurs, gallops, or rubs. No JVD. Radial pulses 2+.  Abdominal: Soft, nondistended, nontender to palpation. Bowel sounds present. No hepatosplenomegaly or masses. No CVA tenderness. Significantly Obese patient.  Neuro: CN II-XII intact. UE and LE strength 5/5 bilaterally and sensation intact. Normal FTN testing.  MSK: No LE edema bilaterally.  Skin: Warm, dry. No rashes or wounds.  Psych: Appropriate mood and affect.    Last Recorded Vitals  Blood pressure 98/65, pulse 63, temperature 36.2 °C (97.2 °F), temperature source Temporal, resp. rate 16, height 1.854 m (6' 0.99\"), weight (!) 156 kg (343 lb 14.7 oz), SpO2 97 %.  Intake/Output last 3 Shifts:  I/O last 3 completed shifts:  In: 1395.4 (8.9 mL/kg) [P.O.:480; I.V.:915.4 (5.8 mL/kg)]  Out: 0 (0 mL/kg)   Weight: 156.9 kg     Relevant Results              Active Medications  Scheduled medications  amoxicillin, 500 mg, oral, q12h ISAIAH  aspirin, 81 mg, oral, Daily  atorvastatin, 80 mg, oral, Nightly  carvedilol, 6.25 mg, oral, BID with meals  insulin glargine, 40 Units, subcutaneous, Nightly  insulin lispro, 0-10 Units, subcutaneous, Before meals & nightly  insulin lispro, 6 Units, subcutaneous, TID with meals  mupirocin, 0.5 Application, Topical, BID  pantoprazole, 40 mg, oral, Daily before breakfast   Or  pantoprazole, 40 mg, intravenous, Daily before breakfast  polyethylene glycol, 17 g, oral, Daily  pregabalin, 75 mg, oral, " Daily      Continuous medications  heparin, 0-4,000 Units/hr, Last Rate: 2,300 Units/hr (12/14/23 2315)      PRN medications  PRN medications: acetaminophen **OR** acetaminophen **OR** acetaminophen, dextrose 10 % in water (D10W), dextrose, glucagon     Recent Labs  Results for orders placed or performed during the hospital encounter of 12/12/23 (from the past 24 hour(s))   POCT GLUCOSE   Result Value Ref Range    POCT Glucose 244 (H) 74 - 99 mg/dL   Blood Gas Arterial Full Panel Unsolicited   Result Value Ref Range    POCT pH, Arterial 7.40 7.38 - 7.42 pH    POCT pCO2, Arterial 37 (L) 38 - 42 mm Hg    POCT pO2, Arterial 94 85 - 95 mm Hg    POCT SO2, Arterial 99 94 - 100 %    POCT Oxy Hemoglobin, Arterial 96.5 94.0 - 98.0 %    POCT Hematocrit Calculated, Arterial 44.0 41.0 - 52.0 %    POCT Sodium, Arterial 131 (L) 136 - 145 mmol/L    POCT Potassium, Arterial 4.7 3.5 - 5.3 mmol/L    POCT Chloride, Arterial 101 98 - 107 mmol/L    POCT Ionized Calcium, Arterial 1.22 1.10 - 1.33 mmol/L    POCT Glucose, Arterial 251 (H) 74 - 99 mg/dL    POCT Lactate, Arterial 1.3 0.4 - 2.0 mmol/L    POCT Base Excess, Arterial -1.5 -2.0 - 3.0 mmol/L    POCT HCO3 Calculated, Arterial 22.9 22.0 - 26.0 mmol/L    POCT Hemoglobin, Arterial 14.6 13.5 - 17.5 g/dL    POCT Anion Gap, Arterial 12 10 - 25 mmo/L    Patient Temperature 37.0 degrees Celsius   Coox Panel, Arterial Unsolicited   Result Value Ref Range    POCT Hemoglobin, Arterial 14.6 13.5 - 17.5 g/dL    POCT Oxy Hemoglobin, Arterial 96.5 94.0 - 98.0 %    POCT Carboxyhemoglobin, Arterial 1.5 %    POCT Methemoglobin, Arterial 0.5 0.0 - 1.5 %    POCT Deoxy Hemoglobin, Arterial 1.5 0.0 - 5.0 %   Pulmonary function testing   Result Value Ref Range    FVC - Predicted 5.00     FEV1 - Predicted 3.89     FVC - PRE 3.55     FEV1 - Pre 2.72    POCT GLUCOSE   Result Value Ref Range    POCT Glucose 239 (H) 74 - 99 mg/dL   POCT GLUCOSE   Result Value Ref Range    POCT Glucose 219 (H) 74 - 99 mg/dL    CBC and Auto Differential   Result Value Ref Range    WBC 6.8 4.4 - 11.3 x10*3/uL    nRBC 0.0 0.0 - 0.0 /100 WBCs    RBC 4.38 (L) 4.50 - 5.90 x10*6/uL    Hemoglobin 13.6 13.5 - 17.5 g/dL    Hematocrit 39.1 (L) 41.0 - 52.0 %    MCV 89 80 - 100 fL    MCH 31.1 26.0 - 34.0 pg    MCHC 34.8 32.0 - 36.0 g/dL    RDW 12.3 11.5 - 14.5 %    Platelets 193 150 - 450 x10*3/uL    Neutrophils % 43.2 40.0 - 80.0 %    Immature Granulocytes %, Automated 0.3 0.0 - 0.9 %    Lymphocytes % 46.9 13.0 - 44.0 %    Monocytes % 6.2 2.0 - 10.0 %    Eosinophils % 2.8 0.0 - 6.0 %    Basophils % 0.6 0.0 - 2.0 %    Neutrophils Absolute 2.92 1.20 - 7.70 x10*3/uL    Immature Granulocytes Absolute, Automated 0.02 0.00 - 0.70 x10*3/uL    Lymphocytes Absolute 3.17 1.20 - 4.80 x10*3/uL    Monocytes Absolute 0.42 0.10 - 1.00 x10*3/uL    Eosinophils Absolute 0.19 0.00 - 0.70 x10*3/uL    Basophils Absolute 0.04 0.00 - 0.10 x10*3/uL   Basic Metabolic Panel   Result Value Ref Range    Glucose 200 (H) 74 - 99 mg/dL    Sodium 137 136 - 145 mmol/L    Potassium 4.0 3.5 - 5.3 mmol/L    Chloride 103 98 - 107 mmol/L    Bicarbonate 25 21 - 32 mmol/L    Anion Gap 13 10 - 20 mmol/L    Urea Nitrogen 14 6 - 23 mg/dL    Creatinine 0.82 0.50 - 1.30 mg/dL    eGFR >90 >60 mL/min/1.73m*2    Calcium 8.9 8.6 - 10.6 mg/dL   Magnesium   Result Value Ref Range    Magnesium 1.87 1.60 - 2.40 mg/dL   Phosphorus   Result Value Ref Range    Phosphorus 3.5 2.5 - 4.9 mg/dL       Imaging  Transthoracic Echo (TTE) Complete    Result Date: 12/12/2023            [facility] [address], [facility] [address]    Tel [phone] Fax [phone] TRANSTHORACIC ECHOCARDIOGRAM REPORT  Patient Name:     DEANNE Oropeza Physician:  [id number] Gomez MD Study Date:       12/11/2023          Ordering Provider:  [id number] GOMEZ MRN/PID:           [id number]            Fellow: Accession#:       [id number]        Nurse: Date of           1965 / 58      Sonographer:        Erika Maldonado RDCS Birth/Age:        years Gender:           M                   Additional Staff: Height:           182.88 cm           Admit Date:         12/9/2023 Weight:           158.76 kg           Admission Status:   Inpatient - Routine BSA:              2.70 m2             Department          [facility] CCU                                       Location: Blood Pressure: 138 /83 mmHg Study Type:    TRANSTHORACIC ECHO (TTE) COMPLETE Diagnosis/ICD: Non ST elevation (NSTEMI) myocardial infarction-I21.4 Indication:    NSTEMI CPT Codes:     Echo Complete w Full Doppler-60500  Study Detail: The following Echo studies were performed: 2D, M-Mode, Doppler and               color flow. Technically challenging study due to poor acoustic               windows and body habitus. Definity used as a contrast agent for               endocardial border definition. Total contrast used for this               procedure was 2 mL via IV push.  PHYSICIAN INTERPRETATION: Left Ventricle: Left ventricular systolic function is mildly decreased, with an estimated ejection fraction of 40-45%. Estimated left ventricular mass is increased. There is global hypokinesis of the left ventricle with minor regional variations. The left ventricular cavity size is upper limits of normal. The left ventricular septal wall thickness is mildly increased. There is mildly increased left ventricular posterior wall thickness. Left ventricular diastolic filling was indeterminate. There are normal left ventricular filling pressures. LV Wall Scoring: There is diffuse hypokinesis. Left Atrium: The left atrium is upper limits of normal in size. Right Ventricle: The right ventricle is normal in size. There is normal right ventricular global systolic function. Right Atrium: The right atrium is normal in size. Aortic Valve: The aortic valve  is trileaflet. There is no evidence of aortic valve stenosis. There is no evidence of aortic valve regurgitation. The peak instantaneous gradient of the aortic valve is 2.7 mmHg. The mean gradient of the aortic valve is 2.0 mmHg. Mitral Valve: The mitral valve is normal in structure. There is trace mitral valve regurgitation. Tricuspid Valve: The tricuspid valve is structurally normal. There is trace tricuspid regurgitation. The right ventricular systolic pressure is unable to be estimated. Pulmonic Valve: The pulmonic valve was not assessed. The pulmonic valve regurgitation was not assessed. Pericardium: There is no pericardial effusion noted. Aorta: The aortic root is normal. Systemic Veins: The inferior vena cava appears to be of normal size. The hepatic vein appears to be of normal size. There is IVC inspiratory collapse greater than 50%.  CONCLUSIONS:  1. Left ventricular systolic function is mildly decreased with a 40-45% estimated ejection fraction.  2. Multiple segmental abnormalities exist. See findings.  3. Increased LV mass.  4. Aortic valve stenosis is not present.  5. There is global hypokinesis of the left ventricle with minor regional variations. QUANTITATIVE DATA SUMMARY: 2D MEASUREMENTS:                          Normal Ranges: IVSd:          1.42 cm   (0.6-1.1cm) LVPWd:         1.39 cm   (0.6-1.1cm) LVIDd:         4.63 cm   (3.9-5.9cm) LVIDs:         3.63 cm LV Mass Index: 96.5 g/m2 LV % FS        21.6 % LA VOLUME:                               Normal Ranges: LA Vol A4C:        40.0 ml    (22+/-6mL/m2) LA Vol A2C:        35.5 ml LA Vol BP:         37.8 ml LA Vol Index A4C:  14.8ml/m2 LA Vol Index A2C:  13.1 ml/m2 LA Vol Index BP:   14.0 ml/m2 LA Area A4C:       16.7 cm2 LA Area A2C:       15.7 cm2 LA Major Axis A4C: 5.9 cm LA Major Axis A2C: 5.9 cm LA Volume Index:   13.0 ml/m2 LA Vol A4C:        37.0 ml LA Vol A2C:        34.0 ml LV SYSTOLIC FUNCTION BY 2D PLANIMETRY (MOD):                      Normal Ranges: EF-A4C View: 33.9 % (>=55%) EF-A2C View: 47.8 % EF-Biplane:  42.0 % LV DIASTOLIC FUNCTION:                        Normal Ranges: MV Peak E:    0.44 m/s (0.7-1.2 m/s) MV Peak A:    0.51 m/s (0.42-0.7 m/s) E/A Ratio:    0.85     (1.0-2.2) MV e'         0.04 m/s (>8.0) MV lateral e' 0.06 m/s MV medial e'  0.03 m/s E/e' Ratio:   9.92     (<8.0) MITRAL VALVE:                 Normal Ranges: MV DT: 211 msec (150-240msec) AORTIC VALVE:                                   Normal Ranges: AoV Vmax:                0.83 m/s (<=1.7m/s) AoV Peak P.7 mmHg (<20mmHg) AoV Mean P.0 mmHg (1.7-11.5mmHg) LVOT Max Sergio:            0.59 m/s (<=1.1m/s) AoV VTI:                 16.50 cm (18-25cm) LVOT VTI:                12.40 cm LVOT Diameter:           2.50 cm  (1.8-2.4cm) AoV Area, VTI:           3.69 cm2 (2.5-5.5cm2) AoV Area,Vmax:           3.48 cm2 (2.5-4.5cm2) AoV Dimensionless Index: 0.75  RIGHT VENTRICLE: RV Basal 2.97 cm RV Mid   2.58 cm RV Major 8.0 cm TAPSE:   20.3 mm RV s'    0.08 m/s TRICUSPID VALVE/RVSP:                            Normal Ranges: Peak TR Velocity: 0.71 m/s RV Syst Pressure: 5.0 mmHg (< 30mmHg)  [id number] Ramos Post MD Electronically signed on 2023 at 5:36:23 PM  Wall Scoring  ** Final **     ECG 12 lead    Result Date: 2023  Normal sinus rhythm Possible Left atrial enlargement ST & T wave abnormality, consider inferior ischemia Abnormal ECG When compared with ECG of 09-AUG-2021 20:45, QRS voltage has decreased Non-specific change in ST segment in Inferior leads ST now depressed in Anterior leads T wave inversion now evident in Inferior leads    ECG 12 lead    Result Date: 2023  Normal sinus rhythm ST & T wave abnormality, consider inferior ischemia Abnormal ECG When compared with ECG of 09-AUG-2021 20:45, ST now depressed in Inferior leads ST now depressed in Anterior leads T wave inversion now evident in Inferior leads    Cardiac catheterization -  coronary    Result Date: 12/12/2023   [facility], Cath Lab      [address] [address] Cardiovascular Catheterization Report Patient Name:      DEANNE MAYORGA         Performing           [id number] Kaleb Lim                                        Physician:           MD Study Date:        12/11/2023          Verifying Physician: Jessica Lim MD MRN/PID:           [id number]            Cardiologist: Accession#:        [id number]        Ordering Provider:   [id number] LIM Date of Birth/Age: 1965 / 58      Fellow:                    years Gender:            M                   Fellow: Encounter#:        [id number]  Study: Left Heart Cath  Indications: DEANNE MAYORGA is a 58 year old male who presents with dyslipidemia, obesity, hypertension and a chest pain assessment of typical angina. NSTE - ACS.  Procedure Description: After infiltration with 2% Lidocaine, the right radial artery was cannulated with a modified Seldinger technique. Subsequently a 6 Maori sheath was placed in the right radial artery. Selective coronary catheterization was performed using a 5 Fr catheter(s) exchanged over a guide wire to cannulate the coronary arteries. Multiple injections of contrast were made into the left and right coronary arteries with angiograms recorded in multiple projections. After completion of the procedure, the arterial sheath was pulled and a TR Band Radial Compression Device was utilized to obtain patent hemostasis.  Coronary Angiography: The coronary circulation is right dominant.  Left Main Coronary Artery: The left main coronary artery is a normal caliber vessel. The left main arises normally from the left coronary sinus of Valsalva and bifurcates into the LAD and circumflex coronary arteries. The left main coronary artery showed no significant disease or stenosis greater than 30%.  Left Anterior Descending  Coronary Artery Distribution: The left anterior descending coronary artery is a normal caliber vessel. The LAD arises normally from the left main coronary artery and gives rise to the 1st diagonal branch. The mid left anterior descending coronary artery showed 100% stenosis. The 1st diagonal branch is a normal caliber vessel. The proximal to mid 1st diagonal branch revealed 95% stenosis.  Circumflex Coronary Artery Distribution: The circumflex coronary artery is a normal caliber vessel. The circumflex arises normally from the left main coronary artery, terminates in the AV groove, giving rise to the first obtuse marginal and second obtuse marginal. LCx occluded after early first OM. OM1 with occluded mid portion with faint left to left collaterals. OM2 ocluded with left to left collaterals.  Right Coronary Artery Distribution: The right coronary artery is a normal caliber vessel. The RCA arises normally from the right sinus of Valsalva, supplies the right posterolateral descending artery and supplies the posterolateral system. The mid right coronary artery showed 95% stenosis.  Valve Findings: No aortic valve stenosis is visualized.  Coronary Lesion Summary: Vessel         Stenosis    Vessel Segment LAD          100% stenosis       mid 1st Diagonal 95% stenosis  proximal to mid RCA          95% stenosis        mid  Hemo Personnel: +----------------+---------+ Name            Duty      +----------------+---------+ Kaleb Lim MD 1 +----------------+---------+  Hemodynamic Pressures:  +----+---------------+----------+-------------+--------------+-------+---------+ Site   Date Time   Phase NameSystolic mmHgDiastolic mmHgED mmHgMean mmHg +----+---------------+----------+-------------+--------------+-------+---------+   LV     12/11/2023  AIR REST          110             1      8                  12:25:29 PM                                                       +----+---------------+----------+-------------+--------------+-------+---------+   LV     12/11/2023  AIR REST          106             2     11                  12:25:38 PM                                                      +----+---------------+----------+-------------+--------------+-------+---------+  LVp     12/11/2023  AIR REST          105            -2      7                  12:25:44 PM                                                      +----+---------------+----------+-------------+--------------+-------+---------+  AOp     12/11/2023  AIR REST          104             0              85         12:25:51 PM                                                      +----+---------------+----------+-------------+--------------+-------+---------+   AO     12/11/2023  AIR REST          111            62              84         12:26:26 PM                                                      +----+---------------+----------+-------------+--------------+-------+---------+   AO     12/11/2023  AIR REST          119            80              93         12:29:00 PM                                                      +----+---------------+----------+-------------+--------------+-------+---------+  Complications: No in-lab complications observed.  Cardiac Cath Post Procedure Notes: Post Procedure Diagnosis: Triple vessel disease. Blood Loss:               Estimated blood loss during the procedure was 0 mls. Specimens Removed:        Number of specimen(s) removed: none.  Recommendations: Maximize medical therapy. Agressive risk factor modification efforts. Consider referral to cardiac rehabilitation. Coronary artery bypass graft surgery. ____________________________________________________________________________________ CONCLUSIONS:  1. Severe 3 vessel CAD in this right dominant system.  2. Normal LVEDP 11mmHg with no AS.  3. Consider for  multivessel CABG. ICD 10 Codes: Non ST elevation (NSTEMI) myocardial infarction-I21.4  CPT Codes: Left Heart Cath (visualization of coronaries) and LV-43370  [id number] Kaleb Lim MD Performing Physician Electronically signed by [id number] Kaleb Lmi MD on 12/12/2023 at 1:53:57 PM  ** Final **          Assessment/Plan   Principal Problem:    Coronary artery disease (CAD) excluded  Active Problems:    Type 2 diabetes mellitus, without long-term current use of insulin (CMS/[facility])    HTN (hypertension)    NSTEMI (non-ST elevated myocardial infarction) (CMS/[facility])    Ischemic cardiomyopathy    HLD (hyperlipidemia)    Mr Wen 59 yo M with PMHx HTN,HLD, T2DM, L4-L5 Discitis with epidural abscess  s/p debridement, who was transferred from [facility] to [facility] for CABG evaluation. The patient presented with chest pain radiated to Lt arm that occurred at rest improved with NTG, was found to have NSTEMI at [facility] ED (EKG with inferolateral ischemia), trops 322 --1626, was started on ASA,Plavix and Heparin gtt for NSTEMI, [facility] with multivessel disease, transferred here for CABG evaluation. Pt currently HDS, pain free, will continue with heparin gtt, ASA, and statin,CT surgery consulted.    Updates 12/15/23   - CABG Wednesday 20th of December  - Endocrine consulted and following recs  - repeat trop still elevated so heparin will not be stopped        #ACS -NSTEMI  #Multivessel CAD   #HTN   #HLD  #ICM   :Trops at [facility] 12/10: 322--1626.   :TTE 12/10: EF 40-45%, no valvular disease, global hypokinesis.      :[facility] 12/11: 100% stenosis in mid LAD, 95% stenosis proximal to mid first diagonal branch, occluded LCx after early first OM, OM1 with occluded mid portion, OM2 occluded, and 95% stenosis mid RCA.  :Pt s/p ASA, Plavix and Heparin gtt since 10/12   :Home Lisniopril 20mg, Atorvastatin 80   -Continue with Heparin gtt, ASA  -Continue with Correg 6.25mg BID  -Continue with Atorvastatin 80mg daily.   -Cardiothoracic surgery consulted for CABG  evaluation.   -Will repeat Trops, CBC, CMP   -Will hold ACE/ARBs (Lisinopril) and clopidogrel  i/s/o possible CABG.     #Uncontrolled T2DM   :HgA1c 12/10: 11  :Home Metformin 1000mg  -Holding home meds for now.   Recommendations:  - Increase glargine to 40 units at bedtime  - Start mealtime insulin 6 units of lispro 3 times daily AC  - Continue #2 sliding scale 3 times daily AC  - Accu-Cheks 3 times daily before meals and at bedtime  - Change diet to carb consistent diet 60  - Diabetic education consulted       #Hx of L4-L5 Discitis c/b epidural abscess   :Home Amoxacillin 500mg BID ppx, follow up with ID    - Continue home Amoxicillin 500mg BID         Disposition: Home   Follow-ups: PCP, Cardiology, Endocrine      N: Cradiac  A: PIV  DVT ppx: Heparin gtt  GI ppx: PPI    Code Status: Full code (confirmed on admission)  Surrogate Medical Decision-maker: Babar Chery ([phone])           Lai Calixto MD

## 2023-12-15 NOTE — PROGRESS NOTES
"Behzad Diaz is a 58 y.o. male on day 3 of admission presenting with Coronary artery disease (CAD) excluded.  Patient has been provided the office number for Dr. Phelps [phone] along with fax number [phone] for his disability paper work.      Physical Exam    Last Recorded Vitals  Blood pressure 98/62, pulse 68, temperature 35.8 °C (96.4 °F), resp. rate 18, height 1.854 m (6' 0.99\"), weight (!) 156 kg (343 lb 14.7 oz), SpO2 96 %.  Intake/Output last 3 Shifts:  I/O last 3 completed shifts:  In: 1875.4 (12 mL/kg) [P.O.:960; I.V.:915.4 (5.9 mL/kg)]  Out: 0 (0 mL/kg)   Weight: 156 kg         Assessment/Plan   Principal Problem:    Coronary artery disease (CAD) excluded  Active Problems:    Type 2 diabetes mellitus, without long-term current use of insulin (CMS/[facility])    HTN (hypertension)    NSTEMI (non-ST elevated myocardial infarction) (CMS/[facility])    Ischemic cardiomyopathy    HLD (hyperlipidemia)          Carlton Alberto RN      "

## 2023-12-15 NOTE — PROGRESS NOTES
"Behzad Diaz is a 58 y.o. male on day 3 of admission presenting with Coronary artery disease (CAD) excluded.    Subjective   Patient was seen at bedside.  Reports that he is on diabetic education this morning which was helpful.  Continues to be hyperglycemic albeit better.  Reports good appetite.    Objective   Constitutional: NAD, well groomed. AOx3. Cooperative  HEENT: EOMI, Anicteric scleras   Neck: Soft, supple   Cardiovascular: normal HR  Respiratory: no increased wob,  or accessory muscle use.  Abdomen: soft, obesely distended  Psych : appropriate affect     Last Recorded Vitals  Blood pressure 98/62, pulse 68, temperature 35.8 °C (96.4 °F), resp. rate 18, height 1.854 m (6' 0.99\"), weight (!) 156 kg (343 lb 14.7 oz), SpO2 96 %.  Intake/Output last 3 Shifts:  I/O last 3 completed shifts:  In: 1875.4 (12 mL/kg) [P.O.:960; I.V.:915.4 (5.9 mL/kg)]  Out: 0 (0 mL/kg)   Weight: 156 kg     Relevant Results  Results from last 7 days   Lab Units 12/15/23  1238 12/15/23  0750 12/15/23  0454 12/14/23  2027 12/14/23  1759 12/14/23  1130 12/14/23  0624 12/14/23  0335 12/13/23  0757 12/13/23  0708 12/11/23  0808 12/11/23  0553 12/10/23  0801 12/10/23  0421   POCT GLUCOSE mg/dL 229* 201*  --  219* 239* 244*   < >  --    < >  --    < >  --    < >  --    GLUCOSE mg/dL  --   --  200*  --   --   --   --  215*  --  271*  --  282*  --  353*    < > = values in this interval not displayed.     Assessment/Plan   Principal Problem:    Coronary artery disease (CAD) excluded  Active Problems:    Type 2 diabetes mellitus, without long-term current use of insulin (CMS/HCC)    HTN (hypertension)    NSTEMI (non-ST elevated myocardial infarction) (CMS/HCC)    Ischemic cardiomyopathy    HLD (hyperlipidemia)      Behzad Diaz is a 58 y.o. male with HTN, HLD, poorly controlled DM2, obesity class III, CAD, and history of discitis and epidural abscess s/p debridement who presents to [facility] for CABG evaluation.     Endocrinology is consulted to " assist in diabetes management.  Diabetes history:  Diabetes duration: 4 to 5 years           Home blood glucose checks: Patient does not check his BG at home  Hypoglycemia (y/n, threshold and symptoms): No hypoglycemia  Home diabetes regimen: Metformin 1000 mg  BID   Home diet (how many meals a day): Patient reports poor diet as he is mostly eating out because of his work  Outpatient physician managing diabetes: PCP  Macrovascular complications: CVD [yes]     CVA [no]     PVD [no]  Microvascular complications: Retinopathy [unknown, patient has not had a retinal exam recently]            neuropathy [endorses neuropathy but attributes this to prior spinal surgery]   nephropathy [no microalbumin to creatinine ratio on chart, creatinine 0.7]  Last A1c: 11%  Inpatient diabetes regimen: Lantus 30, #2 sliding scale  Inpatient diet: Cardiac diet     Continues to be hyperglycemic.  Received an additional 4 units around bedtime which kept his BG stable.    Recommendations:  - Increase glargine to 45 units at bedtime  - Lispro with meals 10/10/6 with breakfast lunch and dinner respectively  - Continue #2 sliding scale 3 times daily AC  - Accu-Cheks 3 times daily before meals and at bedtime  - Carb consistent diet 60  - Hypoglycemia protocol  -We will follow and adjust regimen accordingly    Plan was communicated to the primary team    Rosalba King MD  Endocrinology, PGY 4  Pager [phone] or secure chat     Case seen, examined, and discussed with Dr. Liz

## 2023-12-15 NOTE — PROGRESS NOTES
"CARDIAC SURGERY CONSULT PROGRESS NOTE    SUBJECTIVE  Mr Behzad 59 yo M with PMHx HTN,HLD, T2DM (HgbA1c 11%), L4-L5 Discitis with epidural abscess s/p debridement, who was transferred from [facility] to [facility] for CABG evaluation.      The patient presented to [facility] ED 12/10 with left sided chest pain at rest that radiated to his Lt arm, it was associated with diaphoresis and nausea. It was relieved by Nitro, he was given ASA load. Upon presentation to [facility] ED pt was HDS, afebrile, EKG with T-wave inversion infero-lateral leads, Trops 322 with repeat up to 1626, the patient was started on Heparin gtt, and Plavix and admitted to medicine for ACS (NSTEMI), TTE 10/12 with EF 40-45% with global hypokinesis of LV. LHC 12/11 revealed 100% stenosis in mid LAD, 95% stenosis proximal to mid first diagonal branch, occluded LCx after early first OM, OM1 with occluded mid portion, OM2 occluded, and 95% stenosis mid RCA. The patient remained HDS throughout his stay, his chest pain resolved since initial admission, the patient is being transferred to [facility] for CABG eval.      This morning 12/13 troponin up to 5,667.      Cardiac surgery is consulted for CABG evaluation.       Objective   BP 98/65 (BP Location: Left arm, Patient Position: Lying)   Pulse 63   Temp 36.2 °C (97.2 °F) (Temporal)   Resp 16   Ht 1.854 m (6' 0.99\")   Wt (!) 156 kg (343 lb 14.7 oz)   SpO2 97%   BMI 45.38 kg/m²   Pain Score: 0 - No pain   Vitals:    12/15/23 0300   Weight: (!) 156 kg (343 lb 14.7 oz)          Intake/Output Summary (Last 24 hours) at 12/15/2023 0954  Last data filed at 12/15/2023 0100  Gross per 24 hour   Intake 1875.4 ml   Output 0 ml   Net 1875.4 ml       Physical Exam  Physical Exam  Constitutional:       General: He is not in acute distress.     Appearance: He is obese. He is not ill-appearing.   HENT:      Head: Normocephalic.      Mouth/Throat:      Mouth: Mucous membranes are moist.   Cardiovascular:      Rate and Rhythm: Normal rate " and regular rhythm.      Heart sounds: No murmur heard.  Pulmonary:      Effort: Pulmonary effort is normal.      Breath sounds: Normal breath sounds.   Abdominal:      General: Bowel sounds are normal. There is no distension.      Palpations: Abdomen is soft.      Tenderness: There is no abdominal tenderness.   Musculoskeletal:         General: Normal range of motion.      Cervical back: Neck supple.      Right lower leg: No edema.      Left lower leg: No edema.   Skin:     General: Skin is warm and dry.   Neurological:      General: No focal deficit present.      Mental Status: He is alert and oriented to person, place, and time.   Psychiatric:         Mood and Affect: Mood normal.         Behavior: Behavior normal.         Medications  Scheduled medications  amoxicillin, 500 mg, oral, q12h ISAIAH  aspirin, 81 mg, oral, Daily  atorvastatin, 80 mg, oral, Nightly  carvedilol, 6.25 mg, oral, BID with meals  [START ON 12/19/2023] chlorhexidine, 15 mL, Mouth/Throat, BID  insulin glargine, 40 Units, subcutaneous, Nightly  insulin lispro, 0-10 Units, subcutaneous, Before meals & nightly  insulin lispro, 6 Units, subcutaneous, TID with meals  mupirocin, 0.5 Application, Topical, BID  pantoprazole, 40 mg, oral, Daily before breakfast   Or  pantoprazole, 40 mg, intravenous, Daily before breakfast  polyethylene glycol, 17 g, oral, Daily  pregabalin, 75 mg, oral, Daily    Continuous medications  heparin, 0-4,000 Units/hr, Last Rate: 2,300 Units/hr (12/14/23 2315)    PRN medications  PRN medications: acetaminophen **OR** acetaminophen **OR** acetaminophen, dextrose 10 % in water (D10W), dextrose, glucagon    Labs  Results for orders placed or performed during the hospital encounter of 12/12/23 (from the past 24 hour(s))   POCT GLUCOSE   Result Value Ref Range    POCT Glucose 244 (H) 74 - 99 mg/dL   Blood Gas Arterial Full Panel Unsolicited   Result Value Ref Range    POCT pH, Arterial 7.40 7.38 - 7.42 pH    POCT pCO2, Arterial 37  (L) 38 - 42 mm Hg    POCT pO2, Arterial 94 85 - 95 mm Hg    POCT SO2, Arterial 99 94 - 100 %    POCT Oxy Hemoglobin, Arterial 96.5 94.0 - 98.0 %    POCT Hematocrit Calculated, Arterial 44.0 41.0 - 52.0 %    POCT Sodium, Arterial 131 (L) 136 - 145 mmol/L    POCT Potassium, Arterial 4.7 3.5 - 5.3 mmol/L    POCT Chloride, Arterial 101 98 - 107 mmol/L    POCT Ionized Calcium, Arterial 1.22 1.10 - 1.33 mmol/L    POCT Glucose, Arterial 251 (H) 74 - 99 mg/dL    POCT Lactate, Arterial 1.3 0.4 - 2.0 mmol/L    POCT Base Excess, Arterial -1.5 -2.0 - 3.0 mmol/L    POCT HCO3 Calculated, Arterial 22.9 22.0 - 26.0 mmol/L    POCT Hemoglobin, Arterial 14.6 13.5 - 17.5 g/dL    POCT Anion Gap, Arterial 12 10 - 25 mmo/L    Patient Temperature 37.0 degrees Celsius   Coox Panel, Arterial Unsolicited   Result Value Ref Range    POCT Hemoglobin, Arterial 14.6 13.5 - 17.5 g/dL    POCT Oxy Hemoglobin, Arterial 96.5 94.0 - 98.0 %    POCT Carboxyhemoglobin, Arterial 1.5 %    POCT Methemoglobin, Arterial 0.5 0.0 - 1.5 %    POCT Deoxy Hemoglobin, Arterial 1.5 0.0 - 5.0 %   Pulmonary function testing   Result Value Ref Range    FVC - Predicted 5.00     FEV1 - Predicted 3.89     FVC - PRE 3.55     FEV1 - Pre 2.72    POCT GLUCOSE   Result Value Ref Range    POCT Glucose 239 (H) 74 - 99 mg/dL   POCT GLUCOSE   Result Value Ref Range    POCT Glucose 219 (H) 74 - 99 mg/dL   CBC and Auto Differential   Result Value Ref Range    WBC 6.8 4.4 - 11.3 x10*3/uL    nRBC 0.0 0.0 - 0.0 /100 WBCs    RBC 4.38 (L) 4.50 - 5.90 x10*6/uL    Hemoglobin 13.6 13.5 - 17.5 g/dL    Hematocrit 39.1 (L) 41.0 - 52.0 %    MCV 89 80 - 100 fL    MCH 31.1 26.0 - 34.0 pg    MCHC 34.8 32.0 - 36.0 g/dL    RDW 12.3 11.5 - 14.5 %    Platelets 193 150 - 450 x10*3/uL    Neutrophils % 43.2 40.0 - 80.0 %    Immature Granulocytes %, Automated 0.3 0.0 - 0.9 %    Lymphocytes % 46.9 13.0 - 44.0 %    Monocytes % 6.2 2.0 - 10.0 %    Eosinophils % 2.8 0.0 - 6.0 %    Basophils % 0.6 0.0 - 2.0 %     Neutrophils Absolute 2.92 1.20 - 7.70 x10*3/uL    Immature Granulocytes Absolute, Automated 0.02 0.00 - 0.70 x10*3/uL    Lymphocytes Absolute 3.17 1.20 - 4.80 x10*3/uL    Monocytes Absolute 0.42 0.10 - 1.00 x10*3/uL    Eosinophils Absolute 0.19 0.00 - 0.70 x10*3/uL    Basophils Absolute 0.04 0.00 - 0.10 x10*3/uL   Basic Metabolic Panel   Result Value Ref Range    Glucose 200 (H) 74 - 99 mg/dL    Sodium 137 136 - 145 mmol/L    Potassium 4.0 3.5 - 5.3 mmol/L    Chloride 103 98 - 107 mmol/L    Bicarbonate 25 21 - 32 mmol/L    Anion Gap 13 10 - 20 mmol/L    Urea Nitrogen 14 6 - 23 mg/dL    Creatinine 0.82 0.50 - 1.30 mg/dL    eGFR >90 >60 mL/min/1.73m*2    Calcium 8.9 8.6 - 10.6 mg/dL   Magnesium   Result Value Ref Range    Magnesium 1.87 1.60 - 2.40 mg/dL   Phosphorus   Result Value Ref Range    Phosphorus 3.5 2.5 - 4.9 mg/dL   Heparin Assay   Result Value Ref Range    Heparin Unfractionated 0.4 See Comment Below for Therapeutic Ranges IU/mL   POCT GLUCOSE   Result Value Ref Range    POCT Glucose 201 (H) 74 - 99 mg/dL       Cardiac Testing  [facility]: 12/11/23  Coronary Lesion Summary:  Vessel         Stenosis    Vessel Segment  LAD          100% stenosis       mid  1st Diagonal 95% stenosis  proximal to mid  RCA          95% stenosis        mid     Echo: 12/11/23  CONCLUSIONS:   1. Left ventricular systolic function is mildly decreased with a 40-45% estimated ejection fraction.   2. Multiple segmental abnormalities exist. See findings.   3. Increased LV mass.   4. Aortic valve stenosis is not present.   5. There is global hypokinesis of the left ventricle with minor regional variations.          ASSESSMENT & PLAN  Mr Behzad 59 yo M with PMHx HTN,HLD, T2DM (HgbA1c 11%), L4-L5 Discitis with epidural abscess s/p debridement, who was transferred from [facility] to [facility] for CABG evaluation.      The patient presented to [facility] ED 12/10 with left sided chest pain at rest that radiated to his Lt arm, it was associated with diaphoresis  and nausea. It was relieved by Nitro, he was given ASA load. Upon presentation to [facility] ED pt was HDS, afebrile, EKG with T-wave inversion infero-lateral leads, Trops 322 with repeat up to 1626, the patient was started on Heparin gtt, and Plavix and admitted to medicine for ACS (NSTEMI), TTE 10/12 with EF 40-45% with global hypokinesis of LV. LHC 12/11 revealed 100% stenosis in mid LAD, 95% stenosis proximal to mid first diagonal branch, occluded LCx after early first OM, OM1 with occluded mid portion, OM2 occluded, and 95% stenosis mid RCA. The patient remained HDS throughout his stay, his chest pain resolved since initial admission, the patient is being transferred to [facility] for CABG eval.      Cardiac surgery is consulted for CABG evaluation.         RECOMMENDATIONS  - Dr. Mattie Phelps aware of patient and reviewed imaging and data.  - Plan for OR 12/20 Wednesday for CABG  - Medical optimization per primary team  - Preop risk stratification studies/labs/UA/PFTs and vascular ultrasounds ordered in EMR by our team  - Dental evaluation not indicated for isolated CAB surgeries   - Appreciate endocrinology consult given HgbA1c 11%  - Continue ASA, high-intensity statin, and BB (or document contraindications for use)     - No ACEi/ARBs in the pre-op period (at least 48 hours)  - Hold any SGLT2 inhibitors (Farxiga, Jardiance, etc.) for at least 3 days prior to cardiac surgery to prevent euglycemic DKA  - No antiplatelets other than ASA, no anticoagulants other than Heparin  - NPO after midnight, blood on hold/T&S, and preop scrubs only to be ordered for OR 12/20     Will continue to follow along.  Thank you for the consultation.   Patient educated and all questions answered.  Please page the cardiac surgery consult pager [phone] with any questions or changes in patient condition.       Patricia Solis PA-C  Cardiac Surgery Consult [facility]  [facility]  Cardiac Surgery Consult Pager [phone]     12/15/2023  9:54  AM

## 2023-12-15 NOTE — CONSULTS
Inpatient Diabetes Education Consult    Reason for Visit:  Behzad Diaz is a 58 y.o. male who presents for HTN, CAD and prep for CABG next week, T2DM    Consulting Service/Provider: Dr. Krishnan    Visit Type: Initial visit    Visit Modality: In-person    Discharge Equipment/Supply Needs:       Patient has supplies at home:  will need all new supplies pending what he will need at time of discharge.  Needs BG meter replaced and will think about CGM    Patient History and Assessment:  New diagnosis:  n/a  Previous diagnosis: Type 2  Patient known to Diabetes Education department: No  Treatment prior to hospital admission: Oral medications Metformin 2x day  Insulin: Long acting, via pen, discontinued by his MD when A1c was down again  Blood glucose testing: Does not routinely test and will need a new meter at discharge  Complications: cardiovascular disease, CAD s/p to have CABG 12/20/23, and obesity  PTA Medications:    Current Outpatient Medications   Medication Instructions    amoxicillin (AMOXIL) 500 mg, oral, 2 times daily    aspirin 81 mg, oral, Daily    atorvastatin (LIPITOR) 80 mg, oral, Nightly    carvedilol (COREG) 6.25 mg, oral, 2 times daily with meals    cyclobenzaprine (FLEXERIL) 10 mg, oral, 2 times daily PRN    lisinopril 20 mg, oral, Daily    metFORMIN (Glucophage) 1,000 mg tablet 1 tablet, oral, Daily with breakfast, Patient choice    pregabalin (LYRICA) 75 mg, oral, Daily, Per patient statement       Glucose   Date/Time Value Ref Range Status   12/15/2023 04:54  (H) 74 - 99 mg/dL Final   12/14/2023 03:35  (H) 74 - 99 mg/dL Final   12/13/2023 07:08  (H) 74 - 99 mg/dL Final   12/11/2023 05:53  (H) 74 - 99 mg/dL Final   12/10/2023 04:21  (H) 74 - 99 mg/dL Final   05/06/2022 01:07  (H) 74 - 99 mg/dL Final   11/30/2021 02:45  (H) 74 - 99 mg/dL Final   11/23/2021 09:00  (H) 74 - 99 mg/dL Final   11/08/2021 09:10  (H) 74 - 99 mg/dL Final   11/01/2021  "11:00 AM 92 74 - 99 mg/dL Final   10/25/2021 03:15 PM 81 74 - 99 mg/dL Final   10/18/2021 01:15  (H) 74 - 99 mg/dL Final   10/11/2021 04:00  (H) 74 - 99 mg/dL Final   10/04/2021 09:10  (H) 74 - 99 mg/dL Final   09/27/2021 09:15  (H) 74 - 99 mg/dL Final     No results found for: \"CPEPTIDE\"  POC HEMOGLOBIN A1c   Date Value Ref Range Status   08/25/2023 10.8 (A) 4.2 - 6.5 % Final     Hemoglobin A1C   Date Value Ref Range Status   12/10/2023 11.0 (H) see below % Final   08/27/2021 6.4 (A) % Final     Comment:          Diagnosis of Diabetes-Adults   Non-Diabetic: < or = 5.6%   Increased risk for developing diabetes: 5.7-6.4%   Diagnostic of diabetes: > or = 6.5%  .       Monitoring of Diabetes                Age (y)     Therapeutic Goal (%)   Adults:          >18           <7.0   Pediatrics:    13-18           <7.5                   7-12           <8.0                   0- 6            7.5-8.5   American Diabetes Association. Diabetes Care 33(S1), Jan 2010.     11/01/2020 6.0 % Final     Comment:          Diagnosis of Diabetes-Adults   Non-Diabetic: < or = 5.6%   Increased risk for developing diabetes: 5.7-6.4%   Diagnostic of diabetes: > or = 6.5%  .       Monitoring of Diabetes                Age (y)     Therapeutic Goal (%)   Adults:          >18           <7.0   Pediatrics:    13-18           <7.5                   7-12           <8.0                   0- 6            7.5-8.5   American Diabetes Association. Diabetes Care 33(S1), Jan 2010.     09/17/2020 6.7 % Final     Comment:          Diagnosis of Diabetes-Adults   Non-Diabetic: < or = 5.6%   Increased risk for developing diabetes: 5.7-6.4%   Diagnostic of diabetes: > or = 6.5%  .       Monitoring of Diabetes                Age (y)     Therapeutic Goal (%)   Adults:          >18           <7.0   Pediatrics:    13-18           <7.5                   7-12           <8.0                   0- 6            7.5-8.5   American Diabetes " "Association. Diabetes Care 33(S1), Jan 2010.         Patient Learning/Readiness Assessment:  Mr. Diaz is agreeable to diabetes education.      Interventions/Topics Covered:  See After Visit Summary for handouts/information sheets provided to patient.  Education Documentation  No documentation found.        Additional topics covered: We reviewed recent BG values and insulin regimen at this time.  Chart created for him to review and follow along.  He is able to determine meal time dose correctly based on BG scenarios.    He has used insulin pen in the past -- he's comfortable if needs to resume.  We reviewed his diet/nutrition.  He is traveling often and eats usually one large meal (dinner).  Discussed ways to maybe break that up during the day and portions of food on his plate.  Additional materials provided: chart re insulin regimen to follow while inpatient.    CGM:  he will think about a CGM -- not sure he \"want something attached to me'    Education Outcome/Recommendations:        Recommendations for bedside nursing: Allow patient to self-inject insulin (supervised)    Recommendations for Providers: Follow-up w/ PCP and/or Endocrinology and Diabetes supplies needed for discharge    Additional Comments: Mr. Diaz is agreeable to follow up next week to review information.  Time spent:  50 mins         "

## 2023-12-16 LAB
ANION GAP SERPL CALC-SCNC: 14 MMOL/L (ref 10–20)
BASOPHILS # BLD AUTO: 0.05 X10*3/UL (ref 0–0.1)
BASOPHILS NFR BLD AUTO: 0.7 %
BUN SERPL-MCNC: 13 MG/DL (ref 6–23)
CALCIUM SERPL-MCNC: 8.7 MG/DL (ref 8.6–10.6)
CHLORIDE SERPL-SCNC: 102 MMOL/L (ref 98–107)
CO2 SERPL-SCNC: 23 MMOL/L (ref 21–32)
CREAT SERPL-MCNC: 0.8 MG/DL (ref 0.5–1.3)
EOSINOPHIL # BLD AUTO: 0.16 X10*3/UL (ref 0–0.7)
EOSINOPHIL NFR BLD AUTO: 2.3 %
ERYTHROCYTE [DISTWIDTH] IN BLOOD BY AUTOMATED COUNT: 12.1 % (ref 11.5–14.5)
GFR SERPL CREATININE-BSD FRML MDRD: >90 ML/MIN/1.73M*2
GLUCOSE BLD MANUAL STRIP-MCNC: 197 MG/DL (ref 74–99)
GLUCOSE BLD MANUAL STRIP-MCNC: 198 MG/DL (ref 74–99)
GLUCOSE BLD MANUAL STRIP-MCNC: 238 MG/DL (ref 74–99)
GLUCOSE BLD MANUAL STRIP-MCNC: 239 MG/DL (ref 74–99)
GLUCOSE SERPL-MCNC: 183 MG/DL (ref 74–99)
HCT VFR BLD AUTO: 40.8 % (ref 41–52)
HGB BLD-MCNC: 13.8 G/DL (ref 13.5–17.5)
IMM GRANULOCYTES # BLD AUTO: 0.04 X10*3/UL (ref 0–0.7)
IMM GRANULOCYTES NFR BLD AUTO: 0.6 % (ref 0–0.9)
LYMPHOCYTES # BLD AUTO: 3.09 X10*3/UL (ref 1.2–4.8)
LYMPHOCYTES NFR BLD AUTO: 44.1 %
MAGNESIUM SERPL-MCNC: 1.83 MG/DL (ref 1.6–2.4)
MCH RBC QN AUTO: 30.1 PG (ref 26–34)
MCHC RBC AUTO-ENTMCNC: 33.8 G/DL (ref 32–36)
MCV RBC AUTO: 89 FL (ref 80–100)
MONOCYTES # BLD AUTO: 0.45 X10*3/UL (ref 0.1–1)
MONOCYTES NFR BLD AUTO: 6.4 %
NEUTROPHILS # BLD AUTO: 3.21 X10*3/UL (ref 1.2–7.7)
NEUTROPHILS NFR BLD AUTO: 45.9 %
NRBC BLD-RTO: 0 /100 WBCS (ref 0–0)
PHOSPHATE SERPL-MCNC: 3.4 MG/DL (ref 2.5–4.9)
PLATELET # BLD AUTO: 202 X10*3/UL (ref 150–450)
POTASSIUM SERPL-SCNC: 3.8 MMOL/L (ref 3.5–5.3)
RBC # BLD AUTO: 4.58 X10*6/UL (ref 4.5–5.9)
SODIUM SERPL-SCNC: 135 MMOL/L (ref 136–145)
UFH PPP CHRO-ACNC: 0.5 IU/ML
WBC # BLD AUTO: 7 X10*3/UL (ref 4.4–11.3)

## 2023-12-16 PROCEDURE — 83735 ASSAY OF MAGNESIUM: CPT

## 2023-12-16 PROCEDURE — 2500000004 HC RX 250 GENERAL PHARMACY W/ HCPCS (ALT 636 FOR OP/ED)

## 2023-12-16 PROCEDURE — 2500000002 HC RX 250 W HCPCS SELF ADMINISTERED DRUGS (ALT 637 FOR MEDICARE OP, ALT 636 FOR OP/ED)

## 2023-12-16 PROCEDURE — 84100 ASSAY OF PHOSPHORUS: CPT

## 2023-12-16 PROCEDURE — 85025 COMPLETE CBC W/AUTO DIFF WBC: CPT

## 2023-12-16 PROCEDURE — 36415 COLL VENOUS BLD VENIPUNCTURE: CPT

## 2023-12-16 PROCEDURE — 1200000002 HC GENERAL ROOM WITH TELEMETRY DAILY

## 2023-12-16 PROCEDURE — 99232 SBSQ HOSP IP/OBS MODERATE 35: CPT | Performed by: INTERNAL MEDICINE

## 2023-12-16 PROCEDURE — 2500000001 HC RX 250 WO HCPCS SELF ADMINISTERED DRUGS (ALT 637 FOR MEDICARE OP)

## 2023-12-16 PROCEDURE — 80048 BASIC METABOLIC PNL TOTAL CA: CPT

## 2023-12-16 PROCEDURE — 82947 ASSAY GLUCOSE BLOOD QUANT: CPT

## 2023-12-16 PROCEDURE — 85520 HEPARIN ASSAY: CPT

## 2023-12-16 PROCEDURE — 99232 SBSQ HOSP IP/OBS MODERATE 35: CPT

## 2023-12-16 RX ORDER — INSULIN LISPRO 100 [IU]/ML
14 INJECTION, SOLUTION INTRAVENOUS; SUBCUTANEOUS
Status: DISCONTINUED | OUTPATIENT
Start: 2023-12-17 | End: 2023-12-17

## 2023-12-16 RX ORDER — INSULIN LISPRO 100 [IU]/ML
10 INJECTION, SOLUTION INTRAVENOUS; SUBCUTANEOUS
Status: DISCONTINUED | OUTPATIENT
Start: 2023-12-17 | End: 2023-12-17

## 2023-12-16 RX ORDER — INSULIN GLARGINE 100 [IU]/ML
52 INJECTION, SOLUTION SUBCUTANEOUS NIGHTLY
Status: DISCONTINUED | OUTPATIENT
Start: 2023-12-16 | End: 2023-12-19

## 2023-12-16 RX ADMIN — INSULIN GLARGINE 52 UNITS: 100 INJECTION, SOLUTION SUBCUTANEOUS at 20:34

## 2023-12-16 RX ADMIN — ATORVASTATIN CALCIUM 80 MG: 80 TABLET, FILM COATED ORAL at 20:33

## 2023-12-16 RX ADMIN — AMOXICILLIN 500 MG: 500 CAPSULE ORAL at 08:50

## 2023-12-16 RX ADMIN — MUPIROCIN 0.5 APPLICATION: 20 OINTMENT TOPICAL at 08:50

## 2023-12-16 RX ADMIN — AMOXICILLIN 500 MG: 500 CAPSULE ORAL at 20:33

## 2023-12-16 RX ADMIN — INSULIN LISPRO 4 UNITS: 100 INJECTION, SOLUTION INTRAVENOUS; SUBCUTANEOUS at 22:05

## 2023-12-16 RX ADMIN — INSULIN LISPRO 2 UNITS: 100 INJECTION, SOLUTION INTRAVENOUS; SUBCUTANEOUS at 08:54

## 2023-12-16 RX ADMIN — MUPIROCIN 0.5 APPLICATION: 20 OINTMENT TOPICAL at 20:33

## 2023-12-16 RX ADMIN — INSULIN LISPRO 10 UNITS: 100 INJECTION, SOLUTION INTRAVENOUS; SUBCUTANEOUS at 08:52

## 2023-12-16 RX ADMIN — INSULIN LISPRO 10 UNITS: 100 INJECTION, SOLUTION INTRAVENOUS; SUBCUTANEOUS at 13:40

## 2023-12-16 RX ADMIN — PANTOPRAZOLE SODIUM 40 MG: 40 TABLET, DELAYED RELEASE ORAL at 06:04

## 2023-12-16 RX ADMIN — CARVEDILOL 6.25 MG: 6.25 TABLET, FILM COATED ORAL at 18:25

## 2023-12-16 RX ADMIN — INSULIN LISPRO 4 UNITS: 100 INJECTION, SOLUTION INTRAVENOUS; SUBCUTANEOUS at 13:40

## 2023-12-16 RX ADMIN — PREGABALIN 75 MG: 75 CAPSULE ORAL at 08:50

## 2023-12-16 RX ADMIN — INSULIN LISPRO 2 UNITS: 100 INJECTION, SOLUTION INTRAVENOUS; SUBCUTANEOUS at 18:30

## 2023-12-16 RX ADMIN — CARVEDILOL 6.25 MG: 6.25 TABLET, FILM COATED ORAL at 08:50

## 2023-12-16 RX ADMIN — ASPIRIN 81 MG CHEWABLE TABLET 81 MG: 81 TABLET CHEWABLE at 08:50

## 2023-12-16 RX ADMIN — HEPARIN SODIUM 2300 UNITS/HR: 10000 INJECTION, SOLUTION INTRAVENOUS at 09:14

## 2023-12-16 RX ADMIN — HEPARIN SODIUM 2300 UNITS/HR: 10000 INJECTION, SOLUTION INTRAVENOUS at 18:26

## 2023-12-16 ASSESSMENT — COGNITIVE AND FUNCTIONAL STATUS - GENERAL
DAILY ACTIVITIY SCORE: 24
MOBILITY SCORE: 24

## 2023-12-16 ASSESSMENT — PAIN SCALES - GENERAL
PAINLEVEL_OUTOF10: 0 - NO PAIN
PAINLEVEL_OUTOF10: 0 - NO PAIN

## 2023-12-16 ASSESSMENT — PAIN - FUNCTIONAL ASSESSMENT
PAIN_FUNCTIONAL_ASSESSMENT: 0-10
PAIN_FUNCTIONAL_ASSESSMENT: 0-10

## 2023-12-16 NOTE — PROGRESS NOTES
"Behzad Diaz is a 58 y.o. male on day 4 of admission presenting with Coronary artery disease (CAD) excluded.    Subjective   Patient was not seen or examined today.  Only chart was reviewed.       Objective   Not done today.    Last Recorded Vitals  Blood pressure 123/73, pulse 69, temperature 36 °C (96.8 °F), temperature source Temporal, resp. rate 18, height 1.854 m (6' 0.99\"), weight (!) 155 kg (341 lb 14.4 oz), SpO2 98 %.  Intake/Output last 3 Shifts:  I/O last 3 completed shifts:  In: 1310 (8.4 mL/kg) [P.O.:1310]  Out: 0 (0 mL/kg)   Weight: 155.1 kg     Relevant Results  Results from last 7 days   Lab Units 12/16/23  1258 12/16/23  0814 12/16/23  0552 12/15/23  2052 12/15/23  1651 12/15/23  1238 12/15/23  0750 12/15/23  0454 12/14/23  0624 12/14/23  0335 12/13/23  0757 12/13/23  0708 12/11/23  0808 12/11/23  0553   POCT GLUCOSE mg/dL 238* 198*  --  262* 199* 229*   < >  --    < >  --    < >  --    < >  --    GLUCOSE mg/dL  --   --  183*  --   --   --   --  200*  --  215*  --  271*  --  282*    < > = values in this interval not displayed.     Results for orders placed or performed during the hospital encounter of 12/12/23 (from the past 24 hour(s))   POCT GLUCOSE   Result Value Ref Range    POCT Glucose 262 (H) 74 - 99 mg/dL   Heparin Assay   Result Value Ref Range    Heparin Unfractionated 0.5 See Comment Below for Therapeutic Ranges IU/mL   CBC and Auto Differential   Result Value Ref Range    WBC 7.0 4.4 - 11.3 x10*3/uL    nRBC 0.0 0.0 - 0.0 /100 WBCs    RBC 4.58 4.50 - 5.90 x10*6/uL    Hemoglobin 13.8 13.5 - 17.5 g/dL    Hematocrit 40.8 (L) 41.0 - 52.0 %    MCV 89 80 - 100 fL    MCH 30.1 26.0 - 34.0 pg    MCHC 33.8 32.0 - 36.0 g/dL    RDW 12.1 11.5 - 14.5 %    Platelets 202 150 - 450 x10*3/uL    Neutrophils % 45.9 40.0 - 80.0 %    Immature Granulocytes %, Automated 0.6 0.0 - 0.9 %    Lymphocytes % 44.1 13.0 - 44.0 %    Monocytes % 6.4 2.0 - 10.0 %    Eosinophils % 2.3 0.0 - 6.0 %    Basophils % 0.7 0.0 - 2.0 " %    Neutrophils Absolute 3.21 1.20 - 7.70 x10*3/uL    Immature Granulocytes Absolute, Automated 0.04 0.00 - 0.70 x10*3/uL    Lymphocytes Absolute 3.09 1.20 - 4.80 x10*3/uL    Monocytes Absolute 0.45 0.10 - 1.00 x10*3/uL    Eosinophils Absolute 0.16 0.00 - 0.70 x10*3/uL    Basophils Absolute 0.05 0.00 - 0.10 x10*3/uL   Basic Metabolic Panel   Result Value Ref Range    Glucose 183 (H) 74 - 99 mg/dL    Sodium 135 (L) 136 - 145 mmol/L    Potassium 3.8 3.5 - 5.3 mmol/L    Chloride 102 98 - 107 mmol/L    Bicarbonate 23 21 - 32 mmol/L    Anion Gap 14 10 - 20 mmol/L    Urea Nitrogen 13 6 - 23 mg/dL    Creatinine 0.80 0.50 - 1.30 mg/dL    eGFR >90 >60 mL/min/1.73m*2    Calcium 8.7 8.6 - 10.6 mg/dL   Magnesium   Result Value Ref Range    Magnesium 1.83 1.60 - 2.40 mg/dL   Phosphorus   Result Value Ref Range    Phosphorus 3.4 2.5 - 4.9 mg/dL   POCT GLUCOSE   Result Value Ref Range    POCT Glucose 198 (H) 74 - 99 mg/dL   POCT GLUCOSE   Result Value Ref Range    POCT Glucose 238 (H) 74 - 99 mg/dL                   Assessment/Plan   Principal Problem:    Coronary artery disease (CAD) excluded  Active Problems:    Type 2 diabetes mellitus, without long-term current use of insulin (CMS/[facility])    HTN (hypertension)    NSTEMI (non-ST elevated myocardial infarction) (CMS/[facility])    Ischemic cardiomyopathy    HLD (hyperlipidemia)  Behzad Diaz is a 58 y.o. male with HTN, HLD, poorly controlled DM2, obesity class III, CAD, and history of discitis and epidural abscess s/p debridement who presents to [facility] for CABG evaluation.     Endocrinology is consulted to assist in diabetes management.  Diabetes history:  Diabetes duration: 4 to 5 years           Home blood glucose checks: Patient does not check his BG at home  Hypoglycemia (y/n, threshold and symptoms): No hypoglycemia  Home diabetes regimen: Metformin 1000 mg  BID   Home diet (how many meals a day): Patient reports poor diet as he is mostly eating out because of his work  Outpatient  physician managing diabetes: PCP  Macrovascular complications: CVD [yes]     CVA [no]     PVD [no]  Microvascular complications: Retinopathy [unknown, patient has not had a retinal exam recently]            neuropathy [endorses neuropathy but attributes this to prior spinal surgery]   nephropathy [no microalbumin to creatinine ratio on chart, creatinine 0.7]  Last A1c: 11%  Inpatient diabetes regimen: Initially Lantus 30, #2 sliding scale  Inpatient diet: Cardiac diet     Continues to be hyperglycemic.       Recommendations:  - Increase glargine to 52 units at bedtime as of tonight  - Increase lispro with meals to 14/14/10 with breakfast lunch and dinner respectively  - Continue #2 sliding scale 3 times daily AC  - Accu-Cheks 3 times daily before meals and at bedtime  - Carb consistent diet 60  - Hypoglycemia protocol  -We will follow and adjust regimen accordingly     Plan was communicated to the primary team over the phone earlier today.     Case discussed with Dr. Liz

## 2023-12-16 NOTE — PROGRESS NOTES
"Behzad Diaz is a 58 y.o. male on day 4 of admission presenting with Coronary artery disease (CAD) excluded.    Subjective   No acute events overnight. Patient denied, nausea, vomiting, fever, and cough. Patient denies chest pain, dyspnea, and palpitations.         Objective     Physical Exam  Constitutional: Well-developed male in no acute distress.  HEENT: Normocephalic, atraumatic. PERRL. EOMI. No cervical lymphadenopathy.  Respiratory: CTA bilaterally. No wheezes, rales, or rhonchi. Normal respiratory effort.  Cardiovascular: RRR. No murmurs, gallops, or rubs. No JVD. Radial pulses 2+.  Abdominal: Soft, nondistended, nontender to palpation. Bowel sounds present. No hepatosplenomegaly or masses. No CVA tenderness. Significantly Obese patient.  Neuro: CN II-XII intact. UE and LE strength 5/5 bilaterally and sensation intact. Normal FTN testing.  MSK: No LE edema bilaterally.  Skin: Warm, dry. No rashes or wounds.  Psych: Appropriate mood and affect.    Last Recorded Vitals  Blood pressure 122/81, pulse 70, temperature 36 °C (96.8 °F), temperature source Temporal, resp. rate 18, height 1.854 m (6' 0.99\"), weight (!) 155 kg (341 lb 14.4 oz), SpO2 98 %.  Intake/Output last 3 Shifts:  I/O last 3 completed shifts:  In: 1310 (8.4 mL/kg) [P.O.:1310]  Out: 0 (0 mL/kg)   Weight: 155.1 kg     Relevant Results              Active Medications  Scheduled medications  amoxicillin, 500 mg, oral, q12h ISAIAH  aspirin, 81 mg, oral, Daily  atorvastatin, 80 mg, oral, Nightly  carvedilol, 6.25 mg, oral, BID with meals  [START ON 12/19/2023] chlorhexidine, 15 mL, Mouth/Throat, BID  insulin glargine, 45 Units, subcutaneous, Nightly  insulin lispro, 0-10 Units, subcutaneous, Before meals & nightly  insulin lispro, 10 Units, subcutaneous, Daily with breakfast  insulin lispro, 10 Units, subcutaneous, Daily with lunch  insulin lispro, 6 Units, subcutaneous, Daily before evening meal  mupirocin, 0.5 Application, Topical, BID  pantoprazole, 40 " mg, oral, Daily before breakfast   Or  pantoprazole, 40 mg, intravenous, Daily before breakfast  polyethylene glycol, 17 g, oral, Daily  pregabalin, 75 mg, oral, Daily      Continuous medications  heparin, 0-4,000 Units/hr, Last Rate: 2,300 Units/hr (12/16/23 0914)      PRN medications  PRN medications: acetaminophen **OR** acetaminophen **OR** acetaminophen, dextrose 10 % in water (D10W), dextrose, glucagon     Recent Labs  Results for orders placed or performed during the hospital encounter of 12/12/23 (from the past 24 hour(s))   POCT GLUCOSE   Result Value Ref Range    POCT Glucose 229 (H) 74 - 99 mg/dL   POCT GLUCOSE   Result Value Ref Range    POCT Glucose 199 (H) 74 - 99 mg/dL   POCT GLUCOSE   Result Value Ref Range    POCT Glucose 262 (H) 74 - 99 mg/dL   Heparin Assay   Result Value Ref Range    Heparin Unfractionated 0.5 See Comment Below for Therapeutic Ranges IU/mL   CBC and Auto Differential   Result Value Ref Range    WBC 7.0 4.4 - 11.3 x10*3/uL    nRBC 0.0 0.0 - 0.0 /100 WBCs    RBC 4.58 4.50 - 5.90 x10*6/uL    Hemoglobin 13.8 13.5 - 17.5 g/dL    Hematocrit 40.8 (L) 41.0 - 52.0 %    MCV 89 80 - 100 fL    MCH 30.1 26.0 - 34.0 pg    MCHC 33.8 32.0 - 36.0 g/dL    RDW 12.1 11.5 - 14.5 %    Platelets 202 150 - 450 x10*3/uL    Neutrophils % 45.9 40.0 - 80.0 %    Immature Granulocytes %, Automated 0.6 0.0 - 0.9 %    Lymphocytes % 44.1 13.0 - 44.0 %    Monocytes % 6.4 2.0 - 10.0 %    Eosinophils % 2.3 0.0 - 6.0 %    Basophils % 0.7 0.0 - 2.0 %    Neutrophils Absolute 3.21 1.20 - 7.70 x10*3/uL    Immature Granulocytes Absolute, Automated 0.04 0.00 - 0.70 x10*3/uL    Lymphocytes Absolute 3.09 1.20 - 4.80 x10*3/uL    Monocytes Absolute 0.45 0.10 - 1.00 x10*3/uL    Eosinophils Absolute 0.16 0.00 - 0.70 x10*3/uL    Basophils Absolute 0.05 0.00 - 0.10 x10*3/uL   Basic Metabolic Panel   Result Value Ref Range    Glucose 183 (H) 74 - 99 mg/dL    Sodium 135 (L) 136 - 145 mmol/L    Potassium 3.8 3.5 - 5.3 mmol/L     Chloride 102 98 - 107 mmol/L    Bicarbonate 23 21 - 32 mmol/L    Anion Gap 14 10 - 20 mmol/L    Urea Nitrogen 13 6 - 23 mg/dL    Creatinine 0.80 0.50 - 1.30 mg/dL    eGFR >90 >60 mL/min/1.73m*2    Calcium 8.7 8.6 - 10.6 mg/dL   Magnesium   Result Value Ref Range    Magnesium 1.83 1.60 - 2.40 mg/dL   Phosphorus   Result Value Ref Range    Phosphorus 3.4 2.5 - 4.9 mg/dL   POCT GLUCOSE   Result Value Ref Range    POCT Glucose 198 (H) 74 - 99 mg/dL       Imaging  Transthoracic Echo (TTE) Complete    Result Date: 12/12/2023            [facility] [place] [address] [id number]    Tel [phone] Fax [phone] TRANSTHORACIC ECHOCARDIOGRAM REPORT  Patient Name:     DEANNE BANERJEELEIGHANN Oropeza Physician:  Phillip Post MD Study Date:       12/11/2023          Ordering Provider:  [id number] TONY POST MRN/PID:          [id number]            Fellow: Accession#:       [id number]        Nurse: Date of           1965 / 58      Sonographer:        Erika Maldonado RDCS Birth/Age:        years Gender:           M                   Additional Staff: Height:           182.88 cm           Admit Date:         12/9/2023 Weight:           158.76 kg           Admission Status:   Inpatient - Routine BSA:              2.70 m2             Department          [facility]                                       Location: Blood Pressure: 138 /83 mmHg Study Type:    TRANSTHORACIC ECHO (TTE) COMPLETE Diagnosis/ICD: Non ST elevation (NSTEMI) myocardial infarction-I21.4 Indication:    NSTEMI CPT Codes:     Echo Complete w Full Doppler-55542  Study Detail: The following Echo studies were performed: 2D, M-Mode, Doppler and               color flow. Technically challenging study due to poor acoustic               windows and body habitus. Definity used as a contrast agent for               endocardial  border definition. Total contrast used for this               procedure was 2 mL via IV push.  PHYSICIAN INTERPRETATION: Left Ventricle: Left ventricular systolic function is mildly decreased, with an estimated ejection fraction of 40-45%. Estimated left ventricular mass is increased. There is global hypokinesis of the left ventricle with minor regional variations. The left ventricular cavity size is upper limits of normal. The left ventricular septal wall thickness is mildly increased. There is mildly increased left ventricular posterior wall thickness. Left ventricular diastolic filling was indeterminate. There are normal left ventricular filling pressures. LV Wall Scoring: There is diffuse hypokinesis. Left Atrium: The left atrium is upper limits of normal in size. Right Ventricle: The right ventricle is normal in size. There is normal right ventricular global systolic function. Right Atrium: The right atrium is normal in size. Aortic Valve: The aortic valve is trileaflet. There is no evidence of aortic valve stenosis. There is no evidence of aortic valve regurgitation. The peak instantaneous gradient of the aortic valve is 2.7 mmHg. The mean gradient of the aortic valve is 2.0 mmHg. Mitral Valve: The mitral valve is normal in structure. There is trace mitral valve regurgitation. Tricuspid Valve: The tricuspid valve is structurally normal. There is trace tricuspid regurgitation. The right ventricular systolic pressure is unable to be estimated. Pulmonic Valve: The pulmonic valve was not assessed. The pulmonic valve regurgitation was not assessed. Pericardium: There is no pericardial effusion noted. Aorta: The aortic root is normal. Systemic Veins: The inferior vena cava appears to be of normal size. The hepatic vein appears to be of normal size. There is IVC inspiratory collapse greater than 50%.  CONCLUSIONS:  1. Left ventricular systolic function is mildly decreased with a 40-45% estimated ejection fraction.   2. Multiple segmental abnormalities exist. See findings.  3. Increased LV mass.  4. Aortic valve stenosis is not present.  5. There is global hypokinesis of the left ventricle with minor regional variations. QUANTITATIVE DATA SUMMARY: 2D MEASUREMENTS:                          Normal Ranges: IVSd:          1.42 cm   (0.6-1.1cm) LVPWd:         1.39 cm   (0.6-1.1cm) LVIDd:         4.63 cm   (3.9-5.9cm) LVIDs:         3.63 cm LV Mass Index: 96.5 g/m2 LV % FS        21.6 % LA VOLUME:                               Normal Ranges: LA Vol A4C:        40.0 ml    (22+/-6mL/m2) LA Vol A2C:        35.5 ml LA Vol BP:         37.8 ml LA Vol Index A4C:  14.8ml/m2 LA Vol Index A2C:  13.1 ml/m2 LA Vol Index BP:   14.0 ml/m2 LA Area A4C:       16.7 cm2 LA Area A2C:       15.7 cm2 LA Major Axis A4C: 5.9 cm LA Major Axis A2C: 5.9 cm LA Volume Index:   13.0 ml/m2 LA Vol A4C:        37.0 ml LA Vol A2C:        34.0 ml LV SYSTOLIC FUNCTION BY 2D PLANIMETRY (MOD):                     Normal Ranges: EF-A4C View: 33.9 % (>=55%) EF-A2C View: 47.8 % EF-Biplane:  42.0 % LV DIASTOLIC FUNCTION:                        Normal Ranges: MV Peak E:    0.44 m/s (0.7-1.2 m/s) MV Peak A:    0.51 m/s (0.42-0.7 m/s) E/A Ratio:    0.85     (1.0-2.2) MV e'         0.04 m/s (>8.0) MV lateral e' 0.06 m/s MV medial e'  0.03 m/s E/e' Ratio:   9.92     (<8.0) MITRAL VALVE:                 Normal Ranges: MV DT: 211 msec (150-240msec) AORTIC VALVE:                                   Normal Ranges: AoV Vmax:                0.83 m/s (<=1.7m/s) AoV Peak P.7 mmHg (<20mmHg) AoV Mean P.0 mmHg (1.7-11.5mmHg) LVOT Max Sergio:            0.59 m/s (<=1.1m/s) AoV VTI:                 16.50 cm (18-25cm) LVOT VTI:                12.40 cm LVOT Diameter:           2.50 cm  (1.8-2.4cm) AoV Area, VTI:           3.69 cm2 (2.5-5.5cm2) AoV Area,Vmax:           3.48 cm2 (2.5-4.5cm2) AoV Dimensionless Index: 0.75  RIGHT VENTRICLE: RV Basal 2.97 cm RV Mid   2.58  cm RV Major 8.0 cm TAPSE:   20.3 mm RV s'    0.08 m/s TRICUSPID VALVE/RVSP:                            Normal Ranges: Peak TR Velocity: 0.71 m/s RV Syst Pressure: 5.0 mmHg (< 30mmHg)  [id number] Ramos Post MD Electronically signed on 12/12/2023 at 5:36:23 PM  Wall Scoring  ** Final **     ECG 12 lead    Result Date: 12/12/2023  Normal sinus rhythm Possible Left atrial enlargement ST & T wave abnormality, consider inferior ischemia Abnormal ECG When compared with ECG of 09-AUG-2021 20:45, QRS voltage has decreased Non-specific change in ST segment in Inferior leads ST now depressed in Anterior leads T wave inversion now evident in Inferior leads    ECG 12 lead    Result Date: 12/12/2023  Normal sinus rhythm ST & T wave abnormality, consider inferior ischemia Abnormal ECG When compared with ECG of 09-AUG-2021 20:45, ST now depressed in Inferior leads ST now depressed in Anterior leads T wave inversion now evident in Inferior leads    Cardiac catheterization - coronary    Result Date: 12/12/2023   [facility] [place]      [id number] [id number] Cardiovascular Catheterization Report Patient Name:      DEANNE MAYORGA         Performing           [id number] Kaleb Lim                                        Physician:           MD Study Date:        12/11/2023          Verifying Physician: Jessica Lim MD MRN/PID:           [id number]            Cardiologist: Accession#:        [id number]        Ordering Provider:   Jessica LIM Date of Birth/Age: 1965 / 58      Fellow:                    years Gender:            M                   Fellow: Encounter#:        [id number]  Study: Left Heart Cath  Indications: DEANNE MAYORGA is a 58 year old male who presents with dyslipidemia, obesity, hypertension and a chest pain assessment of typical angina. NSTE - ACS.  Procedure Description: After infiltration with 2% Lidocaine,  the right radial artery was cannulated with a modified Seldinger technique. Subsequently a 6 Croatian sheath was placed in the right radial artery. Selective coronary catheterization was performed using a 5 Fr catheter(s) exchanged over a guide wire to cannulate the coronary arteries. Multiple injections of contrast were made into the left and right coronary arteries with angiograms recorded in multiple projections. After completion of the procedure, the arterial sheath was pulled and a TR Band Radial Compression Device was utilized to obtain patent hemostasis.  Coronary Angiography: The coronary circulation is right dominant.  Left Main Coronary Artery: The left main coronary artery is a normal caliber vessel. The left main arises normally from the left coronary sinus of Valsalva and bifurcates into the LAD and circumflex coronary arteries. The left main coronary artery showed no significant disease or stenosis greater than 30%.  Left Anterior Descending Coronary Artery Distribution: The left anterior descending coronary artery is a normal caliber vessel. The LAD arises normally from the left main coronary artery and gives rise to the 1st diagonal branch. The mid left anterior descending coronary artery showed 100% stenosis. The 1st diagonal branch is a normal caliber vessel. The proximal to mid 1st diagonal branch revealed 95% stenosis.  Circumflex Coronary Artery Distribution: The circumflex coronary artery is a normal caliber vessel. The circumflex arises normally from the left main coronary artery, terminates in the AV groove, giving rise to the first obtuse marginal and second obtuse marginal. LCx occluded after early first OM. OM1 with occluded mid portion with faint left to left collaterals. OM2 ocluded with left to left collaterals.  Right Coronary Artery Distribution: The right coronary artery is a normal caliber vessel. The RCA arises normally from the right sinus of Valsalva, supplies the right  posterolateral descending artery and supplies the posterolateral system. The mid right coronary artery showed 95% stenosis.  Valve Findings: No aortic valve stenosis is visualized.  Coronary Lesion Summary: Vessel         Stenosis    Vessel Segment LAD          100% stenosis       mid 1st Diagonal 95% stenosis  proximal to mid RCA          95% stenosis        mid  Hemo Personnel: +----------------+---------+ Name            Duty      +----------------+---------+ Kaleb Lim MD 1 +----------------+---------+  Hemodynamic Pressures:  +----+---------------+----------+-------------+--------------+-------+---------+ Site   Date Time   Phase NameSystolic mmHgDiastolic mmHgED mmHgMean mmHg +----+---------------+----------+-------------+--------------+-------+---------+        12/11/2023  AIR REST          110             1      8                  12:25:29 PM                                                      +----+---------------+----------+-------------+--------------+-------+---------+   LV     12/11/2023  AIR REST          106             2     11                  12:25:38 PM                                                      +----+---------------+----------+-------------+--------------+-------+---------+  LVp     12/11/2023  AIR REST          105            -2      7                  12:25:44 PM                                                      +----+---------------+----------+-------------+--------------+-------+---------+  AOp     12/11/2023  AIR REST          104             0              85         12:25:51 PM                                                      +----+---------------+----------+-------------+--------------+-------+---------+   AO     12/11/2023  AIR REST          111            62              84         12:26:26 PM                                                       +----+---------------+----------+-------------+--------------+-------+---------+   AO     12/11/2023  AIR REST          119            80              93         12:29:00 PM                                                      +----+---------------+----------+-------------+--------------+-------+---------+  Complications: No in-lab complications observed.  Cardiac Cath Post Procedure Notes: Post Procedure Diagnosis: Triple vessel disease. Blood Loss:               Estimated blood loss during the procedure was 0 mls. Specimens Removed:        Number of specimen(s) removed: none.  Recommendations: Maximize medical therapy. Agressive risk factor modification efforts. Consider referral to cardiac rehabilitation. Coronary artery bypass graft surgery. ____________________________________________________________________________________ CONCLUSIONS:  1. Severe 3 vessel CAD in this right dominant system.  2. Normal LVEDP 11mmHg with no AS.  3. Consider for multivessel CABG. ICD 10 Codes: Non ST elevation (NSTEMI) myocardial infarction-I21.4  CPT Codes: Left Heart Cath (visualization of coronaries) and LV-98326  [id number] Kaleb Lim MD Performing Physician Electronically signed by [id number] Kaleb Lim MD on 12/12/2023 at 1:53:57 PM  ** Final **          Assessment/Plan   Principal Problem:    Coronary artery disease (CAD) excluded  Active Problems:    Type 2 diabetes mellitus, without long-term current use of insulin (CMS/HCC)    HTN (hypertension)    NSTEMI (non-ST elevated myocardial infarction) (CMS/HCC)    Ischemic cardiomyopathy    HLD (hyperlipidemia)    Mr Wen 59 yo M with PMHx HTN,HLD, T2DM, L4-L5 Discitis with epidural abscess  s/p debridement, who was transferred from [facility] to [facility] for CABG evaluation. The patient presented with chest pain radiated to Lt arm that occurred at rest improved with NTG, was found to have NSTEMI at [facility] ED (EKG with inferolateral ischemia), trops 322 --1626, was started on  ASA,Plavix and Heparin gtt for NSTEMI, [facility] with multivessel disease, transferred here for CABG evaluation. Pt currently HDS, pain free, will continue with heparin gtt, ASA, and statin,CT surgery consulted.    Updates 12/16/23   - CABG Wednesday 20th of December  - Endocrine consulted and following recs  - repeat trop tomorrow and decide on whether heparin will not be stopped        #ACS -NSTEMI  #Multivessel CAD   #HTN   #HLD  #ICM   :Trops at [facility] 12/10: 322--1626.   :TTE 12/10: EF 40-45%, no valvular disease, global hypokinesis.      :[facility] 12/11: 100% stenosis in mid LAD, 95% stenosis proximal to mid first diagonal branch, occluded LCx after early first OM, OM1 with occluded mid portion, OM2 occluded, and 95% stenosis mid RCA.  :Pt s/p ASA, Plavix and Heparin gtt since 10/12   :Home Lisniopril 20mg, Atorvastatin 80   -Continue with Heparin gtt, ASA  -Continue with Correg 6.25mg BID  -Continue with Atorvastatin 80mg daily.   -Cardiothoracic surgery consulted for CABG evaluation.   -Will repeat Trops, CBC, CMP   -Will hold ACE/ARBs (Lisinopril) and clopidogrel  i/s/o possible CABG.     #Uncontrolled T2DM   :HgA1c 12/10: 11  :Home Metformin 1000mg  -Holding home meds for now.   Recommendations:  - Increase glargine to 40 units at bedtime  - Start mealtime insulin 6 units of lispro 3 times daily AC  - Continue #2 sliding scale 3 times daily AC  - Accu-Cheks 3 times daily before meals and at bedtime  - Change diet to carb consistent diet 60  - Diabetic education consulted       #Hx of L4-L5 Discitis c/b epidural abscess   :Home Amoxacillin 500mg BID ppx, follow up with ID    - Continue home Amoxicillin 500mg BID         Disposition: Home   Follow-ups: PCP, Cardiology, Endocrine      N: Cradiac  A: PIV  DVT ppx: Heparin gtt  GI ppx: PPI    Code Status: Full code (confirmed on admission)  Surrogate Medical Decision-maker: Babar Chery ([phone])           Lai Calixto MD

## 2023-12-17 LAB
ANION GAP SERPL CALC-SCNC: 12 MMOL/L (ref 10–20)
BASOPHILS # BLD AUTO: 0.04 X10*3/UL (ref 0–0.1)
BASOPHILS NFR BLD AUTO: 0.6 %
BUN SERPL-MCNC: 12 MG/DL (ref 6–23)
CALCIUM SERPL-MCNC: 8.6 MG/DL (ref 8.6–10.6)
CARDIAC TROPONIN I PNL SERPL HS: 979 NG/L (ref 0–53)
CHLORIDE SERPL-SCNC: 104 MMOL/L (ref 98–107)
CO2 SERPL-SCNC: 25 MMOL/L (ref 21–32)
CREAT SERPL-MCNC: 0.94 MG/DL (ref 0.5–1.3)
EOSINOPHIL # BLD AUTO: 0.18 X10*3/UL (ref 0–0.7)
EOSINOPHIL NFR BLD AUTO: 2.5 %
ERYTHROCYTE [DISTWIDTH] IN BLOOD BY AUTOMATED COUNT: 12.1 % (ref 11.5–14.5)
GFR SERPL CREATININE-BSD FRML MDRD: >90 ML/MIN/1.73M*2
GLUCOSE BLD MANUAL STRIP-MCNC: 174 MG/DL (ref 74–99)
GLUCOSE BLD MANUAL STRIP-MCNC: 197 MG/DL (ref 74–99)
GLUCOSE BLD MANUAL STRIP-MCNC: 204 MG/DL (ref 74–99)
GLUCOSE BLD MANUAL STRIP-MCNC: 229 MG/DL (ref 74–99)
GLUCOSE SERPL-MCNC: 191 MG/DL (ref 74–99)
HCT VFR BLD AUTO: 40.3 % (ref 41–52)
HGB BLD-MCNC: 13.8 G/DL (ref 13.5–17.5)
IMM GRANULOCYTES # BLD AUTO: 0.04 X10*3/UL (ref 0–0.7)
IMM GRANULOCYTES NFR BLD AUTO: 0.6 % (ref 0–0.9)
LYMPHOCYTES # BLD AUTO: 2.93 X10*3/UL (ref 1.2–4.8)
LYMPHOCYTES NFR BLD AUTO: 41.5 %
MAGNESIUM SERPL-MCNC: 1.87 MG/DL (ref 1.6–2.4)
MCH RBC QN AUTO: 30.7 PG (ref 26–34)
MCHC RBC AUTO-ENTMCNC: 34.2 G/DL (ref 32–36)
MCV RBC AUTO: 90 FL (ref 80–100)
MONOCYTES # BLD AUTO: 0.46 X10*3/UL (ref 0.1–1)
MONOCYTES NFR BLD AUTO: 6.5 %
NEUTROPHILS # BLD AUTO: 3.41 X10*3/UL (ref 1.2–7.7)
NEUTROPHILS NFR BLD AUTO: 48.3 %
NRBC BLD-RTO: 0 /100 WBCS (ref 0–0)
PHOSPHATE SERPL-MCNC: 3.6 MG/DL (ref 2.5–4.9)
PLATELET # BLD AUTO: 196 X10*3/UL (ref 150–450)
POTASSIUM SERPL-SCNC: 3.8 MMOL/L (ref 3.5–5.3)
RBC # BLD AUTO: 4.5 X10*6/UL (ref 4.5–5.9)
SODIUM SERPL-SCNC: 137 MMOL/L (ref 136–145)
UFH PPP CHRO-ACNC: 0.5 IU/ML
WBC # BLD AUTO: 7.1 X10*3/UL (ref 4.4–11.3)

## 2023-12-17 PROCEDURE — 2500000004 HC RX 250 GENERAL PHARMACY W/ HCPCS (ALT 636 FOR OP/ED)

## 2023-12-17 PROCEDURE — 82947 ASSAY GLUCOSE BLOOD QUANT: CPT

## 2023-12-17 PROCEDURE — 99232 SBSQ HOSP IP/OBS MODERATE 35: CPT | Performed by: INTERNAL MEDICINE

## 2023-12-17 PROCEDURE — 36415 COLL VENOUS BLD VENIPUNCTURE: CPT

## 2023-12-17 PROCEDURE — 84100 ASSAY OF PHOSPHORUS: CPT

## 2023-12-17 PROCEDURE — 2500000001 HC RX 250 WO HCPCS SELF ADMINISTERED DRUGS (ALT 637 FOR MEDICARE OP)

## 2023-12-17 PROCEDURE — 84484 ASSAY OF TROPONIN QUANT: CPT

## 2023-12-17 PROCEDURE — 2500000002 HC RX 250 W HCPCS SELF ADMINISTERED DRUGS (ALT 637 FOR MEDICARE OP, ALT 636 FOR OP/ED)

## 2023-12-17 PROCEDURE — 85025 COMPLETE CBC W/AUTO DIFF WBC: CPT

## 2023-12-17 PROCEDURE — 85520 HEPARIN ASSAY: CPT

## 2023-12-17 PROCEDURE — 99232 SBSQ HOSP IP/OBS MODERATE 35: CPT

## 2023-12-17 PROCEDURE — 83735 ASSAY OF MAGNESIUM: CPT

## 2023-12-17 PROCEDURE — 1200000002 HC GENERAL ROOM WITH TELEMETRY DAILY

## 2023-12-17 PROCEDURE — 80048 BASIC METABOLIC PNL TOTAL CA: CPT

## 2023-12-17 RX ORDER — INSULIN LISPRO 100 [IU]/ML
0-15 INJECTION, SOLUTION INTRAVENOUS; SUBCUTANEOUS
Status: DISCONTINUED | OUTPATIENT
Start: 2023-12-17 | End: 2023-12-20

## 2023-12-17 RX ORDER — INSULIN LISPRO 100 [IU]/ML
16 INJECTION, SOLUTION INTRAVENOUS; SUBCUTANEOUS
Status: DISCONTINUED | OUTPATIENT
Start: 2023-12-18 | End: 2023-12-18

## 2023-12-17 RX ORDER — INSULIN LISPRO 100 [IU]/ML
14 INJECTION, SOLUTION INTRAVENOUS; SUBCUTANEOUS
Status: DISCONTINUED | OUTPATIENT
Start: 2023-12-17 | End: 2023-12-18

## 2023-12-17 RX ADMIN — AMOXICILLIN 500 MG: 500 CAPSULE ORAL at 08:42

## 2023-12-17 RX ADMIN — AMOXICILLIN 500 MG: 500 CAPSULE ORAL at 20:59

## 2023-12-17 RX ADMIN — ASPIRIN 81 MG CHEWABLE TABLET 81 MG: 81 TABLET CHEWABLE at 08:41

## 2023-12-17 RX ADMIN — INSULIN GLARGINE 52 UNITS: 100 INJECTION, SOLUTION SUBCUTANEOUS at 21:00

## 2023-12-17 RX ADMIN — ATORVASTATIN CALCIUM 80 MG: 80 TABLET, FILM COATED ORAL at 20:58

## 2023-12-17 RX ADMIN — CARVEDILOL 6.25 MG: 6.25 TABLET, FILM COATED ORAL at 18:29

## 2023-12-17 RX ADMIN — CARVEDILOL 6.25 MG: 6.25 TABLET, FILM COATED ORAL at 08:41

## 2023-12-17 RX ADMIN — INSULIN LISPRO 14 UNITS: 100 INJECTION, SOLUTION INTRAVENOUS; SUBCUTANEOUS at 13:36

## 2023-12-17 RX ADMIN — INSULIN LISPRO 3 UNITS: 100 INJECTION, SOLUTION INTRAVENOUS; SUBCUTANEOUS at 18:30

## 2023-12-17 RX ADMIN — MUPIROCIN 0.5 APPLICATION: 20 OINTMENT TOPICAL at 08:42

## 2023-12-17 RX ADMIN — MUPIROCIN 0.5 APPLICATION: 20 OINTMENT TOPICAL at 21:00

## 2023-12-17 RX ADMIN — INSULIN LISPRO 4 UNITS: 100 INJECTION, SOLUTION INTRAVENOUS; SUBCUTANEOUS at 13:36

## 2023-12-17 RX ADMIN — INSULIN LISPRO 14 UNITS: 100 INJECTION, SOLUTION INTRAVENOUS; SUBCUTANEOUS at 10:27

## 2023-12-17 RX ADMIN — PANTOPRAZOLE SODIUM 40 MG: 40 TABLET, DELAYED RELEASE ORAL at 06:57

## 2023-12-17 RX ADMIN — PREGABALIN 75 MG: 75 CAPSULE ORAL at 08:41

## 2023-12-17 RX ADMIN — INSULIN LISPRO 14 UNITS: 100 INJECTION, SOLUTION INTRAVENOUS; SUBCUTANEOUS at 18:29

## 2023-12-17 RX ADMIN — HEPARIN SODIUM 2300 UNITS/HR: 10000 INJECTION, SOLUTION INTRAVENOUS at 06:57

## 2023-12-17 RX ADMIN — INSULIN LISPRO 2 UNITS: 100 INJECTION, SOLUTION INTRAVENOUS; SUBCUTANEOUS at 10:29

## 2023-12-17 ASSESSMENT — COGNITIVE AND FUNCTIONAL STATUS - GENERAL
MOBILITY SCORE: 24
DAILY ACTIVITIY SCORE: 24

## 2023-12-17 ASSESSMENT — PAIN - FUNCTIONAL ASSESSMENT
PAIN_FUNCTIONAL_ASSESSMENT: 0-10

## 2023-12-17 ASSESSMENT — PAIN SCALES - GENERAL
PAINLEVEL_OUTOF10: 0 - NO PAIN

## 2023-12-17 NOTE — PROGRESS NOTES
"Behzad Diaz is a 58 y.o. male on day 5 of admission presenting with Coronary artery disease (CAD) excluded.    Subjective   Patient was seen and examined this morning.  Had no major complaints, appetite is good.       Objective   Constitutional: NAD, obese, AOx3. Cooperative  HEENT: EOMI, Anicteric scleras   Neck: Soft, supple   Cardiovascular: normal HR  Respiratory: no increased wob,  or accessory muscle use.    Abdomen: soft, obesely distended  Extremities: Trace lower extremity swelling  Psych : appropriate affect    Last Recorded Vitals  Blood pressure 95/60, pulse 76, temperature 35.9 °C (96.6 °F), temperature source Temporal, resp. rate 16, height 1.854 m (6' 0.99\"), weight (!) 156 kg (343 lb 2.3 oz), SpO2 96 %.  Intake/Output last 3 Shifts:  I/O last 3 completed shifts:  In: 2995.5 (19.2 mL/kg) [P.O.:1750; I.V.:1245.5 (8 mL/kg)]  Out: 1200 (7.7 mL/kg) [Urine:1200 (0.2 mL/kg/hr)]  Weight: 155.6 kg     Relevant Results  Results from last 7 days   Lab Units 12/17/23  1324 12/17/23  1019 12/17/23  0649 12/16/23  2151 12/16/23  1822 12/16/23  1258 12/16/23  0814 12/16/23  0552 12/15/23  0750 12/15/23  0454 12/14/23  0624 12/14/23  0335 12/13/23  0757 12/13/23  0708   POCT GLUCOSE mg/dL 204* 174*  --  239* 197* 238*   < >  --    < >  --    < >  --    < >  --    GLUCOSE mg/dL  --   --  191*  --   --   --   --  183*  --  200*  --  215*  --  271*    < > = values in this interval not displayed.     Results for orders placed or performed during the hospital encounter of 12/12/23 (from the past 24 hour(s))   POCT GLUCOSE   Result Value Ref Range    POCT Glucose 197 (H) 74 - 99 mg/dL   POCT GLUCOSE   Result Value Ref Range    POCT Glucose 239 (H) 74 - 99 mg/dL   CBC and Auto Differential   Result Value Ref Range    WBC 7.1 4.4 - 11.3 x10*3/uL    nRBC 0.0 0.0 - 0.0 /100 WBCs    RBC 4.50 4.50 - 5.90 x10*6/uL    Hemoglobin 13.8 13.5 - 17.5 g/dL    Hematocrit 40.3 (L) 41.0 - 52.0 %    MCV 90 80 - 100 fL    MCH 30.7 26.0 - " 34.0 pg    MCHC 34.2 32.0 - 36.0 g/dL    RDW 12.1 11.5 - 14.5 %    Platelets 196 150 - 450 x10*3/uL    Neutrophils % 48.3 40.0 - 80.0 %    Immature Granulocytes %, Automated 0.6 0.0 - 0.9 %    Lymphocytes % 41.5 13.0 - 44.0 %    Monocytes % 6.5 2.0 - 10.0 %    Eosinophils % 2.5 0.0 - 6.0 %    Basophils % 0.6 0.0 - 2.0 %    Neutrophils Absolute 3.41 1.20 - 7.70 x10*3/uL    Immature Granulocytes Absolute, Automated 0.04 0.00 - 0.70 x10*3/uL    Lymphocytes Absolute 2.93 1.20 - 4.80 x10*3/uL    Monocytes Absolute 0.46 0.10 - 1.00 x10*3/uL    Eosinophils Absolute 0.18 0.00 - 0.70 x10*3/uL    Basophils Absolute 0.04 0.00 - 0.10 x10*3/uL   Basic Metabolic Panel   Result Value Ref Range    Glucose 191 (H) 74 - 99 mg/dL    Sodium 137 136 - 145 mmol/L    Potassium 3.8 3.5 - 5.3 mmol/L    Chloride 104 98 - 107 mmol/L    Bicarbonate 25 21 - 32 mmol/L    Anion Gap 12 10 - 20 mmol/L    Urea Nitrogen 12 6 - 23 mg/dL    Creatinine 0.94 0.50 - 1.30 mg/dL    eGFR >90 >60 mL/min/1.73m*2    Calcium 8.6 8.6 - 10.6 mg/dL   Magnesium   Result Value Ref Range    Magnesium 1.87 1.60 - 2.40 mg/dL   Phosphorus   Result Value Ref Range    Phosphorus 3.6 2.5 - 4.9 mg/dL   Troponin I, High Sensitivity   Result Value Ref Range    Troponin I, High Sensitivity 979 (HH) 0 - 53 ng/L   Heparin Assay   Result Value Ref Range    Heparin Unfractionated 0.5 See Comment Below for Therapeutic Ranges IU/mL   POCT GLUCOSE   Result Value Ref Range    POCT Glucose 174 (H) 74 - 99 mg/dL   POCT GLUCOSE   Result Value Ref Range    POCT Glucose 204 (H) 74 - 99 mg/dL                   Assessment/Plan   Principal Problem:    Coronary artery disease (CAD) excluded  Active Problems:    Type 2 diabetes mellitus, without long-term current use of insulin (CMS/[facility])    HTN (hypertension)    NSTEMI (non-ST elevated myocardial infarction) (CMS/HCC)    Ischemic cardiomyopathy    HLD (hyperlipidemia)    Behzad Diaz is a 58 y.o. male with HTN, HLD, poorly controlled DM2, obesity  class III, CAD, and history of discitis and epidural abscess s/p debridement who presents to [facility] for CABG evaluation, plan for 12/20.     Endocrinology is consulted to assist in diabetes management.  Diabetes history:  Diabetes duration: 4 to 5 years           Home blood glucose checks: Patient does not check his BG at home  Hypoglycemia (y/n, threshold and symptoms): No hypoglycemia  Home diabetes regimen: Metformin 1000 mg  BID   Home diet (how many meals a day): Patient reports poor diet as he is mostly eating out because of his work  Outpatient physician managing diabetes: PCP  Macrovascular complications: CVD [yes]     CVA [no]     PVD [no]  Microvascular complications: Retinopathy [unknown, patient has not had a retinal exam recently]            neuropathy [endorses neuropathy but attributes this to prior spinal surgery]   nephropathy [no microalbumin to creatinine ratio on chart, creatinine 0.7]  Last A1c: 11%  Inpatient diabetes regimen: Initially Lantus 30, #2 sliding scale  Inpatient diet: Cardiac diet     Continues to be hyperglycemic.       Recommendations:  - Continue with glargine to 52 units at bedtime  - Increase lispro with meals to 16/16/14 with breakfast lunch and dinner respectively  - Increase sliding scale insulin to #3 [3 units for every 50 above 150] 3 times daily AC  - Accu-Cheks 3 times daily before meals and at bedtime  - Carb consistent diet 60  - Hypoglycemia protocol  -We will follow and adjust regimen accordingly     Plan was communicated to the primary team over the phone earlier today.     Case discussed with Dr. Liz

## 2023-12-17 NOTE — PROGRESS NOTES
"Behzad Diaz is a 58 y.o. male on day 5 of admission presenting with Coronary artery disease (CAD) excluded.    Subjective   No acute events overnight. Patient denied, nausea, vomiting, fever, and cough. Patient denies chest pain, dyspnea, and palpitations.         Objective     Physical Exam  Constitutional: Well-developed male in no acute distress.  HEENT: Normocephalic, atraumatic. PERRL. EOMI. No cervical lymphadenopathy.  Respiratory: CTA bilaterally. No wheezes, rales, or rhonchi. Normal respiratory effort.  Cardiovascular: RRR. No murmurs, gallops, or rubs. No JVD. Radial pulses 2+.  Abdominal: Soft, nondistended, nontender to palpation. Bowel sounds present. No hepatosplenomegaly or masses. No CVA tenderness. Significantly Obese patient.  Neuro: CN II-XII intact. UE and LE strength 5/5 bilaterally and sensation intact. Normal FTN testing.  MSK: No LE edema bilaterally.  Skin: Warm, dry. No rashes or wounds.  Psych: Appropriate mood and affect.    Last Recorded Vitals  Blood pressure 112/70, pulse 66, temperature 35.5 °C (95.9 °F), resp. rate 14, height 1.854 m (6' 0.99\"), weight (!) 156 kg (343 lb 2.3 oz), SpO2 98 %.  Intake/Output last 3 Shifts:  I/O last 3 completed shifts:  In: 2995.5 (19.2 mL/kg) [P.O.:1750; I.V.:1245.5 (8 mL/kg)]  Out: 1200 (7.7 mL/kg) [Urine:1200 (0.2 mL/kg/hr)]  Weight: 155.6 kg     Relevant Results              Active Medications  Scheduled medications  amoxicillin, 500 mg, oral, q12h ISAIAH  aspirin, 81 mg, oral, Daily  atorvastatin, 80 mg, oral, Nightly  carvedilol, 6.25 mg, oral, BID with meals  [START ON 12/19/2023] chlorhexidine, 15 mL, Mouth/Throat, BID  insulin glargine, 52 Units, subcutaneous, Nightly  insulin lispro, 0-10 Units, subcutaneous, Before meals & nightly  insulin lispro, 10 Units, subcutaneous, Daily before evening meal  insulin lispro, 14 Units, subcutaneous, Daily with breakfast  insulin lispro, 14 Units, subcutaneous, Daily with lunch  mupirocin, 0.5 Application, " Topical, BID  pantoprazole, 40 mg, oral, Daily before breakfast   Or  pantoprazole, 40 mg, intravenous, Daily before breakfast  polyethylene glycol, 17 g, oral, Daily  pregabalin, 75 mg, oral, Daily      Continuous medications  heparin, 0-4,000 Units/hr, Last Rate: 2,300 Units/hr (12/17/23 0657)      PRN medications  PRN medications: acetaminophen **OR** acetaminophen **OR** acetaminophen, dextrose 10 % in water (D10W), dextrose, glucagon     Recent Labs  Results for orders placed or performed during the hospital encounter of 12/12/23 (from the past 24 hour(s))   POCT GLUCOSE   Result Value Ref Range    POCT Glucose 238 (H) 74 - 99 mg/dL   POCT GLUCOSE   Result Value Ref Range    POCT Glucose 197 (H) 74 - 99 mg/dL   POCT GLUCOSE   Result Value Ref Range    POCT Glucose 239 (H) 74 - 99 mg/dL   CBC and Auto Differential   Result Value Ref Range    WBC 7.1 4.4 - 11.3 x10*3/uL    nRBC 0.0 0.0 - 0.0 /100 WBCs    RBC 4.50 4.50 - 5.90 x10*6/uL    Hemoglobin 13.8 13.5 - 17.5 g/dL    Hematocrit 40.3 (L) 41.0 - 52.0 %    MCV 90 80 - 100 fL    MCH 30.7 26.0 - 34.0 pg    MCHC 34.2 32.0 - 36.0 g/dL    RDW 12.1 11.5 - 14.5 %    Platelets 196 150 - 450 x10*3/uL    Neutrophils % 48.3 40.0 - 80.0 %    Immature Granulocytes %, Automated 0.6 0.0 - 0.9 %    Lymphocytes % 41.5 13.0 - 44.0 %    Monocytes % 6.5 2.0 - 10.0 %    Eosinophils % 2.5 0.0 - 6.0 %    Basophils % 0.6 0.0 - 2.0 %    Neutrophils Absolute 3.41 1.20 - 7.70 x10*3/uL    Immature Granulocytes Absolute, Automated 0.04 0.00 - 0.70 x10*3/uL    Lymphocytes Absolute 2.93 1.20 - 4.80 x10*3/uL    Monocytes Absolute 0.46 0.10 - 1.00 x10*3/uL    Eosinophils Absolute 0.18 0.00 - 0.70 x10*3/uL    Basophils Absolute 0.04 0.00 - 0.10 x10*3/uL   Basic Metabolic Panel   Result Value Ref Range    Glucose 191 (H) 74 - 99 mg/dL    Sodium 137 136 - 145 mmol/L    Potassium 3.8 3.5 - 5.3 mmol/L    Chloride 104 98 - 107 mmol/L    Bicarbonate 25 21 - 32 mmol/L    Anion Gap 12 10 - 20 mmol/L     Urea Nitrogen 12 6 - 23 mg/dL    Creatinine 0.94 0.50 - 1.30 mg/dL    eGFR >90 >60 mL/min/1.73m*2    Calcium 8.6 8.6 - 10.6 mg/dL   Magnesium   Result Value Ref Range    Magnesium 1.87 1.60 - 2.40 mg/dL   Phosphorus   Result Value Ref Range    Phosphorus 3.6 2.5 - 4.9 mg/dL   Troponin I, High Sensitivity   Result Value Ref Range    Troponin I, High Sensitivity 979 (HH) 0 - 53 ng/L   Heparin Assay   Result Value Ref Range    Heparin Unfractionated 0.5 See Comment Below for Therapeutic Ranges IU/mL       Imaging  Transthoracic Echo (TTE) Complete    Result Date: 12/12/2023            [facility] [address] [address] [address], [place] [address]    Tel [phone] Fax [phone] TRANSTHORACIC ECHOCARDIOGRAM REPORT  Patient Name:     DEANNE BANERJEELEIGHANN Oropeza Physician:  Phillip Post MD Study Date:       12/11/2023          Ordering Provider:  [id number] POST MRN/PID:          [id number]            Fellow: Accession#:       [id number]        Nurse: Date of           1965 / 58      Sonographer:        Erika Maldonado RDCS Birth/Age:        years Gender:           M                   Additional Staff: Height:           182.88 cm           Admit Date:         12/9/2023 Weight:           158.76 kg           Admission Status:   Inpatient - Routine BSA:              2.70 m2             Department          [facility] CC[facility]                                       Location: Blood Pressure: 138 /83 mmHg Study Type:    TRANSTHORACIC ECHO (TTE) COMPLETE Diagnosis/ICD: Non ST elevation (NSTEMI) myocardial infarction-I21.4 Indication:    NSTEMI CPT Codes:     Echo Complete w Full Doppler-40909  Study Detail: The following Echo studies were performed: 2D, M-Mode, Doppler and               color flow. Technically challenging study due to poor acoustic               windows and body habitus. Definity used  as a contrast agent for               endocardial border definition. Total contrast used for this               procedure was 2 mL via IV push.  PHYSICIAN INTERPRETATION: Left Ventricle: Left ventricular systolic function is mildly decreased, with an estimated ejection fraction of 40-45%. Estimated left ventricular mass is increased. There is global hypokinesis of the left ventricle with minor regional variations. The left ventricular cavity size is upper limits of normal. The left ventricular septal wall thickness is mildly increased. There is mildly increased left ventricular posterior wall thickness. Left ventricular diastolic filling was indeterminate. There are normal left ventricular filling pressures. LV Wall Scoring: There is diffuse hypokinesis. Left Atrium: The left atrium is upper limits of normal in size. Right Ventricle: The right ventricle is normal in size. There is normal right ventricular global systolic function. Right Atrium: The right atrium is normal in size. Aortic Valve: The aortic valve is trileaflet. There is no evidence of aortic valve stenosis. There is no evidence of aortic valve regurgitation. The peak instantaneous gradient of the aortic valve is 2.7 mmHg. The mean gradient of the aortic valve is 2.0 mmHg. Mitral Valve: The mitral valve is normal in structure. There is trace mitral valve regurgitation. Tricuspid Valve: The tricuspid valve is structurally normal. There is trace tricuspid regurgitation. The right ventricular systolic pressure is unable to be estimated. Pulmonic Valve: The pulmonic valve was not assessed. The pulmonic valve regurgitation was not assessed. Pericardium: There is no pericardial effusion noted. Aorta: The aortic root is normal. Systemic Veins: The inferior vena cava appears to be of normal size. The hepatic vein appears to be of normal size. There is IVC inspiratory collapse greater than 50%.  CONCLUSIONS:  1. Left ventricular systolic function is mildly  decreased with a 40-45% estimated ejection fraction.  2. Multiple segmental abnormalities exist. See findings.  3. Increased LV mass.  4. Aortic valve stenosis is not present.  5. There is global hypokinesis of the left ventricle with minor regional variations. QUANTITATIVE DATA SUMMARY: 2D MEASUREMENTS:                          Normal Ranges: IVSd:          1.42 cm   (0.6-1.1cm) LVPWd:         1.39 cm   (0.6-1.1cm) LVIDd:         4.63 cm   (3.9-5.9cm) LVIDs:         3.63 cm LV Mass Index: 96.5 g/m2 LV % FS        21.6 % LA VOLUME:                               Normal Ranges: LA Vol A4C:        40.0 ml    (22+/-6mL/m2) LA Vol A2C:        35.5 ml LA Vol BP:         37.8 ml LA Vol Index A4C:  14.8ml/m2 LA Vol Index A2C:  13.1 ml/m2 LA Vol Index BP:   14.0 ml/m2 LA Area A4C:       16.7 cm2 LA Area A2C:       15.7 cm2 LA Major Axis A4C: 5.9 cm LA Major Axis A2C: 5.9 cm LA Volume Index:   13.0 ml/m2 LA Vol A4C:        37.0 ml LA Vol A2C:        34.0 ml LV SYSTOLIC FUNCTION BY 2D PLANIMETRY (MOD):                     Normal Ranges: EF-A4C View: 33.9 % (>=55%) EF-A2C View: 47.8 % EF-Biplane:  42.0 % LV DIASTOLIC FUNCTION:                        Normal Ranges: MV Peak E:    0.44 m/s (0.7-1.2 m/s) MV Peak A:    0.51 m/s (0.42-0.7 m/s) E/A Ratio:    0.85     (1.0-2.2) MV e'         0.04 m/s (>8.0) MV lateral e' 0.06 m/s MV medial e'  0.03 m/s E/e' Ratio:   9.92     (<8.0) MITRAL VALVE:                 Normal Ranges: MV DT: 211 msec (150-240msec) AORTIC VALVE:                                   Normal Ranges: AoV Vmax:                0.83 m/s (<=1.7m/s) AoV Peak P.7 mmHg (<20mmHg) AoV Mean P.0 mmHg (1.7-11.5mmHg) LVOT Max Sergio:            0.59 m/s (<=1.1m/s) AoV VTI:                 16.50 cm (18-25cm) LVOT VTI:                12.40 cm LVOT Diameter:           2.50 cm  (1.8-2.4cm) AoV Area, VTI:           3.69 cm2 (2.5-5.5cm2) AoV Area,Vmax:           3.48 cm2 (2.5-4.5cm2) AoV Dimensionless Index:  0.75  RIGHT VENTRICLE: RV Basal 2.97 cm RV Mid   2.58 cm RV Major 8.0 cm TAPSE:   20.3 mm RV s'    0.08 m/s TRICUSPID VALVE/RVSP:                            Normal Ranges: Peak TR Velocity: 0.71 m/s RV Syst Pressure: 5.0 mmHg (< 30mmHg)  [id number] Ramos Post MD Electronically signed on 12/12/2023 at 5:36:23 PM  Wall Scoring  ** Final **     ECG 12 lead    Result Date: 12/12/2023  Normal sinus rhythm Possible Left atrial enlargement ST & T wave abnormality, consider inferior ischemia Abnormal ECG When compared with ECG of 09-AUG-2021 20:45, QRS voltage has decreased Non-specific change in ST segment in Inferior leads ST now depressed in Anterior leads T wave inversion now evident in Inferior leads    ECG 12 lead    Result Date: 12/12/2023  Normal sinus rhythm ST & T wave abnormality, consider inferior ischemia Abnormal ECG When compared with ECG of 09-AUG-2021 20:45, ST now depressed in Inferior leads ST now depressed in Anterior leads T wave inversion now evident in Inferior leads    Cardiac catheterization - coronary    Result Date: 12/12/2023   [facility], Cath Lab      [address] [place] [address] Cardiovascular Catheterization Report Patient Name:      DEANNE MAYORGA         Performing           [id number] Kaleb Lim                                        Physician:           MD Study Date:        12/11/2023          Verifying Physician: Jessica Lim MD MRN/PID:           [id number]            Cardiologist: Accession#:        [id number]        Ordering Provider:   [id number] LIM Date of Birth/Age: 1965 / 58      Fellow:                    years Gender:            M                   Fellow: Encounter#:        [id number]  Study: Left Heart Cath  Indications: DEANNE MAYORGA is a 58 year old male who presents with dyslipidemia, obesity, hypertension and a chest pain assessment of typical angina. NSTE - ACS.   Procedure Description: After infiltration with 2% Lidocaine, the right radial artery was cannulated with a modified Seldinger technique. Subsequently a 6 Latvian sheath was placed in the right radial artery. Selective coronary catheterization was performed using a 5 Fr catheter(s) exchanged over a guide wire to cannulate the coronary arteries. Multiple injections of contrast were made into the left and right coronary arteries with angiograms recorded in multiple projections. After completion of the procedure, the arterial sheath was pulled and a TR Band Radial Compression Device was utilized to obtain patent hemostasis.  Coronary Angiography: The coronary circulation is right dominant.  Left Main Coronary Artery: The left main coronary artery is a normal caliber vessel. The left main arises normally from the left coronary sinus of Valsalva and bifurcates into the LAD and circumflex coronary arteries. The left main coronary artery showed no significant disease or stenosis greater than 30%.  Left Anterior Descending Coronary Artery Distribution: The left anterior descending coronary artery is a normal caliber vessel. The LAD arises normally from the left main coronary artery and gives rise to the 1st diagonal branch. The mid left anterior descending coronary artery showed 100% stenosis. The 1st diagonal branch is a normal caliber vessel. The proximal to mid 1st diagonal branch revealed 95% stenosis.  Circumflex Coronary Artery Distribution: The circumflex coronary artery is a normal caliber vessel. The circumflex arises normally from the left main coronary artery, terminates in the AV groove, giving rise to the first obtuse marginal and second obtuse marginal. LCx occluded after early first OM. OM1 with occluded mid portion with faint left to left collaterals. OM2 ocluded with left to left collaterals.  Right Coronary Artery Distribution: The right coronary artery is a normal caliber vessel. The RCA arises normally  from the right sinus of Valsalva, supplies the right posterolateral descending artery and supplies the posterolateral system. The mid right coronary artery showed 95% stenosis.  Valve Findings: No aortic valve stenosis is visualized.  Coronary Lesion Summary: Vessel         Stenosis    Vessel Segment LAD          100% stenosis       mid 1st Diagonal 95% stenosis  proximal to mid RCA          95% stenosis        mid  Hemo Personnel: +----------------+---------+ Name            Duty      +----------------+---------+ Kaleb Lim MD 1 +----------------+---------+  Hemodynamic Pressures:  +----+---------------+----------+-------------+--------------+-------+---------+ Site   Date Time   Phase NameSystolic mmHgDiastolic mmHgED mmHgMean mmHg +----+---------------+----------+-------------+--------------+-------+---------+        12/11/2023  AIR REST          110             1      8                  12:25:29 PM                                                      +----+---------------+----------+-------------+--------------+-------+---------+   LV     12/11/2023  AIR REST          106             2     11                  12:25:38 PM                                                      +----+---------------+----------+-------------+--------------+-------+---------+  LVp     12/11/2023  AIR REST          105            -2      7                  12:25:44 PM                                                      +----+---------------+----------+-------------+--------------+-------+---------+  AOp     12/11/2023  AIR REST          104             0              85         12:25:51 PM                                                      +----+---------------+----------+-------------+--------------+-------+---------+   AO     12/11/2023  AIR REST          111            62              84         12:26:26 PM                                                       +----+---------------+----------+-------------+--------------+-------+---------+   AO     12/11/2023  AIR REST          119            80              93         12:29:00 PM                                                      +----+---------------+----------+-------------+--------------+-------+---------+  Complications: No in-lab complications observed.  Cardiac Cath Post Procedure Notes: Post Procedure Diagnosis: Triple vessel disease. Blood Loss:               Estimated blood loss during the procedure was 0 mls. Specimens Removed:        Number of specimen(s) removed: none.  Recommendations: Maximize medical therapy. Agressive risk factor modification efforts. Consider referral to cardiac rehabilitation. Coronary artery bypass graft surgery. ____________________________________________________________________________________ CONCLUSIONS:  1. Severe 3 vessel CAD in this right dominant system.  2. Normal LVEDP 11mmHg with no AS.  3. Consider for multivessel CABG. ICD 10 Codes: Non ST elevation (NSTEMI) myocardial infarction-I21.4  CPT Codes: Left Heart Cath (visualization of coronaries) and LV-27281  [id number] Kaleb Lim MD Performing Physician Electronically signed by [id number] Kaleb Lim MD on 12/12/2023 at 1:53:57 PM  ** Final **          Assessment/Plan   Principal Problem:    Coronary artery disease (CAD) excluded  Active Problems:    Type 2 diabetes mellitus, without long-term current use of insulin (CMS/HCC)    HTN (hypertension)    NSTEMI (non-ST elevated myocardial infarction) (CMS/HCC)    Ischemic cardiomyopathy    HLD (hyperlipidemia)    Mr Wen 57 yo M with PMHx HTN,HLD, T2DM, L4-L5 Discitis with epidural abscess  s/p debridement, who was transferred from [facility] to [facility] for CABG evaluation. The patient presented with chest pain radiated to Lt arm that occurred at rest improved with NTG, was found to have NSTEMI at [facility] ED (EKG with inferolateral ischemia),  "trops 322 --1626, was started on ASA,Plavix and Heparin gtt for NSTEMI, [facility] with multivessel disease, transferred here for CABG evaluation. Pt currently HDS, pain free, will continue with heparin gtt, ASA, and statin,CT surgery consulted.    Updates 12/17/23   - CABG Wednesday 20th of December  - Endocrine consulted and following recs: Increase glargine to 52 units at bedtime as of tonight, Increase lispro with meals to 14/14/10 with breakfast lunch and dinner respectively  - repeat trop: 979 ; Heparin will be stopped.        #ACS -NSTEMI  #Multivessel CAD   #HTN   #HLD  #ICM   :Trops at [facility] 12/10: 322--1626.   :TTE 12/10: EF 40-45%, no valvular disease, global hypokinesis.      :[facility] 12/11: 100% stenosis in mid LAD, 95% stenosis proximal to mid first diagonal branch, occluded LCx after early first OM, OM1 with occluded mid portion, OM2 occluded, and 95% stenosis mid RCA.  :Pt s/p ASA, Plavix and Heparin gtt since 10/12   :Home Lisniopril 20mg, Atorvastatin 80   -Continue with, ASA  - Dc Heparin drip for >48hrs , drop in trops and no evidence of \" soft thrombus\"  -Continue with Correg 6.25mg BID  -Continue with Atorvastatin 80mg daily.   -Cardiothoracic surgery consulted for CABG evaluation.   -Will hold ACE/ARBs (Lisinopril) and clopidogrel  i/s/o possible CABG.     #Uncontrolled T2DM   :HgA1c 12/10: 11  :Home Metformin 1000mg  -Holding home meds for now.   Recommendations:  - Increase glargine to 52 units at bedtime as of tonight  - Increase lispro with meals to 14/14/10 with breakfast lunch and dinner respectively  - Continue #2 sliding scale 3 times daily AC  - Accu-Cheks 3 times daily before meals and at bedtime  - Carb consistent diet 60  - Hypoglycemia protocol       #Hx of L4-L5 Discitis c/b epidural abscess   :Home Amoxacillin 500mg BID ppx, follow up with ID    - Continue home Amoxicillin 500mg BID         Disposition: Home   Follow-ups: PCP, Cardiology, Endocrine      N: Cradiac  A: PIV  DVT ppx: Heparin " gtt  GI ppx: PPI    Code Status: Full code (confirmed on admission)  Surrogate Medical Decision-maker: Babar Chery ([phone])           Lai Calixto MD

## 2023-12-18 LAB
ABO GROUP (TYPE) IN BLOOD: NORMAL
ANION GAP SERPL CALC-SCNC: 14 MMOL/L (ref 10–20)
ANTIBODY SCREEN: NORMAL
ATRIAL RATE: 68 BPM
BASOPHILS # BLD AUTO: 0.03 X10*3/UL (ref 0–0.1)
BASOPHILS NFR BLD AUTO: 0.5 %
BUN SERPL-MCNC: 11 MG/DL (ref 6–23)
CALCIUM SERPL-MCNC: 9.1 MG/DL (ref 8.6–10.6)
CHLORIDE SERPL-SCNC: 102 MMOL/L (ref 98–107)
CO2 SERPL-SCNC: 27 MMOL/L (ref 21–32)
CREAT SERPL-MCNC: 0.98 MG/DL (ref 0.5–1.3)
EOSINOPHIL # BLD AUTO: 0.17 X10*3/UL (ref 0–0.7)
EOSINOPHIL NFR BLD AUTO: 2.7 %
ERYTHROCYTE [DISTWIDTH] IN BLOOD BY AUTOMATED COUNT: 12.5 % (ref 11.5–14.5)
GFR SERPL CREATININE-BSD FRML MDRD: >90 ML/MIN/1.73M*2
GLUCOSE BLD MANUAL STRIP-MCNC: 147 MG/DL (ref 74–99)
GLUCOSE BLD MANUAL STRIP-MCNC: 152 MG/DL (ref 74–99)
GLUCOSE BLD MANUAL STRIP-MCNC: 152 MG/DL (ref 74–99)
GLUCOSE BLD MANUAL STRIP-MCNC: 172 MG/DL (ref 74–99)
GLUCOSE SERPL-MCNC: 215 MG/DL (ref 74–99)
HCT VFR BLD AUTO: 43.6 % (ref 41–52)
HGB BLD-MCNC: 14.5 G/DL (ref 13.5–17.5)
IMM GRANULOCYTES # BLD AUTO: 0.01 X10*3/UL (ref 0–0.7)
IMM GRANULOCYTES NFR BLD AUTO: 0.2 % (ref 0–0.9)
LYMPHOCYTES # BLD AUTO: 2.12 X10*3/UL (ref 1.2–4.8)
LYMPHOCYTES NFR BLD AUTO: 34 %
MAGNESIUM SERPL-MCNC: 1.9 MG/DL (ref 1.6–2.4)
MCH RBC QN AUTO: 30.3 PG (ref 26–34)
MCHC RBC AUTO-ENTMCNC: 33.3 G/DL (ref 32–36)
MCV RBC AUTO: 91 FL (ref 80–100)
MONOCYTES # BLD AUTO: 0.33 X10*3/UL (ref 0.1–1)
MONOCYTES NFR BLD AUTO: 5.3 %
NEUTROPHILS # BLD AUTO: 3.58 X10*3/UL (ref 1.2–7.7)
NEUTROPHILS NFR BLD AUTO: 57.3 %
NRBC BLD-RTO: 0 /100 WBCS (ref 0–0)
P AXIS: 59 DEGREES
P OFFSET: 189 MS
P ONSET: 131 MS
PHOSPHATE SERPL-MCNC: 3.1 MG/DL (ref 2.5–4.9)
PLATELET # BLD AUTO: 204 X10*3/UL (ref 150–450)
POTASSIUM SERPL-SCNC: 4.7 MMOL/L (ref 3.5–5.3)
PR INTERVAL: 190 MS
Q ONSET: 226 MS
QRS COUNT: 12 BEATS
QRS DURATION: 94 MS
QT INTERVAL: 410 MS
QTC CALCULATION(BAZETT): 435 MS
QTC FREDERICIA: 427 MS
R AXIS: 19 DEGREES
RBC # BLD AUTO: 4.78 X10*6/UL (ref 4.5–5.9)
RH FACTOR (ANTIGEN D): NORMAL
SODIUM SERPL-SCNC: 138 MMOL/L (ref 136–145)
T AXIS: 266 DEGREES
T OFFSET: 431 MS
VENTRICULAR RATE: 68 BPM
WBC # BLD AUTO: 6.2 X10*3/UL (ref 4.4–11.3)

## 2023-12-18 PROCEDURE — 83735 ASSAY OF MAGNESIUM: CPT

## 2023-12-18 PROCEDURE — 82947 ASSAY GLUCOSE BLOOD QUANT: CPT

## 2023-12-18 PROCEDURE — 99232 SBSQ HOSP IP/OBS MODERATE 35: CPT

## 2023-12-18 PROCEDURE — 2500000004 HC RX 250 GENERAL PHARMACY W/ HCPCS (ALT 636 FOR OP/ED)

## 2023-12-18 PROCEDURE — 84100 ASSAY OF PHOSPHORUS: CPT

## 2023-12-18 PROCEDURE — 99232 SBSQ HOSP IP/OBS MODERATE 35: CPT | Performed by: STUDENT IN AN ORGANIZED HEALTH CARE EDUCATION/TRAINING PROGRAM

## 2023-12-18 PROCEDURE — 36415 COLL VENOUS BLD VENIPUNCTURE: CPT

## 2023-12-18 PROCEDURE — 85025 COMPLETE CBC W/AUTO DIFF WBC: CPT

## 2023-12-18 PROCEDURE — 86901 BLOOD TYPING SEROLOGIC RH(D): CPT | Performed by: PHYSICIAN ASSISTANT

## 2023-12-18 PROCEDURE — 82374 ASSAY BLOOD CARBON DIOXIDE: CPT

## 2023-12-18 PROCEDURE — 99232 SBSQ HOSP IP/OBS MODERATE 35: CPT | Performed by: PHYSICIAN ASSISTANT

## 2023-12-18 PROCEDURE — 1200000002 HC GENERAL ROOM WITH TELEMETRY DAILY

## 2023-12-18 PROCEDURE — 2500000001 HC RX 250 WO HCPCS SELF ADMINISTERED DRUGS (ALT 637 FOR MEDICARE OP)

## 2023-12-18 RX ORDER — INSULIN LISPRO 100 [IU]/ML
18 INJECTION, SOLUTION INTRAVENOUS; SUBCUTANEOUS
Status: DISCONTINUED | OUTPATIENT
Start: 2023-12-19 | End: 2023-12-20

## 2023-12-18 RX ORDER — INSULIN LISPRO 100 [IU]/ML
18 INJECTION, SOLUTION INTRAVENOUS; SUBCUTANEOUS
Status: DISCONTINUED | OUTPATIENT
Start: 2023-12-18 | End: 2023-12-20

## 2023-12-18 RX ORDER — INSULIN LISPRO 100 [IU]/ML
20 INJECTION, SOLUTION INTRAVENOUS; SUBCUTANEOUS
Status: DISCONTINUED | OUTPATIENT
Start: 2023-12-19 | End: 2023-12-20

## 2023-12-18 RX ADMIN — ATORVASTATIN CALCIUM 80 MG: 80 TABLET, FILM COATED ORAL at 20:09

## 2023-12-18 RX ADMIN — INSULIN LISPRO 16 UNITS: 100 INJECTION, SOLUTION INTRAVENOUS; SUBCUTANEOUS at 13:07

## 2023-12-18 RX ADMIN — INSULIN LISPRO 3 UNITS: 100 INJECTION, SOLUTION INTRAVENOUS; SUBCUTANEOUS at 08:43

## 2023-12-18 RX ADMIN — AMOXICILLIN 500 MG: 500 CAPSULE ORAL at 20:11

## 2023-12-18 RX ADMIN — AMOXICILLIN 500 MG: 500 CAPSULE ORAL at 08:41

## 2023-12-18 RX ADMIN — PANTOPRAZOLE SODIUM 40 MG: 40 TABLET, DELAYED RELEASE ORAL at 08:42

## 2023-12-18 RX ADMIN — ASPIRIN 81 MG CHEWABLE TABLET 81 MG: 81 TABLET CHEWABLE at 08:41

## 2023-12-18 RX ADMIN — CARVEDILOL 6.25 MG: 6.25 TABLET, FILM COATED ORAL at 08:41

## 2023-12-18 RX ADMIN — INSULIN LISPRO 18 UNITS: 100 INJECTION, SOLUTION INTRAVENOUS; SUBCUTANEOUS at 18:39

## 2023-12-18 RX ADMIN — CARVEDILOL 6.25 MG: 6.25 TABLET, FILM COATED ORAL at 17:39

## 2023-12-18 RX ADMIN — PREGABALIN 75 MG: 75 CAPSULE ORAL at 08:42

## 2023-12-18 RX ADMIN — INSULIN GLARGINE 52 UNITS: 100 INJECTION, SOLUTION SUBCUTANEOUS at 20:12

## 2023-12-18 RX ADMIN — INSULIN LISPRO 3 UNITS: 100 INJECTION, SOLUTION INTRAVENOUS; SUBCUTANEOUS at 13:06

## 2023-12-18 RX ADMIN — INSULIN LISPRO 3 UNITS: 100 INJECTION, SOLUTION INTRAVENOUS; SUBCUTANEOUS at 18:38

## 2023-12-18 RX ADMIN — INSULIN LISPRO 16 UNITS: 100 INJECTION, SOLUTION INTRAVENOUS; SUBCUTANEOUS at 08:43

## 2023-12-18 ASSESSMENT — COGNITIVE AND FUNCTIONAL STATUS - GENERAL
MOBILITY SCORE: 24
DAILY ACTIVITIY SCORE: 24

## 2023-12-18 ASSESSMENT — PAIN SCALES - GENERAL: PAINLEVEL_OUTOF10: 0 - NO PAIN

## 2023-12-18 NOTE — PROGRESS NOTES
"Behzad Diaz is a 58 y.o. male on day 6 of admission presenting with Coronary artery disease (CAD) excluded.  Transitional Care Coordination Progress Note:   Patient discussed during interdisciplinary rounds.   Team members present: (TCC, Cardiology)   Plan per Medical/Surgical team: (Cabg work up 12/20)   Discharge disposition: (pending PT/OT eval post-op)   Status- In patient   Payer- [employer] [facility]  Potential Barriers: (Surgical intervention   ADOD: (5-7 days)   .Carlton Alberto RN [facility] [phone]         Physical Exam    Last Recorded Vitals  Blood pressure 105/67, pulse 76, temperature 35.9 °C (96.6 °F), resp. rate 17, height 1.854 m (6' 0.99\"), weight (!) 155 kg (341 lb 11.2 oz), SpO2 96 %.  Intake/Output last 3 Shifts:  I/O last 3 completed shifts:  In: 2222 (14.3 mL/kg) [P.O.:1890; I.V.:332 (2.1 mL/kg)]  Out: 2425 (15.6 mL/kg) [Urine:2425 (0.4 mL/kg/hr)]  Weight: 155 kg         Assessment/Plan   Principal Problem:    Coronary artery disease (CAD) excluded  Active Problems:    Type 2 diabetes mellitus, without long-term current use of insulin (CMS/[facility])    HTN (hypertension)    NSTEMI (non-ST elevated myocardial infarction) (CMS/[facility])    Ischemic cardiomyopathy    HLD (hyperlipidemia)              Carlton Alberto RN      "

## 2023-12-18 NOTE — PROGRESS NOTES
"CARDIAC SURGERY CONSULT PROGRESS NOTE    SUBJECTIVE  Mr Behzad 59 yo M with PMHx HTN,HLD, T2DM (HgbA1c 11%), L4-L5 Discitis with epidural abscess s/p debridement, who was transferred from [facility] to [facility] for CABG evaluation.      The patient presented to [facility] ED 12/10 with left sided chest pain at rest that radiated to his Lt arm, it was associated with diaphoresis and nausea. It was relieved by Nitro, he was given ASA load. Upon presentation to [facility] ED pt was HDS, afebrile, EKG with T-wave inversion infero-lateral leads, Trops 322 with repeat up to 1626, the patient was started on Heparin gtt, and Plavix and admitted to medicine for ACS (NSTEMI), TTE 10/12 with EF 40-45% with global hypokinesis of LV. LHC 12/11 revealed 100% stenosis in mid LAD, 95% stenosis proximal to mid first diagonal branch, occluded LCx after early first OM, OM1 with occluded mid portion, OM2 occluded, and 95% stenosis mid RCA. The patient remained HDS throughout his stay, his chest pain resolved since initial admission, the patient is being transferred to [facility] for CABG eval.      This morning 12/13 troponin up to 5,667.      Cardiac surgery is consulted for CABG evaluation.       Objective   /67   Pulse 76   Temp 35.9 °C (96.6 °F)   Resp 17   Ht 1.854 m (6' 0.99\")   Wt (!) 155 kg (341 lb 11.2 oz) Comment: RnNotified  SpO2 96%   BMI 45.09 kg/m²   Pain Score: 0 - No pain   Vitals:    12/18/23 0133   Weight: (!) 155 kg (341 lb 11.2 oz)          Intake/Output Summary (Last 24 hours) at 12/18/2023 1211  Last data filed at 12/18/2023 1015  Gross per 24 hour   Intake 1350 ml   Output 925 ml   Net 425 ml         Physical Exam  Physical Exam  Constitutional:       General: He is not in acute distress.     Appearance: He is obese. He is not ill-appearing.   HENT:      Head: Normocephalic.      Mouth/Throat:      Mouth: Mucous membranes are moist.   Cardiovascular:      Rate and Rhythm: Normal rate and regular rhythm.      Heart sounds: No " murmur heard.  Pulmonary:      Effort: Pulmonary effort is normal.      Breath sounds: Normal breath sounds.   Abdominal:      General: Bowel sounds are normal. There is no distension.      Palpations: Abdomen is soft.      Tenderness: There is no abdominal tenderness.   Musculoskeletal:         General: Normal range of motion.      Cervical back: Neck supple.      Right lower leg: No edema.      Left lower leg: No edema.   Skin:     General: Skin is warm and dry.   Neurological:      General: No focal deficit present.      Mental Status: He is alert and oriented to person, place, and time.   Psychiatric:         Mood and Affect: Mood normal.         Behavior: Behavior normal.         Medications  Scheduled medications  amoxicillin, 500 mg, oral, q12h ISAIAH  aspirin, 81 mg, oral, Daily  atorvastatin, 80 mg, oral, Nightly  carvedilol, 6.25 mg, oral, BID with meals  [START ON 12/19/2023] chlorhexidine, 15 mL, Mouth/Throat, BID  insulin glargine, 52 Units, subcutaneous, Nightly  insulin lispro, 0-15 Units, subcutaneous, TID with meals  insulin lispro, 14 Units, subcutaneous, Daily before evening meal  insulin lispro, 16 Units, subcutaneous, Daily with breakfast  insulin lispro, 16 Units, subcutaneous, Daily with lunch  pantoprazole, 40 mg, oral, Daily before breakfast   Or  pantoprazole, 40 mg, intravenous, Daily before breakfast  polyethylene glycol, 17 g, oral, Daily  pregabalin, 75 mg, oral, Daily    Continuous medications     PRN medications  PRN medications: acetaminophen **OR** acetaminophen **OR** acetaminophen, dextrose 10 % in water (D10W), dextrose, glucagon    Labs  Results for orders placed or performed during the hospital encounter of 12/12/23 (from the past 24 hour(s))   POCT GLUCOSE   Result Value Ref Range    POCT Glucose 204 (H) 74 - 99 mg/dL   POCT GLUCOSE   Result Value Ref Range    POCT Glucose 197 (H) 74 - 99 mg/dL   POCT GLUCOSE   Result Value Ref Range    POCT Glucose 229 (H) 74 - 99 mg/dL   POCT  GLUCOSE   Result Value Ref Range    POCT Glucose 172 (H) 74 - 99 mg/dL   CBC and Auto Differential   Result Value Ref Range    WBC 6.2 4.4 - 11.3 x10*3/uL    nRBC 0.0 0.0 - 0.0 /100 WBCs    RBC 4.78 4.50 - 5.90 x10*6/uL    Hemoglobin 14.5 13.5 - 17.5 g/dL    Hematocrit 43.6 41.0 - 52.0 %    MCV 91 80 - 100 fL    MCH 30.3 26.0 - 34.0 pg    MCHC 33.3 32.0 - 36.0 g/dL    RDW 12.5 11.5 - 14.5 %    Platelets 204 150 - 450 x10*3/uL    Neutrophils % 57.3 40.0 - 80.0 %    Immature Granulocytes %, Automated 0.2 0.0 - 0.9 %    Lymphocytes % 34.0 13.0 - 44.0 %    Monocytes % 5.3 2.0 - 10.0 %    Eosinophils % 2.7 0.0 - 6.0 %    Basophils % 0.5 0.0 - 2.0 %    Neutrophils Absolute 3.58 1.20 - 7.70 x10*3/uL    Immature Granulocytes Absolute, Automated 0.01 0.00 - 0.70 x10*3/uL    Lymphocytes Absolute 2.12 1.20 - 4.80 x10*3/uL    Monocytes Absolute 0.33 0.10 - 1.00 x10*3/uL    Eosinophils Absolute 0.17 0.00 - 0.70 x10*3/uL    Basophils Absolute 0.03 0.00 - 0.10 x10*3/uL   Basic Metabolic Panel   Result Value Ref Range    Glucose 215 (H) 74 - 99 mg/dL    Sodium 138 136 - 145 mmol/L    Potassium 4.7 3.5 - 5.3 mmol/L    Chloride 102 98 - 107 mmol/L    Bicarbonate 27 21 - 32 mmol/L    Anion Gap 14 10 - 20 mmol/L    Urea Nitrogen 11 6 - 23 mg/dL    Creatinine 0.98 0.50 - 1.30 mg/dL    eGFR >90 >60 mL/min/1.73m*2    Calcium 9.1 8.6 - 10.6 mg/dL   Magnesium   Result Value Ref Range    Magnesium 1.90 1.60 - 2.40 mg/dL   Phosphorus   Result Value Ref Range    Phosphorus 3.1 2.5 - 4.9 mg/dL       Cardiac Testing  [facility]: 12/11/23  Coronary Lesion Summary:  Vessel         Stenosis    Vessel Segment  LAD          100% stenosis       mid  1st Diagonal 95% stenosis  proximal to mid  RCA          95% stenosis        mid     Echo: 12/11/23  CONCLUSIONS:   1. Left ventricular systolic function is mildly decreased with a 40-45% estimated ejection fraction.   2. Multiple segmental abnormalities exist. See findings.   3. Increased LV mass.   4. Aortic  valve stenosis is not present.   5. There is global hypokinesis of the left ventricle with minor regional variations.          ASSESSMENT & PLAN  Mr Behzad 59 yo M with PMHx HTN,HLD, T2DM (HgbA1c 11%), L4-L5 Discitis with epidural abscess s/p debridement, who was transferred from [facility] to [facility] for CABG evaluation.      The patient presented to [facility] ED 12/10 with left sided chest pain at rest that radiated to his Lt arm, it was associated with diaphoresis and nausea. It was relieved by Nitro, he was given ASA load. Upon presentation to [facility] ED pt was HDS, afebrile, EKG with T-wave inversion infero-lateral leads, Trops 322 with repeat up to 1626, the patient was started on Heparin gtt, and Plavix and admitted to medicine for ACS (NSTEMI), TTE 10/12 with EF 40-45% with global hypokinesis of LV. LHC 12/11 revealed 100% stenosis in mid LAD, 95% stenosis proximal to mid first diagonal branch, occluded LCx after early first OM, OM1 with occluded mid portion, OM2 occluded, and 95% stenosis mid RCA. The patient remained HDS throughout his stay, his chest pain resolved since initial admission, the patient is being transferred to [facility] for CABG eval.      Cardiac surgery is consulted for CABG evaluation.         RECOMMENDATIONS  - Dr. Mattie Phelps aware of patient and reviewed imaging and data.  - Plan for OR 12/20 Wednesday for CABG  - Medical optimization per primary team  - Preop risk stratification studies/labs/UA/PFTs and vascular ultrasounds ordered in EMR by our team  - Dental evaluation not indicated for isolated CAB surgeries   - Appreciate endocrinology consult given HgbA1c 11%  - Continue ASA, high-intensity statin, and BB (or document contraindications for use)     - No ACEi/ARBs in the pre-op period (at least 48 hours)  - Hold any SGLT2 inhibitors (Farxiga, Jardiance, etc.) for at least 3 days prior to cardiac surgery to prevent euglycemic DKA  - No antiplatelets other than ASA, no anticoagulants other than  Heparin  - NPO after midnight, blood on hold/T&S, and preop scrubs only to be ordered for OR 12/20     Will continue to follow along.  Thank you for the consultation.   Patient educated and all questions answered.  Please page the cardiac surgery consult pager [phone] with any questions or changes in patient condition.       Patricia Solis PA-C  Cardiac Surgery Consult [facility]  [facility]  Cardiac Surgery Consult Pager [phone]     12/18/2023  12:11 PM

## 2023-12-18 NOTE — PROGRESS NOTES
"Behzad Diaz is a 58 y.o. male on day 6 of admission presenting with Coronary artery disease (CAD) excluded.    Subjective   Patient was seen at bedside.  No overnight events.  No acute complaints.       Objective   Constitutional: NAD, obese, AOx3. Cooperative  HEENT: EOMI, Anicteric scleras   Neck: Soft, supple   Cardiovascular: normal HR  Respiratory: no increased wob,  or accessory muscle use.    Psych : appropriate affect    Last Recorded Vitals  Blood pressure 120/75, pulse 61, temperature 36 °C (96.8 °F), temperature source Temporal, resp. rate 16, height 1.854 m (6' 0.99\"), weight (!) 155 kg (341 lb 11.2 oz), SpO2 97 %.  Intake/Output last 3 Shifts:  I/O last 3 completed shifts:  In: 2222 (14.3 mL/kg) [P.O.:1890; I.V.:332 (2.1 mL/kg)]  Out: 2425 (15.6 mL/kg) [Urine:2425 (0.4 mL/kg/hr)]  Weight: 155 kg     Relevant Results  Results from last 7 days   Lab Units 12/18/23  0743 12/17/23  2120 12/17/23  1825 12/17/23  1324 12/17/23  1019 12/17/23  0649 12/16/23  0814 12/16/23  0552 12/15/23  0750 12/15/23  0454 12/14/23  0624 12/14/23  0335 12/13/23  0757 12/13/23  0708   POCT GLUCOSE mg/dL 172* 229* 197* 204* 174*  --    < >  --    < >  --    < >  --    < >  --    GLUCOSE mg/dL  --   --   --   --   --  191*  --  183*  --  200*  --  215*  --  271*    < > = values in this interval not displayed.     Results from last 7 days   Lab Units 12/17/23  0649 12/14/23  0335 12/13/23  0708   SODIUM mmol/L 137   < > 134*   POTASSIUM mmol/L 3.8   < > 3.9   CHLORIDE mmol/L 104   < > 101   CO2 mmol/L 25   < > 24   BUN mg/dL 12   < > 16   CREATININE mg/dL 0.94   < > 0.93   CALCIUM mg/dL 8.6   < > 8.8   PROTEIN TOTAL g/dL  --   --  6.5   BILIRUBIN TOTAL mg/dL  --   --  1.3*   ALK PHOS U/L  --   --  58   ALT U/L  --   --  26   AST U/L  --   --  26   GLUCOSE mg/dL 191*   < > 271*    < > = values in this interval not displayed.   Scheduled medications  amoxicillin, 500 mg, oral, q12h ISAIAH  aspirin, 81 mg, oral, Daily  atorvastatin, 80 " mg, oral, Nightly  carvedilol, 6.25 mg, oral, BID with meals  [START ON 12/19/2023] chlorhexidine, 15 mL, Mouth/Throat, BID  insulin glargine, 52 Units, subcutaneous, Nightly  insulin lispro, 0-15 Units, subcutaneous, TID with meals  insulin lispro, 14 Units, subcutaneous, Daily before evening meal  insulin lispro, 16 Units, subcutaneous, Daily with breakfast  insulin lispro, 16 Units, subcutaneous, Daily with lunch  pantoprazole, 40 mg, oral, Daily before breakfast   Or  pantoprazole, 40 mg, intravenous, Daily before breakfast  polyethylene glycol, 17 g, oral, Daily  pregabalin, 75 mg, oral, Daily      Continuous medications     PRN medications  PRN medications: acetaminophen **OR** acetaminophen **OR** acetaminophen, dextrose 10 % in water (D10W), dextrose, glucagon    Assessment/Plan   Principal Problem:    Coronary artery disease (CAD) excluded  Active Problems:    Type 2 diabetes mellitus, without long-term current use of insulin (CMS/[facility])    HTN (hypertension)    NSTEMI (non-ST elevated myocardial infarction) (CMS/[facility])    Ischemic cardiomyopathy    HLD (hyperlipidemia)    Behzad Diaz is a 58 y.o. male with HTN, HLD, poorly controlled DM2, obesity class III, CAD, and history of discitis and epidural abscess s/p debridement who presents to [facility] for CABG evaluation, plan for 12/20.     Endocrinology is consulted to assist in diabetes management.  Diabetes history:  Diabetes duration: 4 to 5 years           Home blood glucose checks: Patient does not check his BG at home  Hypoglycemia (y/n, threshold and symptoms): No hypoglycemia  Home diabetes regimen: Metformin 1000 mg  BID   Home diet (how many meals a day): Patient reports poor diet as he is mostly eating out because of his work  Outpatient physician managing diabetes: PCP  Macrovascular complications: CVD [yes]     CVA [no]     PVD [no]  Microvascular complications: Retinopathy [unknown, patient has not had a retinal exam recently]            neuropathy  [endorses neuropathy but attributes this to prior spinal surgery]   nephropathy [no microalbumin to creatinine ratio on chart, creatinine 0.7]  Last A1c: 11%       Continues to be hyperglycemic.       Recommendations:  - Continue with glargine to 52 units at bedtime  - Increase lispro with meals to 20/18/18with breakfast lunch and dinner respectively  - continue sliding scale insulin #3 [3 units for every 50 above 150] 3 times daily AC  - Accu-Cheks 3 times daily before meals and at bedtime  - Carb consistent diet 60  - Hypoglycemia protocol  -We will follow and adjust regimen accordingly     Plan was communicated to the primary team      Case discussed with Dr. Liz

## 2023-12-19 LAB
ALBUMIN SERPL BCP-MCNC: 3.9 G/DL (ref 3.4–5)
ANION GAP SERPL CALC-SCNC: 17 MMOL/L (ref 10–20)
BASOPHILS # BLD AUTO: 0.05 X10*3/UL (ref 0–0.1)
BASOPHILS NFR BLD AUTO: 0.7 %
BUN SERPL-MCNC: 14 MG/DL (ref 6–23)
CALCIUM SERPL-MCNC: 9.1 MG/DL (ref 8.6–10.6)
CHLORIDE SERPL-SCNC: 104 MMOL/L (ref 98–107)
CO2 SERPL-SCNC: 23 MMOL/L (ref 21–32)
CREAT SERPL-MCNC: 0.84 MG/DL (ref 0.5–1.3)
EOSINOPHIL # BLD AUTO: 0.24 X10*3/UL (ref 0–0.7)
EOSINOPHIL NFR BLD AUTO: 3.2 %
ERYTHROCYTE [DISTWIDTH] IN BLOOD BY AUTOMATED COUNT: 12.6 % (ref 11.5–14.5)
GFR SERPL CREATININE-BSD FRML MDRD: >90 ML/MIN/1.73M*2
GLUCOSE BLD MANUAL STRIP-MCNC: 116 MG/DL (ref 74–99)
GLUCOSE BLD MANUAL STRIP-MCNC: 170 MG/DL (ref 74–99)
GLUCOSE BLD MANUAL STRIP-MCNC: 176 MG/DL (ref 74–99)
GLUCOSE BLD MANUAL STRIP-MCNC: 180 MG/DL (ref 74–99)
GLUCOSE BLD MANUAL STRIP-MCNC: 198 MG/DL (ref 74–99)
GLUCOSE SERPL-MCNC: 106 MG/DL (ref 74–99)
HCT VFR BLD AUTO: 43.3 % (ref 41–52)
HGB BLD-MCNC: 14.6 G/DL (ref 13.5–17.5)
IMM GRANULOCYTES # BLD AUTO: 0.03 X10*3/UL (ref 0–0.7)
IMM GRANULOCYTES NFR BLD AUTO: 0.4 % (ref 0–0.9)
LYMPHOCYTES # BLD AUTO: 3.24 X10*3/UL (ref 1.2–4.8)
LYMPHOCYTES NFR BLD AUTO: 42.7 %
MAGNESIUM SERPL-MCNC: 1.93 MG/DL (ref 1.6–2.4)
MCH RBC QN AUTO: 30.7 PG (ref 26–34)
MCHC RBC AUTO-ENTMCNC: 33.7 G/DL (ref 32–36)
MCV RBC AUTO: 91 FL (ref 80–100)
MONOCYTES # BLD AUTO: 0.51 X10*3/UL (ref 0.1–1)
MONOCYTES NFR BLD AUTO: 6.7 %
NEUTROPHILS # BLD AUTO: 3.51 X10*3/UL (ref 1.2–7.7)
NEUTROPHILS NFR BLD AUTO: 46.3 %
NRBC BLD-RTO: 0 /100 WBCS (ref 0–0)
PHOSPHATE SERPL-MCNC: 3.6 MG/DL (ref 2.5–4.9)
PLATELET # BLD AUTO: 213 X10*3/UL (ref 150–450)
POTASSIUM SERPL-SCNC: 3.7 MMOL/L (ref 3.5–5.3)
RBC # BLD AUTO: 4.76 X10*6/UL (ref 4.5–5.9)
SODIUM SERPL-SCNC: 140 MMOL/L (ref 136–145)
WBC # BLD AUTO: 7.6 X10*3/UL (ref 4.4–11.3)

## 2023-12-19 PROCEDURE — 2500000001 HC RX 250 WO HCPCS SELF ADMINISTERED DRUGS (ALT 637 FOR MEDICARE OP): Performed by: PHYSICIAN ASSISTANT

## 2023-12-19 PROCEDURE — 83735 ASSAY OF MAGNESIUM: CPT

## 2023-12-19 PROCEDURE — 2500000004 HC RX 250 GENERAL PHARMACY W/ HCPCS (ALT 636 FOR OP/ED)

## 2023-12-19 PROCEDURE — 2500000001 HC RX 250 WO HCPCS SELF ADMINISTERED DRUGS (ALT 637 FOR MEDICARE OP)

## 2023-12-19 PROCEDURE — 99232 SBSQ HOSP IP/OBS MODERATE 35: CPT | Performed by: PHYSICIAN ASSISTANT

## 2023-12-19 PROCEDURE — 99232 SBSQ HOSP IP/OBS MODERATE 35: CPT | Performed by: STUDENT IN AN ORGANIZED HEALTH CARE EDUCATION/TRAINING PROGRAM

## 2023-12-19 PROCEDURE — 1200000002 HC GENERAL ROOM WITH TELEMETRY DAILY

## 2023-12-19 PROCEDURE — 80069 RENAL FUNCTION PANEL: CPT

## 2023-12-19 PROCEDURE — 36415 COLL VENOUS BLD VENIPUNCTURE: CPT

## 2023-12-19 PROCEDURE — 85025 COMPLETE CBC W/AUTO DIFF WBC: CPT

## 2023-12-19 PROCEDURE — 82947 ASSAY GLUCOSE BLOOD QUANT: CPT

## 2023-12-19 PROCEDURE — 99232 SBSQ HOSP IP/OBS MODERATE 35: CPT

## 2023-12-19 RX ORDER — POTASSIUM CHLORIDE 20 MEQ/1
20 TABLET, EXTENDED RELEASE ORAL ONCE
Status: COMPLETED | OUTPATIENT
Start: 2023-12-19 | End: 2023-12-19

## 2023-12-19 RX ORDER — INSULIN GLARGINE 100 [IU]/ML
36 INJECTION, SOLUTION SUBCUTANEOUS NIGHTLY
Status: DISCONTINUED | OUTPATIENT
Start: 2023-12-19 | End: 2023-12-20

## 2023-12-19 RX ORDER — INSULIN LISPRO 100 [IU]/ML
0-15 INJECTION, SOLUTION INTRAVENOUS; SUBCUTANEOUS 4 TIMES DAILY
Status: CANCELLED | OUTPATIENT
Start: 2023-12-19

## 2023-12-19 RX ADMIN — ASPIRIN 81 MG CHEWABLE TABLET 81 MG: 81 TABLET CHEWABLE at 08:45

## 2023-12-19 RX ADMIN — CARVEDILOL 6.25 MG: 6.25 TABLET, FILM COATED ORAL at 08:45

## 2023-12-19 RX ADMIN — ATORVASTATIN CALCIUM 80 MG: 80 TABLET, FILM COATED ORAL at 20:46

## 2023-12-19 RX ADMIN — PANTOPRAZOLE SODIUM 40 MG: 40 TABLET, DELAYED RELEASE ORAL at 06:21

## 2023-12-19 RX ADMIN — AMOXICILLIN 500 MG: 500 CAPSULE ORAL at 08:45

## 2023-12-19 RX ADMIN — PREGABALIN 75 MG: 75 CAPSULE ORAL at 08:45

## 2023-12-19 RX ADMIN — CHLORHEXIDINE GLUCONATE 15 ML: 1.2 SOLUTION ORAL at 20:46

## 2023-12-19 RX ADMIN — CARVEDILOL 6.25 MG: 6.25 TABLET, FILM COATED ORAL at 17:39

## 2023-12-19 RX ADMIN — AMOXICILLIN 500 MG: 500 CAPSULE ORAL at 20:46

## 2023-12-19 RX ADMIN — CHLORHEXIDINE GLUCONATE 15 ML: 1.2 SOLUTION ORAL at 08:45

## 2023-12-19 RX ADMIN — INSULIN LISPRO 3 UNITS: 100 INJECTION, SOLUTION INTRAVENOUS; SUBCUTANEOUS at 17:39

## 2023-12-19 RX ADMIN — INSULIN GLARGINE 36 UNITS: 100 INJECTION, SOLUTION SUBCUTANEOUS at 20:46

## 2023-12-19 RX ADMIN — INSULIN LISPRO 18 UNITS: 100 INJECTION, SOLUTION INTRAVENOUS; SUBCUTANEOUS at 13:15

## 2023-12-19 RX ADMIN — INSULIN LISPRO 18 UNITS: 100 INJECTION, SOLUTION INTRAVENOUS; SUBCUTANEOUS at 17:40

## 2023-12-19 RX ADMIN — INSULIN LISPRO 3 UNITS: 100 INJECTION, SOLUTION INTRAVENOUS; SUBCUTANEOUS at 13:16

## 2023-12-19 RX ADMIN — POTASSIUM CHLORIDE 20 MEQ: 1500 TABLET, EXTENDED RELEASE ORAL at 13:18

## 2023-12-19 ASSESSMENT — PAIN - FUNCTIONAL ASSESSMENT
PAIN_FUNCTIONAL_ASSESSMENT: 0-10

## 2023-12-19 ASSESSMENT — COGNITIVE AND FUNCTIONAL STATUS - GENERAL
MOBILITY SCORE: 24
DAILY ACTIVITIY SCORE: 24

## 2023-12-19 ASSESSMENT — PAIN SCALES - GENERAL
PAINLEVEL_OUTOF10: 0 - NO PAIN

## 2023-12-19 NOTE — CARE PLAN
Problem: Diabetes  Goal: Achieve decreasing blood glucose levels by end of shift  Outcome: Progressing  Flowsheets (Taken 12/19/2023 0531)  Achieve decreasing blood glucose levels by end of shift: Med administration/monitoring of effect  Goal: Increase stability of blood glucose readings by end of shift  Outcome: Progressing  Flowsheets (Taken 12/19/2023 0531)  Increase stability of blood glucose readings by end of shift: Med administration/monitoring of effect  Goal: Decrease in ketones present in urine by end of shift  Outcome: Progressing  Goal: Maintain electrolyte levels within acceptable range throughout shift  Outcome: Progressing  Goal: Maintain glucose levels >70mg/dl to <250mg/dl throughout shift  Outcome: Progressing  Goal: No changes in neurological exam by end of shift  Outcome: Progressing  Goal: Learn about and adhere to nutrition recommendations by end of shift  Outcome: Progressing  Goal: Vital signs within normal range for age by end of shift  Outcome: Progressing  Goal: Increase self care and/or family involovement by end of shift  Outcome: Progressing  Goal: Receive DSME education by end of shift  Outcome: Progressing

## 2023-12-19 NOTE — PROGRESS NOTES
"Behzad Diaz is a 58 y.o. male on day 6 of admission presenting with Coronary artery disease (CAD) excluded.    Subjective   The patient was this AM and with the team during rounds, denied any chest pain, palpitation or SOB. No events overnight.          Objective     Physical Exam  Constitutional: Well-developed male in no acute distress.  HEENT: Normocephalic, atraumatic. PERRL. EOMI. No cervical lymphadenopathy.  Respiratory: CTA bilaterally. No wheezes, rales, or rhonchi. Normal respiratory effort.  Cardiovascular: RRR. No murmurs, gallops, or rubs. No JVD. Radial pulses 2+.  Abdominal: Soft, nondistended, nontender to palpation. Bowel sounds present. No hepatosplenomegaly or masses. No CVA tenderness. Significantly Obese patient.  Neuro: CN II-XII intact. UE and LE strength 5/5 bilaterally and sensation intact. Normal FTN testing.  MSK: No LE edema bilaterally.  Skin: Warm, dry. No rashes or wounds.  Psych: Appropriate mood and affect.    Last Recorded Vitals  Blood pressure 126/76, pulse 63, temperature 36 °C (96.8 °F), resp. rate 20, height 1.854 m (6' 0.99\"), weight (!) 155 kg (341 lb 11.2 oz), SpO2 97 %.  Intake/Output last 3 Shifts:  I/O last 3 completed shifts:  In: 2442 (15.8 mL/kg) [P.O.:2110; I.V.:332 (2.1 mL/kg)]  Out: 1225 (7.9 mL/kg) [Urine:1225 (0.2 mL/kg/hr)]  Weight: 155 kg     Relevant Results              Active Medications  Scheduled medications  amoxicillin, 500 mg, oral, q12h ISAIAH  aspirin, 81 mg, oral, Daily  atorvastatin, 80 mg, oral, Nightly  carvedilol, 6.25 mg, oral, BID with meals  [START ON 12/19/2023] chlorhexidine, 15 mL, Mouth/Throat, BID  insulin glargine, 52 Units, subcutaneous, Nightly  insulin lispro, 0-15 Units, subcutaneous, TID with meals  [START ON 12/19/2023] insulin lispro, 18 Units, subcutaneous, Daily with lunch  insulin lispro, 18 Units, subcutaneous, Daily before evening meal  [START ON 12/19/2023] insulin lispro, 20 Units, subcutaneous, Daily with " breakfast  pantoprazole, 40 mg, oral, Daily before breakfast   Or  pantoprazole, 40 mg, intravenous, Daily before breakfast  polyethylene glycol, 17 g, oral, Daily  pregabalin, 75 mg, oral, Daily      Continuous medications       PRN medications  PRN medications: acetaminophen **OR** acetaminophen **OR** acetaminophen, dextrose 10 % in water (D10W), dextrose, glucagon     Recent Labs  Results for orders placed or performed during the hospital encounter of 12/12/23 (from the past 24 hour(s))   POCT GLUCOSE   Result Value Ref Range    POCT Glucose 229 (H) 74 - 99 mg/dL   POCT GLUCOSE   Result Value Ref Range    POCT Glucose 172 (H) 74 - 99 mg/dL   CBC and Auto Differential   Result Value Ref Range    WBC 6.2 4.4 - 11.3 x10*3/uL    nRBC 0.0 0.0 - 0.0 /100 WBCs    RBC 4.78 4.50 - 5.90 x10*6/uL    Hemoglobin 14.5 13.5 - 17.5 g/dL    Hematocrit 43.6 41.0 - 52.0 %    MCV 91 80 - 100 fL    MCH 30.3 26.0 - 34.0 pg    MCHC 33.3 32.0 - 36.0 g/dL    RDW 12.5 11.5 - 14.5 %    Platelets 204 150 - 450 x10*3/uL    Neutrophils % 57.3 40.0 - 80.0 %    Immature Granulocytes %, Automated 0.2 0.0 - 0.9 %    Lymphocytes % 34.0 13.0 - 44.0 %    Monocytes % 5.3 2.0 - 10.0 %    Eosinophils % 2.7 0.0 - 6.0 %    Basophils % 0.5 0.0 - 2.0 %    Neutrophils Absolute 3.58 1.20 - 7.70 x10*3/uL    Immature Granulocytes Absolute, Automated 0.01 0.00 - 0.70 x10*3/uL    Lymphocytes Absolute 2.12 1.20 - 4.80 x10*3/uL    Monocytes Absolute 0.33 0.10 - 1.00 x10*3/uL    Eosinophils Absolute 0.17 0.00 - 0.70 x10*3/uL    Basophils Absolute 0.03 0.00 - 0.10 x10*3/uL   Basic Metabolic Panel   Result Value Ref Range    Glucose 215 (H) 74 - 99 mg/dL    Sodium 138 136 - 145 mmol/L    Potassium 4.7 3.5 - 5.3 mmol/L    Chloride 102 98 - 107 mmol/L    Bicarbonate 27 21 - 32 mmol/L    Anion Gap 14 10 - 20 mmol/L    Urea Nitrogen 11 6 - 23 mg/dL    Creatinine 0.98 0.50 - 1.30 mg/dL    eGFR >90 >60 mL/min/1.73m*2    Calcium 9.1 8.6 - 10.6 mg/dL   Magnesium   Result  Value Ref Range    Magnesium 1.90 1.60 - 2.40 mg/dL   Phosphorus   Result Value Ref Range    Phosphorus 3.1 2.5 - 4.9 mg/dL   Type And Screen   Result Value Ref Range    ABO TYPE AB     Rh TYPE POS     ANTIBODY SCREEN NEG    POCT GLUCOSE   Result Value Ref Range    POCT Glucose 152 (H) 74 - 99 mg/dL   POCT GLUCOSE   Result Value Ref Range    POCT Glucose 152 (H) 74 - 99 mg/dL       Assessment/Plan   Principal Problem:    Coronary artery disease (CAD) excluded  Active Problems:    Type 2 diabetes mellitus, without long-term current use of insulin (CMS/[facility])    HTN (hypertension)    NSTEMI (non-ST elevated myocardial infarction) (CMS/[facility])    Ischemic cardiomyopathy    HLD (hyperlipidemia)    Mr Wen 57 yo M with PMHx HTN,HLD, T2DM, L4-L5 Discitis with epidural abscess  s/p debridement, who was transferred from [facility] to [facility] for CABG evaluation. The patient presented with chest pain radiated to Lt arm that occurred at rest improved with NTG, was found to have NSTEMI at [facility] ED (EKG with inferolateral ischemia), trops 322 --1626, was started on ASA,Plavix and Heparin gtt for NSTEMI, [facility] with multivessel disease, transferred here for CABG evaluation. Pt currently HDS, pain free, will continue with heparin gtt, ASA, and statin,CT surgery consulted.    Updates 12/18/23   - CABG Wednesday 20th of December  - Endocrine consulted and following recs: continue on glargine to 52 units at bedtime as of tonight, Increase lispro with meals to 20/18/18  with breakfast lunch and dinner respectively          #ACS -NSTEMI  #Multivessel CAD   #HTN   #HLD  #ICM   :Trops at [facility] 12/10: 322--1626.   :TTE 12/10: EF 40-45%, no valvular disease, global hypokinesis.      :LHC 12/11: 100% stenosis in mid LAD, 95% stenosis proximal to mid first diagonal branch, occluded LCx after early first OM, OM1 with occluded mid portion, OM2 occluded, and 95% stenosis mid RCA.  :Pt s/p ASA, Plavix and Heparin gtt since 10/12   :Home Lisniopril 20mg,  "Atorvastatin 80   -Continue with, ASA  - Dc Heparin drip for >48hrs , drop in trops and no evidence of \" soft thrombus\"  -Continue with Correg 6.25mg BID  -Continue with Atorvastatin 80mg daily.   -Cardiothoracic surgery consulted for CABG evaluation.   -Will hold ACE/ARBs (Lisinopril) and clopidogrel  i/s/o possible CABG.     #Uncontrolled T2DM   :HgA1c 12/10: 11  :Home Metformin 1000mg  -Holding home meds for now.   Recommendations:  - Increase glargine to 52 units at bedtime as of tonight  - Increase lispro with meals to 20/18/18 with breakfast lunch and dinner respectively  - Continue #3 sliding scale 3 times daily AC  - Accu-Cheks 3 times daily before meals and at bedtime  - Carb consistent diet 60  - Hypoglycemia protocol       #Hx of L4-L5 Discitis c/b epidural abscess   :Home Amoxacillin 500mg BID ppx, follow up with ID    - Continue home Amoxicillin 500mg BID           Disposition: Home   Follow-ups: PCP, Cardiology, Endocrine      N: Cradiac  A: PIV  DVT ppx: Heparin gtt  GI ppx: PPI    Code Status: Full code (confirmed on admission)  Surrogate Medical Decision-maker: Babar Chery ([phone])         Zay Munoz MD  Neurology PGY-1        "

## 2023-12-19 NOTE — PROGRESS NOTES
"Behzad Diaz is a 58 y.o. male on day 7 of admission presenting with Coronary artery disease (CAD) excluded.    Subjective   Patient was seen at bedside.  Patient reports that his breakfast dose was held this a.m. by nursing staff.  He is planned for his CABG tomorrow and will be n.p.o. after midnight.     Objective   Constitutional: NAD, obese, AOx3. Cooperative  HEENT: EOMI, Anicteric scleras   Neck: Soft, supple   Cardiovascular: normal HR  Respiratory: no increased wob,  or accessory muscle use.    Psych : appropriate affect    Last Recorded Vitals  Blood pressure 111/74, pulse 72, temperature 36.1 °C (97 °F), temperature source Temporal, resp. rate 18, height 1.854 m (6' 0.99\"), weight (!) 155 kg (341 lb), SpO2 98 %.  Intake/Output last 3 Shifts:  I/O last 3 completed shifts:  In: 1320 (8.5 mL/kg) [P.O.:1320]  Out: 1475 (9.5 mL/kg) [Urine:1475 (0.3 mL/kg/hr)]  Weight: 154.7 kg     Relevant Results  Results from last 7 days   Lab Units 12/19/23  1230 12/19/23  0840 12/19/23  0654 12/18/23  2220 12/18/23  1810 12/18/23  1254 12/18/23  1026 12/17/23  1019 12/17/23  0649 12/16/23  0814 12/16/23  0552 12/15/23  0750 12/15/23  0454   POCT GLUCOSE mg/dL 198* 116*  --  147* 152* 152*  --    < >  --    < >  --    < >  --    GLUCOSE mg/dL  --   --  106*  --   --   --  215*  --  191*  --  183*  --  200*    < > = values in this interval not displayed.     Results from last 7 days   Lab Units 12/19/23  0654 12/14/23  0335 12/13/23  0708   SODIUM mmol/L 140   < > 134*   POTASSIUM mmol/L 3.7   < > 3.9   CHLORIDE mmol/L 104   < > 101   CO2 mmol/L 23   < > 24   BUN mg/dL 14   < > 16   CREATININE mg/dL 0.84   < > 0.93   CALCIUM mg/dL 9.1   < > 8.8   PROTEIN TOTAL g/dL  --   --  6.5   BILIRUBIN TOTAL mg/dL  --   --  1.3*   ALK PHOS U/L  --   --  58   ALT U/L  --   --  26   AST U/L  --   --  26   GLUCOSE mg/dL 106*   < > 271*    < > = values in this interval not displayed.   Scheduled medications  amoxicillin, 500 mg, oral, q12h " [facility]  aspirin, 81 mg, oral, Daily  atorvastatin, 80 mg, oral, Nightly  carvedilol, 6.25 mg, oral, BID with meals  chlorhexidine, 15 mL, Mouth/Throat, BID  insulin glargine, 36 Units, subcutaneous, Nightly  insulin lispro, 0-15 Units, subcutaneous, TID with meals  insulin lispro, 18 Units, subcutaneous, Daily with lunch  insulin lispro, 18 Units, subcutaneous, Daily before evening meal  insulin lispro, 20 Units, subcutaneous, Daily with breakfast  pantoprazole, 40 mg, oral, Daily before breakfast   Or  pantoprazole, 40 mg, intravenous, Daily before breakfast  polyethylene glycol, 17 g, oral, Daily  pregabalin, 75 mg, oral, Daily      Continuous medications     PRN medications  PRN medications: acetaminophen **OR** acetaminophen **OR** acetaminophen, dextrose 10 % in water (D10W), dextrose, glucagon    Assessment/Plan   Principal Problem:    Coronary artery disease (CAD) excluded  Active Problems:    Type 2 diabetes mellitus, without long-term current use of insulin (CMS/[facility])    HTN (hypertension)    NSTEMI (non-ST elevated myocardial infarction) (CMS/[facility])    Ischemic cardiomyopathy    HLD (hyperlipidemia)    Behzad Diaz is a 58 y.o. male with HTN, HLD, poorly controlled DM2, obesity class III, CAD, and history of discitis and epidural abscess s/p debridement who presents to [facility] for CABG evaluation, plan for 12/20.     Endocrinology is consulted to assist in diabetes management.  Diabetes history:  Diabetes duration: 4 to 5 years           Home blood glucose checks: Patient does not check his BG at home  Hypoglycemia (y/n, threshold and symptoms): No hypoglycemia  Home diabetes regimen: Metformin 1000 mg  BID   Home diet (how many meals a day): Patient reports poor diet as he is mostly eating out because of his work  Outpatient physician managing diabetes: PCP  Macrovascular complications: CVD [yes]     CVA [no]     PVD [no]  Microvascular complications: Retinopathy [unknown, patient has not had a retinal exam  recently]            neuropathy [endorses neuropathy but attributes this to prior spinal surgery]   nephropathy [no microalbumin to creatinine ratio on chart, creatinine 0.7]  Last A1c: 11%       BG is better controlled.  Breakfast dose was held due to concern for tightly controlled blood sugar however mealtime insulin should not be withheld and if there is a concern for tightly controlled sugar then dose should be reduced and not withheld.  Patient will be n.p.o. after midnight tonight for planned CABG    Recommendations:  - Decrease glargine to 35 units at bedtime  - For today continue mealtime lispro 20/18/18with breakfast lunch and dinner respectively and continue sliding scale insulin #3 [3 units for every 50 above 150] 3 times daily AC  - Discontinue mealtime lispro for 12/20  - Switch sliding scale to #3 sliding scale lispro every 4 hours while patient is n.p.o. (after midnight)  -Accu-Cheks every 4 hours while patient is n.p.o.  - Hypoglycemia protocol  -We will follow and adjust regimen accordingly     Plan was communicated to the primary team      Case discussed with Dr. Liz

## 2023-12-19 NOTE — PROGRESS NOTES
"CARDIAC SURGERY CONSULT PROGRESS NOTE    SUBJECTIVE  Mr Behzad 59 yo M with PMHx HTN,HLD, T2DM (HgbA1c 11%), L4-L5 Discitis with epidural abscess s/p debridement, who was transferred from [facility] to [facility] for CABG evaluation.      The patient presented to [facility] ED 12/10 with left sided chest pain at rest that radiated to his Lt arm, it was associated with diaphoresis and nausea. It was relieved by Nitro, he was given ASA load. Upon presentation to [facility] ED pt was HDS, afebrile, EKG with T-wave inversion infero-lateral leads, Trops 322 with repeat up to 1626, the patient was started on Heparin gtt, and Plavix and admitted to medicine for ACS (NSTEMI), TTE 10/12 with EF 40-45% with global hypokinesis of LV. LHC 12/11 revealed 100% stenosis in mid LAD, 95% stenosis proximal to mid first diagonal branch, occluded LCx after early first OM, OM1 with occluded mid portion, OM2 occluded, and 95% stenosis mid RCA. The patient remained HDS throughout his stay, his chest pain resolved since initial admission, the patient is being transferred to [facility] for CABG eval.      This morning 12/13 troponin up to 5,667.      Cardiac surgery is consulted for CABG evaluation.       Objective   /72 (BP Location: Left arm, Patient Position: Lying)   Pulse 72   Temp 36.4 °C (97.5 °F) (Temporal)   Resp 18   Ht 1.854 m (6' 0.99\")   Wt (!) 155 kg (341 lb)   SpO2 96%   BMI 45.00 kg/m²   Pain Score: 0 - No pain   Vitals:    12/19/23 0156   Weight: (!) 155 kg (341 lb)          Intake/Output Summary (Last 24 hours) at 12/19/2023 1033  Last data filed at 12/19/2023 1029  Gross per 24 hour   Intake 850 ml   Output 975 ml   Net -125 ml         Physical Exam  Physical Exam  Constitutional:       General: He is not in acute distress.     Appearance: He is obese. He is not ill-appearing.   HENT:      Head: Normocephalic.      Mouth/Throat:      Mouth: Mucous membranes are moist.   Cardiovascular:      Rate and Rhythm: Normal rate and regular " rhythm.      Heart sounds: No murmur heard.  Pulmonary:      Effort: Pulmonary effort is normal.      Breath sounds: Normal breath sounds.   Abdominal:      General: Bowel sounds are normal. There is no distension.      Palpations: Abdomen is soft.      Tenderness: There is no abdominal tenderness.   Musculoskeletal:         General: Normal range of motion.      Cervical back: Neck supple.      Right lower leg: No edema.      Left lower leg: No edema.   Skin:     General: Skin is warm and dry.   Neurological:      General: No focal deficit present.      Mental Status: He is alert and oriented to person, place, and time.   Psychiatric:         Mood and Affect: Mood normal.         Behavior: Behavior normal.         Medications  Scheduled medications  amoxicillin, 500 mg, oral, q12h ISAIAH  aspirin, 81 mg, oral, Daily  atorvastatin, 80 mg, oral, Nightly  carvedilol, 6.25 mg, oral, BID with meals  chlorhexidine, 15 mL, Mouth/Throat, BID  insulin glargine, 36 Units, subcutaneous, Nightly  insulin lispro, 0-15 Units, subcutaneous, TID with meals  insulin lispro, 18 Units, subcutaneous, Daily with lunch  insulin lispro, 18 Units, subcutaneous, Daily before evening meal  insulin lispro, 20 Units, subcutaneous, Daily with breakfast  pantoprazole, 40 mg, oral, Daily before breakfast   Or  pantoprazole, 40 mg, intravenous, Daily before breakfast  polyethylene glycol, 17 g, oral, Daily  potassium chloride CR, 20 mEq, oral, Once  pregabalin, 75 mg, oral, Daily    Continuous medications     PRN medications  PRN medications: acetaminophen **OR** acetaminophen **OR** acetaminophen, dextrose 10 % in water (D10W), dextrose, glucagon    Labs  Results for orders placed or performed during the hospital encounter of 12/12/23 (from the past 24 hour(s))   POCT GLUCOSE   Result Value Ref Range    POCT Glucose 152 (H) 74 - 99 mg/dL   POCT GLUCOSE   Result Value Ref Range    POCT Glucose 152 (H) 74 - 99 mg/dL   POCT GLUCOSE   Result Value Ref  Range    POCT Glucose 147 (H) 74 - 99 mg/dL   CBC and Auto Differential   Result Value Ref Range    WBC 7.6 4.4 - 11.3 x10*3/uL    nRBC 0.0 0.0 - 0.0 /100 WBCs    RBC 4.76 4.50 - 5.90 x10*6/uL    Hemoglobin 14.6 13.5 - 17.5 g/dL    Hematocrit 43.3 41.0 - 52.0 %    MCV 91 80 - 100 fL    MCH 30.7 26.0 - 34.0 pg    MCHC 33.7 32.0 - 36.0 g/dL    RDW 12.6 11.5 - 14.5 %    Platelets 213 150 - 450 x10*3/uL    Neutrophils % 46.3 40.0 - 80.0 %    Immature Granulocytes %, Automated 0.4 0.0 - 0.9 %    Lymphocytes % 42.7 13.0 - 44.0 %    Monocytes % 6.7 2.0 - 10.0 %    Eosinophils % 3.2 0.0 - 6.0 %    Basophils % 0.7 0.0 - 2.0 %    Neutrophils Absolute 3.51 1.20 - 7.70 x10*3/uL    Immature Granulocytes Absolute, Automated 0.03 0.00 - 0.70 x10*3/uL    Lymphocytes Absolute 3.24 1.20 - 4.80 x10*3/uL    Monocytes Absolute 0.51 0.10 - 1.00 x10*3/uL    Eosinophils Absolute 0.24 0.00 - 0.70 x10*3/uL    Basophils Absolute 0.05 0.00 - 0.10 x10*3/uL   Magnesium   Result Value Ref Range    Magnesium 1.93 1.60 - 2.40 mg/dL   Renal Function Panel   Result Value Ref Range    Glucose 106 (H) 74 - 99 mg/dL    Sodium 140 136 - 145 mmol/L    Potassium 3.7 3.5 - 5.3 mmol/L    Chloride 104 98 - 107 mmol/L    Bicarbonate 23 21 - 32 mmol/L    Anion Gap 17 10 - 20 mmol/L    Urea Nitrogen 14 6 - 23 mg/dL    Creatinine 0.84 0.50 - 1.30 mg/dL    eGFR >90 >60 mL/min/1.73m*2    Calcium 9.1 8.6 - 10.6 mg/dL    Phosphorus 3.6 2.5 - 4.9 mg/dL    Albumin 3.9 3.4 - 5.0 g/dL   POCT GLUCOSE   Result Value Ref Range    POCT Glucose 116 (H) 74 - 99 mg/dL       Cardiac Testing  C: 12/11/23  Coronary Lesion Summary:  Vessel         Stenosis    Vessel Segment  LAD          100% stenosis       mid  1st Diagonal 95% stenosis  proximal to mid  RCA          95% stenosis        mid     Echo: 12/11/23  CONCLUSIONS:   1. Left ventricular systolic function is mildly decreased with a 40-45% estimated ejection fraction.   2. Multiple segmental abnormalities exist. See  findings.   3. Increased LV mass.   4. Aortic valve stenosis is not present.   5. There is global hypokinesis of the left ventricle with minor regional variations.          ASSESSMENT & PLAN  Mr Behzad 59 yo M with PMHx HTN,HLD, T2DM (HgbA1c 11%), L4-L5 Discitis with epidural abscess s/p debridement, who was transferred from [facility] to [facility] for CABG evaluation.      The patient presented to [facility] ED 12/10 with left sided chest pain at rest that radiated to his Lt arm, it was associated with diaphoresis and nausea. It was relieved by Nitro, he was given ASA load. Upon presentation to [facility] ED pt was HDS, afebrile, EKG with T-wave inversion infero-lateral leads, Trops 322 with repeat up to 1626, the patient was started on Heparin gtt, and Plavix and admitted to medicine for ACS (NSTEMI), TTE 10/12 with EF 40-45% with global hypokinesis of LV. LHC 12/11 revealed 100% stenosis in mid LAD, 95% stenosis proximal to mid first diagonal branch, occluded LCx after early first OM, OM1 with occluded mid portion, OM2 occluded, and 95% stenosis mid RCA. The patient remained HDS throughout his stay, his chest pain resolved since initial admission, the patient is being transferred to [facility] for CABG eval.      Cardiac surgery is consulted for CABG evaluation.         RECOMMENDATIONS  - Dr. Mattie Phelps aware of patient and reviewed imaging and data.  - Plan for OR 12/20 tomorrow for CABG  - Medical optimization per primary team  - Preop risk stratification studies/labs/UA/PFTs and vascular ultrasounds ordered in EMR by our team  - Dental evaluation not indicated for isolated CAB surgeries   - Appreciate endocrinology consult given HgbA1c 11%  - Continue ASA, high-intensity statin, and BB (or document contraindications for use)     - No ACEi/ARBs in the pre-op period (at least 48 hours)  - Hold any SGLT2 inhibitors (Farxiga, Jardiance, etc.) for at least 3 days prior to cardiac surgery to prevent euglycemic DKA  - No antiplatelets other  than ASA, no anticoagulants other than Heparin  - NPO after midnight, blood on hold/T&S, and preop scrubs ordered for tomorrow 12/20     Will continue to follow along.  Thank you for the consultation.   Patient educated and all questions answered.  Please page the cardiac surgery consult pager [phone] with any questions or changes in patient condition.       Patricia Solis PA-C  Cardiac Surgery Consult [facility]UN[facility]  [facility]  Cardiac Surgery Consult Pager [phone]     12/19/2023  10:33 AM

## 2023-12-19 NOTE — PROGRESS NOTES
"Behzad Diaz is a 58 y.o. male on day 6 of admission presenting with Coronary artery disease (CAD) excluded.    Subjective   The patient was this AM and with the team during rounds, denied any chest pain, palpitation or SOB. No events overnight.          Objective     Physical Exam  Constitutional: Well-developed male in no acute distress.  HEENT: Normocephalic, atraumatic. PERRL. EOMI. No cervical lymphadenopathy.  Respiratory: CTA bilaterally. No wheezes, rales, or rhonchi. Normal respiratory effort.  Cardiovascular: RRR. No murmurs, gallops, or rubs. No JVD. Radial pulses 2+.  Abdominal: Soft, nondistended, nontender to palpation. Bowel sounds present. No hepatosplenomegaly or masses. No CVA tenderness. Significantly Obese patient.  Neuro: CN II-XII intact. UE and LE strength 5/5 bilaterally and sensation intact. Normal FTN testing.  MSK: No LE edema bilaterally.  Skin: Warm, dry. No rashes or wounds.  Psych: Appropriate mood and affect.    Last Recorded Vitals  Blood pressure 126/76, pulse 63, temperature 36 °C (96.8 °F), resp. rate 20, height 1.854 m (6' 0.99\"), weight (!) 155 kg (341 lb 11.2 oz), SpO2 97 %.  Intake/Output last 3 Shifts:  I/O last 3 completed shifts:  In: 2442 (15.8 mL/kg) [P.O.:2110; I.V.:332 (2.1 mL/kg)]  Out: 1225 (7.9 mL/kg) [Urine:1225 (0.2 mL/kg/hr)]  Weight: 155 kg     Relevant Results           Active Medications  Scheduled medications  amoxicillin, 500 mg, oral, q12h ISAIAH  aspirin, 81 mg, oral, Daily  atorvastatin, 80 mg, oral, Nightly  carvedilol, 6.25 mg, oral, BID with meals  chlorhexidine, 15 mL, Mouth/Throat, BID  insulin glargine, 36 Units, subcutaneous, Nightly  insulin lispro, 0-15 Units, subcutaneous, TID with meals  insulin lispro, 18 Units, subcutaneous, Daily with lunch  insulin lispro, 18 Units, subcutaneous, Daily before evening meal  insulin lispro, 20 Units, subcutaneous, Daily with breakfast  pantoprazole, 40 mg, oral, Daily before breakfast   Or  pantoprazole, 40 mg, " intravenous, Daily before breakfast  polyethylene glycol, 17 g, oral, Daily  pregabalin, 75 mg, oral, Daily      Continuous medications     PRN medications  PRN medications: acetaminophen **OR** acetaminophen **OR** acetaminophen, dextrose 10 % in water (D10W), dextrose, glucagon     Recent Labs  Results for orders placed or performed during the hospital encounter of 12/12/23 (from the past 24 hour(s))   POCT GLUCOSE   Result Value Ref Range    POCT Glucose 172 (H) 74 - 99 mg/dL   CBC and Auto Differential   Result Value Ref Range    WBC 6.2 4.4 - 11.3 x10*3/uL    nRBC 0.0 0.0 - 0.0 /100 WBCs    RBC 4.78 4.50 - 5.90 x10*6/uL    Hemoglobin 14.5 13.5 - 17.5 g/dL    Hematocrit 43.6 41.0 - 52.0 %    MCV 91 80 - 100 fL    MCH 30.3 26.0 - 34.0 pg    MCHC 33.3 32.0 - 36.0 g/dL    RDW 12.5 11.5 - 14.5 %    Platelets 204 150 - 450 x10*3/uL    Neutrophils % 57.3 40.0 - 80.0 %    Immature Granulocytes %, Automated 0.2 0.0 - 0.9 %    Lymphocytes % 34.0 13.0 - 44.0 %    Monocytes % 5.3 2.0 - 10.0 %    Eosinophils % 2.7 0.0 - 6.0 %    Basophils % 0.5 0.0 - 2.0 %    Neutrophils Absolute 3.58 1.20 - 7.70 x10*3/uL    Immature Granulocytes Absolute, Automated 0.01 0.00 - 0.70 x10*3/uL    Lymphocytes Absolute 2.12 1.20 - 4.80 x10*3/uL    Monocytes Absolute 0.33 0.10 - 1.00 x10*3/uL    Eosinophils Absolute 0.17 0.00 - 0.70 x10*3/uL    Basophils Absolute 0.03 0.00 - 0.10 x10*3/uL   Basic Metabolic Panel   Result Value Ref Range    Glucose 215 (H) 74 - 99 mg/dL    Sodium 138 136 - 145 mmol/L    Potassium 4.7 3.5 - 5.3 mmol/L    Chloride 102 98 - 107 mmol/L    Bicarbonate 27 21 - 32 mmol/L    Anion Gap 14 10 - 20 mmol/L    Urea Nitrogen 11 6 - 23 mg/dL    Creatinine 0.98 0.50 - 1.30 mg/dL    eGFR >90 >60 mL/min/1.73m*2    Calcium 9.1 8.6 - 10.6 mg/dL   Magnesium   Result Value Ref Range    Magnesium 1.90 1.60 - 2.40 mg/dL   Phosphorus   Result Value Ref Range    Phosphorus 3.1 2.5 - 4.9 mg/dL   Type And Screen   Result Value Ref Range     "ABO TYPE AB     Rh TYPE POS     ANTIBODY SCREEN NEG    POCT GLUCOSE   Result Value Ref Range    POCT Glucose 152 (H) 74 - 99 mg/dL   POCT GLUCOSE   Result Value Ref Range    POCT Glucose 152 (H) 74 - 99 mg/dL   POCT GLUCOSE   Result Value Ref Range    POCT Glucose 147 (H) 74 - 99 mg/dL       Imaging  No results found.           Assessment/Plan   Principal Problem:    Coronary artery disease (CAD) excluded  Active Problems:    Type 2 diabetes mellitus, without long-term current use of insulin (CMS/[facility])    HTN (hypertension)    NSTEMI (non-ST elevated myocardial infarction) (CMS/[facility])    Ischemic cardiomyopathy    HLD (hyperlipidemia)    Mr Wen 57 yo M with PMHx HTN,HLD, T2DM, L4-L5 Discitis with epidural abscess  s/p debridement, who was transferred from [facility] to [facility] for CABG evaluation. The patient presented with chest pain radiated to Lt arm that occurred at rest improved with NTG, was found to have NSTEMI at [facility] ED (EKG with inferolateral ischemia), trops 322 --1626, was started on ASA,Plavix and Heparin gtt for NSTEMI, [facility] with multivessel disease, transferred here for CABG evaluation. Pt currently HDS, pain free, will continue with heparin gtt, ASA, and statin,CT surgery consulted.    Updates 12/19/23   - CABG Wednesday 20th of December  -NPO tonight  -basal insulin decreased by 30% for npo  - Potassium repleted     #ACS -NSTEMI  #Multivessel CAD   #HTN   #HLD  #ICM   :Trops at [facility] 12/10: 322--1626.   :TTE 12/10: EF 40-45%, no valvular disease, global hypokinesis.      :[facility] 12/11: 100% stenosis in mid LAD, 95% stenosis proximal to mid first diagonal branch, occluded LCx after early first OM, OM1 with occluded mid portion, OM2 occluded, and 95% stenosis mid RCA.  :Pt s/p ASA, Plavix and Heparin gtt since 10/12   :Home Lisniopril 20mg, Atorvastatin 80   -Continue with, ASA  - Dc Heparin drip for >48hrs , drop in trops and no evidence of \" soft thrombus\"  -Continue with Correg 6.25mg BID  -Continue with " Atorvastatin 80mg daily.   -Cardiothoracic surgery consulted for CABG evaluation.   -Will hold ACE/ARBs (Lisinopril) and clopidogrel  i/s/o possible CABG.     #Uncontrolled T2DM   :HgA1c 12/10: 11  :Home Metformin 1000mg  -Holding home meds for now.   Recommendations:  - Increase glargine to 52 units at bedtime as of tonight  - Increase lispro with meals to 20/18/18 with breakfast lunch and dinner respectively  - Continue #3 sliding scale 3 times daily AC  - Accu-Cheks 3 times daily before meals and at bedtime  - Carb consistent diet 60  - Hypoglycemia protocol       #Hx of L4-L5 Discitis c/b epidural abscess   :Home Amoxacillin 500mg BID ppx, follow up with ID    - Continue home Amoxicillin 500mg BID           Disposition: Home   Follow-ups: PCP, Cardiology, Endocrine      N: Cradiac  A: PIV  DVT ppx: Heparin gtt  GI ppx: PPI    Code Status: Full code (confirmed on admission)  Surrogate Medical Decision-maker: Babar Chery ([phone])      Lai Calixto MD

## 2023-12-20 ENCOUNTER — HOSPITAL ENCOUNTER (OUTPATIENT)
Dept: OPERATING ROOM | Facility: HOSPITAL | Age: 58
Discharge: HOME | DRG: 236 | End: 2023-12-20
Payer: COMMERCIAL

## 2023-12-20 ENCOUNTER — ANESTHESIA (OUTPATIENT)
Dept: OPERATING ROOM | Facility: HOSPITAL | Age: 58
DRG: 236 | End: 2023-12-20
Payer: COMMERCIAL

## 2023-12-20 ENCOUNTER — APPOINTMENT (OUTPATIENT)
Dept: RADIOLOGY | Facility: HOSPITAL | Age: 58
DRG: 236 | End: 2023-12-20
Payer: COMMERCIAL

## 2023-12-20 ENCOUNTER — ANESTHESIA EVENT (OUTPATIENT)
Dept: OPERATING ROOM | Facility: HOSPITAL | Age: 58
DRG: 236 | End: 2023-12-20
Payer: COMMERCIAL

## 2023-12-20 LAB
ANION GAP BLDA CALCULATED.4IONS-SCNC: 10 MMO/L (ref 10–25)
ANION GAP BLDA CALCULATED.4IONS-SCNC: 11 MMO/L (ref 10–25)
ANION GAP BLDA CALCULATED.4IONS-SCNC: 12 MMO/L (ref 10–25)
ANION GAP BLDA CALCULATED.4IONS-SCNC: 8 MMO/L (ref 10–25)
ANION GAP BLDA CALCULATED.4IONS-SCNC: 9 MMO/L (ref 10–25)
ANION GAP SERPL CALC-SCNC: 14 MMOL/L (ref 10–20)
APTT PPP: 33 SECONDS (ref 27–38)
BASE EXCESS BLDA CALC-SCNC: -1 MMOL/L (ref -2–3)
BASE EXCESS BLDA CALC-SCNC: -1.4 MMOL/L (ref -2–3)
BASE EXCESS BLDA CALC-SCNC: -1.9 MMOL/L (ref -2–3)
BASE EXCESS BLDA CALC-SCNC: -1.9 MMOL/L (ref -2–3)
BASE EXCESS BLDA CALC-SCNC: -2 MMOL/L (ref -2–3)
BASE EXCESS BLDA CALC-SCNC: -2.5 MMOL/L (ref -2–3)
BASE EXCESS BLDA CALC-SCNC: -2.8 MMOL/L (ref -2–3)
BASE EXCESS BLDA CALC-SCNC: -2.9 MMOL/L (ref -2–3)
BASE EXCESS BLDA CALC-SCNC: -3.2 MMOL/L (ref -2–3)
BASE EXCESS BLDA CALC-SCNC: -3.6 MMOL/L (ref -2–3)
BASE EXCESS BLDA CALC-SCNC: -3.7 MMOL/L (ref -2–3)
BASOPHILS # BLD AUTO: 0.06 X10*3/UL (ref 0–0.1)
BASOPHILS NFR BLD AUTO: 0.7 %
BLOOD EXPIRATION DATE: NORMAL
BODY TEMPERATURE: 37 DEGREES CELSIUS
BUN SERPL-MCNC: 13 MG/DL (ref 6–23)
CA-I BLD-SCNC: 1.13 MMOL/L (ref 1.1–1.33)
CA-I BLDA-SCNC: 1.07 MMOL/L (ref 1.1–1.33)
CA-I BLDA-SCNC: 1.09 MMOL/L (ref 1.1–1.33)
CA-I BLDA-SCNC: 1.1 MMOL/L (ref 1.1–1.33)
CA-I BLDA-SCNC: 1.11 MMOL/L (ref 1.1–1.33)
CA-I BLDA-SCNC: 1.13 MMOL/L (ref 1.1–1.33)
CA-I BLDA-SCNC: 1.15 MMOL/L (ref 1.1–1.33)
CA-I BLDA-SCNC: 1.16 MMOL/L (ref 1.1–1.33)
CA-I BLDA-SCNC: 1.17 MMOL/L (ref 1.1–1.33)
CA-I BLDA-SCNC: 1.2 MMOL/L (ref 1.1–1.33)
CALCIUM SERPL-MCNC: 9.4 MG/DL (ref 8.6–10.6)
CFT BLD TEG: 2 MIN (ref 0.8–2.1)
CHLORIDE BLDA-SCNC: 103 MMOL/L (ref 98–107)
CHLORIDE BLDA-SCNC: 104 MMOL/L (ref 98–107)
CHLORIDE BLDA-SCNC: 104 MMOL/L (ref 98–107)
CHLORIDE BLDA-SCNC: 105 MMOL/L (ref 98–107)
CHLORIDE BLDA-SCNC: 106 MMOL/L (ref 98–107)
CHLORIDE SERPL-SCNC: 103 MMOL/L (ref 98–107)
CLOT ANGLE BLD TEG: 64 DEG (ref 63–78)
CLOT INIT BLD TEG: 16.1 MIN (ref 4.6–9.1)
CLOT INIT P HPASE BLD TEG: 12.1 MIN (ref 4.3–8.3)
CO2 SERPL-SCNC: 24 MMOL/L (ref 21–32)
COHGB MFR BLDA: 0.7 %
COHGB MFR BLDA: 0.9 %
COHGB MFR BLDA: 0.9 %
COHGB MFR BLDA: 1 %
COHGB MFR BLDA: 1.1 %
COHGB MFR BLDA: 1.1 %
COHGB MFR BLDA: 1.2 %
COHGB MFR BLDA: 1.4 %
COHGB MFR BLDA: 1.6 %
COHGB MFR BLDA: NORMAL %
CREAT SERPL-MCNC: 0.98 MG/DL (ref 0.5–1.3)
DISPENSE STATUS: NORMAL
DO-HGB MFR BLDA: 0.2 % (ref 0–5)
DO-HGB MFR BLDA: 0.2 % (ref 0–5)
DO-HGB MFR BLDA: 0.5 % (ref 0–5)
DO-HGB MFR BLDA: 0.7 % (ref 0–5)
DO-HGB MFR BLDA: 0.7 % (ref 0–5)
DO-HGB MFR BLDA: 0.9 % (ref 0–5)
DO-HGB MFR BLDA: 1 % (ref 0–5)
DO-HGB MFR BLDA: 1.1 % (ref 0–5)
DO-HGB MFR BLDA: 1.1 % (ref 0–5)
DO-HGB MFR BLDA: NORMAL %
EOSINOPHIL # BLD AUTO: 0.25 X10*3/UL (ref 0–0.7)
EOSINOPHIL NFR BLD AUTO: 3 %
ERYTHROCYTE [DISTWIDTH] IN BLOOD BY AUTOMATED COUNT: 12.5 % (ref 11.5–14.5)
ERYTHROCYTE [DISTWIDTH] IN BLOOD BY AUTOMATED COUNT: 12.6 % (ref 11.5–14.5)
FIBRINOGEN BLD CALC-MCNC: 536 MG/DL (ref 278–581)
FIBRINOGEN PPP-MCNC: 431 MG/DL (ref 200–400)
GFR SERPL CREATININE-BSD FRML MDRD: >90 ML/MIN/1.73M*2
GLUCOSE BLD MANUAL STRIP-MCNC: 173 MG/DL (ref 74–99)
GLUCOSE BLD MANUAL STRIP-MCNC: 194 MG/DL (ref 74–99)
GLUCOSE BLDA-MCNC: 141 MG/DL (ref 74–99)
GLUCOSE BLDA-MCNC: 151 MG/DL (ref 74–99)
GLUCOSE BLDA-MCNC: 156 MG/DL (ref 74–99)
GLUCOSE BLDA-MCNC: 156 MG/DL (ref 74–99)
GLUCOSE BLDA-MCNC: 163 MG/DL (ref 74–99)
GLUCOSE BLDA-MCNC: 169 MG/DL (ref 74–99)
GLUCOSE BLDA-MCNC: 170 MG/DL (ref 74–99)
GLUCOSE BLDA-MCNC: 177 MG/DL (ref 74–99)
GLUCOSE BLDA-MCNC: 195 MG/DL (ref 74–99)
GLUCOSE BLDA-MCNC: 195 MG/DL (ref 74–99)
GLUCOSE BLDA-MCNC: 212 MG/DL (ref 74–99)
GLUCOSE SERPL-MCNC: 193 MG/DL (ref 74–99)
HCO3 BLDA-SCNC: 20.6 MMOL/L (ref 22–26)
HCO3 BLDA-SCNC: 20.7 MMOL/L (ref 22–26)
HCO3 BLDA-SCNC: 21.3 MMOL/L (ref 22–26)
HCO3 BLDA-SCNC: 21.9 MMOL/L (ref 22–26)
HCO3 BLDA-SCNC: 22.2 MMOL/L (ref 22–26)
HCO3 BLDA-SCNC: 22.2 MMOL/L (ref 22–26)
HCO3 BLDA-SCNC: 22.3 MMOL/L (ref 22–26)
HCO3 BLDA-SCNC: 22.4 MMOL/L (ref 22–26)
HCO3 BLDA-SCNC: 22.5 MMOL/L (ref 22–26)
HCO3 BLDA-SCNC: 22.6 MMOL/L (ref 22–26)
HCO3 BLDA-SCNC: 22.8 MMOL/L (ref 22–26)
HCT VFR BLD AUTO: 41.5 % (ref 41–52)
HCT VFR BLD AUTO: 48.5 % (ref 41–52)
HCT VFR BLD EST: 41 % (ref 41–52)
HCT VFR BLD EST: 42 % (ref 41–52)
HCT VFR BLD EST: 44 % (ref 41–52)
HCT VFR BLD EST: 44 % (ref 41–52)
HCT VFR BLD EST: 45 % (ref 41–52)
HCT VFR BLD EST: 46 % (ref 41–52)
HCT VFR BLD EST: ABNORMAL %
HGB BLD-MCNC: 14.3 G/DL (ref 13.5–17.5)
HGB BLD-MCNC: 16 G/DL (ref 13.5–17.5)
HGB BLDA-MCNC: 13.5 G/DL (ref 13.5–17.5)
HGB BLDA-MCNC: 13.5 G/DL (ref 13.5–17.5)
HGB BLDA-MCNC: 13.8 G/DL (ref 13.5–17.5)
HGB BLDA-MCNC: 13.9 G/DL (ref 13.5–17.5)
HGB BLDA-MCNC: 13.9 G/DL (ref 13.5–17.5)
HGB BLDA-MCNC: 14.6 G/DL (ref 13.5–17.5)
HGB BLDA-MCNC: 14.6 G/DL (ref 13.5–17.5)
HGB BLDA-MCNC: 14.7 G/DL (ref 13.5–17.5)
HGB BLDA-MCNC: 14.7 G/DL (ref 13.5–17.5)
HGB BLDA-MCNC: 14.9 G/DL (ref 13.5–17.5)
HGB BLDA-MCNC: 14.9 G/DL (ref 13.5–17.5)
HGB BLDA-MCNC: 15 G/DL (ref 13.5–17.5)
HGB BLDA-MCNC: 15.1 G/DL (ref 13.5–17.5)
HGB BLDA-MCNC: 15.1 G/DL (ref 13.5–17.5)
HGB BLDA-MCNC: 15.2 G/DL (ref 13.5–17.5)
HGB BLDA-MCNC: 15.2 G/DL (ref 13.5–17.5)
HGB BLDA-MCNC: ABNORMAL G/DL
HGB BLDA-MCNC: NORMAL G/DL
IMM GRANULOCYTES # BLD AUTO: 0.03 X10*3/UL (ref 0–0.7)
IMM GRANULOCYTES NFR BLD AUTO: 0.4 % (ref 0–0.9)
INHALED O2 CONCENTRATION: 35 %
INHALED O2 CONCENTRATION: 40 %
INHALED O2 CONCENTRATION: 45 %
INHALED O2 CONCENTRATION: 50 %
INHALED O2 CONCENTRATION: 60 %
INHALED O2 CONCENTRATION: 75 %
INHALED O2 CONCENTRATION: 75 %
INR PPP: 1.3 (ref 0.9–1.1)
LACTATE BLDA-SCNC: 0.8 MMOL/L (ref 0.4–2)
LACTATE BLDA-SCNC: 0.8 MMOL/L (ref 0.4–2)
LACTATE BLDA-SCNC: 0.9 MMOL/L (ref 0.4–2)
LACTATE BLDA-SCNC: 1 MMOL/L (ref 0.4–2)
LACTATE BLDA-SCNC: 1 MMOL/L (ref 0.4–2)
LACTATE BLDA-SCNC: 1.1 MMOL/L (ref 0.4–2)
LACTATE BLDA-SCNC: 1.6 MMOL/L (ref 0.4–2)
LYMPHOCYTES # BLD AUTO: 3.61 X10*3/UL (ref 1.2–4.8)
LYMPHOCYTES NFR BLD AUTO: 43.1 %
MAGNESIUM SERPL-MCNC: 2.12 MG/DL (ref 1.6–2.4)
MCF BLD TEG: 29 MM (ref 15–32)
MCF BLD TEG: 64 MM (ref 52–69)
MCF BLD TEG: 68 MM (ref 52–70)
MCH RBC QN AUTO: 30.7 PG (ref 26–34)
MCH RBC QN AUTO: 30.8 PG (ref 26–34)
MCHC RBC AUTO-ENTMCNC: 33 G/DL (ref 32–36)
MCHC RBC AUTO-ENTMCNC: 34.5 G/DL (ref 32–36)
MCV RBC AUTO: 89 FL (ref 80–100)
MCV RBC AUTO: 93 FL (ref 80–100)
METHGB MFR BLDA: 0.3 % (ref 0–1.5)
METHGB MFR BLDA: 0.4 % (ref 0–1.5)
METHGB MFR BLDA: 0.7 % (ref 0–1.5)
METHGB MFR BLDA: 0.8 % (ref 0–1.5)
METHGB MFR BLDA: 0.8 % (ref 0–1.5)
METHGB MFR BLDA: 0.9 % (ref 0–1.5)
METHGB MFR BLDA: 1 % (ref 0–1.5)
METHGB MFR BLDA: NORMAL %
MONOCYTES # BLD AUTO: 0.5 X10*3/UL (ref 0.1–1)
MONOCYTES NFR BLD AUTO: 6 %
NEUTROPHILS # BLD AUTO: 3.93 X10*3/UL (ref 1.2–7.7)
NEUTROPHILS NFR BLD AUTO: 46.8 %
NRBC BLD-RTO: 0 /100 WBCS (ref 0–0)
NRBC BLD-RTO: 0 /100 WBCS (ref 0–0)
OXYHGB MFR BLDA: 97 % (ref 94–98)
OXYHGB MFR BLDA: 97 % (ref 94–98)
OXYHGB MFR BLDA: 97.1 % (ref 94–98)
OXYHGB MFR BLDA: 97.1 % (ref 94–98)
OXYHGB MFR BLDA: 97.3 % (ref 94–98)
OXYHGB MFR BLDA: 97.4 % (ref 94–98)
OXYHGB MFR BLDA: 97.7 % (ref 94–98)
OXYHGB MFR BLDA: 97.7 % (ref 94–98)
OXYHGB MFR BLDA: 97.9 % (ref 94–98)
OXYHGB MFR BLDA: 97.9 % (ref 94–98)
OXYHGB MFR BLDA: 98 % (ref 94–98)
OXYHGB MFR BLDA: 98 % (ref 94–98)
OXYHGB MFR BLDA: ABNORMAL %
OXYHGB MFR BLDA: NORMAL %
PCO2 BLDA: 33 MM HG (ref 38–42)
PCO2 BLDA: 34 MM HG (ref 38–42)
PCO2 BLDA: 35 MM HG (ref 38–42)
PCO2 BLDA: 36 MM HG (ref 38–42)
PCO2 BLDA: 37 MM HG (ref 38–42)
PCO2 BLDA: 39 MM HG (ref 38–42)
PCO2 BLDA: 41 MM HG (ref 38–42)
PH BLDA: 7.35 PH (ref 7.38–7.42)
PH BLDA: 7.37 PH (ref 7.38–7.42)
PH BLDA: 7.38 PH (ref 7.38–7.42)
PH BLDA: 7.39 PH (ref 7.38–7.42)
PH BLDA: 7.4 PH (ref 7.38–7.42)
PH BLDA: 7.41 PH (ref 7.38–7.42)
PH BLDA: 7.44 PH (ref 7.38–7.42)
PHOSPHATE SERPL-MCNC: 3.9 MG/DL (ref 2.5–4.9)
PLATELET # BLD AUTO: 218 X10*3/UL (ref 150–450)
PLATELET # BLD AUTO: 249 X10*3/UL (ref 150–450)
PO2 BLDA: 122 MM HG (ref 85–95)
PO2 BLDA: 157 MM HG (ref 85–95)
PO2 BLDA: 171 MM HG (ref 85–95)
PO2 BLDA: 173 MM HG (ref 85–95)
PO2 BLDA: 188 MM HG (ref 85–95)
PO2 BLDA: 196 MM HG (ref 85–95)
PO2 BLDA: 210 MM HG (ref 85–95)
PO2 BLDA: 248 MM HG (ref 85–95)
PO2 BLDA: 270 MM HG (ref 85–95)
PO2 BLDA: 274 MM HG (ref 85–95)
PO2 BLDA: 400 MM HG (ref 85–95)
POTASSIUM BLDA-SCNC: 4.2 MMOL/L (ref 3.5–5.3)
POTASSIUM BLDA-SCNC: 4.3 MMOL/L (ref 3.5–5.3)
POTASSIUM BLDA-SCNC: 4.3 MMOL/L (ref 3.5–5.3)
POTASSIUM BLDA-SCNC: 4.4 MMOL/L (ref 3.5–5.3)
POTASSIUM BLDA-SCNC: 4.5 MMOL/L (ref 3.5–5.3)
POTASSIUM BLDA-SCNC: 4.6 MMOL/L (ref 3.5–5.3)
POTASSIUM BLDA-SCNC: 5.1 MMOL/L (ref 3.5–5.3)
POTASSIUM SERPL-SCNC: 4.4 MMOL/L (ref 3.5–5.3)
PRODUCT BLOOD TYPE: 2800
PRODUCT CODE: NORMAL
PROTHROMBIN TIME: 15.2 SECONDS (ref 9.8–12.8)
RBC # BLD AUTO: 4.64 X10*6/UL (ref 4.5–5.9)
RBC # BLD AUTO: 5.22 X10*6/UL (ref 4.5–5.9)
SAO2 % BLDA: 100 % (ref 94–100)
SAO2 % BLDA: 99 % (ref 94–100)
SAO2 % BLDA: ABNORMAL %
SODIUM BLDA-SCNC: 130 MMOL/L (ref 136–145)
SODIUM BLDA-SCNC: 131 MMOL/L (ref 136–145)
SODIUM BLDA-SCNC: 132 MMOL/L (ref 136–145)
SODIUM BLDA-SCNC: 133 MMOL/L (ref 136–145)
SODIUM SERPL-SCNC: 137 MMOL/L (ref 136–145)
TEST COMMENT: ABNORMAL
UNIT ABO: NORMAL
UNIT NUMBER: NORMAL
UNIT RH: NORMAL
UNIT VOLUME: 349
WBC # BLD AUTO: 15.5 X10*3/UL (ref 4.4–11.3)
WBC # BLD AUTO: 8.4 X10*3/UL (ref 4.4–11.3)

## 2023-12-20 PROCEDURE — 85347 COAGULATION TIME ACTIVATED: CPT

## 2023-12-20 PROCEDURE — 85610 PROTHROMBIN TIME: CPT

## 2023-12-20 PROCEDURE — 33519 CABG ARTERY-VEIN THREE: CPT | Performed by: STUDENT IN AN ORGANIZED HEALTH CARE EDUCATION/TRAINING PROGRAM

## 2023-12-20 PROCEDURE — 82375 ASSAY CARBOXYHB QUANT: CPT

## 2023-12-20 PROCEDURE — 71045 X-RAY EXAM CHEST 1 VIEW: CPT

## 2023-12-20 PROCEDURE — 71045 X-RAY EXAM CHEST 1 VIEW: CPT | Performed by: RADIOLOGY

## 2023-12-20 PROCEDURE — 85027 COMPLETE CBC AUTOMATED: CPT

## 2023-12-20 PROCEDURE — 82947 ASSAY GLUCOSE BLOOD QUANT: CPT

## 2023-12-20 PROCEDURE — 36415 COLL VENOUS BLD VENIPUNCTURE: CPT

## 2023-12-20 PROCEDURE — 84132 ASSAY OF SERUM POTASSIUM: CPT

## 2023-12-20 PROCEDURE — 2780000003 HC OR 278 NO HCPCS: Performed by: STUDENT IN AN ORGANIZED HEALTH CARE EDUCATION/TRAINING PROGRAM

## 2023-12-20 PROCEDURE — 3600000011 HC PERFUSION TIME - INITIAL BASE CHARGE: Performed by: STUDENT IN AN ORGANIZED HEALTH CARE EDUCATION/TRAINING PROGRAM

## 2023-12-20 PROCEDURE — 2500000004 HC RX 250 GENERAL PHARMACY W/ HCPCS (ALT 636 FOR OP/ED): Performed by: ANESTHESIOLOGIST ASSISTANT

## 2023-12-20 PROCEDURE — 33508 ENDOSCOPIC VEIN HARVEST: CPT | Performed by: STUDENT IN AN ORGANIZED HEALTH CARE EDUCATION/TRAINING PROGRAM

## 2023-12-20 PROCEDURE — 37799 UNLISTED PX VASCULAR SURGERY: CPT

## 2023-12-20 PROCEDURE — 99231 SBSQ HOSP IP/OBS SF/LOW 25: CPT

## 2023-12-20 PROCEDURE — 82330 ASSAY OF CALCIUM: CPT

## 2023-12-20 PROCEDURE — 3600000012 HC PERFUSION TIME - EACH INCREMENTAL 1 MINUTE: Performed by: STUDENT IN AN ORGANIZED HEALTH CARE EDUCATION/TRAINING PROGRAM

## 2023-12-20 PROCEDURE — 2500000004 HC RX 250 GENERAL PHARMACY W/ HCPCS (ALT 636 FOR OP/ED): Mod: JZ

## 2023-12-20 PROCEDURE — 33533 CABG ARTERIAL SINGLE: CPT | Performed by: PHYSICIAN ASSISTANT

## 2023-12-20 PROCEDURE — 2500000001 HC RX 250 WO HCPCS SELF ADMINISTERED DRUGS (ALT 637 FOR MEDICARE OP)

## 2023-12-20 PROCEDURE — 94002 VENT MGMT INPAT INIT DAY: CPT

## 2023-12-20 PROCEDURE — 99291 CRITICAL CARE FIRST HOUR: CPT

## 2023-12-20 PROCEDURE — P9045 ALBUMIN (HUMAN), 5%, 250 ML: HCPCS | Mod: JZ | Performed by: STUDENT IN AN ORGANIZED HEALTH CARE EDUCATION/TRAINING PROGRAM

## 2023-12-20 PROCEDURE — P9045 ALBUMIN (HUMAN), 5%, 250 ML: HCPCS | Mod: JZ

## 2023-12-20 PROCEDURE — 2500000005 HC RX 250 GENERAL PHARMACY W/O HCPCS: Performed by: ANESTHESIOLOGIST ASSISTANT

## 2023-12-20 PROCEDURE — A33536 PR CABG, ARTERIAL, FOUR+: Performed by: ANESTHESIOLOGY

## 2023-12-20 PROCEDURE — 3700000001 HC GENERAL ANESTHESIA TIME - INITIAL BASE CHARGE: Performed by: STUDENT IN AN ORGANIZED HEALTH CARE EDUCATION/TRAINING PROGRAM

## 2023-12-20 PROCEDURE — 2500000004 HC RX 250 GENERAL PHARMACY W/ HCPCS (ALT 636 FOR OP/ED)

## 2023-12-20 PROCEDURE — 2500000002 HC RX 250 W HCPCS SELF ADMINISTERED DRUGS (ALT 637 FOR MEDICARE OP, ALT 636 FOR OP/ED): Performed by: STUDENT IN AN ORGANIZED HEALTH CARE EDUCATION/TRAINING PROGRAM

## 2023-12-20 PROCEDURE — 33533 CABG ARTERIAL SINGLE: CPT | Performed by: STUDENT IN AN ORGANIZED HEALTH CARE EDUCATION/TRAINING PROGRAM

## 2023-12-20 PROCEDURE — 3600000018 HC OR TIME - INITIAL BASE CHARGE - PROCEDURE LEVEL SIX: Performed by: STUDENT IN AN ORGANIZED HEALTH CARE EDUCATION/TRAINING PROGRAM

## 2023-12-20 PROCEDURE — 85384 FIBRINOGEN ACTIVITY: CPT

## 2023-12-20 PROCEDURE — 96373 THER/PROPH/DIAG INJ IA: CPT | Performed by: STUDENT IN AN ORGANIZED HEALTH CARE EDUCATION/TRAINING PROGRAM

## 2023-12-20 PROCEDURE — C1713 ANCHOR/SCREW BN/BN,TIS/BN: HCPCS | Performed by: STUDENT IN AN ORGANIZED HEALTH CARE EDUCATION/TRAINING PROGRAM

## 2023-12-20 PROCEDURE — 3600000017 HC OR TIME - EACH INCREMENTAL 1 MINUTE - PROCEDURE LEVEL SIX: Performed by: STUDENT IN AN ORGANIZED HEALTH CARE EDUCATION/TRAINING PROGRAM

## 2023-12-20 PROCEDURE — A4649 SURGICAL SUPPLIES: HCPCS | Performed by: STUDENT IN AN ORGANIZED HEALTH CARE EDUCATION/TRAINING PROGRAM

## 2023-12-20 PROCEDURE — 82435 ASSAY OF BLOOD CHLORIDE: CPT

## 2023-12-20 PROCEDURE — 80048 BASIC METABOLIC PNL TOTAL CA: CPT

## 2023-12-20 PROCEDURE — 83735 ASSAY OF MAGNESIUM: CPT

## 2023-12-20 PROCEDURE — 2720000007 HC OR 272 NO HCPCS: Performed by: STUDENT IN AN ORGANIZED HEALTH CARE EDUCATION/TRAINING PROGRAM

## 2023-12-20 PROCEDURE — 2500000004 HC RX 250 GENERAL PHARMACY W/ HCPCS (ALT 636 FOR OP/ED): Performed by: STUDENT IN AN ORGANIZED HEALTH CARE EDUCATION/TRAINING PROGRAM

## 2023-12-20 PROCEDURE — 2020000001 HC ICU ROOM DAILY

## 2023-12-20 PROCEDURE — 84100 ASSAY OF PHOSPHORUS: CPT

## 2023-12-20 PROCEDURE — 2500000005 HC RX 250 GENERAL PHARMACY W/O HCPCS: Performed by: STUDENT IN AN ORGANIZED HEALTH CARE EDUCATION/TRAINING PROGRAM

## 2023-12-20 PROCEDURE — 85025 COMPLETE CBC W/AUTO DIFF WBC: CPT

## 2023-12-20 PROCEDURE — 33519 CABG ARTERY-VEIN THREE: CPT | Performed by: PHYSICIAN ASSISTANT

## 2023-12-20 PROCEDURE — P9047 ALBUMIN (HUMAN), 25%, 50ML: HCPCS | Mod: JZ | Performed by: STUDENT IN AN ORGANIZED HEALTH CARE EDUCATION/TRAINING PROGRAM

## 2023-12-20 PROCEDURE — 3700000002 HC GENERAL ANESTHESIA TIME - EACH INCREMENTAL 1 MINUTE: Performed by: STUDENT IN AN ORGANIZED HEALTH CARE EDUCATION/TRAINING PROGRAM

## 2023-12-20 DEVICE — SCREW, SD LOCKING, 2.3 X 18MM: Type: IMPLANTABLE DEVICE | Site: STERNUM | Status: FUNCTIONAL

## 2023-12-20 DEVICE — PLATE, LP BOX: Type: IMPLANTABLE DEVICE | Site: STERNUM | Status: FUNCTIONAL

## 2023-12-20 DEVICE — SCREW, SD LOCKING, 2.3 X 14MM: Type: IMPLANTABLE DEVICE | Site: STERNUM | Status: FUNCTIONAL

## 2023-12-20 DEVICE — PLATE, LP LADDER, NARROW: Type: IMPLANTABLE DEVICE | Site: STERNUM | Status: FUNCTIONAL

## 2023-12-20 DEVICE — PLATE, LP MANUBRIUM: Type: IMPLANTABLE DEVICE | Site: STERNUM | Status: FUNCTIONAL

## 2023-12-20 RX ORDER — INSULIN LISPRO 100 [IU]/ML
INJECTION, SOLUTION INTRAVENOUS; SUBCUTANEOUS AS NEEDED
Status: DISCONTINUED | OUTPATIENT
Start: 2023-12-20 | End: 2023-12-20

## 2023-12-20 RX ORDER — GLYCOPYRROLATE 0.2 MG/ML
0.4 INJECTION INTRAMUSCULAR; INTRAVENOUS ONCE
Status: DISCONTINUED | OUTPATIENT
Start: 2023-12-21 | End: 2023-12-21

## 2023-12-20 RX ORDER — SODIUM CHLORIDE, SODIUM GLUCONATE, SODIUM ACETATE, POTASSIUM CHLORIDE AND MAGNESIUM CHLORIDE 30; 37; 368; 526; 502 MG/100ML; MG/100ML; MG/100ML; MG/100ML; MG/100ML
INJECTION, SOLUTION INTRAVENOUS CONTINUOUS PRN
Status: DISCONTINUED | OUTPATIENT
Start: 2023-12-20 | End: 2023-12-20

## 2023-12-20 RX ORDER — METHADONE HYDROCHLORIDE 10 MG/ML
20 INJECTION, SOLUTION INTRAMUSCULAR; INTRAVENOUS; SUBCUTANEOUS ONCE
Status: COMPLETED | OUTPATIENT
Start: 2023-12-20 | End: 2023-12-20

## 2023-12-20 RX ORDER — OXYCODONE HYDROCHLORIDE 5 MG/1
5 TABLET ORAL EVERY 4 HOURS PRN
Status: DISCONTINUED | OUTPATIENT
Start: 2023-12-20 | End: 2023-12-25

## 2023-12-20 RX ORDER — PANTOPRAZOLE SODIUM 40 MG/1
40 TABLET, DELAYED RELEASE ORAL
Status: DISCONTINUED | OUTPATIENT
Start: 2023-12-21 | End: 2023-12-21

## 2023-12-20 RX ORDER — CALCIUM GLUCONATE 20 MG/ML
2 INJECTION, SOLUTION INTRAVENOUS EVERY 6 HOURS PRN
Status: DISCONTINUED | OUTPATIENT
Start: 2023-12-20 | End: 2023-12-21

## 2023-12-20 RX ORDER — NEOSTIGMINE METHYLSULFATE 1 MG/ML
5 INJECTION, SOLUTION INTRAVENOUS ONCE
Status: DISCONTINUED | OUTPATIENT
Start: 2023-12-21 | End: 2023-12-21

## 2023-12-20 RX ORDER — NALOXONE HYDROCHLORIDE 0.4 MG/ML
0.2 INJECTION, SOLUTION INTRAMUSCULAR; INTRAVENOUS; SUBCUTANEOUS EVERY 5 MIN PRN
Status: DISCONTINUED | OUTPATIENT
Start: 2023-12-20 | End: 2023-12-21

## 2023-12-20 RX ORDER — INSULIN LISPRO 100 [IU]/ML
0-15 INJECTION, SOLUTION INTRAVENOUS; SUBCUTANEOUS EVERY 4 HOURS
Status: DISCONTINUED | OUTPATIENT
Start: 2023-12-20 | End: 2023-12-21

## 2023-12-20 RX ORDER — ALBUMIN HUMAN 50 G/1000ML
25 SOLUTION INTRAVENOUS ONCE
Status: COMPLETED | OUTPATIENT
Start: 2023-12-20 | End: 2023-12-21

## 2023-12-20 RX ORDER — CALCIUM CHLORIDE INJECTION 100 MG/ML
INJECTION, SOLUTION INTRAVENOUS AS NEEDED
Status: DISCONTINUED | OUTPATIENT
Start: 2023-12-20 | End: 2023-12-20

## 2023-12-20 RX ORDER — ACETAMINOPHEN 325 MG/1
650 TABLET ORAL EVERY 4 HOURS
Status: DISCONTINUED | OUTPATIENT
Start: 2023-12-20 | End: 2023-12-21

## 2023-12-20 RX ORDER — ROCURONIUM BROMIDE 10 MG/ML
INJECTION, SOLUTION INTRAVENOUS AS NEEDED
Status: DISCONTINUED | OUTPATIENT
Start: 2023-12-20 | End: 2023-12-20

## 2023-12-20 RX ORDER — LIDOCAINE HYDROCHLORIDE 20 MG/ML
INJECTION, SOLUTION INFILTRATION; PERINEURAL AS NEEDED
Status: DISCONTINUED | OUTPATIENT
Start: 2023-12-20 | End: 2023-12-20

## 2023-12-20 RX ORDER — ACETAMINOPHEN 650 MG/1
650 SUPPOSITORY RECTAL EVERY 4 HOURS
Status: DISCONTINUED | OUTPATIENT
Start: 2023-12-20 | End: 2023-12-21

## 2023-12-20 RX ORDER — MIDAZOLAM HYDROCHLORIDE 1 MG/ML
INJECTION INTRAMUSCULAR; INTRAVENOUS CONTINUOUS PRN
Status: DISCONTINUED | OUTPATIENT
Start: 2023-12-20 | End: 2023-12-20

## 2023-12-20 RX ORDER — NEOSTIGMINE METHYLSULFATE 1 MG/ML
INJECTION, SOLUTION INTRAVENOUS
Status: DISPENSED
Start: 2023-12-20 | End: 2023-12-21

## 2023-12-20 RX ORDER — ALBUMIN HUMAN 250 G/1000ML
SOLUTION INTRAVENOUS CONTINUOUS PRN
Status: DISCONTINUED | OUTPATIENT
Start: 2023-12-20 | End: 2023-12-20

## 2023-12-20 RX ORDER — AMOXICILLIN 250 MG
2 CAPSULE ORAL 2 TIMES DAILY
Status: DISCONTINUED | OUTPATIENT
Start: 2023-12-20 | End: 2023-12-29 | Stop reason: HOSPADM

## 2023-12-20 RX ORDER — MAGNESIUM SULFATE HEPTAHYDRATE 40 MG/ML
2 INJECTION, SOLUTION INTRAVENOUS EVERY 6 HOURS PRN
Status: DISCONTINUED | OUTPATIENT
Start: 2023-12-20 | End: 2023-12-21

## 2023-12-20 RX ORDER — DEXTROSE 50 % IN WATER (D50W) INTRAVENOUS SYRINGE
25
Status: DISCONTINUED | OUTPATIENT
Start: 2023-12-20 | End: 2023-12-29 | Stop reason: HOSPADM

## 2023-12-20 RX ORDER — VANCOMYCIN HYDROCHLORIDE 1 G/20ML
INJECTION, POWDER, LYOPHILIZED, FOR SOLUTION INTRAVENOUS AS NEEDED
Status: DISCONTINUED | OUTPATIENT
Start: 2023-12-20 | End: 2023-12-20 | Stop reason: HOSPADM

## 2023-12-20 RX ORDER — HEPARIN SODIUM 1000 [USP'U]/ML
INJECTION, SOLUTION INTRAVENOUS; SUBCUTANEOUS AS NEEDED
Status: DISCONTINUED | OUTPATIENT
Start: 2023-12-20 | End: 2023-12-20

## 2023-12-20 RX ORDER — FENTANYL CITRATE 50 UG/ML
INJECTION, SOLUTION INTRAMUSCULAR; INTRAVENOUS AS NEEDED
Status: DISCONTINUED | OUTPATIENT
Start: 2023-12-20 | End: 2023-12-20

## 2023-12-20 RX ORDER — DIPHENHYDRAMINE HYDROCHLORIDE 50 MG/ML
INJECTION INTRAMUSCULAR; INTRAVENOUS AS NEEDED
Status: DISCONTINUED | OUTPATIENT
Start: 2023-12-20 | End: 2023-12-20

## 2023-12-20 RX ORDER — GLYCOPYRROLATE 0.2 MG/ML
INJECTION INTRAMUSCULAR; INTRAVENOUS
Status: DISPENSED
Start: 2023-12-20 | End: 2023-12-21

## 2023-12-20 RX ORDER — NITROGLYCERIN 40 MG/100ML
INJECTION INTRAVENOUS AS NEEDED
Status: DISCONTINUED | OUTPATIENT
Start: 2023-12-20 | End: 2023-12-20

## 2023-12-20 RX ORDER — NOREPINEPHRINE BITARTRATE 0.03 MG/ML
INJECTION, SOLUTION INTRAVENOUS CONTINUOUS PRN
Status: DISCONTINUED | OUTPATIENT
Start: 2023-12-20 | End: 2023-12-20

## 2023-12-20 RX ORDER — VANCOMYCIN HYDROCHLORIDE 1 G/200ML
INJECTION, SOLUTION INTRAVENOUS AS NEEDED
Status: DISCONTINUED | OUTPATIENT
Start: 2023-12-20 | End: 2023-12-20

## 2023-12-20 RX ORDER — ESMOLOL HYDROCHLORIDE 10 MG/ML
INJECTION INTRAVENOUS AS NEEDED
Status: DISCONTINUED | OUTPATIENT
Start: 2023-12-20 | End: 2023-12-20

## 2023-12-20 RX ORDER — MIDAZOLAM HYDROCHLORIDE 1 MG/ML
INJECTION, SOLUTION INTRAMUSCULAR; INTRAVENOUS AS NEEDED
Status: DISCONTINUED | OUTPATIENT
Start: 2023-12-20 | End: 2023-12-20

## 2023-12-20 RX ORDER — PHENYLEPHRINE 10 MG/250 ML(40 MCG/ML)IN 0.9 % SOD.CHLORIDE INTRAVENOUS
CONTINUOUS PRN
Status: DISCONTINUED | OUTPATIENT
Start: 2023-12-20 | End: 2023-12-20

## 2023-12-20 RX ORDER — PROPOFOL 10 MG/ML
INJECTION, EMULSION INTRAVENOUS CONTINUOUS PRN
Status: DISCONTINUED | OUTPATIENT
Start: 2023-12-20 | End: 2023-12-20

## 2023-12-20 RX ORDER — PROPOFOL 10 MG/ML
0-50 INJECTION, EMULSION INTRAVENOUS CONTINUOUS
Status: DISCONTINUED | OUTPATIENT
Start: 2023-12-20 | End: 2023-12-21

## 2023-12-20 RX ORDER — NAPROXEN SODIUM 220 MG/1
81 TABLET, FILM COATED ORAL ONCE
Status: COMPLETED | OUTPATIENT
Start: 2023-12-20 | End: 2023-12-21

## 2023-12-20 RX ORDER — PROTAMINE SULFATE 10 MG/ML
INJECTION, SOLUTION INTRAVENOUS AS NEEDED
Status: DISCONTINUED | OUTPATIENT
Start: 2023-12-20 | End: 2023-12-20

## 2023-12-20 RX ORDER — MAGNESIUM SULFATE HEPTAHYDRATE 40 MG/ML
4 INJECTION, SOLUTION INTRAVENOUS EVERY 6 HOURS PRN
Status: DISCONTINUED | OUTPATIENT
Start: 2023-12-20 | End: 2023-12-21

## 2023-12-20 RX ORDER — FAMOTIDINE 10 MG/ML
INJECTION INTRAVENOUS AS NEEDED
Status: DISCONTINUED | OUTPATIENT
Start: 2023-12-20 | End: 2023-12-20

## 2023-12-20 RX ORDER — ALBUMIN HUMAN 50 G/1000ML
SOLUTION INTRAVENOUS AS NEEDED
Status: DISCONTINUED | OUTPATIENT
Start: 2023-12-20 | End: 2023-12-20

## 2023-12-20 RX ORDER — POTASSIUM CHLORIDE 14.9 MG/ML
20 INJECTION INTRAVENOUS EVERY 6 HOURS PRN
Status: DISCONTINUED | OUTPATIENT
Start: 2023-12-20 | End: 2023-12-21

## 2023-12-20 RX ORDER — PANTOPRAZOLE SODIUM 40 MG/10ML
40 INJECTION, POWDER, LYOPHILIZED, FOR SOLUTION INTRAVENOUS
Status: DISCONTINUED | OUTPATIENT
Start: 2023-12-21 | End: 2023-12-21

## 2023-12-20 RX ORDER — SODIUM CHLORIDE, SODIUM LACTATE, POTASSIUM CHLORIDE, CALCIUM CHLORIDE 600; 310; 30; 20 MG/100ML; MG/100ML; MG/100ML; MG/100ML
5 INJECTION, SOLUTION INTRAVENOUS CONTINUOUS
Status: DISCONTINUED | OUTPATIENT
Start: 2023-12-20 | End: 2023-12-21

## 2023-12-20 RX ORDER — SODIUM CHLORIDE, SODIUM LACTATE, POTASSIUM CHLORIDE, CALCIUM CHLORIDE 600; 310; 30; 20 MG/100ML; MG/100ML; MG/100ML; MG/100ML
30 INJECTION, SOLUTION INTRAVENOUS CONTINUOUS
Status: DISCONTINUED | OUTPATIENT
Start: 2023-12-20 | End: 2023-12-21

## 2023-12-20 RX ORDER — MAGNESIUM SULFATE HEPTAHYDRATE 40 MG/ML
INJECTION, SOLUTION INTRAVENOUS AS NEEDED
Status: DISCONTINUED | OUTPATIENT
Start: 2023-12-20 | End: 2023-12-20

## 2023-12-20 RX ORDER — HYDROMORPHONE HYDROCHLORIDE 1 MG/ML
0.2 INJECTION, SOLUTION INTRAMUSCULAR; INTRAVENOUS; SUBCUTANEOUS
Status: DISCONTINUED | OUTPATIENT
Start: 2023-12-20 | End: 2023-12-21

## 2023-12-20 RX ORDER — PROPOFOL 10 MG/ML
INJECTION, EMULSION INTRAVENOUS AS NEEDED
Status: DISCONTINUED | OUTPATIENT
Start: 2023-12-20 | End: 2023-12-20

## 2023-12-20 RX ORDER — CEFAZOLIN 1 G/1
INJECTION, POWDER, FOR SOLUTION INTRAVENOUS AS NEEDED
Status: DISCONTINUED | OUTPATIENT
Start: 2023-12-20 | End: 2023-12-20

## 2023-12-20 RX ORDER — PAPAVERINE HYDROCHLORIDE 30 MG/ML
INJECTION INTRAMUSCULAR; INTRAVENOUS AS NEEDED
Status: DISCONTINUED | OUTPATIENT
Start: 2023-12-20 | End: 2023-12-20 | Stop reason: HOSPADM

## 2023-12-20 RX ORDER — POLYETHYLENE GLYCOL 3350 17 G/17G
17 POWDER, FOR SOLUTION ORAL 2 TIMES DAILY
Status: DISCONTINUED | OUTPATIENT
Start: 2023-12-20 | End: 2023-12-29 | Stop reason: HOSPADM

## 2023-12-20 RX ORDER — CALCIUM GLUCONATE 20 MG/ML
1 INJECTION, SOLUTION INTRAVENOUS EVERY 6 HOURS PRN
Status: DISCONTINUED | OUTPATIENT
Start: 2023-12-20 | End: 2023-12-21

## 2023-12-20 RX ORDER — ASPIRIN 300 MG/1
150 SUPPOSITORY RECTAL ONCE
Status: COMPLETED | OUTPATIENT
Start: 2023-12-20 | End: 2023-12-21

## 2023-12-20 RX ADMIN — PROPOFOL 40 MG: 10 INJECTION, EMULSION INTRAVENOUS at 16:55

## 2023-12-20 RX ADMIN — DIPHENHYDRAMINE HYDROCHLORIDE 25 MG: 50 INJECTION INTRAMUSCULAR; INTRAVENOUS at 17:22

## 2023-12-20 RX ADMIN — CALCIUM CHLORIDE 3 ML: 100 INJECTION INTRAVENOUS; INTRAVENTRICULAR at 19:52

## 2023-12-20 RX ADMIN — ROCURONIUM BROMIDE 20 MG: 10 INJECTION, SOLUTION INTRAVENOUS at 19:58

## 2023-12-20 RX ADMIN — PREGABALIN 75 MG: 75 CAPSULE ORAL at 08:22

## 2023-12-20 RX ADMIN — Medication 0.02 MCG/KG/MIN: at 21:23

## 2023-12-20 RX ADMIN — ROCURONIUM BROMIDE 100 MG: 10 INJECTION, SOLUTION INTRAVENOUS at 15:51

## 2023-12-20 RX ADMIN — Medication 16 MCG: at 21:53

## 2023-12-20 RX ADMIN — HEPARIN SODIUM 5000 UNITS: 1000 INJECTION, SOLUTION INTRAVENOUS; SUBCUTANEOUS at 19:46

## 2023-12-20 RX ADMIN — ALBUMIN HUMAN 250 ML: 0.05 INJECTION, SOLUTION INTRAVENOUS at 21:29

## 2023-12-20 RX ADMIN — INSULIN LISPRO 3 UNITS: 100 INJECTION, SOLUTION INTRAVENOUS; SUBCUTANEOUS at 17:53

## 2023-12-20 RX ADMIN — ALBUMIN HUMAN 25 G: 0.05 INJECTION, SOLUTION INTRAVENOUS at 23:44

## 2023-12-20 RX ADMIN — SODIUM CHLORIDE, SODIUM GLUCONATE, SODIUM ACETATE, POTASSIUM CHLORIDE AND MAGNESIUM CHLORIDE: 30; 37; 368; 526; 502 INJECTION, SOLUTION INTRAVENOUS at 15:54

## 2023-12-20 RX ADMIN — FENTANYL CITRATE 50 MCG: 50 INJECTION, SOLUTION INTRAMUSCULAR; INTRAVENOUS at 18:57

## 2023-12-20 RX ADMIN — Medication 8 MCG: at 22:26

## 2023-12-20 RX ADMIN — SODIUM CHLORIDE, SODIUM GLUCONATE, SODIUM ACETATE, POTASSIUM CHLORIDE AND MAGNESIUM CHLORIDE: 526; 502; 368; 37; 30 INJECTION, SOLUTION INTRAVENOUS at 15:54

## 2023-12-20 RX ADMIN — PROPOFOL 25 MCG/KG/MIN: 10 INJECTION, EMULSION INTRAVENOUS at 21:32

## 2023-12-20 RX ADMIN — CEFAZOLIN 3 G: 330 INJECTION, POWDER, FOR SOLUTION INTRAMUSCULAR; INTRAVENOUS at 19:52

## 2023-12-20 RX ADMIN — MAGNESIUM SULFATE HEPTAHYDRATE 2 G: 40 INJECTION, SOLUTION INTRAVENOUS at 18:22

## 2023-12-20 RX ADMIN — Medication 16 MCG: at 21:13

## 2023-12-20 RX ADMIN — FAMOTIDINE 20 MG: 10 INJECTION, SOLUTION INTRAVENOUS at 17:22

## 2023-12-20 RX ADMIN — VANCOMYCIN HYDROCHLORIDE 1.5 G: 1 INJECTION, SOLUTION INTRAVENOUS at 16:06

## 2023-12-20 RX ADMIN — FENTANYL CITRATE 150 MCG: 50 INJECTION, SOLUTION INTRAMUSCULAR; INTRAVENOUS at 16:53

## 2023-12-20 RX ADMIN — Medication 50 PERCENT: at 23:15

## 2023-12-20 RX ADMIN — METHADONE HYDROCHLORIDE 20 MG: 10 INJECTION, SOLUTION INTRAMUSCULAR; INTRAVENOUS; SUBCUTANEOUS at 16:03

## 2023-12-20 RX ADMIN — FENTANYL CITRATE 50 MCG: 50 INJECTION, SOLUTION INTRAMUSCULAR; INTRAVENOUS at 17:24

## 2023-12-20 RX ADMIN — ROCURONIUM BROMIDE 20 MG: 10 INJECTION, SOLUTION INTRAVENOUS at 21:15

## 2023-12-20 RX ADMIN — PROPOFOL 80 MG: 10 INJECTION, EMULSION INTRAVENOUS at 15:51

## 2023-12-20 RX ADMIN — PROTAMINE SULFATE 400 MG: 10 INJECTION, SOLUTION INTRAVENOUS at 21:16

## 2023-12-20 RX ADMIN — SODIUM CHLORIDE 15 MG/KG/HR: 9 INJECTION, SOLUTION INTRAVENOUS at 16:28

## 2023-12-20 RX ADMIN — ALBUMIN HUMAN 250 ML: 0.05 INJECTION, SOLUTION INTRAVENOUS at 19:33

## 2023-12-20 RX ADMIN — ROCURONIUM BROMIDE 10 MG: 10 INJECTION, SOLUTION INTRAVENOUS at 18:33

## 2023-12-20 RX ADMIN — FENTANYL CITRATE 250 MCG: 50 INJECTION, SOLUTION INTRAMUSCULAR; INTRAVENOUS at 15:50

## 2023-12-20 RX ADMIN — CALCIUM CHLORIDE 5 ML: 100 INJECTION INTRAVENOUS; INTRAVENTRICULAR at 21:58

## 2023-12-20 RX ADMIN — Medication 25 MCG: at 18:11

## 2023-12-20 RX ADMIN — CEFAZOLIN 3 G: 330 INJECTION, POWDER, FOR SOLUTION INTRAMUSCULAR; INTRAVENOUS at 15:56

## 2023-12-20 RX ADMIN — ROCURONIUM BROMIDE 20 MG: 10 INJECTION, SOLUTION INTRAVENOUS at 18:28

## 2023-12-20 RX ADMIN — AMOXICILLIN 500 MG: 500 CAPSULE ORAL at 08:21

## 2023-12-20 RX ADMIN — CALCIUM CHLORIDE 2 ML: 100 INJECTION INTRAVENOUS; INTRAVENTRICULAR at 19:48

## 2023-12-20 RX ADMIN — Medication 8 MCG: at 21:00

## 2023-12-20 RX ADMIN — SODIUM CHLORIDE, SODIUM GLUCONATE, SODIUM ACETATE, POTASSIUM CHLORIDE AND MAGNESIUM CHLORIDE: 30; 37; 368; 526; 502 INJECTION, SOLUTION INTRAVENOUS at 16:18

## 2023-12-20 RX ADMIN — PANTOPRAZOLE SODIUM 40 MG: 40 TABLET, DELAYED RELEASE ORAL at 06:32

## 2023-12-20 RX ADMIN — ROCURONIUM BROMIDE 20 MG: 10 INJECTION, SOLUTION INTRAVENOUS at 19:12

## 2023-12-20 RX ADMIN — CARVEDILOL 6.25 MG: 6.25 TABLET, FILM COATED ORAL at 08:22

## 2023-12-20 RX ADMIN — ROCURONIUM BROMIDE 30 MG: 10 INJECTION, SOLUTION INTRAVENOUS at 16:55

## 2023-12-20 RX ADMIN — HEPARIN SODIUM 5000 UNITS: 1000 INJECTION, SOLUTION INTRAVENOUS; SUBCUTANEOUS at 20:20

## 2023-12-20 RX ADMIN — MIDAZOLAM 2 MG: 1 INJECTION INTRAMUSCULAR; INTRAVENOUS at 15:43

## 2023-12-20 RX ADMIN — Medication 25 MCG: at 17:48

## 2023-12-20 RX ADMIN — FENTANYL CITRATE 100 MCG: 50 INJECTION, SOLUTION INTRAMUSCULAR; INTRAVENOUS at 18:11

## 2023-12-20 RX ADMIN — LIDOCAINE HYDROCHLORIDE 100 MG: 20 INJECTION, SOLUTION INFILTRATION; PERINEURAL at 15:50

## 2023-12-20 RX ADMIN — ALBUMIN HUMAN 250 ML: 0.05 INJECTION, SOLUTION INTRAVENOUS at 20:55

## 2023-12-20 RX ADMIN — ESMOLOL HYDROCHLORIDE 5 MG: 10 INJECTION, SOLUTION INTRAVENOUS at 18:06

## 2023-12-20 RX ADMIN — ALBUMIN HUMAN: 0.25 SOLUTION INTRAVENOUS at 19:29

## 2023-12-20 RX ADMIN — Medication 16 MCG: at 21:19

## 2023-12-20 RX ADMIN — FENTANYL CITRATE 100 MCG: 50 INJECTION, SOLUTION INTRAMUSCULAR; INTRAVENOUS at 16:52

## 2023-12-20 RX ADMIN — Medication 0.3 MCG/KG/MIN: at 15:50

## 2023-12-20 RX ADMIN — Medication 25 MCG: at 17:26

## 2023-12-20 RX ADMIN — Medication 50 MCG: at 17:51

## 2023-12-20 RX ADMIN — INSULIN LISPRO 3 UNITS: 100 INJECTION, SOLUTION INTRAVENOUS; SUBCUTANEOUS at 08:22

## 2023-12-20 RX ADMIN — ASPIRIN 81 MG CHEWABLE TABLET 81 MG: 81 TABLET CHEWABLE at 08:21

## 2023-12-20 RX ADMIN — Medication 25 MCG: at 18:58

## 2023-12-20 RX ADMIN — Medication 25 MCG: at 18:06

## 2023-12-20 RX ADMIN — ROCURONIUM BROMIDE 20 MG: 10 INJECTION, SOLUTION INTRAVENOUS at 18:02

## 2023-12-20 RX ADMIN — HEPARIN SODIUM 43000 UNITS: 1000 INJECTION, SOLUTION INTRAVENOUS; SUBCUTANEOUS at 17:42

## 2023-12-20 RX ADMIN — LIDOCAINE HYDROCHLORIDE 100 MG: 20 INJECTION, SOLUTION INFILTRATION; PERINEURAL at 18:23

## 2023-12-20 RX ADMIN — INSULIN HUMAN 2 UNITS/HR: 100 INJECTION, SOLUTION PARENTERAL at 16:56

## 2023-12-20 SDOH — HEALTH STABILITY: MENTAL HEALTH: CURRENT SMOKER: 1

## 2023-12-20 ASSESSMENT — COGNITIVE AND FUNCTIONAL STATUS - GENERAL
DAILY ACTIVITIY SCORE: 24
MOBILITY SCORE: 24

## 2023-12-20 ASSESSMENT — PAIN - FUNCTIONAL ASSESSMENT
PAIN_FUNCTIONAL_ASSESSMENT: 0-10
PAIN_FUNCTIONAL_ASSESSMENT: 0-10

## 2023-12-20 ASSESSMENT — COLUMBIA-SUICIDE SEVERITY RATING SCALE - C-SSRS
1. IN THE PAST MONTH, HAVE YOU WISHED YOU WERE DEAD OR WISHED YOU COULD GO TO SLEEP AND NOT WAKE UP?: NO
2. HAVE YOU ACTUALLY HAD ANY THOUGHTS OF KILLING YOURSELF?: NO
6. HAVE YOU EVER DONE ANYTHING, STARTED TO DO ANYTHING, OR PREPARED TO DO ANYTHING TO END YOUR LIFE?: NO

## 2023-12-20 ASSESSMENT — PAIN SCALES - GENERAL
PAINLEVEL_OUTOF10: 0 - NO PAIN
PAIN_LEVEL: 0
PAINLEVEL_OUTOF10: 0 - NO PAIN

## 2023-12-20 NOTE — ANESTHESIA PREPROCEDURE EVALUATION
Patient: Behzad Diaz    Procedure Information       Date/Time: 12/20/23 1331    Procedure: Creation Bypass Graft Coronary Artery - 2nd round Wednesday 12/20; LIMA/Radial    Location: [facility] OR 19 / [facility] OR    Surgeons: Mattie Phelps MD            Relevant Problems   Cardiovascular  TTE 10/12 with EF 40-45% with global hypokinesis of LV. LHC 12/11 revealed 100% stenosis in mid LAD, 95% stenosis proximal to mid first diagonal branch, occluded LCx after early first OM, OM1 with occluded mid portion, OM2 occluded, and 95% stenosis mid RCA.   (+) HLD (hyperlipidemia)   (+) HTN (hypertension)   (+) NSTEMI (non-ST elevated myocardial infarction) (CMS/HCC)      Endocrine   (+) Type 2 diabetes mellitus, without long-term current use of insulin (CMS/HCC)      /Renal   (+) Acute cystitis with hematuria   (+) Complicated UTI (urinary tract infection)   (+) NAFLD (nonalcoholic fatty liver disease)      Neuro/Psych   (+) Lumbar radiculopathy      GI/Hepatic   (+) NAFLD (nonalcoholic fatty liver disease)      Hematology   (+) Anemia      Musculoskeletal   (+) Acute exacerbation of chronic low back pain   (+) Discitis of lumbar region      Infectious Disease   (+) Candidiasis   (+) Complicated UTI (urinary tract infection)   (+) Fungal nail infection   (+) Osteomyelitis of spine (CMS/HCC)      Other   (+) Discitis   (+) Discitis of lumbar region   (+) Osteomyelitis of spine (CMS/[facility])       Clinical information reviewed:   Tobacco  Allergies  Meds   Med Hx  Surg Hx   Fam Hx  Soc Hx        NPO Detail:       Physical Exam    Airway  Mallampati: II  TM distance: >3 FB  Neck ROM: full     Cardiovascular - normal exam  Rhythm: regular     Dental - normal exam     Pulmonary - normal exam  Breath sounds clear to auscultation     Abdominal - normal exam           Anesthesia Plan    ASA 4     general   (Plan art line, cvp, ANDREZ.  )  The patient is a current smoker.  Patient was previously instructed to abstain from  smoking on day of procedure.  Patient did not smoke on day of procedure.  Education provided regarding risk of obstructive sleep apnea.  intravenous induction   Postoperative administration of opioids is intended.  Anesthetic plan and risks discussed with patient.  Use of blood products discussed with patient who.    Plan discussed with attending.

## 2023-12-20 NOTE — ANESTHESIA PROCEDURE NOTES
Airway  Date/Time: 12/20/2023 4:34 PM  Urgency: elective      Staffing  Performed: resident   Authorized by: Darrell Iglesias MD    Performed by: Chino Spangler MD  Patient location during procedure: OR    Indications and Patient Condition  Indications for airway management: anesthesia  Spontaneous Ventilation: absent  Sedation level: deep  Preoxygenated: yes  Patient position: sniffing  Mask difficulty assessment: 2 - vent by mask + OA or adjuvant +/- NMBA  No planned trial extubation    Final Airway Details  Final airway type: endotracheal airway      Successful airway: ETT  Cuffed: yes   Successful intubation technique: video laryngoscopy  Facilitating devices/methods: intubating stylet  Endotracheal tube insertion site: oral  Blade type: Large glidescope.  Blade size: #4  ETT size (mm): 7.5  Cormack-Lehane Classification: grade I - full view of glottis  Placement verified by: chest auscultation   Measured from: gums  Number of attempts at approach: 1

## 2023-12-20 NOTE — PROGRESS NOTES
"Behzad Diaz is a 58 y.o. male on day 6 of admission presenting with Coronary artery disease (CAD) excluded.    Subjective   The patient was this AM and with the team during rounds, denied any chest pain, palpitation or SOB. No events overnight. NPO for surgery today.         Objective     Physical Exam  Constitutional: Well-developed male in no acute distress.  HEENT: Normocephalic, atraumatic. PERRL. EOMI. No cervical lymphadenopathy.  Respiratory: CTA bilaterally. No wheezes, rales, or rhonchi. Normal respiratory effort.  Cardiovascular: RRR. No murmurs, gallops, or rubs. No JVD. Radial pulses 2+.  Abdominal: Soft, nondistended, nontender to palpation. Bowel sounds present. No hepatosplenomegaly or masses. No CVA tenderness. Significantly Obese patient.  Neuro: CN II-XII intact. UE and LE strength 5/5 bilaterally and sensation intact. Normal FTN testing.  MSK: No LE edema bilaterally.  Skin: Warm, dry. No rashes or wounds.  Psych: Appropriate mood and affect.    Last Recorded Vitals  Blood pressure 126/76, pulse 63, temperature 36 °C (96.8 °F), resp. rate 20, height 1.854 m (6' 0.99\"), weight (!) 155 kg (341 lb 11.2 oz), SpO2 97 %.  Intake/Output last 3 Shifts:  I/O last 3 completed shifts:  In: 2442 (15.8 mL/kg) [P.O.:2110; I.V.:332 (2.1 mL/kg)]  Out: 1225 (7.9 mL/kg) [Urine:1225 (0.2 mL/kg/hr)]  Weight: 155 kg     Relevant Results           Active Medications  Scheduled medications  amoxicillin, 500 mg, oral, q12h ISAIAH  aspirin, 81 mg, oral, Daily  atorvastatin, 80 mg, oral, Nightly  carvedilol, 6.25 mg, oral, BID with meals  insulin glargine, 36 Units, subcutaneous, Nightly  insulin lispro, 0-15 Units, subcutaneous, TID with meals  insulin lispro, 18 Units, subcutaneous, Daily with lunch  insulin lispro, 18 Units, subcutaneous, Daily before evening meal  insulin lispro, 20 Units, subcutaneous, Daily with breakfast  pantoprazole, 40 mg, oral, Daily before breakfast   Or  pantoprazole, 40 mg, intravenous, Daily " before breakfast  polyethylene glycol, 17 g, oral, Daily  pregabalin, 75 mg, oral, Daily      Continuous medications     PRN medications  PRN medications: acetaminophen **OR** acetaminophen **OR** acetaminophen, dextrose 10 % in water (D10W), dextrose, glucagon     Recent Labs  Results for orders placed or performed during the hospital encounter of 12/12/23 (from the past 24 hour(s))   CBC and Auto Differential   Result Value Ref Range    WBC 7.6 4.4 - 11.3 x10*3/uL    nRBC 0.0 0.0 - 0.0 /100 WBCs    RBC 4.76 4.50 - 5.90 x10*6/uL    Hemoglobin 14.6 13.5 - 17.5 g/dL    Hematocrit 43.3 41.0 - 52.0 %    MCV 91 80 - 100 fL    MCH 30.7 26.0 - 34.0 pg    MCHC 33.7 32.0 - 36.0 g/dL    RDW 12.6 11.5 - 14.5 %    Platelets 213 150 - 450 x10*3/uL    Neutrophils % 46.3 40.0 - 80.0 %    Immature Granulocytes %, Automated 0.4 0.0 - 0.9 %    Lymphocytes % 42.7 13.0 - 44.0 %    Monocytes % 6.7 2.0 - 10.0 %    Eosinophils % 3.2 0.0 - 6.0 %    Basophils % 0.7 0.0 - 2.0 %    Neutrophils Absolute 3.51 1.20 - 7.70 x10*3/uL    Immature Granulocytes Absolute, Automated 0.03 0.00 - 0.70 x10*3/uL    Lymphocytes Absolute 3.24 1.20 - 4.80 x10*3/uL    Monocytes Absolute 0.51 0.10 - 1.00 x10*3/uL    Eosinophils Absolute 0.24 0.00 - 0.70 x10*3/uL    Basophils Absolute 0.05 0.00 - 0.10 x10*3/uL   Magnesium   Result Value Ref Range    Magnesium 1.93 1.60 - 2.40 mg/dL   Renal Function Panel   Result Value Ref Range    Glucose 106 (H) 74 - 99 mg/dL    Sodium 140 136 - 145 mmol/L    Potassium 3.7 3.5 - 5.3 mmol/L    Chloride 104 98 - 107 mmol/L    Bicarbonate 23 21 - 32 mmol/L    Anion Gap 17 10 - 20 mmol/L    Urea Nitrogen 14 6 - 23 mg/dL    Creatinine 0.84 0.50 - 1.30 mg/dL    eGFR >90 >60 mL/min/1.73m*2    Calcium 9.1 8.6 - 10.6 mg/dL    Phosphorus 3.6 2.5 - 4.9 mg/dL    Albumin 3.9 3.4 - 5.0 g/dL   POCT GLUCOSE   Result Value Ref Range    POCT Glucose 116 (H) 74 - 99 mg/dL   POCT GLUCOSE   Result Value Ref Range    POCT Glucose 198 (H) 74 - 99  "mg/dL   POCT GLUCOSE   Result Value Ref Range    POCT Glucose 170 (H) 74 - 99 mg/dL   POCT GLUCOSE   Result Value Ref Range    POCT Glucose 176 (H) 74 - 99 mg/dL   POCT GLUCOSE   Result Value Ref Range    POCT Glucose 180 (H) 74 - 99 mg/dL       Imaging  No results found.           Assessment/Plan   Principal Problem:    Coronary artery disease (CAD) excluded  Active Problems:    Type 2 diabetes mellitus, without long-term current use of insulin (CMS/[facility])    HTN (hypertension)    NSTEMI (non-ST elevated myocardial infarction) (CMS/[facility])    Ischemic cardiomyopathy    HLD (hyperlipidemia)    Mr Wen 59 yo M with PMHx HTN,HLD, T2DM, L4-L5 Discitis with epidural abscess  s/p debridement, who was transferred from [facility] to [facility] for CABG evaluation. The patient presented with chest pain radiated to Lt arm that occurred at rest improved with NTG, was found to have NSTEMI at [facility] ED (EKG with inferolateral ischemia), trops 322 --[phone], was started on ASA,Plavix and Heparin gtt for NSTEMI, [facility] with multivessel disease, transferred here for CABG evaluation. Pt currently HDS, pain free, will continue with heparin gtt, ASA, and statin,CT surgery consulted.    Updates 12/20/23   - CABG today  -NPO   - Endo recs appreciated      #ACS -NSTEMI  #Multivessel CAD   #HTN   #HLD  #ICM   :Trops at [facility] 12/10: 322--1626.   :TTE 12/10: EF 40-45%, no valvular disease, global hypokinesis.      :[facility] 12/11: 100% stenosis in mid LAD, 95% stenosis proximal to mid first diagonal branch, occluded LCx after early first OM, OM1 with occluded mid portion, OM2 occluded, and 95% stenosis mid RCA.  :Pt s/p ASA, Plavix and Heparin gtt since 10/12   :Home Lisniopril 20mg, Atorvastatin 80   -Continue with, ASA  - Dc Heparin drip for >48hrs , drop in trops and no evidence of \" soft thrombus\"  -Continue with Correg 6.25mg BID  -Continue with Atorvastatin 80mg daily.   -Cardiothoracic surgery consulted for CABG evaluation.   -Will hold ACE/ARBs (Lisinopril) " and clopidogrel  i/s/o possible CABG.     #Uncontrolled T2DM   :HgA1c 12/10: 11  :Home Metformin 1000mg  -Holding home meds for now.   Recommendations:  - Decrease glargine to 35 units at bedtime  - For today continue mealtime lispro 20/18/18with breakfast lunch and dinner respectively and continue sliding scale insulin #3 [3 units for every 50 above 150] 3 times daily AC  - Discontinue mealtime lispro for 12/20  - Switch sliding scale to #3 sliding scale lispro every 4 hours while patient is n.p.o. (after midnight)       #Hx of L4-L5 Discitis c/b epidural abscess   :Home Amoxacillin 500mg BID ppx, follow up with ID    - Continue home Amoxicillin 500mg BID           Disposition: Home   Follow-ups: PCP, Cardiology, Endocrine      N: Cradiac  A: PIV  DVT ppx: Heparin gtt  GI ppx: PPI    Code Status: Full code (confirmed on admission)  Surrogate Medical Decision-maker: Babar Chery ([phone])      Lai Calixto MD

## 2023-12-21 ENCOUNTER — APPOINTMENT (OUTPATIENT)
Dept: RADIOLOGY | Facility: HOSPITAL | Age: 58
DRG: 236 | End: 2023-12-21
Payer: COMMERCIAL

## 2023-12-21 LAB
ABO GROUP (TYPE) IN BLOOD: NORMAL
ACT BLD: 102 SEC (ref 96–152)
ACT BLD: 125 SEC (ref 96–152)
ACT BLD: 255 SEC (ref 96–152)
ACT BLD: 261 SEC (ref 96–152)
ACT BLD: 283 SEC (ref 96–152)
ACT BLD: 316 SEC (ref 96–152)
ACT BLD: 345 SEC (ref 96–152)
ACT BLD: 349 SEC (ref 96–152)
ACT BLD: 407 SEC (ref 96–152)
ACT BLD: 571 SEC (ref 96–152)
ALBUMIN SERPL BCP-MCNC: 3.8 G/DL (ref 3.4–5)
ALBUMIN SERPL BCP-MCNC: 4.2 G/DL (ref 3.4–5)
ANION GAP BLDA CALCULATED.4IONS-SCNC: 14 MMO/L (ref 10–25)
ANION GAP BLDA CALCULATED.4IONS-SCNC: 15 MMO/L (ref 10–25)
ANION GAP SERPL CALC-SCNC: 14 MMOL/L (ref 10–20)
ANION GAP SERPL CALC-SCNC: 18 MMOL/L (ref 10–20)
ANTIBODY SCREEN: NORMAL
BASE EXCESS BLDA CALC-SCNC: -5.6 MMOL/L (ref -2–3)
BASE EXCESS BLDA CALC-SCNC: -5.7 MMOL/L (ref -2–3)
BODY TEMPERATURE: 37 DEGREES CELSIUS
BODY TEMPERATURE: 37 DEGREES CELSIUS
BUN SERPL-MCNC: 14 MG/DL (ref 6–23)
BUN SERPL-MCNC: 16 MG/DL (ref 6–23)
CA-I BLD-SCNC: 1.09 MMOL/L (ref 1.1–1.33)
CA-I BLDA-SCNC: 1.06 MMOL/L (ref 1.1–1.33)
CA-I BLDA-SCNC: 1.08 MMOL/L (ref 1.1–1.33)
CALCIUM SERPL-MCNC: 8.4 MG/DL (ref 8.6–10.6)
CALCIUM SERPL-MCNC: 8.7 MG/DL (ref 8.6–10.6)
CFT BLD TEG: 0.9 MIN (ref 0.8–2.1)
CHLORIDE BLDA-SCNC: 105 MMOL/L (ref 98–107)
CHLORIDE BLDA-SCNC: 105 MMOL/L (ref 98–107)
CHLORIDE SERPL-SCNC: 105 MMOL/L (ref 98–107)
CHLORIDE SERPL-SCNC: 106 MMOL/L (ref 98–107)
CLOT ANGLE BLD TEG: 76 DEG (ref 63–78)
CLOT INIT BLD TEG: 7.1 MIN (ref 4.6–9.1)
CLOT INIT P HPASE BLD TEG: 6.8 MIN (ref 4.3–8.3)
CO2 SERPL-SCNC: 18 MMOL/L (ref 21–32)
CO2 SERPL-SCNC: 21 MMOL/L (ref 21–32)
CREAT SERPL-MCNC: 0.84 MG/DL (ref 0.5–1.3)
CREAT SERPL-MCNC: 1.02 MG/DL (ref 0.5–1.3)
ERYTHROCYTE [DISTWIDTH] IN BLOOD BY AUTOMATED COUNT: 12.5 % (ref 11.5–14.5)
FIBRINOGEN BLD CALC-MCNC: 622 MG/DL (ref 278–581)
GFR SERPL CREATININE-BSD FRML MDRD: 86 ML/MIN/1.73M*2
GFR SERPL CREATININE-BSD FRML MDRD: >90 ML/MIN/1.73M*2
GLUCOSE BLD MANUAL STRIP-MCNC: 184 MG/DL (ref 74–99)
GLUCOSE BLD MANUAL STRIP-MCNC: 211 MG/DL (ref 74–99)
GLUCOSE BLD MANUAL STRIP-MCNC: 266 MG/DL (ref 74–99)
GLUCOSE BLD MANUAL STRIP-MCNC: 270 MG/DL (ref 74–99)
GLUCOSE BLDA-MCNC: 198 MG/DL (ref 74–99)
GLUCOSE BLDA-MCNC: 202 MG/DL (ref 74–99)
GLUCOSE SERPL-MCNC: 158 MG/DL (ref 74–99)
GLUCOSE SERPL-MCNC: 217 MG/DL (ref 74–99)
HCO3 BLDA-SCNC: 18.2 MMOL/L (ref 22–26)
HCO3 BLDA-SCNC: 18.3 MMOL/L (ref 22–26)
HCT VFR BLD AUTO: 35.1 % (ref 41–52)
HCT VFR BLD EST: 38 % (ref 41–52)
HCT VFR BLD EST: 39 % (ref 41–52)
HGB BLD-MCNC: 12.7 G/DL (ref 13.5–17.5)
HGB BLDA-MCNC: 12.7 G/DL (ref 13.5–17.5)
HGB BLDA-MCNC: 13 G/DL (ref 13.5–17.5)
LACTATE BLDA-SCNC: 0.9 MMOL/L (ref 0.4–2)
LACTATE BLDA-SCNC: 1 MMOL/L (ref 0.4–2)
MAGNESIUM SERPL-MCNC: 2.05 MG/DL (ref 1.6–2.4)
MAGNESIUM SERPL-MCNC: 2.2 MG/DL (ref 1.6–2.4)
MCF BLD TEG: 34 MM (ref 15–32)
MCF BLD TEG: 65 MM (ref 52–69)
MCF BLD TEG: 69 MM (ref 52–70)
MCH RBC QN AUTO: 31.7 PG (ref 26–34)
MCHC RBC AUTO-ENTMCNC: 36.2 G/DL (ref 32–36)
MCV RBC AUTO: 88 FL (ref 80–100)
MGC ASCENT PFT - FEV1 - PRE: 2.72
MGC ASCENT PFT - FEV1 - PREDICTED: 3.89
MGC ASCENT PFT - FVC - PRE: 3.55
MGC ASCENT PFT - FVC - PREDICTED: 5
NRBC BLD-RTO: 0 /100 WBCS (ref 0–0)
OXYHGB MFR BLDA: 96.8 % (ref 94–98)
OXYHGB MFR BLDA: 97.4 % (ref 94–98)
PCO2 BLDA: 30 MM HG (ref 38–42)
PCO2 BLDA: 31 MM HG (ref 38–42)
PH BLDA: 7.38 PH (ref 7.38–7.42)
PH BLDA: 7.39 PH (ref 7.38–7.42)
PHOSPHATE SERPL-MCNC: 3.7 MG/DL (ref 2.5–4.9)
PHOSPHATE SERPL-MCNC: 3.9 MG/DL (ref 2.5–4.9)
PLATELET # BLD AUTO: 183 X10*3/UL (ref 150–450)
PO2 BLDA: 157 MM HG (ref 85–95)
PO2 BLDA: 166 MM HG (ref 85–95)
POTASSIUM BLDA-SCNC: 4.9 MMOL/L (ref 3.5–5.3)
POTASSIUM BLDA-SCNC: 5 MMOL/L (ref 3.5–5.3)
POTASSIUM SERPL-SCNC: 5 MMOL/L (ref 3.5–5.3)
POTASSIUM SERPL-SCNC: 5 MMOL/L (ref 3.5–5.3)
RBC # BLD AUTO: 4.01 X10*6/UL (ref 4.5–5.9)
RH FACTOR (ANTIGEN D): NORMAL
SAO2 % BLDA: 100 % (ref 94–100)
SAO2 % BLDA: 99 % (ref 94–100)
SODIUM BLDA-SCNC: 132 MMOL/L (ref 136–145)
SODIUM BLDA-SCNC: 133 MMOL/L (ref 136–145)
SODIUM SERPL-SCNC: 136 MMOL/L (ref 136–145)
SODIUM SERPL-SCNC: 136 MMOL/L (ref 136–145)
TEST COMMENT: ABNORMAL
WBC # BLD AUTO: 9.4 X10*3/UL (ref 4.4–11.3)

## 2023-12-21 PROCEDURE — 99233 SBSQ HOSP IP/OBS HIGH 50: CPT | Performed by: STUDENT IN AN ORGANIZED HEALTH CARE EDUCATION/TRAINING PROGRAM

## 2023-12-21 PROCEDURE — 84132 ASSAY OF SERUM POTASSIUM: CPT

## 2023-12-21 PROCEDURE — 71045 X-RAY EXAM CHEST 1 VIEW: CPT | Performed by: RADIOLOGY

## 2023-12-21 PROCEDURE — 96372 THER/PROPH/DIAG INJ SC/IM: CPT | Performed by: STUDENT IN AN ORGANIZED HEALTH CARE EDUCATION/TRAINING PROGRAM

## 2023-12-21 PROCEDURE — 2500000004 HC RX 250 GENERAL PHARMACY W/ HCPCS (ALT 636 FOR OP/ED): Performed by: NURSE PRACTITIONER

## 2023-12-21 PROCEDURE — 94003 VENT MGMT INPAT SUBQ DAY: CPT

## 2023-12-21 PROCEDURE — 82947 ASSAY GLUCOSE BLOOD QUANT: CPT

## 2023-12-21 PROCEDURE — 2500000002 HC RX 250 W HCPCS SELF ADMINISTERED DRUGS (ALT 637 FOR MEDICARE OP, ALT 636 FOR OP/ED): Performed by: NURSE PRACTITIONER

## 2023-12-21 PROCEDURE — 2500000001 HC RX 250 WO HCPCS SELF ADMINISTERED DRUGS (ALT 637 FOR MEDICARE OP)

## 2023-12-21 PROCEDURE — 71045 X-RAY EXAM CHEST 1 VIEW: CPT

## 2023-12-21 PROCEDURE — 37799 UNLISTED PX VASCULAR SURGERY: CPT

## 2023-12-21 PROCEDURE — P9045 ALBUMIN (HUMAN), 5%, 250 ML: HCPCS | Mod: JZ

## 2023-12-21 PROCEDURE — 96372 THER/PROPH/DIAG INJ SC/IM: CPT | Performed by: NURSE PRACTITIONER

## 2023-12-21 PROCEDURE — 2500000004 HC RX 250 GENERAL PHARMACY W/ HCPCS (ALT 636 FOR OP/ED): Mod: JZ

## 2023-12-21 PROCEDURE — 86901 BLOOD TYPING SEROLOGIC RH(D): CPT | Performed by: NURSE PRACTITIONER

## 2023-12-21 PROCEDURE — 2500000001 HC RX 250 WO HCPCS SELF ADMINISTERED DRUGS (ALT 637 FOR MEDICARE OP): Performed by: STUDENT IN AN ORGANIZED HEALTH CARE EDUCATION/TRAINING PROGRAM

## 2023-12-21 PROCEDURE — 2500000001 HC RX 250 WO HCPCS SELF ADMINISTERED DRUGS (ALT 637 FOR MEDICARE OP): Performed by: NURSE PRACTITIONER

## 2023-12-21 PROCEDURE — A4217 STERILE WATER/SALINE, 500 ML: HCPCS | Performed by: NURSE PRACTITIONER

## 2023-12-21 PROCEDURE — 2500000002 HC RX 250 W HCPCS SELF ADMINISTERED DRUGS (ALT 637 FOR MEDICARE OP, ALT 636 FOR OP/ED)

## 2023-12-21 PROCEDURE — 82330 ASSAY OF CALCIUM: CPT

## 2023-12-21 PROCEDURE — A4217 STERILE WATER/SALINE, 500 ML: HCPCS

## 2023-12-21 PROCEDURE — 97164 PT RE-EVAL EST PLAN CARE: CPT | Mod: GP

## 2023-12-21 PROCEDURE — 83735 ASSAY OF MAGNESIUM: CPT

## 2023-12-21 PROCEDURE — 85027 COMPLETE CBC AUTOMATED: CPT

## 2023-12-21 PROCEDURE — 2500000004 HC RX 250 GENERAL PHARMACY W/ HCPCS (ALT 636 FOR OP/ED): Performed by: STUDENT IN AN ORGANIZED HEALTH CARE EDUCATION/TRAINING PROGRAM

## 2023-12-21 PROCEDURE — 1200000002 HC GENERAL ROOM WITH TELEMETRY DAILY

## 2023-12-21 PROCEDURE — 97530 THERAPEUTIC ACTIVITIES: CPT | Mod: GP

## 2023-12-21 PROCEDURE — 2500000004 HC RX 250 GENERAL PHARMACY W/ HCPCS (ALT 636 FOR OP/ED)

## 2023-12-21 PROCEDURE — 97161 PT EVAL LOW COMPLEX 20 MIN: CPT | Mod: GP

## 2023-12-21 RX ORDER — INSULIN GLARGINE 100 [IU]/ML
52 INJECTION, SOLUTION SUBCUTANEOUS NIGHTLY
Status: DISCONTINUED | OUTPATIENT
Start: 2023-12-21 | End: 2023-12-27

## 2023-12-21 RX ORDER — CLOPIDOGREL BISULFATE 75 MG/1
75 TABLET ORAL DAILY
Status: DISCONTINUED | OUTPATIENT
Start: 2023-12-22 | End: 2023-12-29 | Stop reason: HOSPADM

## 2023-12-21 RX ORDER — ATORVASTATIN CALCIUM 80 MG/1
80 TABLET, FILM COATED ORAL DAILY
Status: DISCONTINUED | OUTPATIENT
Start: 2023-12-21 | End: 2023-12-29 | Stop reason: HOSPADM

## 2023-12-21 RX ORDER — HYDROMORPHONE HYDROCHLORIDE 1 MG/ML
0.2 INJECTION, SOLUTION INTRAMUSCULAR; INTRAVENOUS; SUBCUTANEOUS EVERY 4 HOURS PRN
Status: DISCONTINUED | OUTPATIENT
Start: 2023-12-21 | End: 2023-12-21

## 2023-12-21 RX ORDER — IRON POLYSACCHARIDE COMPLEX 150 MG
150 CAPSULE ORAL DAILY
Status: DISCONTINUED | OUTPATIENT
Start: 2023-12-21 | End: 2023-12-29 | Stop reason: HOSPADM

## 2023-12-21 RX ORDER — ALBUMIN HUMAN 50 G/1000ML
SOLUTION INTRAVENOUS
Status: COMPLETED
Start: 2023-12-21 | End: 2023-12-21

## 2023-12-21 RX ORDER — ALBUMIN HUMAN 50 G/1000ML
25 SOLUTION INTRAVENOUS ONCE
Status: COMPLETED | OUTPATIENT
Start: 2023-12-21 | End: 2023-12-21

## 2023-12-21 RX ORDER — NAPROXEN SODIUM 220 MG/1
81 TABLET, FILM COATED ORAL DAILY
Status: DISCONTINUED | OUTPATIENT
Start: 2023-12-21 | End: 2023-12-25

## 2023-12-21 RX ORDER — CARVEDILOL 3.12 MG/1
3.12 TABLET ORAL 2 TIMES DAILY
Status: DISCONTINUED | OUTPATIENT
Start: 2023-12-21 | End: 2023-12-29 | Stop reason: HOSPADM

## 2023-12-21 RX ORDER — INSULIN LISPRO 100 [IU]/ML
0-15 INJECTION, SOLUTION INTRAVENOUS; SUBCUTANEOUS
Status: DISCONTINUED | OUTPATIENT
Start: 2023-12-21 | End: 2023-12-29

## 2023-12-21 RX ORDER — NOREPINEPHRINE BITARTRATE/D5W 8 MG/250ML
.01-.5 PLASTIC BAG, INJECTION (ML) INTRAVENOUS CONTINUOUS
Status: DISCONTINUED | OUTPATIENT
Start: 2023-12-21 | End: 2023-12-21

## 2023-12-21 RX ORDER — PREGABALIN 75 MG/1
75 CAPSULE ORAL DAILY
Status: DISCONTINUED | OUTPATIENT
Start: 2023-12-21 | End: 2023-12-29 | Stop reason: HOSPADM

## 2023-12-21 RX ORDER — MULTIVIT-MIN/IRON FUM/FOLIC AC 7.5 MG-4
1 TABLET ORAL DAILY
Status: DISCONTINUED | OUTPATIENT
Start: 2023-12-21 | End: 2023-12-29 | Stop reason: HOSPADM

## 2023-12-21 RX ORDER — HEPARIN SODIUM 5000 [USP'U]/ML
5000 INJECTION, SOLUTION INTRAVENOUS; SUBCUTANEOUS EVERY 8 HOURS
Status: DISCONTINUED | OUTPATIENT
Start: 2023-12-21 | End: 2023-12-29 | Stop reason: HOSPADM

## 2023-12-21 RX ORDER — ONDANSETRON HYDROCHLORIDE 2 MG/ML
4 INJECTION, SOLUTION INTRAVENOUS ONCE
Status: COMPLETED | OUTPATIENT
Start: 2023-12-21 | End: 2023-12-21

## 2023-12-21 RX ORDER — ACETAMINOPHEN 325 MG/1
650 TABLET ORAL EVERY 6 HOURS
Status: DISCONTINUED | OUTPATIENT
Start: 2023-12-21 | End: 2023-12-29 | Stop reason: HOSPADM

## 2023-12-21 RX ORDER — INSULIN LISPRO 100 [IU]/ML
0-15 INJECTION, SOLUTION INTRAVENOUS; SUBCUTANEOUS
Status: DISCONTINUED | OUTPATIENT
Start: 2023-12-21 | End: 2023-12-21

## 2023-12-21 RX ADMIN — HEPARIN SODIUM 5000 UNITS: 5000 INJECTION INTRAVENOUS; SUBCUTANEOUS at 09:38

## 2023-12-21 RX ADMIN — ACETAMINOPHEN 650 MG: 325 TABLET ORAL at 16:39

## 2023-12-21 RX ADMIN — Medication 3 G: at 11:18

## 2023-12-21 RX ADMIN — ALBUMIN HUMAN 25 G: 0.05 INJECTION, SOLUTION INTRAVENOUS at 01:44

## 2023-12-21 RX ADMIN — VANCOMYCIN HYDROCHLORIDE 1250 MG: 5 INJECTION, POWDER, LYOPHILIZED, FOR SOLUTION INTRAVENOUS at 04:12

## 2023-12-21 RX ADMIN — SODIUM CHLORIDE, POTASSIUM CHLORIDE, SODIUM LACTATE AND CALCIUM CHLORIDE 30 ML/HR: 600; 310; 30; 20 INJECTION, SOLUTION INTRAVENOUS at 00:14

## 2023-12-21 RX ADMIN — CARVEDILOL 3.12 MG: 3.12 TABLET, FILM COATED ORAL at 21:24

## 2023-12-21 RX ADMIN — INSULIN LISPRO 10 UNITS: 100 INJECTION, SOLUTION INTRAVENOUS; SUBCUTANEOUS at 03:37

## 2023-12-21 RX ADMIN — POLYSACCHARIDE-IRON COMPLEX 150 MG: 150 CAPSULE ORAL at 15:19

## 2023-12-21 RX ADMIN — Medication 1 TABLET: at 15:20

## 2023-12-21 RX ADMIN — INSULIN LISPRO 5 UNITS: 100 INJECTION, SOLUTION INTRAVENOUS; SUBCUTANEOUS at 08:45

## 2023-12-21 RX ADMIN — ACETAMINOPHEN 650 MG: 325 TABLET ORAL at 06:40

## 2023-12-21 RX ADMIN — ALBUMIN HUMAN 25 G: 50 SOLUTION INTRAVENOUS at 01:44

## 2023-12-21 RX ADMIN — INSULIN LISPRO 9 UNITS: 100 INJECTION, SOLUTION INTRAVENOUS; SUBCUTANEOUS at 18:33

## 2023-12-21 RX ADMIN — OXYCODONE HYDROCHLORIDE 5 MG: 5 TABLET ORAL at 06:40

## 2023-12-21 RX ADMIN — SENNOSIDES AND DOCUSATE SODIUM 2 TABLET: 8.6; 5 TABLET ORAL at 21:24

## 2023-12-21 RX ADMIN — PANTOPRAZOLE SODIUM 40 MG: 40 TABLET, DELAYED RELEASE ORAL at 06:40

## 2023-12-21 RX ADMIN — SODIUM CHLORIDE, POTASSIUM CHLORIDE, SODIUM LACTATE AND CALCIUM CHLORIDE 500 ML: 600; 310; 30; 20 INJECTION, SOLUTION INTRAVENOUS at 11:19

## 2023-12-21 RX ADMIN — INSULIN GLARGINE 52 UNITS: 100 INJECTION, SOLUTION SUBCUTANEOUS at 21:27

## 2023-12-21 RX ADMIN — ONDANSETRON 4 MG: 2 INJECTION INTRAMUSCULAR; INTRAVENOUS at 06:35

## 2023-12-21 RX ADMIN — CARVEDILOL 3.12 MG: 3.12 TABLET, FILM COATED ORAL at 09:50

## 2023-12-21 RX ADMIN — ACETAMINOPHEN 650 MG: 325 TABLET ORAL at 21:24

## 2023-12-21 RX ADMIN — ACETAMINOPHEN 650 MG: 325 TABLET ORAL at 11:39

## 2023-12-21 RX ADMIN — Medication 3 G: at 18:33

## 2023-12-21 RX ADMIN — POLYETHYLENE GLYCOL 3350 17 G: 17 POWDER, FOR SOLUTION ORAL at 08:44

## 2023-12-21 RX ADMIN — PROPOFOL 25 MCG/KG/MIN: 10 INJECTION, EMULSION INTRAVENOUS at 01:12

## 2023-12-21 RX ADMIN — ASPIRIN 81 MG CHEWABLE TABLET 81 MG: 81 TABLET CHEWABLE at 09:50

## 2023-12-21 RX ADMIN — HYDROMORPHONE HYDROCHLORIDE 0.2 MG: 1 INJECTION, SOLUTION INTRAMUSCULAR; INTRAVENOUS; SUBCUTANEOUS at 03:06

## 2023-12-21 RX ADMIN — HEPARIN SODIUM 5000 UNITS: 5000 INJECTION INTRAVENOUS; SUBCUTANEOUS at 16:39

## 2023-12-21 RX ADMIN — INSULIN LISPRO 6 UNITS: 100 INJECTION, SOLUTION INTRAVENOUS; SUBCUTANEOUS at 11:40

## 2023-12-21 RX ADMIN — OXYCODONE HYDROCHLORIDE 5 MG: 5 TABLET ORAL at 15:33

## 2023-12-21 RX ADMIN — SENNOSIDES AND DOCUSATE SODIUM 2 TABLET: 8.6; 5 TABLET ORAL at 08:44

## 2023-12-21 RX ADMIN — PREGABALIN 75 MG: 75 CAPSULE ORAL at 09:50

## 2023-12-21 RX ADMIN — Medication 3 G: at 01:23

## 2023-12-21 RX ADMIN — SODIUM CHLORIDE, POTASSIUM CHLORIDE, SODIUM LACTATE AND CALCIUM CHLORIDE 5 ML/HR: 600; 310; 30; 20 INJECTION, SOLUTION INTRAVENOUS at 00:14

## 2023-12-21 RX ADMIN — ATORVASTATIN CALCIUM 80 MG: 80 TABLET, FILM COATED ORAL at 08:44

## 2023-12-21 RX ADMIN — VANCOMYCIN HYDROCHLORIDE 1250 MG: 5 INJECTION, POWDER, LYOPHILIZED, FOR SOLUTION INTRAVENOUS at 16:40

## 2023-12-21 RX ADMIN — ASPIRIN 81 MG CHEWABLE TABLET 81 MG: 81 TABLET CHEWABLE at 01:23

## 2023-12-21 ASSESSMENT — ACTIVITIES OF DAILY LIVING (ADL): ADL_ASSISTANCE: INDEPENDENT

## 2023-12-21 ASSESSMENT — PAIN - FUNCTIONAL ASSESSMENT
PAIN_FUNCTIONAL_ASSESSMENT: 0-10
PAIN_FUNCTIONAL_ASSESSMENT: CPOT (CRITICAL CARE PAIN OBSERVATION TOOL)
PAIN_FUNCTIONAL_ASSESSMENT: 0-10

## 2023-12-21 ASSESSMENT — COGNITIVE AND FUNCTIONAL STATUS - GENERAL
WALKING IN HOSPITAL ROOM: A LITTLE
TURNING FROM BACK TO SIDE WHILE IN FLAT BAD: A LITTLE
HELP NEEDED FOR BATHING: A LITTLE
MOBILITY SCORE: 17
MOVING FROM LYING ON BACK TO SITTING ON SIDE OF FLAT BED WITH BEDRAILS: A LITTLE
STANDING UP FROM CHAIR USING ARMS: A LITTLE
PERSONAL GROOMING: A LITTLE
MOVING TO AND FROM BED TO CHAIR: A LITTLE
DAILY ACTIVITIY SCORE: 19
TOILETING: A LITTLE
DRESSING REGULAR UPPER BODY CLOTHING: A LITTLE
CLIMB 3 TO 5 STEPS WITH RAILING: A LOT
DRESSING REGULAR LOWER BODY CLOTHING: A LITTLE

## 2023-12-21 ASSESSMENT — PAIN SCALES - GENERAL
PAINLEVEL_OUTOF10: 0 - NO PAIN
PAINLEVEL_OUTOF10: 0 - NO PAIN
PAINLEVEL_OUTOF10: 7
PAINLEVEL_OUTOF10: 6
PAINLEVEL_OUTOF10: 5 - MODERATE PAIN
PAINLEVEL_OUTOF10: 0 - NO PAIN

## 2023-12-21 ASSESSMENT — PAIN DESCRIPTION - DESCRIPTORS: DESCRIPTORS: DISCOMFORT

## 2023-12-21 NOTE — BRIEF OP NOTE
Date: 2023  OR Location: [facility] OR    Name: Behzad Diaz, : 1965, Age: 58 y.o., MRN: [id number], Sex: male    Diagnosis  Pre-op Diagnosis     * Coronary artery disease (CAD) excluded [Z03.89] Post-op Diagnosis     * Coronary artery disease (CAD) excluded [Z03.89]     Procedures  Off Pump CABG X4 w/ LIMA to LAD, SVG to Diag, and SVG Sequenced to OM and PDA (taken off of other vein proximally)    Surgeons      * Mattie Phelps - Primary    Resident/Fellow/Other Assistant:  Surgeon(s) and Role:  ELISA CANALES Pesek    Chest Tubes/Drains: L pleural X1 Mediastinal X1 atrium X1           Permanent pacer/ICD: No   -Preoperative settings:    -Intra-op/ Postoperative settings:    Sternotomy performed by: DARLING Phelps    Conduit Harvested by: ELISA Reyes    Sternal Wires placed by: ELISA Reyes    Arm/Leg/Groin Closure/Cutdown performed by: Orville    Cardio Pulmonary Bypass Time: n/a  Cross-clamp Time: n/a  Circulatory Arrest: No Time:     Is patient candidate for Emergency Re-sternotomy? Yes   -If yes, POD #10 is -     Procedure Summary  Anesthesia: General  ASA: IV  Anesthesia Staff: Anesthesiologist: Rodolfo Heredia MD; Darrell Iglesias MD  C-AA: SID Cole  Anesthesia Resident: Alessandra Valles MD; Chino Spangler MD  Perfusionist: Amor Negrete; Dominique Basilio  Estimated Blood Loss: 300mL  Intra-op Medications:   Medication Name Total Dose   papaverine injection 180 mg   vancomycin (Vancocin) vial for injection 4 g   methadone (Dolophine) injection 20 mg 20 mg              Anesthesia Record               Intraprocedure I/O Totals          Intake    Magnesium Sulfate 0.00 mL    The total shown is the total volume documented since Anesthesia Start was filed.    electrolyte-A (Plasmalyte-A) 1500.00 mL    plasma-lyte-A IV solution 242.67 mL    Norepinephrine Drip 0.00 mL    The total shown is the total volume documented since Anesthesia Start was filed.    Propofol Drip 0.00 mL    The total  shown is the total volume documented since Anesthesia Start was filed.    Phenylephrine Drip 0.00 mL    The total shown is the total volume documented since Anesthesia Start was filed.    albumin human 25% 500.00 mL    Cell Saver 1200 mL    Total Intake 3442.67 mL       Output    Urine 415 mL    Est. Blood Loss 2000 mL    Total Output 2415 mL       Net    Net Volume 1027.67 mL          Specimen: No specimens collected     Staff:   Circulator: Amira Herrera RN  Relief Circulator: Medina Cervantes RN  Relief Scrub: Francesco Gonzales  Scrub Person: April Beltran          Findings: CAD    Complications:  None; patient tolerated the procedure well.     Disposition: ICU - intubated and hemodynamically stable.  Condition: stable  Specimens Collected: No specimens collected  Attending Attestation:       Mattie Phelps  Phone Number: [phone]

## 2023-12-21 NOTE — H&P
CTICU History & Physical    Subjective   HPI:  Mr. Behzad Diaz is a 59 yo M who presents to the CTICU s/p Off Pump CABG X4 w/ LIMA to LAD, SVG to Diag, and SVG Sequenced to OM and PDA (taken off of other vein proximally) with Dr. Phelps on 12/20/23. His PMH is significant for CAD, HTN, HLD, T2DM, L4-5 discitis with epidural abscess s/p debridement. He initially presented to [facility] ED on 12/10 with left sided chest pain with radiation to L arm as well as other symptoms suspicios for ACS. Workup revealed NSTEMI. TTE 10/12 with EF 40-45% with global hypokinesis of LV. LHC 12/11 revealed 100% stenosis in mid LAD, 95% stenosis proximal to mid first diagonal branch, occluded LCx after early first OM, OM1 with occluded mid portion, OM2 occluded, and 95% stenosis mid RCA. The patient remained HDS and was transferred to [facility] for cardiac surgery workup.     Cardiac Testing:     TTE:12/10  CONCLUSIONS:   1. Left ventricular systolic function is mildly decreased with a 40-45% estimated ejection fraction.   2. Multiple segmental abnormalities exist. See findings.   3. Increased LV mass.   4. Aortic valve stenosis is not present.   5. There is global hypokinesis of the left ventricle with minor regional variations.    LHC: 12/11  CONCLUSIONS:   1. Severe 3 vessel CAD in this right dominant system.   2. Normal LVEDP 11mmHg with no AS.   3. Consider for multivessel CABG.     Procedure/Surgeon: Off Pump CABG X4 w/ LIMA to LAD, SVG to Diag, and SVG Sequenced to OM and PDA (taken off of other vein proximally)/Mattie Phelps MD  Frontliner/Anesthesia: Darrell Iglesias MD  Out of OR Time (document on ventilator card): 23:00     OR Course/Issues: No complications     CPB time: N/A  Cross clamp time: N/A  Circ arrest time: N/A  Echo Pre/Post: LV 40%, mild to normal RV function, mild to moderate mitral regurg/unchanged post echo  Chest Tubes/Drains: L pleural X1 Mediastinal X1 atrium X1    Temporary wires location/setting: N/A      Fluids:  Crystalloid: 2000mL  Colloid: 750mL  Cellsaver: 1200mL  Products: N/A  EBL: 2000mL  UOP: 355mL     Anesthesia  Intubation: 7.5 tube at 25 at the lip  Intravenous Access: RIJ Mac with mini, PIVs   Benzodiazepine dose/last administration: 2mg midazolam total  Opioid dose/last administration: 20mg methadone total  NMB dose/last administration: 220mg rocuronium total, last dose at 2115  Antibiotic time: Ancef at 19:55  Temperature on admission to ICU: 35.7c    Past Medical History:   Diagnosis Date    Discitis     Discitis     HLD (hyperlipidemia)     HTN (hypertension)     T2DM (type 2 diabetes mellitus) (CMS/[facility])      Past Surgical History:   Procedure Laterality Date    BACK SURGERY      CARDIAC CATHETERIZATION N/A 12/11/2023    Procedure: Left Heart Cath, With LV;  Surgeon: Kaleb Lim MD;  Location: [facility] Cardiac Cath Lab;  Service: Cardiovascular;  Laterality: N/A;    CT GUIDED PERCUTANEOUS BIOPSY BONE DEEP  09/10/2021    CT GUIDED PERCUTANEOUS BIOPSY BONE DEEP 9/10/2021 [facility] CLINICAL LEGACY    OTHER SURGICAL HISTORY  07/30/2020    Scrotal surgery    OTHER SURGICAL HISTORY  07/30/2020    Back surgery     Medications Prior to Admission   Medication Sig Dispense Refill Last Dose    amoxicillin (Amoxil) 500 mg capsule Take 1 capsule (500 mg) by mouth twice a day.   12/12/2023    aspirin 81 mg chewable tablet Chew 1 tablet (81 mg) once daily. Do not start before December 12, 2023. 30 tablet 1 Unknown    atorvastatin (Lipitor) 80 mg tablet Take 1 tablet (80 mg) by mouth once daily at bedtime. 30 tablet 1 Unknown    carvedilol (Coreg) 6.25 mg tablet TAKE 1 TABLET BY MOUTH TWICE A DAY WITH MEALS 180 tablet 3 Taking differently: Taking 1 tablet by mouth once a day    cyclobenzaprine (Flexeril) 10 mg tablet Take 1 tablet (10 mg) by mouth 2 times a day as needed for muscle spasms.   Unknown    lisinopril 20 mg tablet Take 1 tablet (20 mg) by mouth once daily. 90 tablet 3 12/12/2023    metFORMIN (Glucophage)  1,000 mg tablet Take 1 tablet (1,000 mg) by mouth once daily with a meal. Patient choice   12/12/2023    pregabalin (Lyrica) 75 mg capsule Take 1 capsule (75 mg) by mouth once daily. Per patient statement   12/12/2023     Patient has no known allergies.  Social History     Tobacco Use    Smoking status: Some Days     Types: Cigars    Smokeless tobacco: Never   Vaping Use    Vaping Use: Never used   Substance Use Topics    Alcohol use: Yes     Comment: occasional    Drug use: Never     Family History   Problem Relation Name Age of Onset    Coronary artery disease Mother         Review of Systems:  Unable to assess. Intubated and sedated    Objective   Vitals:  Most Recent:  Vitals:    12/20/23 2322   BP:    Pulse:    Resp:    Temp:    SpO2: 97%       24hr Min/Max:  Temp  Min: 35.7 °C (96.3 °F)  Max: 36.2 °C (97.2 °F)  Pulse  Min: 64  Max: 88  BP  Min: 99/64  Max: 136/85  Resp  Min: 15  Max: 18  SpO2  Min: 93 %  Max: 99 %    I/O:  I/O last 2 completed shifts:  In: 120 (0.8 mL/kg) [P.O.:120]  Out: 725 (4.7 mL/kg) [Urine:725 (0.2 mL/kg/hr)]  Weight: 154 kg     LDA:  CVC 12/20/23 Double lumen Non-tunneled Right Internal jugular (Active)   Placement Date/Time: 12/20/23 1610   Hand Hygiene Performed Prior to CVC Insertion: Yes  Site Prep: Chlorhexidine   Site Prep Agent has Completely Dried Before Insertion: Yes  Medical Reason for Not Performing Maximal Sterile Barrier Technique: Yes  L...   Number of days: 0       Arterial Line 12/20/23 Left Brachial (Active)   Placement Date/Time: 12/20/23 1545   Size: 20 G  Orientation: Left  Location: Brachial  Local Anesthetic: Injectable  Technique: Ultrasound guidance  Insertion attempts: 1  Securement Method: Taped  Patient Tolerance: Tolerated well   Number of days: 0       ETT  7.5 mm (Active)   Placement Date/Time: 12/20/23 (c) 1634   Mask Ventilation: Vent by mask + OA or adjuvant +/- NMBA  Technique: Video laryngoscopy  ETT Type: ETT - single  Single Lumen Tube Size: 7.5 mm   Cuffed: Yes  Laryngoscope: (c)   Blade Size: 4  Location: Oral  G...   Number of days: 0       Urethral Catheter Temperature probe 14 Fr. (Active)   Placement Date/Time: 12/20/23 1610   Placed by: KERI Reyes  Hand Hygiene Completed: Yes  Catheter Type: Temperature probe  Tube Size (Fr.): 14 Fr.  Catheter Balloon Size: 10 mL  Urine Returned: Yes   Number of days: 0       Physical Exam:   Constitutional: Intubated and sedated on mechanical ventilation in NAD   Neuro: Neuro intact without obvious focal deficits, on sedation.  PERRL  CV: RRR.  S1S2.  SR on monitor.   Pulm: CTAB on mechanical ventilation.  CT's with appropriate serosang output and no airleak.  : clear yellow urine via coughlin  GI: S/ND/NT. OGT in place with minimal bilious output.  Extremities: NV exam intact x 4.  No edema.  Skin: WDI.  Postop dressings intact.  Chest tube dressings intact.    Psych: MARIN, sedated    Lab Review:  Results from last 7 days   Lab Units 12/20/23  0708   WBC AUTO x10*3/uL 8.4   HEMOGLOBIN g/dL 16.0   HEMATOCRIT % 48.5   PLATELETS AUTO x10*3/uL 249     Results from last 7 days   Lab Units 12/20/23  0707   SODIUM mmol/L 137   POTASSIUM mmol/L 4.4   CHLORIDE mmol/L 103   CO2 mmol/L 24   BUN mg/dL 13   CREATININE mg/dL 0.98   CALCIUM mg/dL 9.4   GLUCOSE mg/dL 193*     Results from last 7 days   Lab Units 12/20/23  0707   MAGNESIUM mg/dL 2.12     Results from last 7 days   Lab Units 12/20/23  2307   POCT PH, ARTERIAL pH 7.38   POCT PCO2, ARTERIAL mm Hg 35*   POCT PO2, ARTERIAL mm Hg 188*   POCT HCO3 CALCULATED, ARTERIAL mmol/L 20.7*   POCT BASE EXCESS, ARTERIAL mmol/L -3.7*       Most recent labs and imaging reviewed.    Assessment/Plan     Assessment:  Mr. Behzad Diaz is a 59 yo M PMH is significant for CAD, HTN, HLD, T2DM, L4-5 discitis with epidural abscess s/p debridement who presents to the CTICU s/p Off Pump CABG X4 w/ LIMA to LAD, SVG to Diag, and SVG Sequenced to OM and PDA (taken off of other vein proximally) with Dr. Phelps  on 12/20/23.     Plan:  NEURO:  PMH of L4-5 discitis with epidural abscess. Patient is intubated and sedated on propofol infusion. Acute post operative pain.   -->  - Serial neuro and pain assessments   - Continue propofol until NMB reversal, then daily sedation vacation at minimum   - NMB reversal when normothermic and hemodynamically stable.    - Scheduled Tylenol   - PRN oxycodone  - PRN dilaudid for pain   - PT Consult, OOB to chair as tolerated, chair position if not tolerated   - CAM ICU score qshift  - Sleep/wake cycle hygiene  - Hold home pregabalin and flexeril     CV:  Patient has a history of CAD, HTN, and HLD. Is now status post off Pump CABG X4 w/ LIMA to LAD, SVG to Diag, and SVG Sequenced to OM and PDA (taken off of other vein proximally) with Dr. Phelps on 12/20/23.  Pre/Post EF: LV 40%, mild to normal RV function, mild to moderate mitral regurg/unchanged post echo. Arrived to CTICU on no pressor support. -->  - Maintain goal MAP 70-90  - Volume resuscitate as clinically indicated  - If CABG is 2/2 STEMI/unstable angina, will receive Plavix POD2  - Start statin tomorrow  - ASA ordered  - Hold home carvedilol and lisinopril     PULM:  No history of pulmonary disease.  Currently intubated on ventilator. Chest tubes with low output.-->  - F/u post op CXR  - Once reversed, wean ventilator settings towards CPAP & extubation   - Wean FiO2 maintaining SpO2 >92%.   - IS q1h and OOB to chair when extubated  - Chest tubes to wall suction.    GI:  No pmh. OG in place.-->  - Continue PPI until extubated  - NPO, will perform bedside swallow eval post extubation   - Colace/senna BID and miralax BID     :  CSA-KARIN Risk Score Low.  No history of renal disease, baseline creatinine ~0.84. Creatinine stable post-op. Coughlin in place and making adequate UOP. -->  - Continue coughlin catheter for strict I/Os.  - Goal UOP 0.5ml/kg/hr  - RFP as clinically indicated  - Replete electrolytes per CTICU protocol  - Post op  hypovolemia, albumin ordered and administered     ENDO:  PMH of T2DM A1c: 11.0-->  - Maintain BG <180, insulin per CTICU protocol     HEME:  Acute blood loss anemia and thrombocytopenia.-->    - Monitor drain output volume and characteristics  - CBC, coags, and fibrinogen post op and as clinically indicated  - Start ASA 6hrs post-op for CABG  - If CABG is 2/2 STEMI/unstable angina, will receive Plavix POD2  - SQH tomorrow   - SCDs for DVT prophylaxis.  - Last type and screen: 12/20     ID:  Afebrile, no current indications of infection. MRSA positive.-->  - Trend temp q4h  - Periop cefazolin x 48hrs and periop vancomycin     Skin:  No active skin issues.  - preventative Mepilex dressings in place on sacrum and heels  - change preventative Mepilex weekly or more frequently as indicated (when moist/soiled)   - every shift skin assessment per nursing and weekly ICU skin rounds  - moisture barrier to be applied with nancy care  - active skin problems addressed with nursing on daily rounds     Proph:  SCDs  PPI     G:  Line  Right IJ MAC w Minimac placed   Left brachial a-line placed     F: Family: will update at bedside postoperatively.    Restraints: The indications the risk/benefits of nonviolence/nondestructive restraints were discussed, soft bilateral wrist restraints indicated and ordered.     A,B,C,D,E,F,G: reviewed     Dispo: CTICU care for now.    CTICU TEAM PHONE [phone]

## 2023-12-21 NOTE — PROGRESS NOTES
"Vancomycin Dosing by Pharmacy- INITIAL    Behzad Diaz is a 58 y.o. year old male who Pharmacy has been consulted for vancomycin dosing for other : MRSA colonization . Based on the patient's indication and renal status this patient will be dosed based on a goal AUC of 400-600.     Renal function is currently stable.    Visit Vitals  /71   Pulse 86   Temp 35.7 °C (96.3 °F) (Temporal)   Resp 15        Lab Results   Component Value Date    CREATININE 0.84 12/20/2023    CREATININE 0.98 12/20/2023    CREATININE 0.84 12/19/2023    CREATININE 0.98 12/18/2023        Patient weight is No results found for: \"PTWEIGHT\"    No results found for: \"CULTURE\"     I/O last 3 completed shifts:  In: 680 (4.4 mL/kg) [P.O.:680]  Out: 1650 (10.7 mL/kg) [Urine:1650 (0.3 mL/kg/hr)]  Weight: 154 kg       Lab Results   Component Value Date    PATIENTTEMP 37.0 12/20/2023    PATIENTTEMP 37.0 12/20/2023    PATIENTTEMP 37.0 12/20/2023          Assessment/Plan     Patient has already been given a loading dose of 1,500  mg once in OR    Will initiate vancomycin maintenance,  1250 mg every 12 hours to start at 0400    This dosing regimen is predicted by [employer] to result in the following pharmacokinetic parameters:    Loading dose: N/A  Regimen: 1250 mg IV every 12 hours.  Start time: 04:06 on 12/21/2023  Exposure target: AUC24 (range)400-600 mg/L.hr   AUC24,ss: 445 mg/L.hr  Probability of AUC24 > 400: 58 %  Ctrough,ss: 11.1 mg/L  Probability of Ctrough,ss > 20: 24 %  Probability of nephrotoxicity (Lodise BERNICE 2009): 7 %      Follow-up level will be ordered on 12/22 with AM labs, unless clinically indicated sooner.  Will continue to monitor renal function daily while on vancomycin and order serum creatinine at least every 48 hours if not already ordered.  Follow for continued vancomycin needs, clinical response, and signs/symptoms of toxicity.       Jocelynn Dubose, PharmD      "

## 2023-12-21 NOTE — PROGRESS NOTES
"Behzad Diaz is a 58 y.o. male on day 9 of admission presenting with Coronary artery disease (CAD) excluded.    Subjective   Patient status post up for his CABG x 4.  Was seen in the ICU earlier this morning however he was transferred to regular floor later this afternoon.  All scheduled insulin was discontinued and patient is on a #5 sliding scale per ICU protocol.    Objective   Constitutional: NAD, obese, AOx3. Cooperative  HEENT: EOMI, Anicteric scleras   Neck: Soft, supple   Cardiovascular: normal HR  Respiratory: no increased wob,  or accessory muscle use, chest tube in place.    Psych : appropriate affect    Last Recorded Vitals  Blood pressure 125/71, pulse 83, temperature 36.4 °C (97.5 °F), temperature source Temporal, resp. rate 21, height 1.854 m (6' 0.99\"), weight (!) 160 kg (352 lb 11.8 oz), SpO2 99 %.  Intake/Output last 3 Shifts:  I/O last 3 completed shifts:  In: 4492.5 (28.1 mL/kg) [P.O.:120; I.V.:329.8 (2.1 mL/kg); Blood:1700; IV Piggyback:2342.7]  Out: 3525 (22 mL/kg) [Urine:1245 (0.2 mL/kg/hr); Blood:2000; Chest Tube:280]  Weight: 160 kg     Relevant Results  Results from last 7 days   Lab Units 12/21/23  0748 12/20/23  2335 12/20/23  1210 12/20/23  0810 12/20/23  0707 12/19/23  2025 12/19/23  1714 12/19/23  0840 12/19/23  0654 12/18/23  1254 12/18/23  1026 12/17/23  1019 12/17/23  0649   POCT GLUCOSE mg/dL 184*  --  173* 194*  --  180* 176*   < >  --    < >  --    < >  --    GLUCOSE mg/dL  --  158*  --   --  193*  --   --   --  106*  --  215*  --  191*    < > = values in this interval not displayed.     Results from last 7 days   Lab Units 12/20/23  2335   SODIUM mmol/L 136   POTASSIUM mmol/L 5.0   CHLORIDE mmol/L 106   CO2 mmol/L 21   BUN mg/dL 14   CREATININE mg/dL 0.84   CALCIUM mg/dL 8.7   GLUCOSE mg/dL 158*   Scheduled medications  acetaminophen, 650 mg, oral, q4h   Or  acetaminophen, 650 mg, rectal, q4h  atorvastatin, 80 mg, oral, Daily  carvedilol, 3.125 mg, oral, BID  ceFAZolin, 3 g, " intravenous, q8h  glycopyrrolate, , ,   heparin, 5,000 Units, subcutaneous, q8h  insulin lispro, 0-15 Units, subcutaneous, TID with meals  neostigmine, , ,   polyethylene glycol, 17 g, oral, BID  pregabalin, 75 mg, oral, Daily  sennosides-docusate sodium, 2 tablet, oral, BID  vancomycin, 1,250 mg, intravenous, q12h      Continuous medications  lactated Ringer's, 5 mL/hr, Last Rate: 5 mL/hr (12/21/23 0636)      PRN medications  PRN medications: calcium gluconate, calcium gluconate, dextrose **OR** glucagon, glycopyrrolate, HYDROmorphone, magnesium sulfate, magnesium sulfate, naloxone, neostigmine, oxyCODONE, potassium chloride    Assessment/Plan   Principal Problem:    Coronary artery disease (CAD) excluded  Active Problems:    Type 2 diabetes mellitus, without long-term current use of insulin (CMS/[facility])    HTN (hypertension)    NSTEMI (non-ST elevated myocardial infarction) (CMS/[facility])    Ischemic cardiomyopathy    HLD (hyperlipidemia)    Behzad Diaz is a 58 y.o. male with HTN, HLD, poorly controlled DM2, obesity class III, CAD, and history of discitis and epidural abscess s/p debridement who presents to [facility] for CABG evaluation, plan for 12/20.     Endocrinology is consulted to assist in diabetes management.  Diabetes history:  Diabetes duration: 4 to 5 years           Home blood glucose checks: Patient does not check his BG at home  Hypoglycemia (y/n, threshold and symptoms): No hypoglycemia  Home diabetes regimen: Metformin 1000 mg  BID   Home diet (how many meals a day): Patient reports poor diet as he is mostly eating out because of his work  Outpatient physician managing diabetes: PCP  Macrovascular complications: CVD [yes]     CVA [no]     PVD [no]  Microvascular complications: Retinopathy [unknown, patient has not had a retinal exam recently]            neuropathy [endorses neuropathy but attributes this to prior spinal surgery]   nephropathy [no microalbumin to creatinine ratio on chart, creatinine  0.7]  Last A1c: 11%         Recommendations:  -Start glargine 52 units at bedtime  - #3 sliding scale 3 times daily AC with meals only  - Would withhold scheduling mealtime lispro until patient is eating reliably.    -Accu-Cheks 3 times daily before meals and at bedtime.  Please obtain bedtime BG as it often missed.  - Hypoglycemia protocol  -We will follow and adjust regimen accordingly     Plan was communicated to the primary team      Case discussed with Dr. Liz

## 2023-12-21 NOTE — PROGRESS NOTES
CTICU Progress Note    Subjective   Pt seen this morning sitting in chair, on 4L n.c., sleeping comfortably. Pt endorses minimal pain, no major concerns. States he feels relatively good. NAEON.     Past Medical History:   Diagnosis Date    Discitis     Discitis     HLD (hyperlipidemia)     HTN (hypertension)     T2DM (type 2 diabetes mellitus) (CMS/[facility])      Past Surgical History:   Procedure Laterality Date    BACK SURGERY      CARDIAC CATHETERIZATION N/A 12/11/2023    Procedure: Left Heart Cath, With LV;  Surgeon: Kaleb Lim MD;  Location: [facility] Cardiac Cath Lab;  Service: Cardiovascular;  Laterality: N/A;    CT GUIDED PERCUTANEOUS BIOPSY BONE DEEP  09/10/2021    CT GUIDED PERCUTANEOUS BIOPSY BONE DEEP 9/10/2021 [facility] CLINICAL LEGACY    OTHER SURGICAL HISTORY  07/30/2020    Scrotal surgery    OTHER SURGICAL HISTORY  07/30/2020    Back surgery     Medications Prior to Admission   Medication Sig Dispense Refill Last Dose    amoxicillin (Amoxil) 500 mg capsule Take 1 capsule (500 mg) by mouth twice a day.   12/12/2023    aspirin 81 mg chewable tablet Chew 1 tablet (81 mg) once daily. Do not start before December 12, 2023. 30 tablet 1 Unknown    atorvastatin (Lipitor) 80 mg tablet Take 1 tablet (80 mg) by mouth once daily at bedtime. 30 tablet 1 Unknown    carvedilol (Coreg) 6.25 mg tablet TAKE 1 TABLET BY MOUTH TWICE A DAY WITH MEALS 180 tablet 3 Taking differently: Taking 1 tablet by mouth once a day    cyclobenzaprine (Flexeril) 10 mg tablet Take 1 tablet (10 mg) by mouth 2 times a day as needed for muscle spasms.   Unknown    lisinopril 20 mg tablet Take 1 tablet (20 mg) by mouth once daily. 90 tablet 3 12/12/2023    metFORMIN (Glucophage) 1,000 mg tablet Take 1 tablet (1,000 mg) by mouth once daily with a meal. Patient choice   12/12/2023    pregabalin (Lyrica) 75 mg capsule Take 1 capsule (75 mg) by mouth once daily. Per patient statement   12/12/2023     Patient has no known allergies.  Social History      Tobacco Use    Smoking status: Some Days     Types: Cigars    Smokeless tobacco: Never   Vaping Use    Vaping Use: Never used   Substance Use Topics    Alcohol use: Yes     Comment: occasional    Drug use: Never     Family History   Problem Relation Name Age of Onset    Coronary artery disease Mother         Review of Systems:  Unable to assess. Intubated and sedated    Objective   Vitals:  Most Recent:  Vitals:    12/21/23 0800   BP:    Pulse: 83   Resp:    Temp: 36.4 °C (97.5 °F)   SpO2: 99%       24hr Min/Max:  Temp  Min: 35.7 °C (96.3 °F)  Max: 36.4 °C (97.5 °F)  Pulse  Min: 70  Max: 97  BP  Min: 103/66  Max: 136/85  Resp  Min: 15  Max: 21  SpO2  Min: 93 %  Max: 100 %    I/O:  I/O last 2 completed shifts:  In: 4372.5 (27.3 mL/kg) [I.V.:329.8 (2.1 mL/kg); Blood:1700; IV Piggyback:2342.7]  Out: 3025 (18.9 mL/kg) [Urine:745 (0.2 mL/kg/hr); Blood:2000; Chest Tube:280]  Weight: 160 kg     LDA:  CVC 12/20/23 Double lumen Non-tunneled Right Internal jugular (Active)   Placement Date/Time: 12/20/23 1610   Hand Hygiene Performed Prior to CVC Insertion: Yes  Site Prep: Chlorhexidine   Site Prep Agent has Completely Dried Before Insertion: Yes  Medical Reason for Not Performing Maximal Sterile Barrier Technique: Yes  L...   Number of days: 0       Arterial Line 12/20/23 Left Brachial (Active)   Placement Date/Time: 12/20/23 1545   Size: 20 G  Orientation: Left  Location: Brachial  Local Anesthetic: Injectable  Technique: Ultrasound guidance  Insertion attempts: 1  Securement Method: Taped  Patient Tolerance: Tolerated well   Number of days: 0       ETT  7.5 mm (Active)   Placement Date/Time: 12/20/23 (c) 1634   Mask Ventilation: Vent by mask + OA or adjuvant +/- NMBA  Technique: Video laryngoscopy  ETT Type: ETT - single  Single Lumen Tube Size: 7.5 mm  Cuffed: Yes  Laryngoscope: (c)   Blade Size: 4  Location: Oral  G...   Number of days: 0       Urethral Catheter Temperature probe 14 Fr. (Active)   Placement  Date/Time: 12/20/23 1610   Placed by: KERI Reyes  Hand Hygiene Completed: Yes  Catheter Type: Temperature probe  Tube Size (Fr.): 14 Fr.  Catheter Balloon Size: 10 mL  Urine Returned: Yes   Number of days: 0       Physical Exam:   Constitutional: Awake, alert, and fully oriented. NAD  Neuro: Neuro intact without obvious focal deficits, follows all commands  CV: RRR.  S1S2.  SR on monitor.   Pulm: CTAB on4L n.c.  CT with appropriate serosang output and no airleak.  : clear yellow urine via coughlin  GI: S/ND/NT.  Extremities: NV exam intact x 4.  No edema.  Skin: WDI.  Postop dressings intact.  Chest tube dressings intact.    Psych: Appropriate mood and behavior.    Lab Review:  Results from last 7 days   Lab Units 12/20/23  2335   WBC AUTO x10*3/uL 15.5*   HEMOGLOBIN g/dL 14.3   HEMATOCRIT % 41.5   PLATELETS AUTO x10*3/uL 218       Results from last 7 days   Lab Units 12/20/23  2335   SODIUM mmol/L 136   POTASSIUM mmol/L 5.0   CHLORIDE mmol/L 106   CO2 mmol/L 21   BUN mg/dL 14   CREATININE mg/dL 0.84   CALCIUM mg/dL 8.7   GLUCOSE mg/dL 158*       Results from last 7 days   Lab Units 12/20/23  2335   MAGNESIUM mg/dL 2.20       Results from last 7 days   Lab Units 12/21/23  0322   POCT PH, ARTERIAL pH 7.39   POCT PCO2, ARTERIAL mm Hg 30*   POCT PO2, ARTERIAL mm Hg 157*   POCT HCO3 CALCULATED, ARTERIAL mmol/L 18.2*   POCT BASE EXCESS, ARTERIAL mmol/L -5.6*         Most recent labs and imaging reviewed.    Assessment/Plan     Assessment:  Mr. Behzad Diaz is a 59 yo M PMH is significant for CAD, HTN, HLD, T2DM, L4-5 discitis with epidural abscess s/p debridement who presents to the CTICU s/p Off Pump CABG X4 w/ LIMA to LAD, SVG to Diag, and SVG Sequenced to OM and PDA (taken off of other vein proximally) with Dr. Phelps on 12/20/23.     Plan:  NEURO:  PMH of L4-5 discitis with epidural abscess. Patient is extubated, awake, alert, and oriented. Acute post operative pain.   -->  - Serial neuro and pain assessments   -  Scheduled Tylenol   - PRN oxycodone and dilaudid for pain   - PT Consult, OOB to chair and walk unit today  - CAM ICU score qshift  - Sleep/wake cycle hygiene  - restarted home pregabalin 75mg  - Hold home flexeril     CV:  Patient has a history of CAD, HTN, and HLD. Is now status post off Pump CABG X4 w/ LIMA to LAD, SVG to Diag, and SVG Sequenced to OM and PDA (taken off of other vein proximally) with Dr. Phelps on 12/20/23.  Pre/Post EF: LV 40%, mild to normal RV function, mild to moderate mitral regurg/unchanged post echo. Arrived to CTICU on no pressor support. -->  - Maintain goal MAP 70-90  - Volume resuscitate as clinically indicated  - Plavix POD2  - ASA and statin started  - Starting coreg 3.125 BID  - Hold home lisinopril  - Will add hydral if needed for BP control     PULM:  No history of pulmonary disease. Extubated. Chest tubes with roughly 30cc/hr.-->  - Post op CXR and as needed  - Wean FiO2 maintaining SpO2 >92%.   - IS q1h and OOB to chair  - Chest tube to wall suction.    GI:  No pmh.->  - cardiac diet ordered  - Colace/senna BID and miralax BID     :  CSA-KARIN Risk Score Low.  No history of renal disease, baseline creatinine ~0.84. Creatinine stable post-op. Coughlin in place and making adequate UOP. -->  - Goal UOP 0.5ml/kg/hr --> encourage PO intake  - RFP as clinically indicated  - Replete electrolytes per CTICU protocol  - Post op hypovolemia, albumin ordered and administered  - remove coughlin today     ENDO:  PMH of T2DM A1c: 11.0-->  - Maintain BG <180, insulin per CTICU protocol  - Endo following     HEME:  Acute blood loss anemia and thrombocytopenia.-->    - Monitor drain output volume and characteristics  - CBC, coags, and fibrinogen post op and as clinically indicated  - ASA started  - Plavix POD2   - SCDs and SQH for DVT prophylaxis.  - Last type and screen: 12/18 --> ordered for today     ID:  Afebrile, no current indications of infection. MRSA positive.-->  - Trend temp q4h  - Periop  cefazolin x 48hrs and periop vancomycin     Skin:  No active skin issues.  - preventative Mepilex dressings in place on sacrum and heels  - change preventative Mepilex weekly or more frequently as indicated (when moist/soiled)   - every shift skin assessment per nursing and weekly ICU skin rounds  - moisture barrier to be applied with nancy care  - active skin problems addressed with nursing on daily rounds     Proph:  SCDs  SQH     G:  Line  Right IJ MAC w [place] placed --> remove today  Left brachial a-line placed --> remove today  2 PIVs    F: Family: will update at bedside as able    Restraints: not indicated at this time   A,B,C,D,E,F,G: reviewed     Dispo: Floor today  CTICU TEAM PHONE [phone]

## 2023-12-21 NOTE — NURSING NOTE
Mr. Diaz is sleeping soundly.  Awaiting transfer to [facility] some time today.  Will follow up next week.  He has demonstrated comfort with insulin pen and bolus with sliding scale coverage.

## 2023-12-21 NOTE — ANESTHESIA POSTPROCEDURE EVALUATION
Patient: Behzad Diaz    Procedure Summary       Date: 12/20/23 Room / Location: [facility] OR 19 / [facility] OR    Anesthesia Start: 1534 Anesthesia Stop: 2321    Procedure: Creation Bypass Graft Coronary Artery (Chest) Diagnosis:       Coronary artery disease (CAD) excluded      (Coronary artery disease (CAD) excluded [Z03.89])    Surgeons: Mattie Phelps MD Responsible Provider: Darrell Iglesias MD    Anesthesia Type: general ASA Status: 4            Anesthesia Type: general    Vitals Value Taken Time   /71 12/20/23 2300   Temp 35.7 °C (96.3 °F) 12/20/23 2300   Pulse 85 12/20/23 2320   Resp 15 12/20/23 2315   SpO2 100 % 12/20/23 2320   Vitals shown include unvalidated device data.    Anesthesia Post Evaluation    Patient location during evaluation: ICU  Patient participation: complete - patient cannot participate  Level of consciousness: sedated  Pain score: 0  Pain management: adequate  Airway patency: patent  Cardiovascular status: acceptable  Respiratory status: ETT  Hydration status: acceptable  Postoperative Nausea and Vomiting: none    No notable events documented.

## 2023-12-21 NOTE — CARE PLAN
The patient's goals for the shift include Keep informed    The clinical goals for the shift include pain control      Problem: Diabetes  Goal: Maintain electrolyte levels within acceptable range throughout shift  Outcome: Progressing     Problem: Diabetes  Goal: Vital signs within normal range for age by end of shift  Outcome: Progressing     Problem: Safety  Goal: I will remain free of falls  Outcome: Progressing

## 2023-12-21 NOTE — PROGRESS NOTES
Physical Therapy    Physical Therapy Re-Evaluation & Treatment    Patient Name: Behzad Diaz  MRN: [id number]  Today's Date: 12/21/2023   Start time: 0906  End time: 0937  Time calculation: 31 min    Assessment/Plan   PT Assessment  PT Assessment Results: Decreased strength, Impaired balance, Decreased endurance, Decreased range of motion, Decreased mobility  Rehab Prognosis: Good  Evaluation/Treatment Tolerance: Patient tolerated treatment well  Medical Staff Made Aware: Yes  End of Session Communication: Bedside nurse  Assessment Comment: Patient with good tolerance to session. Grossly mobilizing at a min assist- CGA level with a RW. Anticipate with continued therapy while in house demononstrate improvements inmobility to meet homegoing needs. Recommend low intensity therapy at discharge.  End of Session Patient Position: Bed, 3 rail up, Alarm off, not on at start of session (All lines in tact)   IP OR SWING BED PT PLAN  Inpatient or Swing Bed: Inpatient  PT Plan  Treatment/Interventions: Bed mobility, Transfer training, Gait training, Stair training, Balance training, Neuromuscular re-education, Strengthening, Neurodevelopmental intervention, Endurance training, Range of motion, Therapeutic exercise, Therapeutic activity  PT Plan: Skilled PT  PT Frequency: 3 times per week  PT Discharge Recommendations: Low intensity level of continued care  Equipment Recommended upon Discharge: Wheeled walker  PT Recommended Transfer Status: Assist x1, Assistive device, Contact guard  PT - OK to Discharge: Yes      Subjective     General Visit Information:  General  Reason for Referral: Initally presenting with NSTEMI s/p left heart catheterization with LV. Now s/p Off Pump CABG X4 w/ LIMA to LAD, SVG to Diag, and SVG Sequenced to OM and PDA (taken off of other vein proximally) with Dr. Phelps on 12/20/23.  Past Medical History Relevant to Rehab: CAD, HTN, HLD, T2DM, L4-5 discitis with epidural abscess s/p debridement  Prior to  Session Communication: Bedside nurse  Patient Position Received: Up in bathroom, Alarm off, not on at start of session (Chets tube to suction, coughlin, 2L via NC, arterial line)  General Comment: Pt seated up in chair upon arrival. Agreeable to session.  Home Living:  Home Living  Type of Home: Apartment  Lives With: Alone  Home Adaptive Equipment: Walker rolling or standard, Cane  Home Layout: One level  Home Access: Stairs to enter with rails  Entrance Stairs-Rails: Right  Entrance Stairs-Number of Steps: 1  Bathroom Shower/Tub: Tub/shower unit  Bathroom Toilet: Standard  Bathroom Equipment: Shower chair with back  Prior Level of Function:  Prior Function Per Pt/Caregiver Report  Level of Section: Independent with ADLs and functional transfers, Independent with homemaking with ambulation  Receives Help From: Family  ADL Assistance: Independent  Homemaking Assistance: Independent  Ambulatory Assistance: Independent  Vocational: Full time employment  Prior Function Comments: Patient independent at baseline, no AD in the home and cane out in the community. Drives and works full time  Precautions:  Precautions  Medical Precautions: Fall precautions, Cardiac precautions, Oxygen therapy device and L/min, Chest tube  Post-Surgical Precautions: Move in the Tube  Vital Signs:  Vital Signs  Heart Rate:  (PRE: 91 POST: 88)  SpO2:  (PRE: 99% POST: 100%)  BP:  (PRE: 129/71 POST: 149/75; during gait Pt BP decreasing to 90s/60s with Pt reporting feeling lightheaded/ cold sweats. returned Pt to supine in bed with resolution of symptoms and BP increase. Informed RN)  MAP (mmHg):  (PRE: 89 POST: 97)    Objective   Pain:  Pain Assessment  Pain Assessment: 0-10  Pain Score: 0 - No pain  Cognition:  Cognition  Overall Cognitive Status: Within Functional Limits  Orientation Level: Oriented X4    General Assessments:    Activity Tolerance  Endurance: Tolerates 10 - 20 min exercise with multiple rests      Strength  Strength Comments:  Grossly 3/5 throughout BLEs  Strength  Strength Comments: Grossly 3/5 throughout BLEs    Functional Assessments:    Bed Mobility  Bed Mobility: Yes  Bed Mobility 1  Bed Mobility 1: Sitting to supine  Level of Assistance 1: Minimum assistance  Bed Mobility Comments 1: min assist to elevate BLEs onto EOB and maintain MITT prx    Transfers  Transfer: Yes  Transfer 1  Transfer From 1: Stand to, Sit to  Transfer to 1: Sit, Stand  Technique 1: Stand to sit, Sit to stand  Transfer Device 1: Walker  Transfer Level of Assistance 1: Minimum assistance  Trials/Comments 1: Min assist for lifting at the trunk, cues for MITT prx    Ambulation/Gait Training  Ambulation/Gait Training Performed: Yes  Ambulation/Gait Training 1  Surface 1: Level tile  Device 1: Rolling walker  Assistance 1: Contact guard  Comments/Distance (ft) 1: 20 ft (Ptambulating ~20 ft with RW and CGA. Decreased cadenece, wide DALTON, and mildly unsteady with no LOB.  Pt BP decreasing to 90s/60s with Pt feeling lightheaded/ cold sweats. Returned to supine in bed with symptom resolution/ increase in BP. informed RN)    Treatments:    Therapeutic Activity  Therapeutic Activity Performed: Yes  Therapeutic Activity 1: 10 min of education provided on MITT and RPE. Pt engaging and aksing several questions throughout.    Outcome Measures:  [facility] Basic Mobility  Turning from your back to your side while in a flat bed without using bedrails: A little  Moving from lying on your back to sitting on the side of a flat bed without using bedrails: A little  Moving to and from bed to chair (including a wheelchair): A little  Standing up from a chair using your arms (e.g. wheelchair or bedside chair): A little  To walk in hospital room: A little  Climbing 3-5 steps with railing: A lot  Basic Mobility - Total Score: 17    FSS-ICU  Ambulation: Walks <50 feet with any assistance x1 or walks any distance with assistance x2 people  Rolling: Minimal assistance (performs 75% or more of  task)  Sitting: Supervision or set-up only  Transfer Sit-to-Stand: Minimal assistance (performs 75% or more of task)  Transfer Supine-to-Sit: Minimal assistance (performs 75% or more of task)  Total Score: 18      Encounter Problems       Encounter Problems (Active)       Mobility       STG - Patient will ambulate 150 ft with LRAD and SBA.       Start:  12/21/23    Expected End:  01/04/24            STG - Patient will ambulate up and down a curb/step with LRAD and SBA.       Start:  12/21/23    Expected End:  01/04/24               Transfers       STG - Patient will perform bed mobility (log roll) independently.        Start:  12/21/23    Expected End:  01/04/24            STG - Patient will transfer sit to and from stand with LRA and modified independence.       Start:  12/21/23    Expected End:  01/04/24                   Education Documentation  Mobility Training, taught by Claribel Em PT at 12/21/2023 11:54 AM.  Learner: Patient  Readiness: Acceptance  Method: Explanation  Response: Verbalizes Understanding    Education Comments  No comments found.

## 2023-12-21 NOTE — SIGNIFICANT EVENT
12/21/23 0223   Daily Screen   Total RSBI 34   Weaning Parameters   Weaning Vital Capacity 1254 mL   Negative Inspiratory Force (NIF) -51   Spontaneous Minute Volume (MV) 9.7   Weaning Tidal Volume 520 mL   Respiratory Depth/Rhythm Regular   Respiratory Effort Unlabored   Dyspnea Occurrence At rest   Weaning Tolerance Excellent

## 2023-12-21 NOTE — CARE PLAN
Problem: Diabetes  Goal: Vital signs within normal range for age by end of shift  12/21/2023 1657 by Taylor Urbina RN  Outcome: Progressing  12/21/2023 1654 by Taylor Urbina RN  Outcome: Progressing     Problem: Diabetes  Goal: Learn about and adhere to nutrition recommendations by end of shift  12/21/2023 1657 by Taylor Urbina RN  Outcome: Progressing  12/21/2023 1654 by Taylor Urbina RN  Outcome: Progressing   The patient's goals for the shift include Keep informed    The clinical goals for the shift include pain control

## 2023-12-21 NOTE — PROGRESS NOTES
"Behzad Diaz is a 58 y.o. male on day 9 of admission presenting with Coronary artery disease (CAD) excluded.  Transitional Care Coordination Progress Note:   Patient discussed during interdisciplinary rounds.   Team members present: (TCC)   Plan per Medical/Surgical team: (cardiac surgery care path 5-7 days)   Discharge disposition: (home with HC)   Status- In patient   Payer-[facility]  Potential Barriers: (cardiac care path)   ADOD: (2-3 days)   .Carlton Alberto RN [facility] [phone]         Physical Exam    Last Recorded Vitals  Blood pressure 156/81, pulse 88, temperature 36.5 °C (97.7 °F), temperature source Temporal, resp. rate 19, height 1.854 m (6' 0.99\"), weight (!) 160 kg (352 lb 11.8 oz), SpO2 96 %.  Intake/Output last 3 Shifts:  I/O last 3 completed shifts:  In: 4492.5 (28.1 mL/kg) [P.O.:120; I.V.:329.8 (2.1 mL/kg); Blood:1700; IV Piggyback:2342.7]  Out: 3525 (22 mL/kg) [Urine:1245 (0.2 mL/kg/hr); Blood:2000; Chest Tube:280]  Weight: 160 kg         Assessment/Plan   Principal Problem:    Coronary artery disease (CAD) excluded  Active Problems:    Type 2 diabetes mellitus, without long-term current use of insulin (CMS/HCC)    HTN (hypertension)    NSTEMI (non-ST elevated myocardial infarction) (CMS/HCC)    Ischemic cardiomyopathy    HLD (hyperlipidemia)         Carlton Alberto RN      "

## 2023-12-22 ENCOUNTER — APPOINTMENT (OUTPATIENT)
Dept: RADIOLOGY | Facility: HOSPITAL | Age: 58
DRG: 236 | End: 2023-12-22
Payer: COMMERCIAL

## 2023-12-22 LAB
ALBUMIN SERPL BCP-MCNC: 3.5 G/DL (ref 3.4–5)
ANION GAP SERPL CALC-SCNC: 13 MMOL/L (ref 10–20)
BUN SERPL-MCNC: 14 MG/DL (ref 6–23)
CALCIUM SERPL-MCNC: 8.2 MG/DL (ref 8.6–10.6)
CHLORIDE SERPL-SCNC: 101 MMOL/L (ref 98–107)
CO2 SERPL-SCNC: 22 MMOL/L (ref 21–32)
CREAT SERPL-MCNC: 0.84 MG/DL (ref 0.5–1.3)
ERYTHROCYTE [DISTWIDTH] IN BLOOD BY AUTOMATED COUNT: 12.7 % (ref 11.5–14.5)
GFR SERPL CREATININE-BSD FRML MDRD: >90 ML/MIN/1.73M*2
GLUCOSE BLD MANUAL STRIP-MCNC: 203 MG/DL (ref 74–99)
GLUCOSE BLD MANUAL STRIP-MCNC: 232 MG/DL (ref 74–99)
GLUCOSE BLD MANUAL STRIP-MCNC: 233 MG/DL (ref 74–99)
GLUCOSE BLD MANUAL STRIP-MCNC: 266 MG/DL (ref 74–99)
GLUCOSE SERPL-MCNC: 240 MG/DL (ref 74–99)
HCT VFR BLD AUTO: 32.5 % (ref 41–52)
HGB BLD-MCNC: 10.9 G/DL (ref 13.5–17.5)
MAGNESIUM SERPL-MCNC: 2.17 MG/DL (ref 1.6–2.4)
MCH RBC QN AUTO: 30.5 PG (ref 26–34)
MCHC RBC AUTO-ENTMCNC: 33.5 G/DL (ref 32–36)
MCV RBC AUTO: 91 FL (ref 80–100)
NRBC BLD-RTO: 0 /100 WBCS (ref 0–0)
PHOSPHATE SERPL-MCNC: 2 MG/DL (ref 2.5–4.9)
PLATELET # BLD AUTO: 168 X10*3/UL (ref 150–450)
POTASSIUM SERPL-SCNC: 4.4 MMOL/L (ref 3.5–5.3)
RBC # BLD AUTO: 3.57 X10*6/UL (ref 4.5–5.9)
SODIUM SERPL-SCNC: 132 MMOL/L (ref 136–145)
WBC # BLD AUTO: 7.6 X10*3/UL (ref 4.4–11.3)

## 2023-12-22 PROCEDURE — 2500000004 HC RX 250 GENERAL PHARMACY W/ HCPCS (ALT 636 FOR OP/ED): Performed by: NURSE PRACTITIONER

## 2023-12-22 PROCEDURE — 99232 SBSQ HOSP IP/OBS MODERATE 35: CPT

## 2023-12-22 PROCEDURE — 94667 MNPJ CHEST WALL 1ST: CPT

## 2023-12-22 PROCEDURE — 2500000002 HC RX 250 W HCPCS SELF ADMINISTERED DRUGS (ALT 637 FOR MEDICARE OP, ALT 636 FOR OP/ED): Performed by: NURSE PRACTITIONER

## 2023-12-22 PROCEDURE — 85027 COMPLETE CBC AUTOMATED: CPT | Performed by: NURSE PRACTITIONER

## 2023-12-22 PROCEDURE — 2500000001 HC RX 250 WO HCPCS SELF ADMINISTERED DRUGS (ALT 637 FOR MEDICARE OP): Performed by: NURSE PRACTITIONER

## 2023-12-22 PROCEDURE — 97166 OT EVAL MOD COMPLEX 45 MIN: CPT | Mod: GO

## 2023-12-22 PROCEDURE — 82947 ASSAY GLUCOSE BLOOD QUANT: CPT

## 2023-12-22 PROCEDURE — A4217 STERILE WATER/SALINE, 500 ML: HCPCS | Performed by: NURSE PRACTITIONER

## 2023-12-22 PROCEDURE — 80069 RENAL FUNCTION PANEL: CPT | Performed by: NURSE PRACTITIONER

## 2023-12-22 PROCEDURE — 71045 X-RAY EXAM CHEST 1 VIEW: CPT

## 2023-12-22 PROCEDURE — 2500000004 HC RX 250 GENERAL PHARMACY W/ HCPCS (ALT 636 FOR OP/ED)

## 2023-12-22 PROCEDURE — 83735 ASSAY OF MAGNESIUM: CPT | Performed by: NURSE PRACTITIONER

## 2023-12-22 PROCEDURE — 97530 THERAPEUTIC ACTIVITIES: CPT | Mod: GO

## 2023-12-22 PROCEDURE — 1200000002 HC GENERAL ROOM WITH TELEMETRY DAILY

## 2023-12-22 PROCEDURE — 36415 COLL VENOUS BLD VENIPUNCTURE: CPT | Performed by: NURSE PRACTITIONER

## 2023-12-22 PROCEDURE — 71045 X-RAY EXAM CHEST 1 VIEW: CPT | Performed by: RADIOLOGY

## 2023-12-22 PROCEDURE — 96372 THER/PROPH/DIAG INJ SC/IM: CPT | Performed by: NURSE PRACTITIONER

## 2023-12-22 PROCEDURE — 99232 SBSQ HOSP IP/OBS MODERATE 35: CPT | Performed by: NURSE PRACTITIONER

## 2023-12-22 RX ORDER — INSULIN LISPRO 100 [IU]/ML
10 INJECTION, SOLUTION INTRAVENOUS; SUBCUTANEOUS
Status: DISCONTINUED | OUTPATIENT
Start: 2023-12-22 | End: 2023-12-23

## 2023-12-22 RX ORDER — FUROSEMIDE 10 MG/ML
20 INJECTION INTRAMUSCULAR; INTRAVENOUS
Status: DISCONTINUED | OUTPATIENT
Start: 2023-12-22 | End: 2023-12-29 | Stop reason: HOSPADM

## 2023-12-22 RX ADMIN — CARVEDILOL 3.12 MG: 3.12 TABLET, FILM COATED ORAL at 20:31

## 2023-12-22 RX ADMIN — Medication 1 TABLET: at 09:36

## 2023-12-22 RX ADMIN — CARVEDILOL 3.12 MG: 3.12 TABLET, FILM COATED ORAL at 09:36

## 2023-12-22 RX ADMIN — INSULIN GLARGINE 52 UNITS: 100 INJECTION, SOLUTION SUBCUTANEOUS at 20:32

## 2023-12-22 RX ADMIN — HEPARIN SODIUM 5000 UNITS: 5000 INJECTION INTRAVENOUS; SUBCUTANEOUS at 00:47

## 2023-12-22 RX ADMIN — INSULIN LISPRO 9 UNITS: 100 INJECTION, SOLUTION INTRAVENOUS; SUBCUTANEOUS at 13:56

## 2023-12-22 RX ADMIN — OXYCODONE HYDROCHLORIDE 5 MG: 5 TABLET ORAL at 09:35

## 2023-12-22 RX ADMIN — HEPARIN SODIUM 5000 UNITS: 5000 INJECTION INTRAVENOUS; SUBCUTANEOUS at 18:45

## 2023-12-22 RX ADMIN — ACETAMINOPHEN 650 MG: 325 TABLET ORAL at 09:34

## 2023-12-22 RX ADMIN — Medication 3 G: at 03:23

## 2023-12-22 RX ADMIN — Medication 3 G: at 12:25

## 2023-12-22 RX ADMIN — POLYSACCHARIDE-IRON COMPLEX 150 MG: 150 CAPSULE ORAL at 09:36

## 2023-12-22 RX ADMIN — INSULIN LISPRO 10 UNITS: 100 INJECTION, SOLUTION INTRAVENOUS; SUBCUTANEOUS at 13:55

## 2023-12-22 RX ADMIN — ACETAMINOPHEN 650 MG: 325 TABLET ORAL at 03:24

## 2023-12-22 RX ADMIN — ASPIRIN 81 MG CHEWABLE TABLET 81 MG: 81 TABLET CHEWABLE at 09:36

## 2023-12-22 RX ADMIN — Medication 3 G: at 20:30

## 2023-12-22 RX ADMIN — OXYCODONE HYDROCHLORIDE 5 MG: 5 TABLET ORAL at 00:47

## 2023-12-22 RX ADMIN — ATORVASTATIN CALCIUM 80 MG: 80 TABLET, FILM COATED ORAL at 09:36

## 2023-12-22 RX ADMIN — PREGABALIN 75 MG: 75 CAPSULE ORAL at 09:36

## 2023-12-22 RX ADMIN — FUROSEMIDE 20 MG: 10 INJECTION, SOLUTION INTRAVENOUS at 18:44

## 2023-12-22 RX ADMIN — VANCOMYCIN HYDROCHLORIDE 1250 MG: 5 INJECTION, POWDER, LYOPHILIZED, FOR SOLUTION INTRAVENOUS at 04:04

## 2023-12-22 RX ADMIN — FUROSEMIDE 20 MG: 10 INJECTION, SOLUTION INTRAVENOUS at 09:35

## 2023-12-22 RX ADMIN — CLOPIDOGREL BISULFATE 75 MG: 75 TABLET ORAL at 09:36

## 2023-12-22 RX ADMIN — HEPARIN SODIUM 5000 UNITS: 5000 INJECTION INTRAVENOUS; SUBCUTANEOUS at 09:36

## 2023-12-22 ASSESSMENT — PAIN DESCRIPTION - LOCATION
LOCATION: INCISION
LOCATION: INCISION
LOCATION: CHEST

## 2023-12-22 ASSESSMENT — PAIN SCALES - GENERAL
PAINLEVEL_OUTOF10: 8
PAINLEVEL_OUTOF10: 6
PAINLEVEL_OUTOF10: 3
PAINLEVEL_OUTOF10: 0 - NO PAIN
PAINLEVEL_OUTOF10: 8
PAINLEVEL_OUTOF10: 3

## 2023-12-22 ASSESSMENT — COGNITIVE AND FUNCTIONAL STATUS - GENERAL
TOILETING: TOTAL
HELP NEEDED FOR BATHING: A LOT
DAILY ACTIVITIY SCORE: 14
DRESSING REGULAR LOWER BODY CLOTHING: A LOT
DRESSING REGULAR UPPER BODY CLOTHING: A LOT
PERSONAL GROOMING: A LITTLE

## 2023-12-22 ASSESSMENT — ACTIVITIES OF DAILY LIVING (ADL)
BATHING_ASSISTANCE: MAXIMAL
ADL_ASSISTANCE: INDEPENDENT

## 2023-12-22 ASSESSMENT — PAIN - FUNCTIONAL ASSESSMENT
PAIN_FUNCTIONAL_ASSESSMENT: 0-10

## 2023-12-22 ASSESSMENT — PAIN DESCRIPTION - DESCRIPTORS: DESCRIPTORS: ACHING;DISCOMFORT

## 2023-12-22 ASSESSMENT — PAIN DESCRIPTION - ORIENTATION: ORIENTATION: MID

## 2023-12-22 NOTE — INDIVIDUALIZED OVERALL PLAN OF CARE NOTE
1500 right pleural and   mediastinal chest tube removed without difficulty. Patient tolerated well.    Stat 1V CXR ordered.    Sarah Gates, APRN-CNP  Cardiac Surgery [facility]  [facility]  Team Pager [phone]

## 2023-12-22 NOTE — PROGRESS NOTES
Occupational Therapy    Evaluation/Treatment    Patient Name: Behzad Diaz  MRN: [id number]  : 1965  Today's Date: 23          Assessment:  OT Assessment: Pt regerred for skilled OT evaluation s/p CABG x4. Pt required min-modA with STS from chair level. Motivated to navigate to sink for oral care; however, became nauseous and lightheaded. Pt returned to seat immediately, VSS, symptoms subsided <3 minutes. NAD at close of session. OT recommend further skilled therapy in an inpatient setting to promote return to IND PLOF.  Barriers to Discharge: Decreased caregiver support  Evaluation/Treatment Tolerance:  (Symptomatic lightheaded/nauseous with activity.)  Medical Staff Made Aware: Yes  End of Session Communication: Bedside nurse  End of Session Patient Position: Up in chair  OT Assessment Results: Decreased ADL status, Decreased upper extremity strength, Decreased safe judgment during ADL, Decreased endurance, Decreased functional mobility  Barriers to Discharge: Decreased caregiver support  Evaluation/Treatment Tolerance:  (Symptomatic lightheaded/nauseous with activity.)  Medical Staff Made Aware: Yes  Strengths: Ability to acquire knowledge, Rehab experience  Barriers to Participation: Comorbidities, Support of Caregivers  Plan:  Treatment Interventions: ADL retraining, Functional transfer training, Endurance training, Compensatory technique education  No Skilled OT: At baseline function  OT Frequency: 3 times per week  OT Discharge Recommendations: Moderate intensity level of continued care  Equipment Recommended upon Discharge: Wheeled walker  OT Recommended Transfer Status: Assist of 2  OT - OK to Discharge: Yes (Eval complete)  Treatment Interventions: ADL retraining, Functional transfer training, Endurance training, Compensatory technique education    Subjective   Current Problem:  1. Coronary artery disease (CAD) excluded  Case Request Operating Room: Creation Bypass Graft Coronary Artery    Case  Request Operating Room: Creation Bypass Graft Coronary Artery      2. NSTEMI (non-ST elevated myocardial infarction) (CMS/formerly [facility])  Vascular US carotid artery duplex bilateral    Vascular US lower extremity vein mapping bilateral    Vascular US ankle brachial index (KHADIJAH) without exercise    Vascular US carotid artery duplex bilateral    Vascular US lower extremity vein mapping bilateral    Vascular US ankle brachial index (KHADIJAH) without exercise      3. Encounter for other preprocedural examination  Vascular US carotid artery duplex bilateral    Vascular US lower extremity vein mapping bilateral    Vascular US ankle brachial index (KHADIJAH) without exercise      4. Other specified symptoms and signs involving the circulatory and respiratory systems  Vascular US carotid artery duplex bilateral      5. ACS (acute coronary syndrome) (CMS/formerly [facility])  Anesthesia Intraoperative Transesophageal Echocardiogram    Anesthesia Intraoperative Transesophageal Echocardiogram      6. Atherosclerosis of coronary artery bypass graft(s), unspecified, with unspecified angina pectoris (CMS/formerly [facility])  Anesthesia Intraoperative Transesophageal Echocardiogram      7. Type 2 diabetes mellitus with other circulatory complication, without long-term current use of insulin (CMS/formerly [facility])  Referral to Clinical Pharmacy        General:   OT Received On: 12/22/23  General  Reason for Referral: Initally presenting with NSTEMI s/p left heart catheterization with LV. Now s/p Off Pump CABG X4 w/ LIMA to LAD, SVG to Diag, and SVG Sequenced to OM and PDA (taken off of other vein proximally) with Dr. Phelps on 12/20/23.  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 Agreeable to  session.  Precautions:  Hearing/Visual Limitations: hearing WFL and vision WFL with reading glasses  Medical Precautions: Fall precautions, Cardiac precautions, Oxygen therapy device and L/min, Chest tube  Post-Surgical Precautions: Move in the Tube  Vital Signs:  Heart Rate: 99  Heart Rate Source: Monitor  SpO2: 96 %  BP: 126/82  BP Location: Left arm  BP Method: Automatic  Patient Position: Sitting  Pain:  Pain Assessment  Pain Assessment: 0-10  Pain Score: 3  Pain Type: Surgical pain  Pain Location: Sternum  Pain Descriptors: Aching, Discomfort    Objective   Cognition:  Overall Cognitive Status: Within Functional Limits  Orientation Level: Oriented X4  Insight: Mild           Home Living:  Type of Home: Apartment  Lives With: Alone  Home Adaptive Equipment: Walker rolling or standard, Cane  Home Layout: One level  Home Access: Stairs to enter with rails, Other (Comment) (No steps to enter from garage)  Entrance Stairs-Rails: Right  Entrance Stairs-Number of Steps: 1  Bathroom Shower/Tub: Tub/shower unit  Bathroom Toilet: Standard  Bathroom Equipment: Shower chair with back  Home Living Comments: No assist available from family/friends during the day.  Prior Function:  Level of [place]: Independent with ADLs and functional transfers, Independent with homemaking with ambulation  ADL Assistance: Independent  Homemaking Assistance: Independent  Ambulatory Assistance: Independent  Vocational: Full time employment  Leisure: Gambling  Hand Dominance: Right  Prior Function Comments: Patient independent at baseline, no AD in the home and cane out in the community. Drives and works full time  IADL History:  Homemaking Responsibilities: Yes  Meal Prep Responsibility: Primary  Laundry Responsibility: Primary  Cleaning Responsibility: Primary  Bill Paying/Finance Responsibility: Primary  Shopping Responsibility: Primary  Current License: Yes  Mode of Transportation: Car  Occupation: Full time employment  Type of  Occupation:   Leisure and Hobbies: Gambling, going to the casino, time with friends  ADL:  Equipment: Reacher, Sock aid  Eating Assistance: Independent  Eating Deficit: None  Grooming Assistance: Minimal  Grooming Deficit:  (Anticipated)  Bathing Assistance: Maximal  Bathing Deficit:  (anticipated)  UE Dressing Assistance: Moderate  UE Dressing Deficit:  (Anticipated)  LE Dressing Assistance: Maximal  Toileting Assistance with Device: Maximal  Toileting Deficit:  (Anticipated)  Functional Assistance: Minimal  Functional Deficit: Steadying, Supervision/safety  ADL Comments: Michael to navigate chair>sink +FWW. Pt became nauseous and lightheaded. Chair pulled up and pt returned to seated. VSS and symptoms subsided <3 minutes.    Bed Mobility/Transfers: Transfers  Transfer: Yes  Transfer 1  Transfer From 1: Sit to  Transfer to 1: Sit, Stand  Technique 1: Sit to stand  Transfer Device 1: Walker  Transfer Level of Assistance 1: Moderate assistance  Trials/Comments 1: ModA to stand from chair, VCs for MITT precautions.  Transfers 2  Transfer From 2: Stand to  Transfer to 2: Chair with arms  Technique 2: Stand to sit  Transfer Device 2: Walker  Transfer Level of Assistance 2: Minimum assistance  Trials/Comments 2: Chair stabilized, Michael for controlled return to seat 2/2 lightheaded/nauseous.         Vision:Vision - Basic Assessment  Current Vision: Wears glasses only for reading  Sensation:  Sensation Comment: B DIPs N/T; B lower leg distal to feet N/T at baseline.  Strength:  Strength Comments: BUEs grossly >3+/5  Other Activity:     Home Environment:     Perception:     Coordination:  Movements are Fluid and Coordinated: Yes   Hand Function:     Extremities: RUE   RUE : Within Functional Limits and LUE   LUE: Within Functional Limits      Outcome Measures: [facility] Daily Activity  Putting on and taking off regular lower body clothing: A lot  Bathing (including washing, rinsing, drying): A lot  Putting on and  taking off regular upper body clothing: A lot  Toileting, which includes using toilet, bedpan or urinal: Total  Taking care of personal grooming such as brushing teeth: A little  Eating Meals: None  Daily Activity - Total Score: 14        Education Documentation  Body Mechanics, taught by Estella Hennessy OT at 12/22/2023  2:13 PM.  Learner: Patient  Readiness: Acceptance  Method: Explanation, Demonstration  Response: Verbalizes Understanding    Precautions, taught by Estella Hennessy OT at 12/22/2023  2:13 PM.  Learner: Patient  Readiness: Acceptance  Method: Explanation, Demonstration  Response: Verbalizes Understanding    ADL Training, taught by Estella Hennessy OT at 12/22/2023  2:13 PM.  Learner: Patient  Readiness: Acceptance  Method: Explanation, Demonstration  Response: Verbalizes Understanding    Education Comments  No comments found.        OP EDUCATION:  Education  Individual(s) Educated: Patient  Education Provided: Symptom management, Diagnosis & Precautions  Risk and Benefits Discussed with Patient/Caregiver/Other: yes  Patient/Caregiver Demonstrated Understanding: yes  Plan of Care Discussed and Agreed Upon: yes  Patient Response to Education: Patient/Caregiver Verbalized Understanding of Information  Education Comment: Pt educated on MITT precautions with verbal and demo; safety while in house    Goals:  Encounter Problems       Encounter Problems (Active)       ADLs       Patient will perform UB and LB bathing with moderate assist level of assistance and extended tub bench and long-handled sponge.       Start:  12/22/23    Expected End:  01/05/24            Patient with complete upper body dressing with minimal assist  level of assistance donning and doffing all UE clothes with no adaptive equipment while edge of bed        Start:  12/22/23    Expected End:  01/05/24            Patient with complete lower body dressing with moderate assist level of assistance donning and doffing all LE clothes  with  reacher, shoe horn, and sock-aid while supported sitting and edge of bed        Start:  12/22/23    Expected End:  01/05/24            Patient will complete daily grooming tasks brushing teeth, shaving, and washing face/hair with supervision level of assistance and PRN adaptive equipment while standing.       Start:  12/22/23    Expected End:  01/05/24            Patient will complete toileting including hygiene clothing management/hygiene with minimal assist  level of assistance and raised toilet seat, grab bars.       Start:  12/22/23    Expected End:  01/05/24               Mobility       STG - Patient will ambulate 150 ft with LRAD and SBA.       Start:  12/21/23    Expected End:  01/04/24            STG - Patient will ambulate up and down a curb/step with LRAD and SBA.       Start:  12/21/23    Expected End:  01/04/24               Transfers       STG - Patient will perform bed mobility (log roll) independently.        Start:  12/21/23    Expected End:  01/04/24            STG - Patient will transfer sit to and from stand with LRA and modified independence.       Start:  12/21/23    Expected End:  01/04/24

## 2023-12-22 NOTE — PROGRESS NOTES
"Behzad Diaz is a 58 y.o. male on day 10 of admission presenting with Coronary artery disease (CAD) excluded.    Subjective   Patient status post up for his CABG x 4, transferred from ICU to floor.  Pt does have small appetite, states he ate some dinner and lunch yesterday.  Pt states at home he rarely eats breakfast  Agreeable to sample CGM at discharge (has iphone), and  endo/pharmacy follow up    Objective   Constitutional: NAD, obese, AOx3. Cooperative  HEENT: EOMI, Anicteric scleras   Neck: Soft, supple   Cardiovascular: normal HR  Respiratory: no increased wob,  or accessory muscle use, chest tube in place.    Psych : appropriate affect    Last Recorded Vitals  Blood pressure 108/74, pulse 95, temperature 36 °C (96.8 °F), temperature source Temporal, resp. rate 18, height 1.854 m (6' 0.99\"), weight (!) 157 kg (346 lb 0.2 oz), SpO2 93 %.  Intake/Output last 3 Shifts:  I/O last 3 completed shifts:  In: 6865.9 (42.9 mL/kg) [P.O.:760; I.V.:463.2 (2.9 mL/kg); Blood:1700; IV Piggyback:3942.7]  Out: 4230 (26.4 mL/kg) [Urine:1750 (0.3 mL/kg/hr); Blood:2000; Chest Tube:480]  Weight: 160 kg     Relevant Results  Results from last 7 days   Lab Units 12/22/23  1023 12/22/23  0729 12/21/23  2112 12/21/23  1813 12/21/23  1139 12/21/23  0954 12/21/23  0748 12/20/23  2335 12/20/23  0810 12/20/23  0707 12/19/23  0840 12/19/23  0654   POCT GLUCOSE mg/dL 232*  --  270* 266* 211*  --  184*  --    < >  --    < >  --    GLUCOSE mg/dL  --  240*  --   --   --  217*  --  158*  --  193*  --  106*    < > = values in this interval not displayed.       Lab Review  Lab Results   Component Value Date    BILITOT 1.3 (H) 12/13/2023    CALCIUM 8.2 (L) 12/22/2023    CO2 22 12/22/2023     12/22/2023    CREATININE 0.84 12/22/2023    GLUCOSE 240 (H) 12/22/2023    ALKPHOS 58 12/13/2023    K 4.4 12/22/2023    PROT 6.5 12/13/2023     (L) 12/22/2023    AST 26 12/13/2023    ALT 26 12/13/2023    BUN 14 12/22/2023    ANIONGAP 13 12/22/2023 "    MG 2.17 12/22/2023    PHOS 2.0 (L) 12/22/2023    ALBUMIN 3.5 12/22/2023    LIPASE 13 09/22/2020    GFRMALE >90 07/07/2023     Lab Results   Component Value Date    TRIG 343 (H) 12/10/2023    CHOL 145 12/10/2023    LDLCALC 51 12/10/2023    HDL 25.2 12/10/2023     Lab Results   Component Value Date    HGBA1C 11.0 (H) 12/10/2023    HGBA1C 10.8 (A) 08/25/2023    HGBA1C 6.4 (A) 08/27/2021     The ASCVD Risk score (Perfecto CARR, et al., 2019) failed to calculate for the following reasons:    The patient has a prior MI or stroke diagnosis    Scheduled medications  acetaminophen, 650 mg, oral, q6h  aspirin, 81 mg, oral, Daily  atorvastatin, 80 mg, oral, Daily  carvedilol, 3.125 mg, oral, BID  ceFAZolin, 3 g, intravenous, q8h  clopidogrel, 75 mg, oral, Daily  furosemide, 20 mg, intravenous, BID with meals  heparin, 5,000 Units, subcutaneous, q8h  insulin glargine, 52 Units, subcutaneous, Nightly  insulin lispro, 0-15 Units, subcutaneous, TID with meals  insulin lispro, 10 Units, subcutaneous, TID with meals  iron polysaccharides, 150 mg, oral, Daily  multivitamin with minerals, 1 tablet, oral, Daily  polyethylene glycol, 17 g, oral, BID  pregabalin, 75 mg, oral, Daily  sennosides-docusate sodium, 2 tablet, oral, BID      Continuous medications       PRN medications  PRN medications: dextrose **OR** glucagon, oxyCODONE    Assessment/Plan   Principal Problem:    Coronary artery disease (CAD) excluded  Active Problems:    Type 2 diabetes mellitus, without long-term current use of insulin (CMS/[facility])    HTN (hypertension)    NSTEMI (non-ST elevated myocardial infarction) (CMS/[facility])    Ischemic cardiomyopathy    HLD (hyperlipidemia)    Behzad Diaz is a 58 y.o. male with HTN, HLD, poorly controlled DM2, obesity class III, CAD, and history of discitis and epidural abscess s/p debridement who presents to [facility] for CABG evaluation, plan for 12/20.     Endocrinology is consulted to assist in diabetes management.  Diabetes  history:  Diabetes duration: 4 to 5 years           Home blood glucose checks: Patient does not check his BG at home  Hypoglycemia (y/n, threshold and symptoms): No hypoglycemia  Home diabetes regimen: Metformin 1000 mg  BID   Home diet (how many meals a day): Patient reports poor diet as he is mostly eating out because of his work  Outpatient physician managing diabetes: PCP  Macrovascular complications: CVD [yes]     CVA [no]     PVD [no]  Microvascular complications: Retinopathy [unknown, patient has not had a retinal exam recently]            neuropathy [endorses neuropathy but attributes this to prior spinal surgery]   nephropathy [no microalbumin to creatinine ratio on chart, creatinine 0.7]  Last A1c: 11%         Recommendations:  - continue glargine 52 units at bedtime  - #3 sliding scale 3 times daily AC with meals only  - start lispro 10 units with meals  -Accu-Cheks 3 times daily before meals and at bedtime.  Please obtain bedtime BG as it often missed.  - Hypoglycemia protocol  -We will follow and adjust regimen accordingly    Discharge planning:   (X) -referring to clinical pharmacy for follow up, pt aware and agreeable to  [place] Plan Pharmacist follow-up  Pt would benefit from the following: insulin at discharge after CABG, GLP-1 start and  PAP  (X) -email sent for scheduling in  hospital discharge diabetes clinic in [place] per pt request  (X) -pt would like to sample CGM prior to discharge, has Agrividane       I spent 35 minutes on care and coordination of this patient    Rossy Edwards PA-C

## 2023-12-22 NOTE — PROGRESS NOTES
"CARDIAC SURGERY DAILY PROGRESS NOTE  Mr. Behzad Diaz is a 57 yo M who presents to the CTICU s/p Off Pump CABG X4 w/ LIMA to LAD, SVG to Diag, and SVG Sequenced to OM and PDA (taken off of other vein proximally) with Dr. Phelps on 12/20/23. His PMH is significant for CAD, HTN, HLD, T2DM, L4-5 discitis with epidural abscess s/p debridement. He initially presented to [facility] ED on 12/10 with left sided chest pain with radiation to L arm as well as other symptoms suspicios for ACS. Workup revealed NSTEMI. TTE 10/12 with EF 40-45% with global hypokinesis of LV. LHC 12/11 revealed 100% stenosis in mid LAD, 95% stenosis proximal to mid first diagonal branch, occluded LCx after early first OM, OM1 with occluded mid portion, OM2 occluded, and 95% stenosis mid RCA. The patient remained HDS and was transferred to [facility] for cardiac surgery workup.     Operation Procedure  12/20/23 Dr Phelps  Off Pump CABG X4 w/ LIMA to LAD, SVG to Diag, and SVG Sequenced to OM and PDA    CTICU uneventful  Transfer to T3 12/21/23  -----------------------       Interval History:   Pt transferred out of CTICU last night    SUBJECTIVE:  CT removed today; OOB to care; needs encouragement to ambulate    Objective   /75 (BP Location: Right arm, Patient Position: Sitting)   Pulse 88   Temp 35.9 °C (96.6 °F) (Temporal)   Resp 18   Ht 1.854 m (6' 0.99\")   Wt (!) 157 kg (346 lb 0.2 oz)   SpO2 96%   BMI 45.66 kg/m²   Pain Score: 3   3 Day Weight Change: Unable to Calculate    Intake and Output    Intake/Output Summary (Last 24 hours) at 12/22/2023 1413  Last data filed at 12/22/2023 1336  Gross per 24 hour   Intake 1876.43 ml   Output 1565 ml   Net 311.43 ml         Physical Exam  Physical Exam  Vitals and nursing note reviewed.   Constitutional:       General: He is not in acute distress.     Appearance: He is obese.   HENT:      Head: Normocephalic.      Mouth/Throat:      Mouth: Mucous membranes are moist.   Eyes:      Conjunctiva/sclera: " Conjunctivae normal.   Cardiovascular:      Rate and Rhythm: Normal rate and regular rhythm.      Pulses: Normal pulses.      Heart sounds: Normal heart sounds.      Comments: Tele NSR 80-90's  No wires  Pulmonary:      Effort: Pulmonary effort is normal.      Breath sounds: Normal breath sounds.      Comments: Chest tube in place; -20sx will remove today  Sternum stable  Abdominal:      General: Bowel sounds are normal.      Palpations: Abdomen is soft.      Tenderness: There is no abdominal tenderness.      Comments: Awaiting postop BM  Per pt normally has BM every 3-5 days   Genitourinary:     Comments: Meyer removed; awaiting void   Musculoskeletal:      Cervical back: Neck supple.      Right lower leg: No edema.      Left lower leg: No edema.   Skin:     General: Skin is warm and dry.      Comments: midsternal chest incision C/D/I, well approximated, no s/s infection, Right SVG incision C/D/I, well approximated, no s/s infection  Sternum stable    Neurological:      General: No focal deficit present.      Mental Status: He is alert and oriented to person, place, and time.   Psychiatric:         Mood and Affect: Mood normal.         Behavior: Behavior normal. Behavior is cooperative.           Medications  Scheduled medications  acetaminophen, 650 mg, oral, q6h  aspirin, 81 mg, oral, Daily  atorvastatin, 80 mg, oral, Daily  carvedilol, 3.125 mg, oral, BID  ceFAZolin, 3 g, intravenous, q8h  clopidogrel, 75 mg, oral, Daily  furosemide, 20 mg, intravenous, BID with meals  heparin, 5,000 Units, subcutaneous, q8h  insulin glargine, 52 Units, subcutaneous, Nightly  insulin lispro, 0-15 Units, subcutaneous, TID with meals  insulin lispro, 10 Units, subcutaneous, TID with meals  iron polysaccharides, 150 mg, oral, Daily  multivitamin with minerals, 1 tablet, oral, Daily  polyethylene glycol, 17 g, oral, BID  pregabalin, 75 mg, oral, Daily  sennosides-docusate sodium, 2 tablet, oral, BID    Continuous medications   PRN  medications  PRN medications: dextrose **OR** glucagon, oxyCODONE    Labs  Results for orders placed or performed during the hospital encounter of 12/12/23 (from the past 24 hour(s))   POCT GLUCOSE   Result Value Ref Range    POCT Glucose 266 (H) 74 - 99 mg/dL   POCT GLUCOSE   Result Value Ref Range    POCT Glucose 270 (H) 74 - 99 mg/dL   CBC   Result Value Ref Range    WBC 7.6 4.4 - 11.3 x10*3/uL    nRBC 0.0 0.0 - 0.0 /100 WBCs    RBC 3.57 (L) 4.50 - 5.90 x10*6/uL    Hemoglobin 10.9 (L) 13.5 - 17.5 g/dL    Hematocrit 32.5 (L) 41.0 - 52.0 %    MCV 91 80 - 100 fL    MCH 30.5 26.0 - 34.0 pg    MCHC 33.5 32.0 - 36.0 g/dL    RDW 12.7 11.5 - 14.5 %    Platelets 168 150 - 450 x10*3/uL   Magnesium   Result Value Ref Range    Magnesium 2.17 1.60 - 2.40 mg/dL   Renal Function Panel   Result Value Ref Range    Glucose 240 (H) 74 - 99 mg/dL    Sodium 132 (L) 136 - 145 mmol/L    Potassium 4.4 3.5 - 5.3 mmol/L    Chloride 101 98 - 107 mmol/L    Bicarbonate 22 21 - 32 mmol/L    Anion Gap 13 10 - 20 mmol/L    Urea Nitrogen 14 6 - 23 mg/dL    Creatinine 0.84 0.50 - 1.30 mg/dL    eGFR >90 >60 mL/min/1.73m*2    Calcium 8.2 (L) 8.6 - 10.6 mg/dL    Phosphorus 2.0 (L) 2.5 - 4.9 mg/dL    Albumin 3.5 3.4 - 5.0 g/dL   POCT GLUCOSE   Result Value Ref Range    POCT Glucose 232 (H) 74 - 99 mg/dL   POCT GLUCOSE   Result Value Ref Range    POCT Glucose 266 (H) 74 - 99 mg/dL         XR chest 1 view    Result Date: 12/22/2023  Interpreted By:  Albin Hills, STUDY: XR CHEST 1 VIEW;  12/22/2023 3:43 am   INDICATION: Signs/Symptoms:s/p cardiac surgery.   COMPARISON: Radiograph dated 12/21/2023   ACCESSION NUMBER(S): [id number]   ORDERING CLINICIAN: INEZ YOUNG   FINDINGS: Interval removal of right IJ central venous catheter   Status post median sternotomy. The cardiomediastinal silhouette is persistently enlarged. Limited assessment due to rotation to the right side and underpenetration.   Persistent bibasilar atelectasis with small  effusion not excluded. No sizable pneumothorax.   Postsurgical changes in the cervical spine.       1. Unchanged appearance of the lungs with persistent bibasilar atelectasis/consolidation. Cannot exclude right basilar small pleural effusion.       Signed by: Alvaro Capellan 2023 12:31 PM Dictation workstation:   [id number]           IMPRESSION & PLAN:  POD # 2 s/p CABGX4 off pump  - Increase activity/ ambulation; PT/OT  - Encourage IS, C/DB; respiratory therapy; wean O2 as burke   - Cardiac rehab referral   - Continue cardiac meds: ASA, BB, statin Plavix  - Pain and anticonstipation meds  - 2v CXR in am   - no wires  - Tele until discharge  - Optimize nutrition and electrolytes    Rhythm  - Tele: NSR 80-90's  - Continue carvedilol 3.125mg PO BID   - Adjust medications as tolerated    Acute Blood Loss Anemia   Recent Labs     23  0729 23  0954 23  2335 23  0708 23  0654 23  1026 23  0649   HGB 10.9* 12.7* 14.3 16.0 14.6 14.5 13.8   HCT 32.5* 35.1* 41.5 48.5 43.3 43.6 40.3*     - MV, PO Iron x1mo  - Daily labs, transfuse as indicated    Volume/Electrolyte Status: Preop wt Weight: (!) 157 kg (345 lb 11.2 oz)   Vitals:    23 1155   Weight: (!) 157 kg (346 lb 0.2 oz)     - Weight: 157  - Lasix 20mg IV BID  - Adjust diuresis as needed for postop cardiac surgery hypervolemia  - Replete electrolytes for hypokalemia/hypomagnesemia/hypophosphatemia as needed   - Daily weights and strict I&Os  - Daily RFP while admitted    Hypertension: home meds: lisinopril   Systolic (24hrs), Av , Min:97 , Max:126    - continue holding home lisinopril   - additional antihypertensives as needed    Hyperlipidemia: home atorvastatin   Lab Results   Component Value Date    CHOL 145 12/10/2023    HDL 25.2 12/10/2023    VLDL 69 (H) 12/10/2023    TRIG 343 (H) 12/10/2023    NHDL 120 12/10/2023   - continue atorvastatin   - follow up lipid panel with PCP/ cardiologist for ongoing lipid  management      Obesity:  Body mass index is 45.66 kg/m².  -encourage lifestyle modification    DM: home meds: Glucophage   Lab Results   Component Value Date    HGBA1C 11.0 (H) 12/10/2023     Results from last 7 days   Lab Units 12/22/23  1333 12/22/23  1023 12/21/23  2112 12/21/23  1813 12/21/23  1139 12/21/23  0748 12/20/23  1210   POCT GLUCOSE mg/dL 266* 232* 270* 266* 211* 184* 173*     -accuchecks premeal and at bedtime  -diabetic diet  -lispro premeal corrective scale  - Endocrinology following- appreciate recs  - Lantus 52 units at HS  - Humalog 10units TID with meals    Discitis  - continue home pregabalin   - holding home flexeril   - holding home amoxicillin for chronic discitis      VTE Prophylaxis: SCDs/TEDs, ambulation, SQ heparin  Code Status: Full Code    Dispo  - PT/OT recs home with home care  - Would benefit from homecare for cardiac surgery carepath and RN visits  - Anticipate discharge next 2-3 days days, pending diuresis, more mobility  - Will continue to assess discharge needs      DEANNA Garcia-CNP  Cardiac Surgery GUNNAR  [facility]  Team Phone [phone]    12/22/2023  2:13 PM

## 2023-12-22 NOTE — PROGRESS NOTES
"Behzad Diaz is a 58 y.o. male on day 10 of admission presenting with Coronary artery disease (CAD) excluded.  Patient has been provided a skilled listing for skilled placement.  Await facility of choice.        Physical Exam    Last Recorded Vitals  Blood pressure 114/75, pulse 90, temperature 35.9 °C (96.6 °F), temperature source Temporal, resp. rate 18, height 1.854 m (6' 0.99\"), weight (!) 157 kg (346 lb 0.2 oz), SpO2 97 %.  Intake/Output last 3 Shifts:  I/O last 3 completed shifts:  In: 6865.9 (42.9 mL/kg) [P.O.:760; I.V.:463.2 (2.9 mL/kg); Blood:1700; IV Piggyback:3942.7]  Out: 4230 (26.4 mL/kg) [Urine:1750 (0.3 mL/kg/hr); Blood:2000; Chest Tube:480]  Weight: 160 kg           Assessment/Plan   Principal Problem:    Coronary artery disease (CAD) excluded  Active Problems:    Type 2 diabetes mellitus, without long-term current use of insulin (CMS/HCC)    HTN (hypertension)    NSTEMI (non-ST elevated myocardial infarction) (CMS/[facility])    Ischemic cardiomyopathy    HLD (hyperlipidemia)          Carlton Alberto RN      "

## 2023-12-22 NOTE — PROGRESS NOTES
"Behzad Diaz is a 58 y.o. male on day 10 of admission presenting with Coronary artery disease (CAD) excluded.  Transitional Care Coordination Progress Note:   Patient discussed during interdisciplinary rounds.   Team members present: (TCC, Cardiac-NP)   Plan per Medical/Surgical team: (Cardiac surgery- CT pulled 12/22-Meyer in place)   Discharge disposition: ([facility])   Status- In patient   Payer- [facility]  Potential Barriers: (Cardiac Medical Management)   ADOD: (2-3 days)   .Carlton Alberto RN [facility] [phone]       Physical Exam    Last Recorded Vitals  Blood pressure 98/61, pulse 86, temperature 36.1 °C (97 °F), temperature source Temporal, resp. rate 18, height 1.854 m (6' 0.99\"), weight (!) 160 kg (352 lb 11.8 oz), SpO2 (!) 36 %.  Intake/Output last 3 Shifts:  I/O last 3 completed shifts:  In: 6865.9 (42.9 mL/kg) [P.O.:760; I.V.:463.2 (2.9 mL/kg); Blood:1700; IV Piggyback:3942.7]  Out: 4230 (26.4 mL/kg) [Urine:1750 (0.3 mL/kg/hr); Blood:2000; Chest Tube:480]  Weight: 160 kg           Assessment/Plan   Principal Problem:    Coronary artery disease (CAD) excluded  Active Problems:    Type 2 diabetes mellitus, without long-term current use of insulin (CMS/[facility])    HTN (hypertension)    NSTEMI (non-ST elevated myocardial infarction) (CMS/[facility])    Ischemic cardiomyopathy    HLD (hyperlipidemia)          Carlton Alberto RN      "

## 2023-12-22 NOTE — PROGRESS NOTES
"CARDIAC SURGERY DAILY PROGRESS NOTE  Mr. Behzad Diaz is a 59 yo M who presents to the CTICU s/p Off Pump CABG X4 w/ LIMA to LAD, SVG to Diag, and SVG Sequenced to OM and PDA (taken off of other vein proximally) with Dr. Phelps on 12/20/23. His PMH is significant for CAD, HTN, HLD, T2DM, L4-5 discitis with epidural abscess s/p debridement. He initially presented to [facility] ED on 12/10 with left sided chest pain with radiation to L arm as well as other symptoms suspicios for ACS. Workup revealed NSTEMI. TTE 10/12 with EF 40-45% with global hypokinesis of LV. LHC 12/11 revealed 100% stenosis in mid LAD, 95% stenosis proximal to mid first diagonal branch, occluded LCx after early first OM, OM1 with occluded mid portion, OM2 occluded, and 95% stenosis mid RCA. The patient remained HDS and was transferred to [facility] for cardiac surgery workup.     Operation Procedure  12/20/23 Dr Phelps  Off Pump CABG X4 w/ LIMA to LAD, SVG to Diag, and SVG Sequenced to OM and PDA    CTICU uneventful  Transfer to T3 12/21/23  -----------------------       Interval History:   Pt transferred out of CTICU last night    SUBJECTIVE:  CT removed today; OOB to care; needs encouragement to ambulate    Objective   /74 (BP Location: Left arm, Patient Position: Lying)   Pulse 95   Temp 36 °C (96.8 °F) (Temporal)   Resp 18   Ht 1.854 m (6' 0.99\")   Wt (!) 157 kg (346 lb 0.2 oz)   SpO2 93%   BMI 45.66 kg/m²   Pain Score: 8   3 Day Weight Change: Unable to Calculate    Intake and Output    Intake/Output Summary (Last 24 hours) at 12/22/2023 1302  Last data filed at 12/22/2023 1235  Gross per 24 hour   Intake 1876.43 ml   Output 1565 ml   Net 311.43 ml       Physical Exam  Physical Exam  Vitals and nursing note reviewed.   Constitutional:       General: He is not in acute distress.     Appearance: He is obese.   HENT:      Head: Normocephalic.      Mouth/Throat:      Mouth: Mucous membranes are moist.   Eyes:      Conjunctiva/sclera: Conjunctivae " normal.   Cardiovascular:      Rate and Rhythm: Normal rate and regular rhythm.      Pulses: Normal pulses.      Heart sounds: Normal heart sounds.      Comments: Tele NSR 80-90's  No wires  Pulmonary:      Effort: Pulmonary effort is normal.      Breath sounds: Normal breath sounds.      Comments: Chest tube in place; -20sx will remove today  Sternum stable  Abdominal:      General: Bowel sounds are normal.      Palpations: Abdomen is soft.      Tenderness: There is no abdominal tenderness.      Comments: Awaiting postop BM  Per pt normally has BM every 3-5 days   Genitourinary:     Comments: Meyer removed; awaiting void   Musculoskeletal:      Cervical back: Neck supple.      Right lower leg: No edema.      Left lower leg: No edema.   Skin:     General: Skin is warm and dry.      Comments: midsternal chest incision C/D/I, well approximated, no s/s infection, Right SVG incision C/D/I, well approximated, no s/s infection  Sternum stable    Neurological:      General: No focal deficit present.      Mental Status: He is alert and oriented to person, place, and time.   Psychiatric:         Mood and Affect: Mood normal.         Behavior: Behavior normal. Behavior is cooperative.           Medications  Scheduled medications  acetaminophen, 650 mg, oral, q6h  aspirin, 81 mg, oral, Daily  atorvastatin, 80 mg, oral, Daily  carvedilol, 3.125 mg, oral, BID  ceFAZolin, 3 g, intravenous, q8h  clopidogrel, 75 mg, oral, Daily  furosemide, 20 mg, intravenous, BID with meals  heparin, 5,000 Units, subcutaneous, q8h  insulin glargine, 52 Units, subcutaneous, Nightly  insulin lispro, 0-15 Units, subcutaneous, TID with meals  insulin lispro, 10 Units, subcutaneous, TID with meals  iron polysaccharides, 150 mg, oral, Daily  multivitamin with minerals, 1 tablet, oral, Daily  polyethylene glycol, 17 g, oral, BID  pregabalin, 75 mg, oral, Daily  sennosides-docusate sodium, 2 tablet, oral, BID    Continuous medications   PRN  medications  PRN medications: dextrose **OR** glucagon, oxyCODONE    Labs  Results for orders placed or performed during the hospital encounter of 12/12/23 (from the past 24 hour(s))   POCT GLUCOSE   Result Value Ref Range    POCT Glucose 266 (H) 74 - 99 mg/dL   POCT GLUCOSE   Result Value Ref Range    POCT Glucose 270 (H) 74 - 99 mg/dL   CBC   Result Value Ref Range    WBC 7.6 4.4 - 11.3 x10*3/uL    nRBC 0.0 0.0 - 0.0 /100 WBCs    RBC 3.57 (L) 4.50 - 5.90 x10*6/uL    Hemoglobin 10.9 (L) 13.5 - 17.5 g/dL    Hematocrit 32.5 (L) 41.0 - 52.0 %    MCV 91 80 - 100 fL    MCH 30.5 26.0 - 34.0 pg    MCHC 33.5 32.0 - 36.0 g/dL    RDW 12.7 11.5 - 14.5 %    Platelets 168 150 - 450 x10*3/uL   Magnesium   Result Value Ref Range    Magnesium 2.17 1.60 - 2.40 mg/dL   Renal Function Panel   Result Value Ref Range    Glucose 240 (H) 74 - 99 mg/dL    Sodium 132 (L) 136 - 145 mmol/L    Potassium 4.4 3.5 - 5.3 mmol/L    Chloride 101 98 - 107 mmol/L    Bicarbonate 22 21 - 32 mmol/L    Anion Gap 13 10 - 20 mmol/L    Urea Nitrogen 14 6 - 23 mg/dL    Creatinine 0.84 0.50 - 1.30 mg/dL    eGFR >90 >60 mL/min/1.73m*2    Calcium 8.2 (L) 8.6 - 10.6 mg/dL    Phosphorus 2.0 (L) 2.5 - 4.9 mg/dL    Albumin 3.5 3.4 - 5.0 g/dL   POCT GLUCOSE   Result Value Ref Range    POCT Glucose 232 (H) 74 - 99 mg/dL         XR chest 1 view    Result Date: 12/22/2023  Interpreted By:  Alvaro Hills, STUDY: XR CHEST 1 VIEW;  12/22/2023 3:43 am   INDICATION: Signs/Symptoms:s/p cardiac surgery.   COMPARISON: Radiograph dated 12/21/2023   ACCESSION NUMBER(S): [id number]   ORDERING CLINICIAN: INEZ YOUNG   FINDINGS: Interval removal of right IJ central venous catheter   Status post median sternotomy. The cardiomediastinal silhouette is persistently enlarged. Limited assessment due to rotation to the right side and underpenetration.   Persistent bibasilar atelectasis with small effusion not excluded. No sizable pneumothorax.   Postsurgical changes in the  cervical spine.       1. Unchanged appearance of the lungs with persistent bibasilar atelectasis/consolidation. Cannot exclude right basilar small pleural effusion.       Signed by: Alvaro Capellan 2023 12:31 PM Dictation workstation:   [id number]                 IMPRESSION & PLAN:  POD # 2 s/p CABGX4 off pump  - Increase activity/ ambulation; PT/OT  - Encourage IS, C/DB; respiratory therapy; wean O2 as burke   - Cardiac rehab referral   - Continue cardiac meds: ASA, BB, statin Plavix  - Pain and anticonstipation meds  - 2v CXR in am   - no wires  - Tele until discharge  - Optimize nutrition and electrolytes    Rhythm  - Tele: NSR 80-90's  - Continue carvedilol 3.125mg PO BID   - Adjust medications as tolerated    Acute Blood Loss Anemia   Recent Labs     23  0729 23  0954 23  2335 23  0708 23  0654 23  1026 23  0649   HGB 10.9* 12.7* 14.3 16.0 14.6 14.5 13.8   HCT 32.5* 35.1* 41.5 48.5 43.3 43.6 40.3*   - MV, PO Iron x1mo  - Daily labs, transfuse as indicated    Volume/Electrolyte Status: Preop wt Weight: (!) 157 kg (345 lb 11.2 oz)   Vitals:    23 1155   Weight: (!) 157 kg (346 lb 0.2 oz)     - Weight: 157  - Lasix 20mg IV BID  - Adjust diuresis as needed for postop cardiac surgery hypervolemia  - Replete electrolytes for hypokalemia/hypomagnesemia/hypophosphatemia as needed   - Daily weights and strict I&Os  - Daily RFP while admitted    Hypertension: home meds: lisinopril   Systolic (24hrs), Av , Min:97 , Max:118     - additional antihypertensives as needed    Hyperlipidemia: home atorvastatin   Lab Results   Component Value Date    CHOL 145 12/10/2023    HDL 25.2 12/10/2023    VLDL 69 (H) 12/10/2023    TRIG 343 (H) 12/10/2023    NHDL 120 12/10/2023   - continue atorvastatin   - follow up lipid panel with PCP/ cardiologist for ongoing lipid management      Obesity:  Body mass index is 45.66 kg/m².  -encourage lifestyle modification    DM: home meds:  Glucophage   Lab Results   Component Value Date    HGBA1C 11.0 (H) 12/10/2023     Results from last 7 days   Lab Units 12/22/23  1333 12/22/23  1023 12/21/23  2112 12/21/23  1813 12/21/23  1139 12/21/23  0748 12/20/23  1210   POCT GLUCOSE mg/dL 266* 232* 270* 266* 211* 184* 173*   -accuchecks premeal and at bedtime  -diabetic diet  -lispro premeal corrective scale  - Endocrinology following- appreciate recs  - Lantus 52 units at HS  - Humalog 10units TID with meals      VTE Prophylaxis: SCDs/TEDs, ambulation, SQ heparin  Code Status: Full Code    Dispo  - PT/OT recs home with home care  - Would benefit from homecare for cardiac surgery carepath and RN visits  - Anticipate discharge next 2-3 days days, pending diuresis, more mobility  - Will continue to assess discharge needs      DEANNA Garcia-CNP  Cardiac Surgery GUNNAR  [facility]  Team Phone [phone]    12/22/2023  1:02 PM

## 2023-12-23 ENCOUNTER — HOME HEALTH ADMISSION (OUTPATIENT)
Dept: HOME HEALTH SERVICES | Facility: HOME HEALTH | Age: 58
End: 2023-12-23
Payer: COMMERCIAL

## 2023-12-23 ENCOUNTER — DOCUMENTATION (OUTPATIENT)
Dept: HOME HEALTH SERVICES | Facility: HOME HEALTH | Age: 58
End: 2023-12-23
Payer: COMMERCIAL

## 2023-12-23 ENCOUNTER — APPOINTMENT (OUTPATIENT)
Dept: RADIOLOGY | Facility: HOSPITAL | Age: 58
DRG: 236 | End: 2023-12-23
Payer: COMMERCIAL

## 2023-12-23 LAB
ALBUMIN SERPL BCP-MCNC: 3.4 G/DL (ref 3.4–5)
ANION GAP SERPL CALC-SCNC: 10 MMOL/L (ref 10–20)
BUN SERPL-MCNC: 14 MG/DL (ref 6–23)
CALCIUM SERPL-MCNC: 8.6 MG/DL (ref 8.6–10.6)
CHLORIDE SERPL-SCNC: 98 MMOL/L (ref 98–107)
CO2 SERPL-SCNC: 28 MMOL/L (ref 21–32)
CREAT SERPL-MCNC: 0.97 MG/DL (ref 0.5–1.3)
ERYTHROCYTE [DISTWIDTH] IN BLOOD BY AUTOMATED COUNT: 12.6 % (ref 11.5–14.5)
GFR SERPL CREATININE-BSD FRML MDRD: >90 ML/MIN/1.73M*2
GLUCOSE BLD MANUAL STRIP-MCNC: 185 MG/DL (ref 74–99)
GLUCOSE BLD MANUAL STRIP-MCNC: 191 MG/DL (ref 74–99)
GLUCOSE BLD MANUAL STRIP-MCNC: 192 MG/DL (ref 74–99)
GLUCOSE BLD MANUAL STRIP-MCNC: 230 MG/DL (ref 74–99)
GLUCOSE SERPL-MCNC: 256 MG/DL (ref 74–99)
HCT VFR BLD AUTO: 31.2 % (ref 41–52)
HGB BLD-MCNC: 10.7 G/DL (ref 13.5–17.5)
MAGNESIUM SERPL-MCNC: 1.88 MG/DL (ref 1.6–2.4)
MCH RBC QN AUTO: 31.2 PG (ref 26–34)
MCHC RBC AUTO-ENTMCNC: 34.3 G/DL (ref 32–36)
MCV RBC AUTO: 91 FL (ref 80–100)
NRBC BLD-RTO: 0 /100 WBCS (ref 0–0)
PHOSPHATE SERPL-MCNC: 1.9 MG/DL (ref 2.5–4.9)
PLATELET # BLD AUTO: 194 X10*3/UL (ref 150–450)
POTASSIUM SERPL-SCNC: 4.4 MMOL/L (ref 3.5–5.3)
RBC # BLD AUTO: 3.43 X10*6/UL (ref 4.5–5.9)
SODIUM SERPL-SCNC: 132 MMOL/L (ref 136–145)
WBC # BLD AUTO: 7.5 X10*3/UL (ref 4.4–11.3)

## 2023-12-23 PROCEDURE — 2500000004 HC RX 250 GENERAL PHARMACY W/ HCPCS (ALT 636 FOR OP/ED): Performed by: NURSE PRACTITIONER

## 2023-12-23 PROCEDURE — 2500000001 HC RX 250 WO HCPCS SELF ADMINISTERED DRUGS (ALT 637 FOR MEDICARE OP): Performed by: NURSE PRACTITIONER

## 2023-12-23 PROCEDURE — 1200000002 HC GENERAL ROOM WITH TELEMETRY DAILY

## 2023-12-23 PROCEDURE — 80069 RENAL FUNCTION PANEL: CPT | Performed by: CLINICAL NURSE SPECIALIST

## 2023-12-23 PROCEDURE — 2500000001 HC RX 250 WO HCPCS SELF ADMINISTERED DRUGS (ALT 637 FOR MEDICARE OP): Performed by: CLINICAL NURSE SPECIALIST

## 2023-12-23 PROCEDURE — 36415 COLL VENOUS BLD VENIPUNCTURE: CPT | Performed by: CLINICAL NURSE SPECIALIST

## 2023-12-23 PROCEDURE — 99232 SBSQ HOSP IP/OBS MODERATE 35: CPT | Performed by: PHYSICIAN ASSISTANT

## 2023-12-23 PROCEDURE — 71046 X-RAY EXAM CHEST 2 VIEWS: CPT | Performed by: RADIOLOGY

## 2023-12-23 PROCEDURE — 83735 ASSAY OF MAGNESIUM: CPT | Performed by: CLINICAL NURSE SPECIALIST

## 2023-12-23 PROCEDURE — 94668 MNPJ CHEST WALL SBSQ: CPT

## 2023-12-23 PROCEDURE — 96372 THER/PROPH/DIAG INJ SC/IM: CPT | Performed by: NURSE PRACTITIONER

## 2023-12-23 PROCEDURE — 99232 SBSQ HOSP IP/OBS MODERATE 35: CPT | Performed by: CLINICAL NURSE SPECIALIST

## 2023-12-23 PROCEDURE — 85027 COMPLETE CBC AUTOMATED: CPT | Performed by: CLINICAL NURSE SPECIALIST

## 2023-12-23 PROCEDURE — 82947 ASSAY GLUCOSE BLOOD QUANT: CPT

## 2023-12-23 PROCEDURE — 2500000004 HC RX 250 GENERAL PHARMACY W/ HCPCS (ALT 636 FOR OP/ED): Performed by: CLINICAL NURSE SPECIALIST

## 2023-12-23 PROCEDURE — 71046 X-RAY EXAM CHEST 2 VIEWS: CPT

## 2023-12-23 RX ORDER — SODIUM,POTASSIUM PHOSPHATES 280-250MG
1 POWDER IN PACKET (EA) ORAL 4 TIMES DAILY
Status: COMPLETED | OUTPATIENT
Start: 2023-12-23 | End: 2023-12-23

## 2023-12-23 RX ORDER — INSULIN LISPRO 100 [IU]/ML
15 INJECTION, SOLUTION INTRAVENOUS; SUBCUTANEOUS
Status: DISCONTINUED | OUTPATIENT
Start: 2023-12-23 | End: 2023-12-26

## 2023-12-23 RX ORDER — LANOLIN ALCOHOL/MO/W.PET/CERES
400 CREAM (GRAM) TOPICAL
Status: COMPLETED | OUTPATIENT
Start: 2023-12-23 | End: 2023-12-23

## 2023-12-23 RX ADMIN — POTASSIUM & SODIUM PHOSPHATES POWDER PACK 280-160-250 MG 1 PACKET: 280-160-250 PACK at 16:09

## 2023-12-23 RX ADMIN — CLOPIDOGREL BISULFATE 75 MG: 75 TABLET ORAL at 08:57

## 2023-12-23 RX ADMIN — HEPARIN SODIUM 5000 UNITS: 5000 INJECTION INTRAVENOUS; SUBCUTANEOUS at 04:44

## 2023-12-23 RX ADMIN — FUROSEMIDE 20 MG: 10 INJECTION, SOLUTION INTRAVENOUS at 08:57

## 2023-12-23 RX ADMIN — ACETAMINOPHEN 650 MG: 325 TABLET ORAL at 21:50

## 2023-12-23 RX ADMIN — INSULIN LISPRO 15 UNITS: 100 INJECTION, SOLUTION INTRAVENOUS; SUBCUTANEOUS at 13:20

## 2023-12-23 RX ADMIN — Medication 3 G: at 04:44

## 2023-12-23 RX ADMIN — FUROSEMIDE 20 MG: 10 INJECTION, SOLUTION INTRAVENOUS at 16:10

## 2023-12-23 RX ADMIN — HEPARIN SODIUM 5000 UNITS: 5000 INJECTION INTRAVENOUS; SUBCUTANEOUS at 13:17

## 2023-12-23 RX ADMIN — Medication 400 MG: at 21:50

## 2023-12-23 RX ADMIN — POLYSACCHARIDE-IRON COMPLEX 150 MG: 150 CAPSULE ORAL at 08:57

## 2023-12-23 RX ADMIN — ACETAMINOPHEN 650 MG: 325 TABLET ORAL at 08:57

## 2023-12-23 RX ADMIN — ACETAMINOPHEN 650 MG: 325 TABLET ORAL at 13:22

## 2023-12-23 RX ADMIN — ATORVASTATIN CALCIUM 80 MG: 80 TABLET, FILM COATED ORAL at 21:50

## 2023-12-23 RX ADMIN — POTASSIUM & SODIUM PHOSPHATES POWDER PACK 280-160-250 MG 1 PACKET: 280-160-250 PACK at 21:50

## 2023-12-23 RX ADMIN — SENNOSIDES AND DOCUSATE SODIUM 2 TABLET: 8.6; 5 TABLET ORAL at 08:58

## 2023-12-23 RX ADMIN — Medication 1 TABLET: at 08:57

## 2023-12-23 RX ADMIN — INSULIN LISPRO 10 UNITS: 100 INJECTION, SOLUTION INTRAVENOUS; SUBCUTANEOUS at 09:13

## 2023-12-23 RX ADMIN — INSULIN LISPRO 3 UNITS: 100 INJECTION, SOLUTION INTRAVENOUS; SUBCUTANEOUS at 09:11

## 2023-12-23 RX ADMIN — HEPARIN SODIUM 5000 UNITS: 5000 INJECTION INTRAVENOUS; SUBCUTANEOUS at 21:50

## 2023-12-23 RX ADMIN — CARVEDILOL 3.12 MG: 3.12 TABLET, FILM COATED ORAL at 21:50

## 2023-12-23 RX ADMIN — PREGABALIN 75 MG: 75 CAPSULE ORAL at 09:10

## 2023-12-23 RX ADMIN — Medication 400 MG: at 16:09

## 2023-12-23 RX ADMIN — ASPIRIN 81 MG CHEWABLE TABLET 81 MG: 81 TABLET CHEWABLE at 08:57

## 2023-12-23 RX ADMIN — CARVEDILOL 3.12 MG: 3.12 TABLET, FILM COATED ORAL at 08:57

## 2023-12-23 RX ADMIN — INSULIN GLARGINE 52 UNITS: 100 INJECTION, SOLUTION SUBCUTANEOUS at 21:51

## 2023-12-23 RX ADMIN — INSULIN LISPRO 15 UNITS: 100 INJECTION, SOLUTION INTRAVENOUS; SUBCUTANEOUS at 18:50

## 2023-12-23 RX ADMIN — INSULIN LISPRO 3 UNITS: 100 INJECTION, SOLUTION INTRAVENOUS; SUBCUTANEOUS at 18:48

## 2023-12-23 RX ADMIN — INSULIN LISPRO 6 UNITS: 100 INJECTION, SOLUTION INTRAVENOUS; SUBCUTANEOUS at 13:17

## 2023-12-23 ASSESSMENT — PAIN SCALES - GENERAL
PAINLEVEL_OUTOF10: 0 - NO PAIN
PAINLEVEL_OUTOF10: 3

## 2023-12-23 ASSESSMENT — PAIN - FUNCTIONAL ASSESSMENT: PAIN_FUNCTIONAL_ASSESSMENT: 0-10

## 2023-12-23 NOTE — PROGRESS NOTES
"Behzad Diaz is a 58 y.o. male on day 11 of admission presenting with Coronary artery disease (CAD) excluded.    Subjective   Patient status post up for his CABG x 4  Pt does have small appetite, states he ate some dinner and lunch yesterday.  Pt states at home he rarely eats breakfast  Agreeable to sample CGM at discharge (has iphone), and  endo/pharmacy follow up    Objective   Review of Systems   All other systems reviewed and are negative.    Physical Exam  Constitutional:       Appearance: Normal appearance. He is obese.   HENT:      Head: Normocephalic and atraumatic.      Nose: Nose normal.      Mouth/Throat:      Mouth: Mucous membranes are moist.      Pharynx: Oropharynx is clear.   Eyes:      Extraocular Movements: Extraocular movements intact.      Conjunctiva/sclera: Conjunctivae normal.   Cardiovascular:      Rate and Rhythm: Normal rate and regular rhythm.      Pulses: Normal pulses.      Heart sounds: Normal heart sounds.   Pulmonary:      Effort: Pulmonary effort is normal.      Breath sounds: Normal breath sounds.   Abdominal:      General: Abdomen is flat. Bowel sounds are normal.      Palpations: Abdomen is soft.   Neurological:      General: No focal deficit present.      Mental Status: He is alert and oriented to person, place, and time. Mental status is at baseline.   Psychiatric:         Mood and Affect: Mood normal.         Behavior: Behavior normal.         Thought Content: Thought content normal.         Judgment: Judgment normal.           Last Recorded Vitals  Blood pressure 98/59, pulse 84, temperature 36.1 °C (97 °F), temperature source Temporal, resp. rate 18, height 1.854 m (6' 0.99\"), weight (!) 157 kg (346 lb 0.2 oz), SpO2 93 %.  Intake/Output last 3 Shifts:  I/O last 3 completed shifts:  In: 990 (6.3 mL/kg) [P.O.:740; IV Piggyback:250]  Out: 3315 (21.1 mL/kg) [Urine:3125 (0.6 mL/kg/hr); Chest Tube:190]  Weight: 156.9 kg     Relevant Results  Results from last 7 days   Lab Units " 12/23/23  0741 12/22/23  2040 12/22/23  1852 12/22/23  1333 12/22/23  1023 12/22/23  0729 12/21/23  1139 12/21/23  0954 12/21/23  0748 12/20/23  2335 12/20/23  0810 12/20/23  0707 12/19/23  0840 12/19/23  0654   POCT GLUCOSE mg/dL 192* 203* 233* 266* 232*  --    < >  --    < >  --    < >  --    < >  --    GLUCOSE mg/dL  --   --   --   --   --  240*  --  217*  --  158*  --  193*  --  106*    < > = values in this interval not displayed.       Lab Review  Lab Results   Component Value Date    BILITOT 1.3 (H) 12/13/2023    CALCIUM 8.2 (L) 12/22/2023    CO2 22 12/22/2023     12/22/2023    CREATININE 0.84 12/22/2023    GLUCOSE 240 (H) 12/22/2023    ALKPHOS 58 12/13/2023    K 4.4 12/22/2023    PROT 6.5 12/13/2023     (L) 12/22/2023    AST 26 12/13/2023    ALT 26 12/13/2023    BUN 14 12/22/2023    ANIONGAP 13 12/22/2023    MG 2.17 12/22/2023    PHOS 2.0 (L) 12/22/2023    ALBUMIN 3.5 12/22/2023    LIPASE 13 09/22/2020    GFRMALE >90 07/07/2023     Lab Results   Component Value Date    TRIG 343 (H) 12/10/2023    CHOL 145 12/10/2023    LDLCALC 51 12/10/2023    HDL 25.2 12/10/2023     Lab Results   Component Value Date    HGBA1C 11.0 (H) 12/10/2023    HGBA1C 10.8 (A) 08/25/2023    HGBA1C 6.4 (A) 08/27/2021     The ASCVD Risk score (Perfecto CARR, et al., 2019) failed to calculate for the following reasons:    The patient has a prior MI or stroke diagnosis    Scheduled medications  acetaminophen, 650 mg, oral, q6h  aspirin, 81 mg, oral, Daily  atorvastatin, 80 mg, oral, Daily  carvedilol, 3.125 mg, oral, BID  clopidogrel, 75 mg, oral, Daily  furosemide, 20 mg, intravenous, BID with meals  heparin, 5,000 Units, subcutaneous, q8h  insulin glargine, 52 Units, subcutaneous, Nightly  insulin lispro, 0-15 Units, subcutaneous, TID with meals  insulin lispro, 15 Units, subcutaneous, TID with meals  iron polysaccharides, 150 mg, oral, Daily  multivitamin with minerals, 1 tablet, oral, Daily  polyethylene glycol, 17 g, oral,  BID  pregabalin, 75 mg, oral, Daily  sennosides-docusate sodium, 2 tablet, oral, BID      Continuous medications       PRN medications  PRN medications: dextrose **OR** glucagon, oxyCODONE    Assessment/Plan   Principal Problem:    Coronary artery disease (CAD) excluded  Active Problems:    Type 2 diabetes mellitus, without long-term current use of insulin (CMS/[facility])    HTN (hypertension)    NSTEMI (non-ST elevated myocardial infarction) (CMS/[facility])    Ischemic cardiomyopathy    HLD (hyperlipidemia)    Behzad Diaz is a 58 y.o. male with HTN, HLD, poorly controlled DM2, obesity class III, CAD, and history of discitis and epidural abscess s/p debridement who presents to [facility] for CABG evaluation, plan for 12/20.     Endocrinology is consulted to assist in diabetes management.  Diabetes history:  Diabetes duration: 4 to 5 years           Home blood glucose checks: Patient does not check his BG at home  Hypoglycemia (y/n, threshold and symptoms): No hypoglycemia  Home diabetes regimen: Metformin 1000 mg  BID   Home diet (how many meals a day): Patient reports poor diet as he is mostly eating out because of his work  Outpatient physician managing diabetes: PCP  Macrovascular complications: CVD [yes]     CVA [no]     PVD [no]  Microvascular complications: Retinopathy [unknown, patient has not had a retinal exam recently]            neuropathy [endorses neuropathy but attributes this to prior spinal surgery]   nephropathy [no microalbumin to creatinine ratio on chart, creatinine 0.7]  Last A1c: 11%       Recommendations:  - continue glargine 52 units at bedtime        NPO: reduce to 35 units qHS  - #3 sliding scale 3 times daily AC with meals only         NPO: adjust to ssi #2 q6hr  - increase lispro 15 units with meals        NPO or glucose <90mg/dL within 1hr before meal: hold  -Accu-Cheks 3 times daily before meals and at bedtime.  Please obtain bedtime BG as it often missed.  - Hypoglycemia protocol  -We will follow and  adjust regimen accordingly  - pt may be a good candidate for SGLT2i prior to discharge (suggest jardiance 25mg or farxiga 10mg)     SGLT2i tx may not be utilized in inpt setting unless the following conditions are met  1) pt is eating and drinking by mouth with a total daily carb intake exceeding 60g of CHO  2) pt is urinating independently of urinary catheters  3) pt can ambulate freely to the restroom in order to maintain personal hygiene  4) no current concern for UTI/genital or inguinal candidiasis  5) no plans for NPO within the next 48-72hr      Discharge planning:   (X) -referring to clinical pharmacy for follow up, pt aware and agreeable to  [facility] Plan Pharmacist follow-up  Pt would benefit from the following: insulin at discharge after CABG, GLP-1 start and  PAP  (X) -email sent for scheduling in  hospital discharge diabetes clinic in [place] per pt request  (X) -pt would like to sample CGM prior to discharge, has [employer]       I spent 35 minutes on care and coordination of this patient    Sera Shepard PA-C

## 2023-12-23 NOTE — PROGRESS NOTES
"CARDIAC SURGERY DAILY PROGRESS NOTE  Mr. Behzad Diaz is a 57 yo M who presents to the CTICU s/p Off Pump CABG X4 w/ LIMA to LAD, SVG to Diag, and SVG Sequenced to OM and PDA (taken off of other vein proximally) with Dr. Phelps on 12/20/23. His PMH is significant for CAD, HTN, HLD, T2DM, L4-5 discitis with epidural abscess s/p debridement. He initially presented to [facility] ED on 12/10 with left sided chest pain with radiation to L arm as well as other symptoms suspicios for ACS. Workup revealed NSTEMI. TTE 10/12 with EF 40-45% with global hypokinesis of LV. LHC 12/11 revealed 100% stenosis in mid LAD, 95% stenosis proximal to mid first diagonal branch, occluded LCx after early first OM, OM1 with occluded mid portion, OM2 occluded, and 95% stenosis mid RCA. The patient remained HDS and was transferred to [facility] for cardiac surgery workup.     Operation Procedure  12/20/23 Dr Phelps  Off Pump CABG X4 w/ LIMA to LAD, SVG to Diag, and SVG Sequenced to OM and PDA    CTICU uneventful  Transfer to T3 12/21/23  -----------------------  Interval History:   Cts removed yesterday, 2CXR done this morning     SUBJECTIVE:  2VCXR done today, insulin increased per endocrine, Mg, Phos replaced    Objective   BP 98/60 (BP Location: Left arm, Patient Position: Lying)   Pulse 78   Temp 36.2 °C (97.2 °F) (Temporal)   Resp 19   Ht 1.854 m (6' 0.99\")   Wt (!) 157 kg (346 lb 0.2 oz)   SpO2 97%   BMI 45.66 kg/m²   Pain Score: 3   3 Day Weight Change: 0.817 kg (1 lb 12.8 oz) per day    Intake and Output    Intake/Output Summary (Last 24 hours) at 12/23/2023 1417  Last data filed at 12/23/2023 1400  Gross per 24 hour   Intake 600 ml   Output 2450 ml   Net -1850 ml       Physical Exam  Physical Exam  Vitals and nursing note reviewed.   Constitutional:       General: He is not in acute distress.     Appearance: He is obese.   HENT:      Head: Normocephalic.      Mouth/Throat:      Mouth: Mucous membranes are moist.   Eyes:      " Conjunctiva/sclera: Conjunctivae normal.   Cardiovascular:      Rate and Rhythm: Normal rate and regular rhythm.      Pulses: Normal pulses.      Heart sounds: Normal heart sounds.      Comments: Tele NSR 70-80s  No wires  Pulmonary:      Effort: Pulmonary effort is normal.      Breath sounds: Normal breath sounds.      Comments: Sternum stable  Abdominal:      General: Bowel sounds are normal.      Palpations: Abdomen is soft.      Tenderness: There is no abdominal tenderness.      Comments: Awaiting postop BM  Per pt normally has BM every 3-5 days   Genitourinary:     Comments: Voiding in urinal    Musculoskeletal:      Cervical back: Neck supple.      Right lower leg: No edema.      Left lower leg: No edema.   Skin:     General: Skin is warm and dry.      Comments: midsternal chest incision C/D/I, well approximated, no s/s infection, Right SVG incision C/D/I, well approximated, no s/s infection  Sternum stable    Neurological:      General: No focal deficit present.      Mental Status: He is alert and oriented to person, place, and time.   Psychiatric:         Mood and Affect: Mood normal.         Behavior: Behavior normal. Behavior is cooperative.         Medications  Scheduled medications  acetaminophen, 650 mg, oral, q6h  aspirin, 81 mg, oral, Daily  atorvastatin, 80 mg, oral, Daily  carvedilol, 3.125 mg, oral, BID  clopidogrel, 75 mg, oral, Daily  furosemide, 20 mg, intravenous, BID with meals  heparin, 5,000 Units, subcutaneous, q8h  insulin glargine, 52 Units, subcutaneous, Nightly  insulin lispro, 0-15 Units, subcutaneous, TID with meals  insulin lispro, 15 Units, subcutaneous, TID with meals  iron polysaccharides, 150 mg, oral, Daily  multivitamin with minerals, 1 tablet, oral, Daily  polyethylene glycol, 17 g, oral, BID  pregabalin, 75 mg, oral, Daily  sennosides-docusate sodium, 2 tablet, oral, BID    Continuous medications   PRN medications  PRN medications: dextrose **OR** glucagon,  oxyCODONE    Labs  Results for orders placed or performed during the hospital encounter of 12/12/23 (from the past 24 hour(s))   POCT GLUCOSE   Result Value Ref Range    POCT Glucose 233 (H) 74 - 99 mg/dL   POCT GLUCOSE   Result Value Ref Range    POCT Glucose 203 (H) 74 - 99 mg/dL   POCT GLUCOSE   Result Value Ref Range    POCT Glucose 192 (H) 74 - 99 mg/dL   CBC   Result Value Ref Range    WBC 7.5 4.4 - 11.3 x10*3/uL    nRBC 0.0 0.0 - 0.0 /100 WBCs    RBC 3.43 (L) 4.50 - 5.90 x10*6/uL    Hemoglobin 10.7 (L) 13.5 - 17.5 g/dL    Hematocrit 31.2 (L) 41.0 - 52.0 %    MCV 91 80 - 100 fL    MCH 31.2 26.0 - 34.0 pg    MCHC 34.3 32.0 - 36.0 g/dL    RDW 12.6 11.5 - 14.5 %    Platelets 194 150 - 450 x10*3/uL   Magnesium   Result Value Ref Range    Magnesium 1.88 1.60 - 2.40 mg/dL   Renal Function Panel   Result Value Ref Range    Glucose 256 (H) 74 - 99 mg/dL    Sodium 132 (L) 136 - 145 mmol/L    Potassium 4.4 3.5 - 5.3 mmol/L    Chloride 98 98 - 107 mmol/L    Bicarbonate 28 21 - 32 mmol/L    Anion Gap 10 10 - 20 mmol/L    Urea Nitrogen 14 6 - 23 mg/dL    Creatinine 0.97 0.50 - 1.30 mg/dL    eGFR >90 >60 mL/min/1.73m*2    Calcium 8.6 8.6 - 10.6 mg/dL    Phosphorus 1.9 (L) 2.5 - 4.9 mg/dL    Albumin 3.4 3.4 - 5.0 g/dL   POCT GLUCOSE   Result Value Ref Range    POCT Glucose 230 (H) 74 - 99 mg/dL         XR chest 1 view    Result Date: 12/22/2023  Interpreted [facility]:  [facility]R CHEST 1 VIEW;  12/22/2023 3:43 am   INDICATION: Signs/Symptoms:s/p cardiac surgery.   COMPARISON: Radiograph dated 12/21/2023   ACCESSION NUMBER(S): [id number]   ORDERING CLINICIAN: INEZ YOUNG   FINDINGS: Interval removal of right IJ central venous catheter   Status post median sternotomy. The cardiomediastinal silhouette is persistently enlarged. Limited assessment due to rotation to the right side and underpenetration.   Persistent bibasilar atelectasis with small effusion not excluded. No sizable pneumothorax.   Postsurgical  changes in the cervical spine.       1. Unchanged appearance of the lungs with persistent bibasilar atelectasis/consolidation. Cannot exclude right basilar small pleural effusion.       Signed by: Alvaro Capellan 2023 12:31 PM Dictation workstation:   [id number]           IMPRESSION & PLAN:  POD #3 s/p CABGx4 off pump  - Increase activity/ ambulation; PT/OT  - Encourage IS, C/DB; respiratory therapy; wean O2 as burke   - Cardiac rehab referral   - Continue cardiac meds: ASA, BB, statin Plavix  - Pain and anticonstipation meds  - 2v CXR in am   - no wires  - Tele until discharge  - Optimize nutrition and electrolytes    Rhythm  - Tele: NSR 70-80s  - Continue carvedilol 3.125mg PO BID   - Adjust medications as tolerated    Acute Blood Loss Anemia   Recent Labs     23  1042 23  0729 23  0954 23  2335 23  0708 23  0654 23  1026   HGB 10.7* 10.9* 12.7* 14.3 16.0 14.6 14.5   HCT 31.2* 32.5* 35.1* 41.5 48.5 43.3 43.6     - MV, PO Iron x1mo  - Daily labs, transfuse as indicated    Volume/Electrolyte Status: Preop wt Weight: (!) 157 kg (345 lb 11.2 oz)   Vitals:    23 1155   Weight: (!) 157 kg (346 lb 0.2 oz)     - Weight: XX, 157  - continue Lasix 20mg IV BID  - Adjust diuresis as needed for postop cardiac surgery hypervolemia  - Replete electrolytes for hypokalemia/hypomagnesemia/hypophosphatemia as needed,  replace Mg & Phos   - Daily weights and strict I&Os  - Daily RFP while admitted    Hypertension: home meds: lisinopril   Systolic (24hrs), Av , Min:98 , Max:119   - continue holding home lisinopril   - additional antihypertensives as needed    Hyperlipidemia: home atorvastatin   Lab Results   Component Value Date    CHOL 145 12/10/2023    HDL 25.2 12/10/2023    VLDL 69 (H) 12/10/2023    TRIG 343 (H) 12/10/2023    NHDL 120 12/10/2023   - continue atorvastatin   - follow up lipid panel with PCP/ cardiologist for ongoing lipid management    Obesity:  Body  mass index is 45.66 kg/m².  -encourage lifestyle modification    DM: home meds: Glucophage   Lab Results   Component Value Date    HGBA1C 11.0 (H) 12/10/2023     Results from last 7 days   Lab Units 12/23/23  1144 12/23/23  0741 12/22/23  2040 12/22/23  1852 12/22/23  1333 12/22/23  1023 12/21/23  2112   POCT GLUCOSE mg/dL 230* 192* 203* 233* 266* 232* 270*     -accuchecks premeal and at bedtime  -diabetic diet  -lispro premeal corrective scale  - Endocrinology following- appreciate recs  - Lantus 52 units at HS  - Humalog 15units TID with meals    Discitis  - continue home pregabalin   - holding home flexeril   - holding home amoxicillin for chronic discitis      VTE Prophylaxis: SCDs/TEDs, ambulation, SQ heparin  Code Status: Full Code    Dispo  - PT/OT recs home with home care  - Would benefit from homecare for cardiac surgery carepath and RN visits  - Anticipate discharge next 1-2 days, pending diuresis, glucose control, more mobility  - Will continue to assess discharge needs    DEANNA Kimbrough-CNS  Cardiac Surgery GUNNAR  [facility]  Team Phone [phone]    12/23/2023  2:17 PM

## 2023-12-23 NOTE — DISCHARGE INSTRUCTIONS
Don't forget to “Keep Your Move in the Tube!!”     Please refer to the “Move in the Tube” handout.  -- Load bearing activities can be completed if you are “staying in the tube.” If you are attempting load bearing activities, let pain be your guide with when trying an activity “out of the tube”. If an activity hurts or is uncomfortable go back to doing it while you stay “in the tube”. There is no time limit to “stay in the tube”.     -- Non-load bearing activities, which are your activities of daily living, can be completed with your arms “out of the tube” as long as you remain pain free. Some activities of daily living examples are dressing, personal care, showering, washing hair, and toilet hygiene.     Don't forget to KEEP YOUR MOVE IN THE TUBE and think of a T. Edilberto dinosaur!    Additional Post-Operative Instructions:  - Remember to use your Incentive spirometer 10x/hr while awake. Remember to cough and deep breath.  - No NSAIDs (common over-the-counter NSAIDs are ibuprofen/Motrin/Advil, naproxen/Naprosyn/Aleve) for 3 months after cardiac surgery; if NSAIDs needed after 3 months, clear use with cardiologist before starting.     Your home medications may have changed after surgery. Carefully compare this list with your prescription bottles at home and set aside any medications you are told to not take so you do not confuse them. Do not dispose of any medications until your follow-up, since your doctors may restart some at your follow-up appointments. It is important to bring a complete, current list of your medications to any medical appointments or hospitalizations.    For any questions about your discharge, please call the cardiac surgery office at [phone]

## 2023-12-23 NOTE — HH CARE COORDINATION
Home Care received a Referral for Nursing and Physical Therapy. We have processed the referral for a Start of Care on 12/26/2023.    If you have any questions or concerns, please feel free to contact us at [phone]. Follow the prompts, enter your five digit zip code, and you will be directed to your care team on [facility] 2.

## 2023-12-24 LAB
ALBUMIN SERPL BCP-MCNC: 3.3 G/DL (ref 3.4–5)
ANION GAP SERPL CALC-SCNC: 14 MMOL/L (ref 10–20)
BUN SERPL-MCNC: 15 MG/DL (ref 6–23)
CALCIUM SERPL-MCNC: 8.4 MG/DL (ref 8.6–10.6)
CHLORIDE SERPL-SCNC: 99 MMOL/L (ref 98–107)
CO2 SERPL-SCNC: 27 MMOL/L (ref 21–32)
CREAT SERPL-MCNC: 0.7 MG/DL (ref 0.5–1.3)
ERYTHROCYTE [DISTWIDTH] IN BLOOD BY AUTOMATED COUNT: 12.6 % (ref 11.5–14.5)
GFR SERPL CREATININE-BSD FRML MDRD: >90 ML/MIN/1.73M*2
GLUCOSE BLD MANUAL STRIP-MCNC: 139 MG/DL (ref 74–99)
GLUCOSE BLD MANUAL STRIP-MCNC: 142 MG/DL (ref 74–99)
GLUCOSE BLD MANUAL STRIP-MCNC: 168 MG/DL (ref 74–99)
GLUCOSE BLD MANUAL STRIP-MCNC: 203 MG/DL (ref 74–99)
GLUCOSE SERPL-MCNC: 144 MG/DL (ref 74–99)
HCT VFR BLD AUTO: 31.5 % (ref 41–52)
HGB BLD-MCNC: 10.7 G/DL (ref 13.5–17.5)
MAGNESIUM SERPL-MCNC: 2.02 MG/DL (ref 1.6–2.4)
MCH RBC QN AUTO: 31.2 PG (ref 26–34)
MCHC RBC AUTO-ENTMCNC: 34 G/DL (ref 32–36)
MCV RBC AUTO: 92 FL (ref 80–100)
NRBC BLD-RTO: 0 /100 WBCS (ref 0–0)
PHOSPHATE SERPL-MCNC: 2.3 MG/DL (ref 2.5–4.9)
PLATELET # BLD AUTO: 237 X10*3/UL (ref 150–450)
POTASSIUM SERPL-SCNC: 3.6 MMOL/L (ref 3.5–5.3)
RBC # BLD AUTO: 3.43 X10*6/UL (ref 4.5–5.9)
SODIUM SERPL-SCNC: 136 MMOL/L (ref 136–145)
WBC # BLD AUTO: 7.4 X10*3/UL (ref 4.4–11.3)

## 2023-12-24 PROCEDURE — 2500000001 HC RX 250 WO HCPCS SELF ADMINISTERED DRUGS (ALT 637 FOR MEDICARE OP): Performed by: NURSE PRACTITIONER

## 2023-12-24 PROCEDURE — 2500000004 HC RX 250 GENERAL PHARMACY W/ HCPCS (ALT 636 FOR OP/ED): Performed by: NURSE PRACTITIONER

## 2023-12-24 PROCEDURE — 97530 THERAPEUTIC ACTIVITIES: CPT | Mod: GP,CQ

## 2023-12-24 PROCEDURE — 82947 ASSAY GLUCOSE BLOOD QUANT: CPT

## 2023-12-24 PROCEDURE — 2500000004 HC RX 250 GENERAL PHARMACY W/ HCPCS (ALT 636 FOR OP/ED): Performed by: CLINICAL NURSE SPECIALIST

## 2023-12-24 PROCEDURE — 82565 ASSAY OF CREATININE: CPT | Performed by: CLINICAL NURSE SPECIALIST

## 2023-12-24 PROCEDURE — 85027 COMPLETE CBC AUTOMATED: CPT | Performed by: CLINICAL NURSE SPECIALIST

## 2023-12-24 PROCEDURE — 96372 THER/PROPH/DIAG INJ SC/IM: CPT | Performed by: NURSE PRACTITIONER

## 2023-12-24 PROCEDURE — 1200000002 HC GENERAL ROOM WITH TELEMETRY DAILY

## 2023-12-24 PROCEDURE — 2500000001 HC RX 250 WO HCPCS SELF ADMINISTERED DRUGS (ALT 637 FOR MEDICARE OP)

## 2023-12-24 PROCEDURE — 94760 N-INVAS EAR/PLS OXIMETRY 1: CPT

## 2023-12-24 PROCEDURE — 99232 SBSQ HOSP IP/OBS MODERATE 35: CPT | Performed by: CLINICAL NURSE SPECIALIST

## 2023-12-24 PROCEDURE — 2500000001 HC RX 250 WO HCPCS SELF ADMINISTERED DRUGS (ALT 637 FOR MEDICARE OP): Performed by: CLINICAL NURSE SPECIALIST

## 2023-12-24 PROCEDURE — 36415 COLL VENOUS BLD VENIPUNCTURE: CPT | Performed by: CLINICAL NURSE SPECIALIST

## 2023-12-24 PROCEDURE — 83735 ASSAY OF MAGNESIUM: CPT | Performed by: CLINICAL NURSE SPECIALIST

## 2023-12-24 RX ORDER — POTASSIUM CHLORIDE 20 MEQ/1
40 TABLET, EXTENDED RELEASE ORAL
Status: COMPLETED | OUTPATIENT
Start: 2023-12-24 | End: 2023-12-24

## 2023-12-24 RX ORDER — BISACODYL 10 MG/1
10 SUPPOSITORY RECTAL DAILY
Status: DISCONTINUED | OUTPATIENT
Start: 2023-12-24 | End: 2023-12-29 | Stop reason: HOSPADM

## 2023-12-24 RX ORDER — LANOLIN ALCOHOL/MO/W.PET/CERES
400 CREAM (GRAM) TOPICAL DAILY
Status: DISCONTINUED | OUTPATIENT
Start: 2023-12-24 | End: 2023-12-25

## 2023-12-24 RX ORDER — SODIUM,POTASSIUM PHOSPHATES 280-250MG
1 POWDER IN PACKET (EA) ORAL 4 TIMES DAILY
Status: COMPLETED | OUTPATIENT
Start: 2023-12-24 | End: 2023-12-24

## 2023-12-24 RX ADMIN — SENNOSIDES AND DOCUSATE SODIUM 2 TABLET: 8.6; 5 TABLET ORAL at 21:38

## 2023-12-24 RX ADMIN — POTASSIUM & SODIUM PHOSPHATES POWDER PACK 280-160-250 MG 1 PACKET: 280-160-250 PACK at 12:57

## 2023-12-24 RX ADMIN — ASPIRIN 81 MG CHEWABLE TABLET 81 MG: 81 TABLET CHEWABLE at 08:25

## 2023-12-24 RX ADMIN — CLOPIDOGREL BISULFATE 75 MG: 75 TABLET ORAL at 08:25

## 2023-12-24 RX ADMIN — Medication: at 15:25

## 2023-12-24 RX ADMIN — CARVEDILOL 3.12 MG: 3.12 TABLET, FILM COATED ORAL at 08:25

## 2023-12-24 RX ADMIN — POTASSIUM & SODIUM PHOSPHATES POWDER PACK 280-160-250 MG 1 PACKET: 280-160-250 PACK at 18:19

## 2023-12-24 RX ADMIN — ACETAMINOPHEN 650 MG: 325 TABLET ORAL at 21:37

## 2023-12-24 RX ADMIN — CARVEDILOL 3.12 MG: 3.12 TABLET, FILM COATED ORAL at 21:37

## 2023-12-24 RX ADMIN — SENNOSIDES AND DOCUSATE SODIUM 2 TABLET: 8.6; 5 TABLET ORAL at 08:26

## 2023-12-24 RX ADMIN — POLYSACCHARIDE-IRON COMPLEX 150 MG: 150 CAPSULE ORAL at 08:26

## 2023-12-24 RX ADMIN — INSULIN LISPRO 15 UNITS: 100 INJECTION, SOLUTION INTRAVENOUS; SUBCUTANEOUS at 08:27

## 2023-12-24 RX ADMIN — ACETAMINOPHEN 650 MG: 325 TABLET ORAL at 15:25

## 2023-12-24 RX ADMIN — INSULIN GLARGINE 52 UNITS: 100 INJECTION, SOLUTION SUBCUTANEOUS at 21:37

## 2023-12-24 RX ADMIN — INSULIN LISPRO 15 UNITS: 100 INJECTION, SOLUTION INTRAVENOUS; SUBCUTANEOUS at 13:05

## 2023-12-24 RX ADMIN — INSULIN LISPRO 3 UNITS: 100 INJECTION, SOLUTION INTRAVENOUS; SUBCUTANEOUS at 08:27

## 2023-12-24 RX ADMIN — HEPARIN SODIUM 5000 UNITS: 5000 INJECTION INTRAVENOUS; SUBCUTANEOUS at 12:58

## 2023-12-24 RX ADMIN — POTASSIUM CHLORIDE 40 MEQ: 1500 TABLET, EXTENDED RELEASE ORAL at 18:19

## 2023-12-24 RX ADMIN — HEPARIN SODIUM 5000 UNITS: 5000 INJECTION INTRAVENOUS; SUBCUTANEOUS at 06:29

## 2023-12-24 RX ADMIN — ATORVASTATIN CALCIUM 80 MG: 80 TABLET, FILM COATED ORAL at 21:37

## 2023-12-24 RX ADMIN — HEPARIN SODIUM 5000 UNITS: 5000 INJECTION INTRAVENOUS; SUBCUTANEOUS at 21:37

## 2023-12-24 RX ADMIN — POTASSIUM CHLORIDE 40 MEQ: 1500 TABLET, EXTENDED RELEASE ORAL at 10:54

## 2023-12-24 RX ADMIN — FUROSEMIDE 20 MG: 10 INJECTION, SOLUTION INTRAVENOUS at 08:26

## 2023-12-24 RX ADMIN — OXYCODONE HYDROCHLORIDE 5 MG: 5 TABLET ORAL at 12:57

## 2023-12-24 RX ADMIN — ACETAMINOPHEN 650 MG: 325 TABLET ORAL at 08:25

## 2023-12-24 RX ADMIN — Medication 400 MG: at 10:54

## 2023-12-24 RX ADMIN — PREGABALIN 75 MG: 75 CAPSULE ORAL at 08:26

## 2023-12-24 RX ADMIN — Medication 1 TABLET: at 08:26

## 2023-12-24 RX ADMIN — INSULIN LISPRO 15 UNITS: 100 INJECTION, SOLUTION INTRAVENOUS; SUBCUTANEOUS at 18:19

## 2023-12-24 RX ADMIN — BISACODYL 10 MG: 10 SUPPOSITORY RECTAL at 15:25

## 2023-12-24 ASSESSMENT — PAIN - FUNCTIONAL ASSESSMENT
PAIN_FUNCTIONAL_ASSESSMENT: 0-10
PAIN_FUNCTIONAL_ASSESSMENT: 0-10

## 2023-12-24 ASSESSMENT — COGNITIVE AND FUNCTIONAL STATUS - GENERAL
MOBILITY SCORE: 17
EATING MEALS: A LITTLE
TOILETING: A LITTLE
STANDING UP FROM CHAIR USING ARMS: A LITTLE
TURNING FROM BACK TO SIDE WHILE IN FLAT BAD: A LITTLE
CLIMB 3 TO 5 STEPS WITH RAILING: A LOT
MOVING TO AND FROM BED TO CHAIR: A LITTLE
WALKING IN HOSPITAL ROOM: A LITTLE
DRESSING REGULAR UPPER BODY CLOTHING: A LITTLE
MOVING FROM LYING ON BACK TO SITTING ON SIDE OF FLAT BED WITH BEDRAILS: A LITTLE
PERSONAL GROOMING: A LITTLE
DAILY ACTIVITIY SCORE: 18
HELP NEEDED FOR BATHING: A LITTLE
DRESSING REGULAR LOWER BODY CLOTHING: A LITTLE

## 2023-12-24 ASSESSMENT — PAIN SCALES - GENERAL
PAINLEVEL_OUTOF10: 3
PAINLEVEL_OUTOF10: 2
PAINLEVEL_OUTOF10: 4
PAINLEVEL_OUTOF10: 0 - NO PAIN

## 2023-12-24 NOTE — PROGRESS NOTES
Physical Therapy    Physical Therapy Treatment    Patient Name: Behzad Diaz  MRN: [id number]  Today's Date: 12/24/2023  Time Calculation  Start Time: 0934  Stop Time: 1002  Time Calculation (min): 28 min       Assessment/Plan   PT Assessment  End of Session Communication: Bedside nurse (communicated pt's symptoms during mobility)  End of Session Patient Position: Bed, 3 rail up     PT Plan  Treatment/Interventions: Bed mobility, Transfer training, Gait training, Stair training, Balance training, Neuromuscular re-education, Strengthening, Neurodevelopmental intervention, Endurance training, Range of motion, Therapeutic exercise, Therapeutic activity  PT Plan: Skilled PT  PT Frequency: 3 times per week  PT Discharge Recommendations: Low intensity level of continued care  Equipment Recommended upon Discharge: Wheeled walker  PT Recommended Transfer Status: Assist x1, Assistive device, Contact guard  PT - OK to Discharge: Yes      General Visit Information:   PT  Visit  PT Received On: 12/24/23  General  Prior to Session Communication: Bedside nurse  Patient Position Received: Bed, 3 rail up  General Comment: pt agreeable to session and states he is very motivated for PT. pt however limitedby onset of dizziness/ lightheadedness when attempting to ambulate. pt stoof claudoi ~30 seconds prior to requiring sitting rest break. pt demonstrates difficulty answering question and demonstrated decerased sitting balance when dizzy requiring mod assist to maintain balance. pt returned to supine and began to communicate properly and reports improved symptoms. unable to assess BP secondary to maintaining pt safety while pt requires assist    Subjective   Precautions:  Precautions  Medical Precautions: Fall precautions, Cardiac precautions, Oxygen therapy device and L/min, Chest tube  Post-Surgical Precautions: Move in the Tube  Vital Signs:  Vital Signs  Heart Rate: 86 (maintained 82-90 throughour session)    Objective   Pain:  Pain  Assessment  Pain Assessment: 0-10  Cognition:  Cognition  Orientation Level: Oriented X4       Treatments:  Therapeutic Exercise  Therapeutic Exercise Performed: Yes  Therapeutic Exercise Activity 1: sitting AP and LAQ x15 reps AROM    Bed Mobility  Bed Mobility: Yes  Bed Mobility 1  Bed Mobility 1: Supine to sitting, Sitting to supine  Level of Assistance 1: Minimum assistance  Bed Mobility Comments 1: reliance on hospital bed elevation, cues to maintain MITT precautions    Ambulation/Gait Training 1  Surface 1: Level tile  Device 1: Rolling walker  Assistance 1: Contact guard  Comments/Distance (ft) 1: instructed pt in several steps forward/ retro, unable to progress further secondary to onset of nausea and lightheaded symptoms  Transfer 1  Technique 1: Sit to stand, Stand to sit  Transfer Device 1: Walker  Transfer Level of Assistance 1: Contact guard  Trials/Comments 1: cues for timing of weight shifts    Outcome Measures:       Education Documentation  Mobility Training, taught by Rodolfo Ashley PTA at 12/24/2023 10:08 AM.  Learner: Patient  Readiness: Acceptance  Method: Explanation  Response: Verbalizes Understanding    Education Comments  No comments found.        OP EDUCATION:       Encounter Problems       Encounter Problems (Active)       Mobility       STG - Patient will ambulate 150 ft with LRAD and SBA. (Not Progressing)       Start:  12/21/23    Expected End:  01/04/24            STG - Patient will ambulate up and down a curb/step with LRAD and SBA. (Not Progressing)       Start:  12/21/23    Expected End:  01/04/24               Transfers       STG - Patient will perform bed mobility (log roll) independently.  (Progressing)       Start:  12/21/23    Expected End:  01/04/24            STG - Patient will transfer sit to and from stand with LRA and modified independence. (Progressing)       Start:  12/21/23    Expected End:  01/04/24

## 2023-12-24 NOTE — PROGRESS NOTES
"CARDIAC SURGERY DAILY PROGRESS NOTE  Mr. Behzad Diaz is a 57 yo M who presents to the CTICU s/p Off Pump CABG X4 w/ LIMA to LAD, SVG to Diag, and SVG Sequenced to OM and PDA (taken off of other vein proximally) with Dr. Phelps on 12/20/23. His PMH is significant for CAD, HTN, HLD, T2DM, L4-5 discitis with epidural abscess s/p debridement. He initially presented to [facility] ED on 12/10 with left sided chest pain with radiation to L arm as well as other symptoms suspicios for ACS. Workup revealed NSTEMI. TTE 10/12 with EF 40-45% with global hypokinesis of LV. LHC 12/11 revealed 100% stenosis in mid LAD, 95% stenosis proximal to mid first diagonal branch, occluded LCx after early first OM, OM1 with occluded mid portion, OM2 occluded, and 95% stenosis mid RCA. The patient remained HDS and was transferred to [facility] for cardiac surgery workup.     Operation Procedure  12/20/23 Dr Phelps  Off Pump CABG X4 w/ LIMA to LAD, SVG to Diag, and SVG Sequenced to OM and PDA    CTICU uneventful  Transfer to T3 12/21/23  -----------------------  Interval History:   Uneventful     SUBJECTIVE:  C/o lightheadedness/dizziness upon standing today with PT, hold further diuresis for today     Objective   /78 (BP Location: Right arm, Patient Position: Sitting)   Pulse 82   Temp 35.6 °C (96.1 °F) (Temporal)   Resp 16   Ht 1.854 m (6' 0.99\")   Wt (!) 154 kg (339 lb 15.2 oz)   SpO2 98%   BMI 44.86 kg/m²   Pain Score: 4   3 Day Weight Change: 0.437 kg (15.4 oz) per day    Intake and Output    Intake/Output Summary (Last 24 hours) at 12/24/2023 1334  Last data filed at 12/24/2023 1008  Gross per 24 hour   Intake 450 ml   Output 1925 ml   Net -1475 ml       Physical Exam  Physical Exam  Vitals and nursing note reviewed.   Constitutional:       General: He is not in acute distress.     Appearance: He is obese.   HENT:      Head: Normocephalic.      Mouth/Throat:      Mouth: Mucous membranes are moist.   Eyes:      Conjunctiva/sclera: " Conjunctivae normal.   Cardiovascular:      Rate and Rhythm: Normal rate and regular rhythm.      Pulses: Normal pulses.      Heart sounds: Normal heart sounds.      Comments: Tele NSR 70-80s  No wires  Pulmonary:      Effort: Pulmonary effort is normal.      Breath sounds: Normal breath sounds.      Comments: Sternum stable  Abdominal:      General: Bowel sounds are normal.      Palpations: Abdomen is soft.      Tenderness: There is no abdominal tenderness.      Comments: Awaiting postop BM  Per pt normally has BM every 3-5 days   Genitourinary:     Comments: Voiding in urinal    Musculoskeletal:      Cervical back: Neck supple.      Right lower leg: No edema.      Left lower leg: No edema.   Skin:     General: Skin is warm and dry.      Comments: midsternal chest incision C/D/I, well approximated, no s/s infection, Right SVG incision C/D/I, well approximated, no s/s infection  Sternum stable    Neurological:      General: No focal deficit present.      Mental Status: He is alert and oriented to person, place, and time.   Psychiatric:         Mood and Affect: Mood normal.         Behavior: Behavior normal. Behavior is cooperative.         Medications  Scheduled medications  acetaminophen, 650 mg, oral, q6h  aspirin, 81 mg, oral, Daily  atorvastatin, 80 mg, oral, Daily  carvedilol, 3.125 mg, oral, BID  clopidogrel, 75 mg, oral, Daily  [Held by provider] furosemide, 20 mg, intravenous, BID with meals  heparin, 5,000 Units, subcutaneous, q8h  insulin glargine, 52 Units, subcutaneous, Nightly  insulin lispro, 0-15 Units, subcutaneous, TID with meals  insulin lispro, 15 Units, subcutaneous, TID with meals  iron polysaccharides, 150 mg, oral, Daily  magnesium oxide, 400 mg, oral, Daily  multivitamin with minerals, 1 tablet, oral, Daily  polyethylene glycol, 17 g, oral, BID  potassium chloride CR, 40 mEq, oral, BID with meals  potassium, sodium phosphates, 1 packet, oral, 4x daily  pregabalin, 75 mg, oral,  Daily  sennosides-docusate sodium, 2 tablet, oral, BID    Continuous medications   PRN medications  PRN medications: dextrose **OR** glucagon, oxyCODONE    Labs  Results for orders placed or performed during the hospital encounter of 12/12/23 (from the past 24 hour(s))   POCT GLUCOSE   Result Value Ref Range    POCT Glucose 191 (H) 74 - 99 mg/dL   POCT GLUCOSE   Result Value Ref Range    POCT Glucose 185 (H) 74 - 99 mg/dL   CBC   Result Value Ref Range    WBC 7.4 4.4 - 11.3 x10*3/uL    nRBC 0.0 0.0 - 0.0 /100 WBCs    RBC 3.43 (L) 4.50 - 5.90 x10*6/uL    Hemoglobin 10.7 (L) 13.5 - 17.5 g/dL    Hematocrit 31.5 (L) 41.0 - 52.0 %    MCV 92 80 - 100 fL    MCH 31.2 26.0 - 34.0 pg    MCHC 34.0 32.0 - 36.0 g/dL    RDW 12.6 11.5 - 14.5 %    Platelets 237 150 - 450 x10*3/uL   Magnesium   Result Value Ref Range    Magnesium 2.02 1.60 - 2.40 mg/dL   Renal Function Panel   Result Value Ref Range    Glucose 144 (H) 74 - 99 mg/dL    Sodium 136 136 - 145 mmol/L    Potassium 3.6 3.5 - 5.3 mmol/L    Chloride 99 98 - 107 mmol/L    Bicarbonate 27 21 - 32 mmol/L    Anion Gap 14 10 - 20 mmol/L    Urea Nitrogen 15 6 - 23 mg/dL    Creatinine 0.70 0.50 - 1.30 mg/dL    eGFR >90 >60 mL/min/1.73m*2    Calcium 8.4 (L) 8.6 - 10.6 mg/dL    Phosphorus 2.3 (L) 2.5 - 4.9 mg/dL    Albumin 3.3 (L) 3.4 - 5.0 g/dL   POCT GLUCOSE   Result Value Ref Range    POCT Glucose 168 (H) 74 - 99 mg/dL   POCT GLUCOSE   Result Value Ref Range    POCT Glucose 142 (H) 74 - 99 mg/dL         XR chest 1 view    Result Date: 12/22/2023  Interpreted By:  Albin Hills, STUDY: XR CHEST 1 VIEW;  12/22/2023 3:43 am   INDICATION: Signs/Symptoms:s/p cardiac surgery.   COMPARISON: Radiograph dated 12/21/2023   ACCESSION NUMBER(S): [id number]   ORDERING CLINICIAN: INEZ YOUNG   FINDINGS: Interval removal of right IJ central venous catheter   Status post median sternotomy. The cardiomediastinal silhouette is persistently enlarged. Limited assessment due to rotation to  the right side and underpenetration.   Persistent bibasilar atelectasis with small effusion not excluded. No sizable pneumothorax.   Postsurgical changes in the cervical spine.       1. Unchanged appearance of the lungs with persistent bibasilar atelectasis/consolidation. Cannot exclude right basilar small pleural effusion.  Signed by: Alvaro Capellan 2023 12:31 PM Dictation workstation:   [id number]       IMPRESSION & PLAN:  POD #4 s/p CABGx4 off pump  - Increase activity/ ambulation; PT/OT  - Encourage IS, C/DB; respiratory therapy; wean O2 as burke   - Cardiac rehab referral   - Continue cardiac meds: ASA, BB, statin Plavix  - Pain and anticonstipation meds  - 2v CXR in am   - no wires  - Tele until discharge  - Optimize nutrition and electrolytes    Rhythm  - Tele: NSR 70-80s  - Continue carvedilol 3.125mg PO BID   - Adjust medications as tolerated    Acute Blood Loss Anemia   Recent Labs     23  0701 23  1042 23  0729 23  0954 23  2335 23  0708 23  0654   HGB 10.7* 10.7* 10.9* 12.7* 14.3 16.0 14.6   HCT 31.5* 31.2* 32.5* 35.1* 41.5 48.5 43.3     - MV, PO Iron x1mo  - Daily labs, transfuse as indicated    Volume/Electrolyte Status: Preop wt Weight: (!) 157 kg (345 lb 11.2 oz)   Vitals:    23 1002   Weight: (!) 154 kg (339 lb 15.2 oz)     - Weight: 154kg,155, 157  - Lasix 20mg IV x1   - Adjust diuresis as needed for postop cardiac surgery hypervolemia  - Replete electrolytes for hypokalemia/hypomagnesemia/hypophosphatemia as needed,  replace Mg & Phos ,  replaced K, Mg, Phos  - Daily weights and strict I&Os  - Daily RFP while admitted    Hypertension: home meds: lisinopril   Systolic (24hrs), Av , Min:93 , Max:114   - continue holding home lisinopril   - additional antihypertensives as needed    Hyperlipidemia: home atorvastatin   Lab Results   Component Value Date    CHOL 145 12/10/2023    HDL 25.2 12/10/2023    VLDL 69 (H) 12/10/2023     TRIG 343 (H) 12/10/2023    NHDL 120 12/10/2023   - continue atorvastatin   - follow up lipid panel with PCP/ cardiologist for ongoing lipid management    Obesity:  Body mass index is 44.86 kg/m².  -encourage lifestyle modification    DM: home meds: Glucophage   Lab Results   Component Value Date    HGBA1C 11.0 (H) 12/10/2023     Results from last 7 days   Lab Units 12/24/23  1149 12/24/23  0759 12/23/23  2148 12/23/23  1652 12/23/23  1144 12/23/23  0741 12/22/23  2040   POCT GLUCOSE mg/dL 142* 168* 185* 191* 230* 192* 203*     -accuchecks premeal and at bedtime  -diabetic diet  -lispro premeal corrective scale  - Endocrinology following- appreciate recs  - Lantus 52 units at HS  - Humalog 15units TID with meals  - Per Endo would like to discharge on low dose SGLT2i    Discitis  - continue home pregabalin   - holding home flexeril   - holding home amoxicillin for chronic discitis      VTE Prophylaxis: SCDs/TEDs, ambulation, SQ heparin  Code Status: Full Code    Dispo  - PT/OT recs home with home care  - Would benefit from homecare for cardiac surgery carepath and RN visits  - Anticipate discharge next 1-2 days, pending fluid status, glucose control, more mobility  - Will continue to assess discharge needs    DEANNA Kimbrough-CNS  Cardiac Surgery GUNNAR  [facility]  Team Phone [phone]    12/24/2023  1:35 PM

## 2023-12-24 NOTE — CARE PLAN
Problem: Diabetes  Goal: Achieve decreasing blood glucose levels by end of shift  Outcome: Progressing  Goal: Increase stability of blood glucose readings by end of shift  Outcome: Progressing  Goal: Decrease in ketones present in urine by end of shift  Outcome: Progressing  Goal: Maintain electrolyte levels within acceptable range throughout shift  Outcome: Progressing  Goal: Maintain glucose levels >70mg/dl to <250mg/dl throughout shift  Outcome: Progressing  Goal: No changes in neurological exam by end of shift  Outcome: Progressing  Goal: Learn about and adhere to nutrition recommendations by end of shift  Outcome: Progressing  Goal: Vital signs within normal range for age by end of shift  Outcome: Progressing  Goal: Increase self care and/or family involovement by end of shift  Outcome: Progressing  Goal: Receive DSME education by end of shift  Outcome: Progressing     Problem: Safety  Goal: Patient will be injury free during hospitalization  Outcome: Progressing  Goal: I will remain free of falls  Outcome: Progressing        Recommendations from today's MTM visit:                                                    MTM (medication therapy management) is a service provided by a clinical pharmacist designed to help you get the most of out of your medicines.   Today we reviewed what your medicines are for, how to know if they are working, that your medicines are safe and how to make your medicine regimen as easy as possible.      1.  Continue current medication regimen.    2.  Please start testing blood sugar more often.    3.  I put in orders for our A1c and kidney function/electrolytes to be checked with your INR on 4/5.    Next MTM visit: Tuesday, April 13th at 11:30am via telephone    It was great to speak with you today.  I value your experience and would be very thankful for your time with providing feedback on our clinic survey. You may receive a survey via email or text message in the next few days.     To schedule another MTM appointment, please call the clinic directly or you may call the MTM scheduling line at [phone] or toll-free at [phone].     My Clinical Pharmacist's contact information:                                                      Please feel free to contact me with any questions or concerns you have.      Nathaly Hughes PharmD, [facility]  Medication Therapy Management Provider  Pager: [phone]     Liv Farley PharmD  Medication Therapy Management Resident  Pager: [phone]

## 2023-12-25 ENCOUNTER — APPOINTMENT (OUTPATIENT)
Dept: RADIOLOGY | Facility: HOSPITAL | Age: 58
DRG: 236 | End: 2023-12-25
Payer: COMMERCIAL

## 2023-12-25 LAB
ALBUMIN SERPL BCP-MCNC: 3.4 G/DL (ref 3.4–5)
ANION GAP SERPL CALC-SCNC: 11 MMOL/L (ref 10–20)
BUN SERPL-MCNC: 14 MG/DL (ref 6–23)
CALCIUM SERPL-MCNC: 8.8 MG/DL (ref 8.6–10.6)
CHLORIDE SERPL-SCNC: 101 MMOL/L (ref 98–107)
CO2 SERPL-SCNC: 30 MMOL/L (ref 21–32)
CREAT SERPL-MCNC: 0.77 MG/DL (ref 0.5–1.3)
ERYTHROCYTE [DISTWIDTH] IN BLOOD BY AUTOMATED COUNT: 12.9 % (ref 11.5–14.5)
GFR SERPL CREATININE-BSD FRML MDRD: >90 ML/MIN/1.73M*2
GLUCOSE BLD MANUAL STRIP-MCNC: 113 MG/DL (ref 74–99)
GLUCOSE BLD MANUAL STRIP-MCNC: 115 MG/DL (ref 74–99)
GLUCOSE BLD MANUAL STRIP-MCNC: 124 MG/DL (ref 74–99)
GLUCOSE BLD MANUAL STRIP-MCNC: 146 MG/DL (ref 74–99)
GLUCOSE SERPL-MCNC: 89 MG/DL (ref 74–99)
HCT VFR BLD AUTO: 32.8 % (ref 41–52)
HGB BLD-MCNC: 10.8 G/DL (ref 13.5–17.5)
MAGNESIUM SERPL-MCNC: 2.13 MG/DL (ref 1.6–2.4)
MCH RBC QN AUTO: 30.8 PG (ref 26–34)
MCHC RBC AUTO-ENTMCNC: 32.9 G/DL (ref 32–36)
MCV RBC AUTO: 93 FL (ref 80–100)
NRBC BLD-RTO: 0 /100 WBCS (ref 0–0)
PHOSPHATE SERPL-MCNC: 2.6 MG/DL (ref 2.5–4.9)
PLATELET # BLD AUTO: 288 X10*3/UL (ref 150–450)
POTASSIUM SERPL-SCNC: 4.4 MMOL/L (ref 3.5–5.3)
RBC # BLD AUTO: 3.51 X10*6/UL (ref 4.5–5.9)
SODIUM SERPL-SCNC: 138 MMOL/L (ref 136–145)
WBC # BLD AUTO: 7.2 X10*3/UL (ref 4.4–11.3)

## 2023-12-25 PROCEDURE — 1200000002 HC GENERAL ROOM WITH TELEMETRY DAILY

## 2023-12-25 PROCEDURE — 82947 ASSAY GLUCOSE BLOOD QUANT: CPT

## 2023-12-25 PROCEDURE — 80069 RENAL FUNCTION PANEL: CPT | Performed by: CLINICAL NURSE SPECIALIST

## 2023-12-25 PROCEDURE — 2500000001 HC RX 250 WO HCPCS SELF ADMINISTERED DRUGS (ALT 637 FOR MEDICARE OP): Performed by: NURSE PRACTITIONER

## 2023-12-25 PROCEDURE — 2500000004 HC RX 250 GENERAL PHARMACY W/ HCPCS (ALT 636 FOR OP/ED): Performed by: NURSE PRACTITIONER

## 2023-12-25 PROCEDURE — 2500000001 HC RX 250 WO HCPCS SELF ADMINISTERED DRUGS (ALT 637 FOR MEDICARE OP): Performed by: CLINICAL NURSE SPECIALIST

## 2023-12-25 PROCEDURE — 96372 THER/PROPH/DIAG INJ SC/IM: CPT | Performed by: NURSE PRACTITIONER

## 2023-12-25 PROCEDURE — 94760 N-INVAS EAR/PLS OXIMETRY 1: CPT

## 2023-12-25 PROCEDURE — 2500000001 HC RX 250 WO HCPCS SELF ADMINISTERED DRUGS (ALT 637 FOR MEDICARE OP)

## 2023-12-25 PROCEDURE — 36415 COLL VENOUS BLD VENIPUNCTURE: CPT | Performed by: CLINICAL NURSE SPECIALIST

## 2023-12-25 PROCEDURE — 2500000004 HC RX 250 GENERAL PHARMACY W/ HCPCS (ALT 636 FOR OP/ED): Performed by: CLINICAL NURSE SPECIALIST

## 2023-12-25 PROCEDURE — 83735 ASSAY OF MAGNESIUM: CPT | Performed by: CLINICAL NURSE SPECIALIST

## 2023-12-25 PROCEDURE — 74018 RADEX ABDOMEN 1 VIEW: CPT

## 2023-12-25 PROCEDURE — 85027 COMPLETE CBC AUTOMATED: CPT | Performed by: CLINICAL NURSE SPECIALIST

## 2023-12-25 PROCEDURE — 94668 MNPJ CHEST WALL SBSQ: CPT

## 2023-12-25 PROCEDURE — 99232 SBSQ HOSP IP/OBS MODERATE 35: CPT | Performed by: NURSE PRACTITIONER

## 2023-12-25 RX ORDER — LACTULOSE 10 G/15ML
20 SOLUTION ORAL 3 TIMES DAILY
Status: DISCONTINUED | OUTPATIENT
Start: 2023-12-25 | End: 2023-12-29 | Stop reason: HOSPADM

## 2023-12-25 RX ORDER — ASPIRIN 81 MG/1
81 TABLET ORAL DAILY
Status: DISCONTINUED | OUTPATIENT
Start: 2023-12-26 | End: 2023-12-29 | Stop reason: HOSPADM

## 2023-12-25 RX ORDER — ADHESIVE BANDAGE
30 BANDAGE TOPICAL ONCE
Status: COMPLETED | OUTPATIENT
Start: 2023-12-25 | End: 2023-12-25

## 2023-12-25 RX ORDER — TRAMADOL HYDROCHLORIDE 50 MG/1
50 TABLET ORAL EVERY 6 HOURS PRN
Status: DISCONTINUED | OUTPATIENT
Start: 2023-12-25 | End: 2023-12-29 | Stop reason: HOSPADM

## 2023-12-25 RX ADMIN — SENNOSIDES AND DOCUSATE SODIUM 2 TABLET: 8.6; 5 TABLET ORAL at 20:40

## 2023-12-25 RX ADMIN — BISACODYL 10 MG: 10 SUPPOSITORY RECTAL at 08:56

## 2023-12-25 RX ADMIN — INSULIN LISPRO 15 UNITS: 100 INJECTION, SOLUTION INTRAVENOUS; SUBCUTANEOUS at 08:56

## 2023-12-25 RX ADMIN — POLYETHYLENE GLYCOL 3350 17 G: 17 POWDER, FOR SOLUTION ORAL at 08:56

## 2023-12-25 RX ADMIN — CARVEDILOL 3.12 MG: 3.12 TABLET, FILM COATED ORAL at 20:37

## 2023-12-25 RX ADMIN — PREGABALIN 75 MG: 75 CAPSULE ORAL at 08:56

## 2023-12-25 RX ADMIN — INSULIN LISPRO 15 UNITS: 100 INJECTION, SOLUTION INTRAVENOUS; SUBCUTANEOUS at 13:23

## 2023-12-25 RX ADMIN — CARVEDILOL 3.12 MG: 3.12 TABLET, FILM COATED ORAL at 08:55

## 2023-12-25 RX ADMIN — LACTULOSE 20 G: 20 SOLUTION ORAL at 20:37

## 2023-12-25 RX ADMIN — Medication: at 09:00

## 2023-12-25 RX ADMIN — POLYSACCHARIDE-IRON COMPLEX 150 MG: 150 CAPSULE ORAL at 08:55

## 2023-12-25 RX ADMIN — INSULIN LISPRO 15 UNITS: 100 INJECTION, SOLUTION INTRAVENOUS; SUBCUTANEOUS at 18:23

## 2023-12-25 RX ADMIN — HEPARIN SODIUM 5000 UNITS: 5000 INJECTION INTRAVENOUS; SUBCUTANEOUS at 06:22

## 2023-12-25 RX ADMIN — ACETAMINOPHEN 650 MG: 325 TABLET ORAL at 08:55

## 2023-12-25 RX ADMIN — ASPIRIN 81 MG CHEWABLE TABLET 81 MG: 81 TABLET CHEWABLE at 08:55

## 2023-12-25 RX ADMIN — SENNOSIDES AND DOCUSATE SODIUM 2 TABLET: 8.6; 5 TABLET ORAL at 08:55

## 2023-12-25 RX ADMIN — CLOPIDOGREL BISULFATE 75 MG: 75 TABLET ORAL at 08:55

## 2023-12-25 RX ADMIN — INSULIN GLARGINE 52 UNITS: 100 INJECTION, SOLUTION SUBCUTANEOUS at 20:41

## 2023-12-25 RX ADMIN — MAGNESIUM HYDROXIDE 30 ML: 400 SUSPENSION ORAL at 15:37

## 2023-12-25 RX ADMIN — ATORVASTATIN CALCIUM 80 MG: 80 TABLET, FILM COATED ORAL at 20:37

## 2023-12-25 RX ADMIN — HEPARIN SODIUM 5000 UNITS: 5000 INJECTION INTRAVENOUS; SUBCUTANEOUS at 13:38

## 2023-12-25 RX ADMIN — Medication 400 MG: at 08:55

## 2023-12-25 RX ADMIN — Medication 1 TABLET: at 08:55

## 2023-12-25 RX ADMIN — HEPARIN SODIUM 5000 UNITS: 5000 INJECTION INTRAVENOUS; SUBCUTANEOUS at 20:37

## 2023-12-25 RX ADMIN — ACETAMINOPHEN 650 MG: 325 TABLET ORAL at 20:36

## 2023-12-25 ASSESSMENT — COGNITIVE AND FUNCTIONAL STATUS - GENERAL
CLIMB 3 TO 5 STEPS WITH RAILING: A LOT
HELP NEEDED FOR BATHING: A LITTLE
DRESSING REGULAR LOWER BODY CLOTHING: A LITTLE
EATING MEALS: A LITTLE
DAILY ACTIVITIY SCORE: 18
MOVING FROM LYING ON BACK TO SITTING ON SIDE OF FLAT BED WITH BEDRAILS: A LITTLE
STANDING UP FROM CHAIR USING ARMS: A LITTLE
TURNING FROM BACK TO SIDE WHILE IN FLAT BAD: A LITTLE
TURNING FROM BACK TO SIDE WHILE IN FLAT BAD: A LITTLE
STANDING UP FROM CHAIR USING ARMS: A LITTLE
MOBILITY SCORE: 17
CLIMB 3 TO 5 STEPS WITH RAILING: A LOT
EATING MEALS: A LITTLE
TOILETING: A LITTLE
WALKING IN HOSPITAL ROOM: A LITTLE
DRESSING REGULAR UPPER BODY CLOTHING: A LITTLE
MOBILITY SCORE: 17
MOVING FROM LYING ON BACK TO SITTING ON SIDE OF FLAT BED WITH BEDRAILS: A LITTLE
DRESSING REGULAR LOWER BODY CLOTHING: A LITTLE
PERSONAL GROOMING: A LITTLE
WALKING IN HOSPITAL ROOM: A LITTLE
MOVING TO AND FROM BED TO CHAIR: A LITTLE
HELP NEEDED FOR BATHING: A LITTLE
MOVING TO AND FROM BED TO CHAIR: A LITTLE
TOILETING: A LITTLE
DRESSING REGULAR UPPER BODY CLOTHING: A LITTLE
PERSONAL GROOMING: A LITTLE
DAILY ACTIVITIY SCORE: 18

## 2023-12-25 ASSESSMENT — PAIN SCALES - GENERAL: PAINLEVEL_OUTOF10: 0 - NO PAIN

## 2023-12-25 ASSESSMENT — PAIN - FUNCTIONAL ASSESSMENT: PAIN_FUNCTIONAL_ASSESSMENT: 0-10

## 2023-12-25 NOTE — PROGRESS NOTES
"CARDIAC SURGERY DAILY PROGRESS NOTE  Mr. Behzad Diaz is a 59 yo M who presents to the CTICU s/p Off Pump CABG X4 w/ LIMA to LAD, SVG to Diag, and SVG Sequenced to OM and PDA (taken off of other vein proximally) with Dr. Phelps on 12/20/23. His PMH is significant for CAD, HTN, HLD, T2DM, L4-5 discitis with epidural abscess s/p debridement. He initially presented to [facility] ED on 12/10 with left sided chest pain with radiation to L arm as well as other symptoms suspicios for ACS. Workup revealed NSTEMI. TTE 10/12 with EF 40-45% with global hypokinesis of LV. LHC 12/11 revealed 100% stenosis in mid LAD, 95% stenosis proximal to mid first diagonal branch, occluded LCx after early first OM, OM1 with occluded mid portion, OM2 occluded, and 95% stenosis mid RCA. The patient remained HDS and was transferred to [facility] for cardiac surgery workup.     Operation Procedure  12/20/23 Dr Phelps  Off Pump CABG X4 w/ LIMA to LAD, SVG to Diag, and SVG Sequenced to OM and PDA    CTICU uneventful  Transfer to T3 12/21/23  -----------------------  Interval History:   Uneventful     SUBJECTIVE:  C/o lightheadedness/dizziness upon standing today with PT, hold further diuresis for today     Objective   /79 (BP Location: Right arm, Patient Position: Lying)   Pulse 75   Temp 36.3 °C (97.3 °F) (Temporal)   Resp 17   Ht 1.854 m (6' 0.99\")   Wt (!) 154 kg (339 lb 15.2 oz)   SpO2 99%   BMI 44.86 kg/m²   Pain Score: 0 - No pain   3 Day Weight Change: Unable to Calculate    Heart Rate:  [72-90]   Temp:  [35.6 °C (96.1 °F)-36.4 °C (97.5 °F)]   Resp:  [16-19]   BP: ()/(64-79)   Weight:  [154 kg (339 lb 15.2 oz)]   SpO2:  [93 %-99 %]     Intake and Output    Intake/Output Summary (Last 24 hours) at 12/25/2023 0900  Last data filed at 12/25/2023 0543  Gross per 24 hour   Intake 240 ml   Output 550 ml   Net -310 ml       Physical Exam  Physical Exam  Vitals and nursing note reviewed.   Constitutional:       General: He is not in acute " distress.     Appearance: He is obese.   HENT:      Head: Normocephalic.      Mouth/Throat:      Mouth: Mucous membranes are moist.   Eyes:      Conjunctiva/sclera: Conjunctivae normal.   Cardiovascular:      Rate and Rhythm: Normal rate and regular rhythm.      Pulses: Normal pulses.      Heart sounds: Normal heart sounds.      Comments: Tele NSR 70-80s  No wires  Pulmonary:      Effort: Pulmonary effort is normal.      Breath sounds: Normal breath sounds.      Comments: Sternum stable  Abdominal:      General: Bowel sounds are normal.      Palpations: Abdomen is soft.      Tenderness: There is no abdominal tenderness.      Comments: Awaiting postop BM  Per pt normally has BM every 3-5 days   Genitourinary:     Comments: Voiding in urinal    Musculoskeletal:      Cervical back: Neck supple.      Right lower leg: No edema.      Left lower leg: No edema.   Skin:     General: Skin is warm and dry.      Comments: midsternal chest incision C/D/I, well approximated, no s/s infection, Right SVG incision C/D/I, well approximated, no s/s infection  Sternum stable    Neurological:      General: No focal deficit present.      Mental Status: He is alert and oriented to person, place, and time.   Psychiatric:         Mood and Affect: Mood normal.         Behavior: Behavior normal. Behavior is cooperative.     Medications  Scheduled medications  acetaminophen, 650 mg, oral, q6h  aspirin, 81 mg, oral, Daily  atorvastatin, 80 mg, oral, Daily  bisacodyl, 10 mg, rectal, Daily  carvedilol, 3.125 mg, oral, BID  clopidogrel, 75 mg, oral, Daily  [Held by provider] furosemide, 20 mg, intravenous, BID with meals  heparin, 5,000 Units, subcutaneous, q8h  insulin glargine, 52 Units, subcutaneous, Nightly  insulin lispro, 0-15 Units, subcutaneous, TID with meals  insulin lispro, 15 Units, subcutaneous, TID with meals  iron polysaccharides, 150 mg, oral, Daily  magnesium oxide, 400 mg, oral, Daily  multivitamin with minerals, 1 tablet, oral,  Daily  polyethylene glycol, 17 g, oral, BID  pregabalin, 75 mg, oral, Daily  sennosides-docusate sodium, 2 tablet, oral, BID  surgical lubricant, , ,     Continuous medications   PRN medications  PRN medications: dextrose **OR** glucagon, oxyCODONE, surgical lubricant    Labs  Results for orders placed or performed during the hospital encounter of 12/12/23 (from the past 24 hour(s))   POCT GLUCOSE   Result Value Ref Range    POCT Glucose 142 (H) 74 - 99 mg/dL   POCT GLUCOSE   Result Value Ref Range    POCT Glucose 139 (H) 74 - 99 mg/dL   POCT GLUCOSE   Result Value Ref Range    POCT Glucose 203 (H) 74 - 99 mg/dL   CBC   Result Value Ref Range    WBC 7.2 4.4 - 11.3 x10*3/uL    nRBC 0.0 0.0 - 0.0 /100 WBCs    RBC 3.51 (L) 4.50 - 5.90 x10*6/uL    Hemoglobin 10.8 (L) 13.5 - 17.5 g/dL    Hematocrit 32.8 (L) 41.0 - 52.0 %    MCV 93 80 - 100 fL    MCH 30.8 26.0 - 34.0 pg    MCHC 32.9 32.0 - 36.0 g/dL    RDW 12.9 11.5 - 14.5 %    Platelets 288 150 - 450 x10*3/uL   Magnesium   Result Value Ref Range    Magnesium 2.13 1.60 - 2.40 mg/dL   Renal Function Panel   Result Value Ref Range    Glucose 89 74 - 99 mg/dL    Sodium 138 136 - 145 mmol/L    Potassium 4.4 3.5 - 5.3 mmol/L    Chloride 101 98 - 107 mmol/L    Bicarbonate 30 21 - 32 mmol/L    Anion Gap 11 10 - 20 mmol/L    Urea Nitrogen 14 6 - 23 mg/dL    Creatinine 0.77 0.50 - 1.30 mg/dL    eGFR >90 >60 mL/min/1.73m*2    Calcium 8.8 8.6 - 10.6 mg/dL    Phosphorus 2.6 2.5 - 4.9 mg/dL    Albumin 3.4 3.4 - 5.0 g/dL   POCT GLUCOSE   Result Value Ref Range    POCT Glucose 115 (H) 74 - 99 mg/dL           IMAGES    I have personally reviewed the following test result(s):    XR chest 2 views 12/23/2023    Narrative  [facility]ed [facility]  [facility]  [facility]Y:  XR CHEST 2 VIEWS;  12/23/2023 9:05 am    INDICATION:  Signs/Symptoms:s/p chest tube removal.    COMPARISON:  Radiograph dated 12/22/2023    ACCESSION NUMBER(S):  [id number]    ORDERING CLINICIAN:  INEZ  ZOSCAK    FINDINGS:  Status post median sternotomy. The cardiac silhouette size is stable.  Assessment is limited due to rotation to the right side.    Bibasilar opacity in the lungs appear unchanged. Cannot exclude small  bilateral pleural effusion. No abena edema or sizable pneumothorax.    Postsurgical changes in the cervical spine, partially imaged. No  acute osseous change.    Impression  1. No sizable pneumothorax.  2. Other stable findings as described        Signed by: Alvaro Capellan 12/23/2023 10:01 AM  Dictation workstation:   [id number]       Transthoracic Echo (TTE) Complete With Contrast 12/11/2023    Narrative  [facility] [place]  [address], [place] [id number]  Tel [phone] Fax [phone]    TRANSTHORACIC ECHOCARDIOGRAM REPORT      Patient Name:     DEANNE MAYORGA         Reading Physician:  Phillip Post MD  Study Date:       12/11/2023          Ordering Provider:  Phillip POST  MRN/PID:          [id number]            Fellow:  Accession#:       [id number]        Nurse:  Date of           1965 / 58      Sonographer:        Erika Maldonado RDCS  Birth/Age:        years  Gender:           M                   Additional Staff:  Height:           182.88 cm           Admit Date:         12/9/2023  Weight:           158.76 kg           Admission Status:   Inpatient - Routine  BSA:              2.70 m2             Department          [facility]  Location:  Blood Pressure: 138 /83 mmHg    Study Type:    TRANSTHORACIC ECHO (TTE) COMPLETE  Diagnosis/ICD: Non ST elevation (NSTEMI) myocardial infarction-I21.4  Indication:    NSTEMI  CPT Codes:     Echo Complete w Full Doppler-49955  Study Detail: The following Echo studies were performed: 2D, M-Mode, Doppler and  color flow. Technically challenging study due to poor acoustic  windows and body habitus. Definity used as a contrast agent for  endocardial border definition. Total contrast used for this  procedure was 2 mL  via IV push.      PHYSICIAN INTERPRETATION:  Left Ventricle: Left ventricular systolic function is mildly decreased, with an estimated ejection fraction of 40-45%. Estimated left ventricular mass is increased. There is global hypokinesis of the left ventricle with minor regional variations. The left ventricular cavity size is upper limits of normal. The left ventricular septal wall thickness is mildly increased. There is mildly increased left ventricular posterior wall thickness. Left ventricular diastolic filling was indeterminate. There are normal left ventricular filling pressures.  LV Wall Scoring:  There is diffuse hypokinesis.    Left Atrium: The left atrium is upper limits of normal in size.  Right Ventricle: The right ventricle is normal in size. There is normal right ventricular global systolic function.  Right Atrium: The right atrium is normal in size.  Aortic Valve: The aortic valve is trileaflet. There is no evidence of aortic valve stenosis.  There is no evidence of aortic valve regurgitation. The peak instantaneous gradient of the aortic valve is 2.7 mmHg. The mean gradient of the aortic valve is 2.0 mmHg.  Mitral Valve: The mitral valve is normal in structure. There is trace mitral valve regurgitation.  Tricuspid Valve: The tricuspid valve is structurally normal. There is trace tricuspid regurgitation. The right ventricular systolic pressure is unable to be estimated.  Pulmonic Valve: The pulmonic valve was not assessed. The pulmonic valve regurgitation was not assessed.  Pericardium: There is no pericardial effusion noted.  Aorta: The aortic root is normal.  Systemic Veins: The inferior vena cava appears to be of normal size. The hepatic vein appears to be of normal size. There is IVC inspiratory collapse greater than 50%.      CONCLUSIONS:  1. Left ventricular systolic function is mildly decreased with a 40-45% estimated ejection fraction.  2. Multiple segmental abnormalities exist. See  findings.  3. Increased LV mass.  4. Aortic valve stenosis is not present.  5. There is global hypokinesis of the left ventricle with minor regional variations.  ** Final **     IMPRESSION & PLAN:  POD # 5  s/p CABGx4 off pump  - Increase activity/ ambulation; PT/OT  - Encourage IS, C/DB; respiratory therapy; wean O2 as burke   - Cardiac rehab referral   - Continue cardiac meds: ASA, BB, statin Plavix  - Pain and anticonstipation meds  - 2v CXR in am   - no wires  - Tele until discharge  - Optimize nutrition and electrolytes    Rhythm  - Tele: NSR 70-80s  - Continue carvedilol 3.125mg PO BID   - Adjust medications as tolerated    Acute Blood Loss Anemia   Recent Labs     23  0558 23  0701 23  1042 23  0729 23  0954 23  2335 23  0708   HGB 10.8* 10.7* 10.7* 10.9* 12.7* 14.3 16.0   HCT 32.8* 31.5* 31.2* 32.5* 35.1* 41.5 48.5     - MV, PO Iron x1mo  - Daily labs, transfuse as indicated    Volume/Electrolyte Status: Preop wt Weight: (!) 157 kg (345 lb 11.2 oz)   Vitals:    23 1002   Weight: (!) 154 kg (339 lb 15.2 oz)     - Weight: 154kg,155, 157  - Lasix 20mg IV x1   - Adjust diuresis as needed for postop cardiac surgery hypervolemia  - Replete electrolytes for hypokalemia/hypomagnesemia/hypophosphatemia as needed,  replace Mg & Phos ,  replaced K, Mg, Phos  - Daily weights and strict I&Os  - Daily RFP while admitted    Hypertension: home meds: lisinopril   Systolic (24hrs), Av , Min:93 , Max:115   - continue holding home lisinopril d/t soft BP   - additional antihypertensives as needed    Hyperlipidemia: home atorvastatin   Lab Results   Component Value Date    CHOL 145 12/10/2023    HDL 25.2 12/10/2023    VLDL 69 (H) 12/10/2023    TRIG 343 (H) 12/10/2023    NHDL 120 12/10/2023   - continue atorvastatin   - follow up lipid panel with PCP/ cardiologist for ongoing lipid management    Metabolic Syndrome   - Body mass index is 44.86 kg/m².  - referral to [facility]  program   - encourage lifestyle modification  - Nutrition consult      DM type II: home meds: Glucophage   Lab Results   Component Value Date    HGBA1C 11.0 (H) 12/10/2023     Results from last 7 days   Lab Units 12/25/23  0732 12/24/23  2139 12/24/23  1752 12/24/23  1149 12/24/23  0759 12/23/23  2148 12/23/23  1652   POCT GLUCOSE mg/dL 115* 203* 139* 142* 168* 185* 191*     -accuchecks premeal and at bedtime  -diabetic diet  -lispro premeal corrective scale  - Endocrinology following- appreciate recs  - Lantus 52 units at HS  - Humalog 15units TID with meals  - Consulted diabetic educator 12/25  - Endo Discharge recs   --- pt may be a good candidate for SGLT2i prior to discharge (suggest jardiance 25mg or farxiga 10mg)    ---  [place] Plan Pharmacist   --- email sent for scheduling in  hospital discharge diabetes clinic in [place] per pt request   --- pt would like to sample CGM prior to discharge, has iphone     Discitis Lumbar region with epidural abscess s/p debridement.  - continue home pregabalin   - holding home flexeril   - holding home amoxicillin for chronic discitis      VTE Prophylaxis: SCDs/TEDs, ambulation, SQ heparin  Code Status: Full Code    Dispo  - PT/OT recs home with home care  - Would benefit from homecare for cardiac surgery carepath and RN visits  - Anticipate discharge next 1-2 days, pending fluid status, glucose control, more mobility  - Will continue to assess discharge needs    DEANNA Simpson-CNP  Cardiac Surgery [facility]  [facility]  Team Phone [phone]    12/25/2023  9:00 AM

## 2023-12-25 NOTE — CARE PLAN
Problem: Diabetes  Goal: Achieve decreasing blood glucose levels by end of shift  12/25/2023 0102 by Chaya M Traube, RN  Outcome: Progressing  12/25/2023 0101 by Chaya M Traube, RN  Outcome: Progressing  Goal: Increase stability of blood glucose readings by end of shift  12/25/2023 0102 by Chaya M Traube, RN  Outcome: Progressing  12/25/2023 0101 by Chaya M Traube, RN  Outcome: Progressing  Goal: Decrease in ketones present in urine by end of shift  12/25/2023 0102 by Chaya M Traube, RN  Outcome: Progressing  12/25/2023 0101 by Chaya M Traube, RN  Outcome: Progressing  Goal: Maintain electrolyte levels within acceptable range throughout shift  12/25/2023 0102 by Chaya M Traube, RN  Outcome: Progressing  12/25/2023 0101 by Chaya M Traube, RN  Outcome: Progressing  Goal: Maintain glucose levels >70mg/dl to <250mg/dl throughout shift  12/25/2023 0102 by Chaya M Traube, RN  Outcome: Progressing  12/25/2023 0101 by Chaya M Traube, RN  Outcome: Progressing  Goal: No changes in neurological exam by end of shift  12/25/2023 0102 by Chaya M Traube, RN  Outcome: Progressing  12/25/2023 0101 by Chaya M Traube, RN  Outcome: Progressing  Goal: Learn about and adhere to nutrition recommendations by end of shift  12/25/2023 0102 by Chaya M Traube, RN  Outcome: Progressing  12/25/2023 0101 by Chaya M Traube, RN  Outcome: Progressing  Goal: Vital signs within normal range for age by end of shift  12/25/2023 0102 by Chaya M Traube, RN  Outcome: Progressing  12/25/2023 0101 by Chaya M Traube, RN  Outcome: Progressing  Goal: Increase self care and/or family involovement by end of shift  12/25/2023 0102 by Chaya M Traube, RN  Outcome: Progressing  12/25/2023 0101 by Chaya M Traube, RN  Outcome: Progressing  Goal: Receive DSME education by end of shift  12/25/2023 0102 by Chaya M Traube, RN  Outcome: Progressing  12/25/2023 0101 by Willow M Traube, RN  Outcome: Progressing     Problem: Safety  Goal: Patient will be injury free during  hospitalization  12/25/2023 0102 by Chaya M Traube, RN  Outcome: Progressing  12/25/2023 0101 by Chaya M Traube, RN  Outcome: Progressing  Goal: I will remain free of falls  12/25/2023 0102 by Chaya M Traube, RN  Outcome: Progressing  12/25/2023 0101 by Chaya M Traube, RN  Outcome: Progressing

## 2023-12-26 LAB
ALBUMIN SERPL BCP-MCNC: 3.5 G/DL (ref 3.4–5)
ANION GAP SERPL CALC-SCNC: 13 MMOL/L (ref 10–20)
BUN SERPL-MCNC: 18 MG/DL (ref 6–23)
CALCIUM SERPL-MCNC: 9.1 MG/DL (ref 8.6–10.6)
CHLORIDE SERPL-SCNC: 100 MMOL/L (ref 98–107)
CO2 SERPL-SCNC: 28 MMOL/L (ref 21–32)
CREAT SERPL-MCNC: 0.79 MG/DL (ref 0.5–1.3)
ERYTHROCYTE [DISTWIDTH] IN BLOOD BY AUTOMATED COUNT: 13.1 % (ref 11.5–14.5)
GFR SERPL CREATININE-BSD FRML MDRD: >90 ML/MIN/1.73M*2
GLUCOSE BLD MANUAL STRIP-MCNC: 118 MG/DL (ref 74–99)
GLUCOSE BLD MANUAL STRIP-MCNC: 134 MG/DL (ref 74–99)
GLUCOSE BLD MANUAL STRIP-MCNC: 150 MG/DL (ref 74–99)
GLUCOSE BLD MANUAL STRIP-MCNC: 98 MG/DL (ref 74–99)
GLUCOSE SERPL-MCNC: 133 MG/DL (ref 74–99)
HCT VFR BLD AUTO: 33.7 % (ref 41–52)
HGB BLD-MCNC: 11.1 G/DL (ref 13.5–17.5)
MAGNESIUM SERPL-MCNC: 2.14 MG/DL (ref 1.6–2.4)
MCH RBC QN AUTO: 30.7 PG (ref 26–34)
MCHC RBC AUTO-ENTMCNC: 32.9 G/DL (ref 32–36)
MCV RBC AUTO: 93 FL (ref 80–100)
NRBC BLD-RTO: 0 /100 WBCS (ref 0–0)
PHOSPHATE SERPL-MCNC: 2.7 MG/DL (ref 2.5–4.9)
PLATELET # BLD AUTO: 313 X10*3/UL (ref 150–450)
POTASSIUM SERPL-SCNC: 4.8 MMOL/L (ref 3.5–5.3)
RBC # BLD AUTO: 3.61 X10*6/UL (ref 4.5–5.9)
SODIUM SERPL-SCNC: 136 MMOL/L (ref 136–145)
WBC # BLD AUTO: 8.2 X10*3/UL (ref 4.4–11.3)

## 2023-12-26 PROCEDURE — 2500000001 HC RX 250 WO HCPCS SELF ADMINISTERED DRUGS (ALT 637 FOR MEDICARE OP): Performed by: NURSE PRACTITIONER

## 2023-12-26 PROCEDURE — 83735 ASSAY OF MAGNESIUM: CPT | Performed by: CLINICAL NURSE SPECIALIST

## 2023-12-26 PROCEDURE — 80069 RENAL FUNCTION PANEL: CPT | Performed by: CLINICAL NURSE SPECIALIST

## 2023-12-26 PROCEDURE — 1200000002 HC GENERAL ROOM WITH TELEMETRY DAILY

## 2023-12-26 PROCEDURE — 99232 SBSQ HOSP IP/OBS MODERATE 35: CPT | Performed by: PHYSICIAN ASSISTANT

## 2023-12-26 PROCEDURE — 82947 ASSAY GLUCOSE BLOOD QUANT: CPT

## 2023-12-26 PROCEDURE — 36415 COLL VENOUS BLD VENIPUNCTURE: CPT | Performed by: CLINICAL NURSE SPECIALIST

## 2023-12-26 PROCEDURE — 99232 SBSQ HOSP IP/OBS MODERATE 35: CPT | Performed by: NURSE PRACTITIONER

## 2023-12-26 PROCEDURE — 85027 COMPLETE CBC AUTOMATED: CPT | Performed by: CLINICAL NURSE SPECIALIST

## 2023-12-26 PROCEDURE — 97530 THERAPEUTIC ACTIVITIES: CPT | Mod: GP,CQ

## 2023-12-26 PROCEDURE — 96372 THER/PROPH/DIAG INJ SC/IM: CPT | Performed by: NURSE PRACTITIONER

## 2023-12-26 PROCEDURE — 2500000004 HC RX 250 GENERAL PHARMACY W/ HCPCS (ALT 636 FOR OP/ED): Performed by: NURSE PRACTITIONER

## 2023-12-26 RX ORDER — AMOXICILLIN 500 MG/1
500 CAPSULE ORAL EVERY 12 HOURS SCHEDULED
Status: DISCONTINUED | OUTPATIENT
Start: 2023-12-26 | End: 2023-12-29 | Stop reason: HOSPADM

## 2023-12-26 RX ORDER — INSULIN LISPRO 100 [IU]/ML
10 INJECTION, SOLUTION INTRAVENOUS; SUBCUTANEOUS
Status: DISCONTINUED | OUTPATIENT
Start: 2023-12-26 | End: 2023-12-29

## 2023-12-26 RX ORDER — DAPAGLIFLOZIN 10 MG/1
10 TABLET, FILM COATED ORAL DAILY
Status: DISCONTINUED | OUTPATIENT
Start: 2023-12-26 | End: 2023-12-29 | Stop reason: HOSPADM

## 2023-12-26 RX ADMIN — HEPARIN SODIUM 5000 UNITS: 5000 INJECTION INTRAVENOUS; SUBCUTANEOUS at 13:28

## 2023-12-26 RX ADMIN — AMOXICILLIN 500 MG: 500 CAPSULE ORAL at 20:13

## 2023-12-26 RX ADMIN — INSULIN LISPRO 15 UNITS: 100 INJECTION, SOLUTION INTRAVENOUS; SUBCUTANEOUS at 13:28

## 2023-12-26 RX ADMIN — Medication 1 TABLET: at 09:21

## 2023-12-26 RX ADMIN — POLYSACCHARIDE-IRON COMPLEX 150 MG: 150 CAPSULE ORAL at 09:22

## 2023-12-26 RX ADMIN — HEPARIN SODIUM 5000 UNITS: 5000 INJECTION INTRAVENOUS; SUBCUTANEOUS at 06:08

## 2023-12-26 RX ADMIN — INSULIN GLARGINE 52 UNITS: 100 INJECTION, SOLUTION SUBCUTANEOUS at 20:15

## 2023-12-26 RX ADMIN — INSULIN LISPRO 10 UNITS: 100 INJECTION, SOLUTION INTRAVENOUS; SUBCUTANEOUS at 18:28

## 2023-12-26 RX ADMIN — PREGABALIN 75 MG: 75 CAPSULE ORAL at 09:21

## 2023-12-26 RX ADMIN — CLOPIDOGREL BISULFATE 75 MG: 75 TABLET ORAL at 09:22

## 2023-12-26 RX ADMIN — ATORVASTATIN CALCIUM 80 MG: 80 TABLET, FILM COATED ORAL at 20:13

## 2023-12-26 RX ADMIN — HEPARIN SODIUM 5000 UNITS: 5000 INJECTION INTRAVENOUS; SUBCUTANEOUS at 20:13

## 2023-12-26 RX ADMIN — DAPAGLIFLOZIN 10 MG: 10 TABLET, FILM COATED ORAL at 14:39

## 2023-12-26 RX ADMIN — AMOXICILLIN 500 MG: 500 CAPSULE ORAL at 09:22

## 2023-12-26 RX ADMIN — CARVEDILOL 3.12 MG: 3.12 TABLET, FILM COATED ORAL at 20:13

## 2023-12-26 RX ADMIN — CARVEDILOL 3.12 MG: 3.12 TABLET, FILM COATED ORAL at 09:21

## 2023-12-26 RX ADMIN — ASPIRIN 81 MG: 81 TABLET, COATED ORAL at 09:21

## 2023-12-26 ASSESSMENT — COGNITIVE AND FUNCTIONAL STATUS - GENERAL
EATING MEALS: A LITTLE
MOBILITY SCORE: 18
PERSONAL GROOMING: A LITTLE
CLIMB 3 TO 5 STEPS WITH RAILING: A LITTLE
WALKING IN HOSPITAL ROOM: A LITTLE
TURNING FROM BACK TO SIDE WHILE IN FLAT BAD: A LITTLE
CLIMB 3 TO 5 STEPS WITH RAILING: A LOT
DRESSING REGULAR LOWER BODY CLOTHING: A LITTLE
MOVING FROM LYING ON BACK TO SITTING ON SIDE OF FLAT BED WITH BEDRAILS: A LITTLE
MOVING TO AND FROM BED TO CHAIR: A LITTLE
MOVING FROM LYING ON BACK TO SITTING ON SIDE OF FLAT BED WITH BEDRAILS: A LITTLE
STANDING UP FROM CHAIR USING ARMS: A LITTLE
TOILETING: A LITTLE
MOBILITY SCORE: 17
DRESSING REGULAR UPPER BODY CLOTHING: A LITTLE
DAILY ACTIVITIY SCORE: 18
WALKING IN HOSPITAL ROOM: A LITTLE
HELP NEEDED FOR BATHING: A LITTLE
TURNING FROM BACK TO SIDE WHILE IN FLAT BAD: A LITTLE
MOVING TO AND FROM BED TO CHAIR: A LITTLE
STANDING UP FROM CHAIR USING ARMS: A LITTLE

## 2023-12-26 ASSESSMENT — PAIN SCALES - GENERAL
PAINLEVEL_OUTOF10: 0 - NO PAIN

## 2023-12-26 ASSESSMENT — PAIN - FUNCTIONAL ASSESSMENT
PAIN_FUNCTIONAL_ASSESSMENT: 0-10
PAIN_FUNCTIONAL_ASSESSMENT: 0-10

## 2023-12-26 NOTE — CARE PLAN
Problem: Diabetes  Goal: Achieve decreasing blood glucose levels by end of shift  Outcome: Progressing  Goal: Increase stability of blood glucose readings by end of shift  Outcome: Progressing  Goal: Decrease in ketones present in urine by end of shift  Outcome: Progressing  Goal: Maintain electrolyte levels within acceptable range throughout shift  Outcome: Progressing  Goal: Maintain glucose levels >70mg/dl to <250mg/dl throughout shift  Outcome: Progressing  Goal: No changes in neurological exam by end of shift  Outcome: Progressing  Goal: Learn about and adhere to nutrition recommendations by end of shift  Outcome: Progressing  Goal: Vital signs within normal range for age by end of shift  Outcome: Progressing  Goal: Increase self care and/or family involovement by end of shift  Outcome: Progressing  Goal: Receive DSME education by end of shift  Outcome: Progressing     Problem: Safety  Goal: Patient will be injury free during hospitalization  Outcome: Progressing  Goal: I will remain free of falls  Outcome: Progressing

## 2023-12-26 NOTE — PROGRESS NOTES
Physical Therapy    Physical Therapy Treatment    Patient Name: Behzad Diaz  MRN: [id number]  Today's Date: 12/26/2023  Time Calculation  Start Time: 1125  Stop Time: 1145  Time Calculation (min): 20 min       Assessment/Plan   PT Assessment  PT Assessment Results: Decreased strength, Impaired balance, Decreased endurance, Decreased range of motion, Decreased mobility  Rehab Prognosis: Good  Barriers to Discharge: none  Evaluation/Treatment Tolerance: Patient tolerated treatment well  Medical Staff Made Aware: Yes  Strengths: Attitude of self, Ability to acquire knowledge  Barriers to Participation: Support of Caregivers  End of Session Communication: Bedside nurse  End of Session Patient Position:  (seated EOB)     PT Plan  Treatment/Interventions: Bed mobility, Transfer training, Gait training, Stair training, Balance training, Neuromuscular re-education, Strengthening, Neurodevelopmental intervention, Endurance training, Range of motion, Therapeutic exercise, Therapeutic activity  PT Plan: Skilled PT  PT Frequency: 3 times per week  PT Discharge Recommendations: Low intensity level of continued care  Equipment Recommended upon Discharge: Wheeled walker  PT Recommended Transfer Status: Assist x1, Assistive device, Contact guard  PT - OK to Discharge: Yes      General Visit Information:   PT  Visit  PT Received On: 12/26/23  Response to Previous Treatment: Patient with no complaints from previous session.  Prior to Session Communication: Bedside nurse  Patient Position Received: Bed, 3 rail up  Family/Caregiver Present: No  General Comment: Patient agreeable to participate in therapy session     Subjective   Precautions:  Precautions  Hearing/Visual Limitations: hearing WFL and vision WFL with reading glasses  Medical Precautions: Fall precautions, Cardiac precautions  Post-Surgical Precautions: Move in the Tube  Precautions Comment: Pt in compliance with precautions throughout session.  Vital Signs:  Vital  Signs  Heart Rate: 99  Heart Rate Source: Monitor    Objective   Pain:  Pain Assessment  Pain Assessment: 0-10  Pain Score: 0 - No pain  Cognition:  Cognition  Overall Cognitive Status: Within Functional Limits  Orientation Level: Oriented X4  Lines/Tubes/Drains:       PT Treatments:           Bed Mobility  Bed Mobility: Yes  Bed Mobility 1  Bed Mobility 1: Supine to sitting  Level of Assistance 1: Contact guard  Ambulation/Gait Training  Ambulation/Gait Training Performed: Yes  Ambulation/Gait Training 1  Surface 1: Level tile  Device 1: Rolling walker  Assistance 1: Contact guard  Quality of Gait 1: Decreased step length  Comments/Distance (ft) 1: 50 ft  Transfers  Transfer: Yes  Transfer 1  Transfer From 1: Sit to  Transfer to 1: Stand  Technique 1: Sit to stand  Transfer Device 1: Walker  Transfer Level of Assistance 1: Contact guard  Trials/Comments 1: x2 trials  Transfers 2  Transfer From 2: Stand to  Transfer to 2: Sit  Technique 2: Stand to sit  Transfer Device 2: Walker  Transfer Level of Assistance 2: Contact guard  Trials/Comments 2: x2 trials  Stairs  Stairs: No          Outcome Measures:  [facility] Basic Mobility  Turning from your back to your side while in a flat bed without using bedrails: A little  Moving from lying on your back to sitting on the side of a flat bed without using bedrails: A little  Moving to and from bed to chair (including a wheelchair): A little  Standing up from a chair using your arms (e.g. wheelchair or bedside chair): A little  To walk in hospital room: A little  Climbing 3-5 steps with railing: A lot  Basic Mobility - Total Score: 17                 [place]  Sitting Balance: Steady, safe  Arises: Able, uses arms to help  Attempts to Arise: Able to arise, one attempt  Immediate Standing Balance (First 5 Seconds): Steady without walker or other support  Standing Balance: Narrow stance without support  Nudged: Steady without walker or other support  Eyes Closed: Unsteady  Turned 360  Degrees: Steadiness: Steady  Turned 360 Degrees: Continuity of Steps: Discontinuous steps  Sitting Down: Uses arms or not a smooth motion  Balance Score: 12  Initiation of Gait: No hesitancy  Step Height: R Swing Foot: Right foot complete clears floor  Step Length: R Swing Foot: Passes left stance foot  Step Height: L Swing Foot: Left foot complete clears floor  Step Length: L Swing Foot: Passes right stance foot  Step Symmetry: Right and left step appear equal  Step Continuity: Steps appear continuous  Path: Mild/moderate deviation or uses walking aid  Trunk: Marked sway or uses walking aid  Walking Time: Heels almost touching while walking  Gait Score: 9  Total Score: 21          Education Documentation  Body Mechanics, taught by Shayy Gonzales PTA at 12/26/2023 12:28 PM.  Learner: Patient  Readiness: Acceptance  Method: Explanation  Response: Verbalizes Understanding    Home Exercise Program, taught by Shayy Gonzales PTA at 12/26/2023 12:28 PM.  Learner: Patient  Readiness: Acceptance  Method: Explanation  Response: Verbalizes Understanding    Mobility Training, taught by Shayy Gonzales PTA at 12/26/2023 12:28 PM.  Learner: Patient  Readiness: Acceptance  Method: Explanation  Response: Verbalizes Understanding    Education Comments  No comments found.          OP EDUCATION:       Encounter Problems       Encounter Problems (Active)       Mobility       STG - Patient will ambulate 150 ft with LRAD and SBA. (Progressing)       Start:  12/21/23    Expected End:  01/04/24            STG - Patient will ambulate up and down a curb/step with LRAD and SBA. (Progressing)       Start:  12/21/23    Expected End:  01/04/24               Transfers       STG - Patient will perform bed mobility (log roll) independently.  (Progressing)       Start:  12/21/23    Expected End:  01/04/24            STG - Patient will transfer sit to and from stand with LRA and modified independence. (Progressing)       Start:  12/21/23     Expected End:  01/04/24 12/26/23 at 12:29 PM   Shayy Gonzales PTA   Rehab Office: [phone]

## 2023-12-26 NOTE — PROGRESS NOTES
"Behzad Diaz is a 58 y.o. male on day 14 of admission presenting with Coronary artery disease (CAD) excluded.  Patient unfortunately doesn't qualify for [facility] Acute Rehab.  Patient has been provided   a list and has selected [facility].  Referral has been submitted.  Patient will need an OT eval for acceptance.          Physical Exam    Last Recorded Vitals  Blood pressure 101/60, pulse 94, temperature 36.4 °C (97.5 °F), temperature source Temporal, resp. rate 16, height 1.854 m (6' 0.99\"), weight (!) 155 kg (342 lb 3.2 oz), SpO2 99 %.  Intake/Output last 3 Shifts:  No intake/output data recorded.      Assessment/Plan   Principal Problem:    Coronary artery disease (CAD) excluded  Active Problems:    Type 2 diabetes mellitus, without long-term current use of insulin (CMS/[facility])    HTN (hypertension)    NSTEMI (non-ST elevated myocardial infarction) (CMS/HCC)    Ischemic cardiomyopathy    HLD (hyperlipidemia)          Carlton Alberto RN      "

## 2023-12-26 NOTE — PROGRESS NOTES
"Behzad Diaz is a 58 y.o. male on day 14 of admission presenting with Coronary artery disease (CAD) excluded.    Subjective   Patient status post up for his CABG x 4  Pt does have small appetite, states he ate some dinner and lunch yesterday.  Pt states at home he rarely eats breakfast  Agreeable to sample CGM at discharge (has iphone), and  endo/pharmacy follow up    Objective   Review of Systems   All other systems reviewed and are negative.    Physical Exam  Constitutional:       Appearance: Normal appearance. He is obese.   HENT:      Head: Normocephalic and atraumatic.      Nose: Nose normal.      Mouth/Throat:      Mouth: Mucous membranes are moist.      Pharynx: Oropharynx is clear.   Eyes:      Extraocular Movements: Extraocular movements intact.      Conjunctiva/sclera: Conjunctivae normal.   Cardiovascular:      Rate and Rhythm: Normal rate and regular rhythm.      Pulses: Normal pulses.      Heart sounds: Normal heart sounds.   Pulmonary:      Effort: Pulmonary effort is normal.      Breath sounds: Normal breath sounds.   Abdominal:      General: Abdomen is flat. Bowel sounds are normal.      Palpations: Abdomen is soft.   Neurological:      General: No focal deficit present.      Mental Status: He is alert and oriented to person, place, and time. Mental status is at baseline.   Psychiatric:         Mood and Affect: Mood normal.         Behavior: Behavior normal.         Thought Content: Thought content normal.         Judgment: Judgment normal.           Last Recorded Vitals  Blood pressure 101/60, pulse 94, temperature 36.4 °C (97.5 °F), temperature source Temporal, resp. rate 16, height 1.854 m (6' 0.99\"), weight (!) 155 kg (342 lb 3.2 oz), SpO2 99 %.  Intake/Output last 3 Shifts:  No intake/output data recorded.    Relevant Results  Results from last 7 days   Lab Units 12/26/23  1155 12/26/23  0733 12/26/23  0625 12/25/23  2034 12/25/23  1557 12/25/23  1140 12/25/23  0732 12/25/23  0558 " 12/24/23  0759 12/24/23  0701 12/23/23  1144 12/23/23  1042 12/22/23  1023 12/22/23  0729   POCT GLUCOSE mg/dL 118* 134*  --  146* 113* 124*   < >  --    < >  --    < >  --    < >  --    GLUCOSE mg/dL  --   --  133*  --   --   --   --  89  --  144*  --  256*  --  240*    < > = values in this interval not displayed.     Lab Review  Lab Results   Component Value Date    BILITOT 1.3 (H) 12/13/2023    CALCIUM 9.1 12/26/2023    CO2 28 12/26/2023     12/26/2023    CREATININE 0.79 12/26/2023    GLUCOSE 133 (H) 12/26/2023    ALKPHOS 58 12/13/2023    K 4.8 12/26/2023    PROT 6.5 12/13/2023     12/26/2023    AST 26 12/13/2023    ALT 26 12/13/2023    BUN 18 12/26/2023    ANIONGAP 13 12/26/2023    MG 2.14 12/26/2023    PHOS 2.7 12/26/2023    ALBUMIN 3.5 12/26/2023    LIPASE 13 09/22/2020    GFRMALE >90 07/07/2023     Lab Results   Component Value Date    TRIG 343 (H) 12/10/2023    CHOL 145 12/10/2023    LDLCALC 51 12/10/2023    HDL 25.2 12/10/2023     Lab Results   Component Value Date    HGBA1C 11.0 (H) 12/10/2023    HGBA1C 10.8 (A) 08/25/2023    HGBA1C 6.4 (A) 08/27/2021     The ASCVD Risk score (Perfecto CARR, et al., 2019) failed to calculate for the following reasons:    The patient has a prior MI or stroke diagnosis    Scheduled medications  acetaminophen, 650 mg, oral, q6h  amoxicillin, 500 mg, oral, q12h ISAIAH  aspirin, 81 mg, oral, Daily  atorvastatin, 80 mg, oral, Daily  bisacodyl, 10 mg, rectal, Daily  carvedilol, 3.125 mg, oral, BID  clopidogrel, 75 mg, oral, Daily  dapagliflozin propanediol, 10 mg, oral, Daily  [Held by provider] furosemide, 20 mg, intravenous, BID with meals  heparin, 5,000 Units, subcutaneous, q8h  insulin glargine, 52 Units, subcutaneous, Nightly  insulin lispro, 0-15 Units, subcutaneous, TID with meals  insulin lispro, 10 Units, subcutaneous, TID with meals  iron polysaccharides, 150 mg, oral, Daily  lactulose, 20 g, oral, TID  multivitamin with minerals, 1 tablet, oral,  Daily  polyethylene glycol, 17 g, oral, BID  pregabalin, 75 mg, oral, Daily  sennosides-docusate sodium, 2 tablet, oral, BID      Continuous medications       PRN medications  PRN medications: dextrose **OR** glucagon, traMADol    Assessment/Plan   Principal Problem:    Coronary artery disease (CAD) excluded  Active Problems:    Type 2 diabetes mellitus, without long-term current use of insulin (CMS/[facility])    HTN (hypertension)    NSTEMI (non-ST elevated myocardial infarction) (CMS/[facility])    Ischemic cardiomyopathy    HLD (hyperlipidemia)    Behzad Diaz is a 58 y.o. male with HTN, HLD, poorly controlled DM2, obesity class III, CAD, and history of discitis and epidural abscess s/p debridement who presents to [facility] for CABG evaluation, plan for 12/20.     Endocrinology is consulted to assist in diabetes management.  Diabetes history:  Diabetes duration: 4 to 5 years           Home blood glucose checks: Patient does not check his BG at home  Hypoglycemia (y/n, threshold and symptoms): No hypoglycemia  Home diabetes regimen: Metformin 1000 mg  BID   Home diet (how many meals a day): Patient reports poor diet as he is mostly eating out because of his work  Outpatient physician managing diabetes: PCP  Macrovascular complications: CVD [yes]     CVA [no]     PVD [no]  Microvascular complications: Retinopathy [unknown, patient has not had a retinal exam recently]            neuropathy [endorses neuropathy but attributes this to prior spinal surgery]   nephropathy [no microalbumin to creatinine ratio on chart, creatinine 0.7]  Last A1c: 11%       Recommendations:  - start Farxiga 10mg daily, give first dose today  - continue glargine 52 units at bedtime        NPO: reduce to 35 units qHS  - #3 sliding scale 3 times daily AC with meals only         NPO: adjust to ssi #2 q6hr  - decrease lispro 10 units with meals        NPO or glucose <90mg/dL within 1hr before meal: hold  -Accu-Cheks 3 times daily before meals and at bedtime.   Please obtain bedtime BG as it often missed.  - Hypoglycemia protocol  -We will follow and adjust regimen accordingly  - pt may be a good candidate for SGLT2i prior to discharge (suggest jardiance 25mg or farxiga 10mg)     SGLT2i tx may not be utilized in inpt setting unless the following conditions are met  1) pt is eating and drinking by mouth with a total daily carb intake exceeding 60g of CHO  2) pt is urinating independently of urinary catheters  3) pt can ambulate freely to the restroom in order to maintain personal hygiene  4) no current concern for UTI/genital or inguinal candidiasis  5) no plans for NPO within the next 48-72hr      Discharge planning:   (X) -referring to clinical pharmacy for follow up, pt aware and agreeable to  [facility] Plan Pharmacist follow-up  Pt would benefit from the following: insulin at discharge after CABG, GLP-1 start and  PAP  (X) -email sent for scheduling in  hospital discharge diabetes clinic in [place] per pt request  (X) -pt would like to sample CGM prior to discharge, has sergio       I spent 35 minutes on care and coordination of this patient    Sera Shepard PA-C

## 2023-12-26 NOTE — PROGRESS NOTES
"CARDIAC SURGERY DAILY PROGRESS NOTE  Mr. Behzad Diaz is a 59 yo M who presents to the CTICU s/p Off Pump CABG X4 w/ LIMA to LAD, SVG to Diag, and SVG Sequenced to OM and PDA (taken off of other vein proximally) with Dr. Phelps on 12/20/23. His PMH is significant for CAD, HTN, HLD, T2DM, L4-5 discitis with epidural abscess s/p debridement. He initially presented to [facility] ED on 12/10 with left sided chest pain with radiation to L arm as well as other symptoms suspicios for ACS. Workup revealed NSTEMI. TTE 10/12 with EF 40-45% with global hypokinesis of LV. LHC 12/11 revealed 100% stenosis in mid LAD, 95% stenosis proximal to mid first diagonal branch, occluded LCx after early first OM, OM1 with occluded mid portion, OM2 occluded, and 95% stenosis mid RCA. The patient remained HDS and was transferred to [facility] for cardiac surgery workup.     Operation Procedure  12/20/23 Dr Phelps  Off Pump CABG X4 w/ LIMA to LAD, SVG to Diag, and SVG Sequenced to OM and PDA    CTICU uneventful  Transfer to T3 12/21/23  -----------------------  Interval History:   Uneventful     SUBJECTIVE:  C/o lightheadedness/dizziness upon standing today with PT, hold further diuresis for today     Objective   BP 99/60 (BP Location: Left arm, Patient Position: Lying)   Pulse 80   Temp 36.1 °C (97 °F) (Temporal)   Resp 16   Ht 1.854 m (6' 0.99\")   Wt (!) 155 kg (342 lb 3.2 oz)   SpO2 100%   BMI 45.16 kg/m²   Pain Score: 0 - No pain   3 Day Weight Change: -0.483 kg (-1 lb 1.1 oz) per day    Heart Rate:  [75-99]   Temp:  [35.8 °C (96.4 °F)-36.4 °C (97.5 °F)]   Resp:  [16-20]   BP: ()/(60-68)   Weight:  [155 kg (342 lb 3.2 oz)]   SpO2:  [95 %-100 %]     Intake and Output    Intake/Output Summary (Last 24 hours) at 12/26/2023 1827  Last data filed at 12/26/2023 1700  Gross per 24 hour   Intake 600 ml   Output --   Net 600 ml       Physical Exam  Physical Exam  Vitals and nursing note reviewed.   Constitutional:       General: He is not in acute " distress.     Appearance: He is obese.   HENT:      Head: Normocephalic.      Mouth/Throat:      Mouth: Mucous membranes are moist.   Eyes:      Conjunctiva/sclera: Conjunctivae normal.   Cardiovascular:      Rate and Rhythm: Normal rate and regular rhythm.      Pulses: Normal pulses.      Heart sounds: Normal heart sounds.      Comments: Tele NSR 70-80s  No wires  Pulmonary:      Effort: Pulmonary effort is normal.      Breath sounds: Normal breath sounds.      Comments: Sternum stable  Abdominal:      General: Bowel sounds are normal.      Palpations: Abdomen is soft.      Tenderness: There is no abdominal tenderness.      Comments: postop BM 12/26  Per pt normally has BM every 3-5 days   Genitourinary:     Comments: Voiding in urinal    Musculoskeletal:      Cervical back: Neck supple.      Right lower leg: No edema.      Left lower leg: No edema.   Skin:     General: Skin is warm and dry.      Comments: midsternal chest incision C/D/I, well approximated, no s/s infection, Right SVG incision C/D/I, well approximated, no s/s infection  Sternum stable    Neurological:      General: No focal deficit present.      Mental Status: He is alert and oriented to person, place, and time.   Psychiatric:         Mood and Affect: Mood normal.         Behavior: Behavior normal. Behavior is cooperative.       Medications  Scheduled medications  acetaminophen, 650 mg, oral, q6h  amoxicillin, 500 mg, oral, q12h ISAIAH  aspirin, 81 mg, oral, Daily  atorvastatin, 80 mg, oral, Daily  bisacodyl, 10 mg, rectal, Daily  carvedilol, 3.125 mg, oral, BID  clopidogrel, 75 mg, oral, Daily  dapagliflozin propanediol, 10 mg, oral, Daily  [Held by provider] furosemide, 20 mg, intravenous, BID with meals  heparin, 5,000 Units, subcutaneous, q8h  insulin glargine, 52 Units, subcutaneous, Nightly  insulin lispro, 0-15 Units, subcutaneous, TID with meals  insulin lispro, 10 Units, subcutaneous, TID with meals  iron polysaccharides, 150 mg, oral,  Daily  lactulose, 20 g, oral, TID  multivitamin with minerals, 1 tablet, oral, Daily  polyethylene glycol, 17 g, oral, BID  pregabalin, 75 mg, oral, Daily  sennosides-docusate sodium, 2 tablet, oral, BID    Continuous medications   PRN medications  PRN medications: dextrose **OR** glucagon, traMADol    Labs  Results for orders placed or performed during the hospital encounter of 12/12/23 (from the past 24 hour(s))   POCT GLUCOSE   Result Value Ref Range    POCT Glucose 146 (H) 74 - 99 mg/dL   CBC   Result Value Ref Range    WBC 8.2 4.4 - 11.3 x10*3/uL    nRBC 0.0 0.0 - 0.0 /100 WBCs    RBC 3.61 (L) 4.50 - 5.90 x10*6/uL    Hemoglobin 11.1 (L) 13.5 - 17.5 g/dL    Hematocrit 33.7 (L) 41.0 - 52.0 %    MCV 93 80 - 100 fL    MCH 30.7 26.0 - 34.0 pg    MCHC 32.9 32.0 - 36.0 g/dL    RDW 13.1 11.5 - 14.5 %    Platelets 313 150 - 450 x10*3/uL   Magnesium   Result Value Ref Range    Magnesium 2.14 1.60 - 2.40 mg/dL   Renal Function Panel   Result Value Ref Range    Glucose 133 (H) 74 - 99 mg/dL    Sodium 136 136 - 145 mmol/L    Potassium 4.8 3.5 - 5.3 mmol/L    Chloride 100 98 - 107 mmol/L    Bicarbonate 28 21 - 32 mmol/L    Anion Gap 13 10 - 20 mmol/L    Urea Nitrogen 18 6 - 23 mg/dL    Creatinine 0.79 0.50 - 1.30 mg/dL    eGFR >90 >60 mL/min/1.73m*2    Calcium 9.1 8.6 - 10.6 mg/dL    Phosphorus 2.7 2.5 - 4.9 mg/dL    Albumin 3.5 3.4 - 5.0 g/dL   POCT GLUCOSE   Result Value Ref Range    POCT Glucose 134 (H) 74 - 99 mg/dL   POCT GLUCOSE   Result Value Ref Range    POCT Glucose 118 (H) 74 - 99 mg/dL           IMAGES    I have personally reviewed the following test result(s):    XR chest 2 views 12/23/2023    Narrative  Interpreted By:  Alvaro Hills,  STUDY:  XR CHEST 2 VIEWS;  12/23/2023 9:05 am    INDICATION:  Signs/Symptoms:s/p chest tube removal.    COMPARISON:  Radiograph dated 12/22/2023    ACCESSION NUMBER(S):  [id number]    ORDERING CLINICIAN:  INEZ YOUNG    FINDINGS:  Status post median sternotomy. The  cardiac silhouette size is stable.  Assessment is limited due to rotation to the right side.    Bibasilar opacity in the lungs appear unchanged. Cannot exclude small  bilateral pleural effusion. No abena edema or sizable pneumothorax.    Postsurgical changes in the cervical spine, partially imaged. No  acute osseous change.    Impression  1. No sizable pneumothorax.  2. Other stable findings as described        Signed by: Alvaro Capellan 12/23/2023 10:01 AM  Dictation workstation:   [id number]       Transthoracic Echo (TTE) Complete With Contrast 12/11/2023    [facility]  [address] [id number]  Tel [phone] Fax [phone]    TRANSTHORACIC ECHOCARDIOGRAM REPORT      Patient Name:     DEANNE Oropeza Physician:  [id number] Post MD  Study Date:       12/11/2023          Ordering Provider:  Phillip POST  MRN/PID:          [id number]            Fellow:  Accession#:       [id number]        Nurse:  Date of           1965 / 58      Sonographer:        Erika Maldonado RD  Birth/Age:        years  Gender:           M                   Additional Staff:  Height:           182.88 cm           Admit Date:         12/9/2023  Weight:           158.76 kg           Admission Status:   Inpatient - Routine  BSA:              2.70 m2             Department          [facility]  Location:  Blood Pressure: 138 /83 mmHg    Study Type:    TRANSTHORACIC ECHO (TTE) COMPLETE  Diagnosis/ICD: Non ST elevation (NSTEMI) myocardial infarction-I21.4  Indication:    NSTEMI  CPT Codes:     Echo Complete w Full Doppler-[id number]  Study Detail: The following Echo studies were performed: 2D, M-Mode, Doppler and  color flow. Technically challenging study due to poor acoustic  windows and body habitus. Definity used as a contrast agent for  endocardial border definition. Total contrast used for this  procedure was 2 mL via IV push.      PHYSICIAN INTERPRETATION:  Left  Ventricle: Left ventricular systolic function is mildly decreased, with an estimated ejection fraction of 40-45%. Estimated left ventricular mass is increased. There is global hypokinesis of the left ventricle with minor regional variations. The left ventricular cavity size is upper limits of normal. The left ventricular septal wall thickness is mildly increased. There is mildly increased left ventricular posterior wall thickness. Left ventricular diastolic filling was indeterminate. There are normal left ventricular filling pressures.  LV Wall Scoring:  There is diffuse hypokinesis.    Left Atrium: The left atrium is upper limits of normal in size.  Right Ventricle: The right ventricle is normal in size. There is normal right ventricular global systolic function.  Right Atrium: The right atrium is normal in size.  Aortic Valve: The aortic valve is trileaflet. There is no evidence of aortic valve stenosis.  There is no evidence of aortic valve regurgitation. The peak instantaneous gradient of the aortic valve is 2.7 mmHg. The mean gradient of the aortic valve is 2.0 mmHg.  Mitral Valve: The mitral valve is normal in structure. There is trace mitral valve regurgitation.  Tricuspid Valve: The tricuspid valve is structurally normal. There is trace tricuspid regurgitation. The right ventricular systolic pressure is unable to be estimated.  Pulmonic Valve: The pulmonic valve was not assessed. The pulmonic valve regurgitation was not assessed.  Pericardium: There is no pericardial effusion noted.  Aorta: The aortic root is normal.  Systemic Veins: The inferior vena cava appears to be of normal size. The hepatic vein appears to be of normal size. There is IVC inspiratory collapse greater than 50%.      CONCLUSIONS:  1. Left ventricular systolic function is mildly decreased with a 40-45% estimated ejection fraction.  2. Multiple segmental abnormalities exist. See findings.  3. Increased LV mass.  4. Aortic valve stenosis is  not present.  5. There is global hypokinesis of the left ventricle with minor regional variations.  ** Final **           IMPRESSION & PLAN:  POD #6  s/p CABGx4 off pump  - Increase activity/ ambulation; PT/OT  - Encourage IS, C/DB; respiratory therapy; wean O2 as burke   - Cardiac rehab referral   - Continue cardiac meds: ASA, BB, statin   - continue LIFELONG Plavix per Dr. Phelps  - Pain and anticonstipation meds  - 2v CXR    - no wires  - Tele until discharge  - Optimize nutrition and electrolytes    Rhythm  - Tele: NSR 70-80s  - Continue carvedilol 3.125mg PO BID   - Adjust medications as tolerated    Acute Blood Loss Anemia   Recent Labs     23  0625 23  0558 23  0701 23  1042 23  0729 23  0954 23  2335   HGB 11.1* 10.8* 10.7* 10.7* 10.9* 12.7* 14.3   HCT 33.7* 32.8* 31.5* 31.2* 32.5* 35.1* 41.5     - MV, PO Iron x1mo  - Daily labs, transfuse as indicated    Volume/Electrolyte Status: Preop wt Weight: (!) 157 kg (345 lb 11.2 oz)   Vitals:    23 0410   Weight: (!) 155 kg (342 lb 3.2 oz)   - Weight: 155, 154kg,155, 157  - held lasix  with addition of farxiga; re-eval in am  - Adjust diuresis as needed for postop cardiac surgery hypervolemia  - Replete electrolytes for hypokalemia/hypomagnesemia/hypophosphatemia as needed,  replace Mg & Phos ,  replaced K, Mg, Phos  - Daily weights and strict I&Os  - Daily RFP while admitted    Hypertension: home meds: lisinopril   Systolic (24hrs), Av , Min:99 , Max:118   - continue holding home lisinopril; resume as BP allows   - additional antihypertensives as needed    Hyperlipidemia: home atorvastatin   Lab Results   Component Value Date    CHOL 145 12/10/2023    HDL 25.2 12/10/2023    VLDL 69 (H) 12/10/2023    TRIG 343 (H) 12/10/2023    NHDL 120 12/10/2023   - continue atorvastatin   - follow up lipid panel with PCP/ cardiologist for ongoing lipid management    Metabolic Syndrome   - Body mass index is  45.16 kg/m².  - referral to [facility] program   - encourage lifestyle modification  - Nutrition consult      DM type II: home meds: Glucophage   Lab Results   Component Value Date    HGBA1C 11.0 (H) 12/10/2023     Results from last 7 days   Lab Units 12/26/23  1155 12/26/23  0733 12/25/23  2034 12/25/23  1557 12/25/23  1140 12/25/23  0732 12/24/23  2139   POCT GLUCOSE mg/dL 118* 134* 146* 113* 124* 115* 203*     -accuchecks premeal and at bedtime  -diabetic diet  -lispro premeal corrective scale  - Endocrinology following- appreciate recs  - Lantus 52 units at HS  - Humalog 10units TID with meals  - 12/26 started farxiga 10mg daily  - Consulted diabetic educator 12/25  - Endo Discharge recs   --- pt may be a good candidate for SGLT2i prior to discharge (suggest jardiance 25mg or farxiga 10mg)    ---  [facility] Plan Pharmacist   --- email sent for scheduling in  hospital discharge diabetes clinic in [place] per pt request   --- pt would like to sample CGM prior to discharge, has iphone     Discitis Lumbar region with epidural abscess s/p debridement.  - continue home pregabalin   - holding home flexeril   - resumed home amoxicillin for chronic discitis; follows with ID and is on lifelong chronic suppression    VTE Prophylaxis: SCDs/TEDs, ambulation, SQ heparin  Code Status: Full Code    Dispo  - PT/OT recs home with home care; patient would like to go to SNF and has provided facility choice to [facility]  - if home, would benefit from homecare for cardiac surgery carepath and RN visits  - Anticipate discharge next 1-2 days, pending fluid status, glucose control, facility acceptance/ disposition plan  - Will continue to assess discharge needs      DEANNA Bay-CNP  Cardiac Surgery [facility]  [facility]  Team Phone [phone]    12/26/2023  6:27 PM

## 2023-12-27 LAB
ALBUMIN SERPL BCP-MCNC: 3.3 G/DL (ref 3.4–5)
ANION GAP SERPL CALC-SCNC: 13 MMOL/L (ref 10–20)
BUN SERPL-MCNC: 17 MG/DL (ref 6–23)
CALCIUM SERPL-MCNC: 8.8 MG/DL (ref 8.6–10.6)
CHLORIDE SERPL-SCNC: 104 MMOL/L (ref 98–107)
CO2 SERPL-SCNC: 26 MMOL/L (ref 21–32)
CREAT SERPL-MCNC: 0.9 MG/DL (ref 0.5–1.3)
ERYTHROCYTE [DISTWIDTH] IN BLOOD BY AUTOMATED COUNT: 13.4 % (ref 11.5–14.5)
GFR SERPL CREATININE-BSD FRML MDRD: >90 ML/MIN/1.73M*2
GLUCOSE BLD MANUAL STRIP-MCNC: 100 MG/DL (ref 74–99)
GLUCOSE BLD MANUAL STRIP-MCNC: 126 MG/DL (ref 74–99)
GLUCOSE BLD MANUAL STRIP-MCNC: 134 MG/DL (ref 74–99)
GLUCOSE BLD MANUAL STRIP-MCNC: 154 MG/DL (ref 74–99)
GLUCOSE SERPL-MCNC: 114 MG/DL (ref 74–99)
HCT VFR BLD AUTO: 33 % (ref 41–52)
HGB BLD-MCNC: 10.8 G/DL (ref 13.5–17.5)
MAGNESIUM SERPL-MCNC: 2.1 MG/DL (ref 1.6–2.4)
MCH RBC QN AUTO: 30.9 PG (ref 26–34)
MCHC RBC AUTO-ENTMCNC: 32.7 G/DL (ref 32–36)
MCV RBC AUTO: 94 FL (ref 80–100)
NRBC BLD-RTO: 0 /100 WBCS (ref 0–0)
PHOSPHATE SERPL-MCNC: 3.8 MG/DL (ref 2.5–4.9)
PLATELET # BLD AUTO: 291 X10*3/UL (ref 150–450)
POTASSIUM SERPL-SCNC: 3.9 MMOL/L (ref 3.5–5.3)
RBC # BLD AUTO: 3.5 X10*6/UL (ref 4.5–5.9)
SODIUM SERPL-SCNC: 139 MMOL/L (ref 136–145)
WBC # BLD AUTO: 7.4 X10*3/UL (ref 4.4–11.3)

## 2023-12-27 PROCEDURE — 36415 COLL VENOUS BLD VENIPUNCTURE: CPT | Performed by: CLINICAL NURSE SPECIALIST

## 2023-12-27 PROCEDURE — 99232 SBSQ HOSP IP/OBS MODERATE 35: CPT | Performed by: PHYSICIAN ASSISTANT

## 2023-12-27 PROCEDURE — 82947 ASSAY GLUCOSE BLOOD QUANT: CPT

## 2023-12-27 PROCEDURE — 2500000004 HC RX 250 GENERAL PHARMACY W/ HCPCS (ALT 636 FOR OP/ED): Performed by: NURSE PRACTITIONER

## 2023-12-27 PROCEDURE — 83735 ASSAY OF MAGNESIUM: CPT | Performed by: CLINICAL NURSE SPECIALIST

## 2023-12-27 PROCEDURE — 2500000001 HC RX 250 WO HCPCS SELF ADMINISTERED DRUGS (ALT 637 FOR MEDICARE OP): Performed by: NURSE PRACTITIONER

## 2023-12-27 PROCEDURE — 99232 SBSQ HOSP IP/OBS MODERATE 35: CPT | Performed by: NURSE PRACTITIONER

## 2023-12-27 PROCEDURE — 97535 SELF CARE MNGMENT TRAINING: CPT | Mod: GO

## 2023-12-27 PROCEDURE — 96372 THER/PROPH/DIAG INJ SC/IM: CPT | Performed by: NURSE PRACTITIONER

## 2023-12-27 PROCEDURE — 85027 COMPLETE CBC AUTOMATED: CPT | Performed by: CLINICAL NURSE SPECIALIST

## 2023-12-27 PROCEDURE — 80069 RENAL FUNCTION PANEL: CPT | Performed by: CLINICAL NURSE SPECIALIST

## 2023-12-27 PROCEDURE — 1200000002 HC GENERAL ROOM WITH TELEMETRY DAILY

## 2023-12-27 RX ORDER — INSULIN GLARGINE 100 [IU]/ML
45 INJECTION, SOLUTION SUBCUTANEOUS NIGHTLY
Status: DISCONTINUED | OUTPATIENT
Start: 2023-12-27 | End: 2023-12-28

## 2023-12-27 RX ADMIN — ASPIRIN 81 MG: 81 TABLET, COATED ORAL at 08:42

## 2023-12-27 RX ADMIN — INSULIN GLARGINE 45 UNITS: 100 INJECTION, SOLUTION SUBCUTANEOUS at 21:25

## 2023-12-27 RX ADMIN — ATORVASTATIN CALCIUM 80 MG: 80 TABLET, FILM COATED ORAL at 21:34

## 2023-12-27 RX ADMIN — AMOXICILLIN 500 MG: 500 CAPSULE ORAL at 08:46

## 2023-12-27 RX ADMIN — DAPAGLIFLOZIN 10 MG: 10 TABLET, FILM COATED ORAL at 08:42

## 2023-12-27 RX ADMIN — POLYSACCHARIDE-IRON COMPLEX 150 MG: 150 CAPSULE ORAL at 08:42

## 2023-12-27 RX ADMIN — INSULIN LISPRO 10 UNITS: 100 INJECTION, SOLUTION INTRAVENOUS; SUBCUTANEOUS at 18:09

## 2023-12-27 RX ADMIN — HEPARIN SODIUM 5000 UNITS: 5000 INJECTION INTRAVENOUS; SUBCUTANEOUS at 05:05

## 2023-12-27 RX ADMIN — HEPARIN SODIUM 5000 UNITS: 5000 INJECTION INTRAVENOUS; SUBCUTANEOUS at 12:26

## 2023-12-27 RX ADMIN — AMOXICILLIN 500 MG: 500 CAPSULE ORAL at 21:31

## 2023-12-27 RX ADMIN — INSULIN LISPRO 10 UNITS: 100 INJECTION, SOLUTION INTRAVENOUS; SUBCUTANEOUS at 08:43

## 2023-12-27 RX ADMIN — ACETAMINOPHEN 650 MG: 325 TABLET ORAL at 08:42

## 2023-12-27 RX ADMIN — CARVEDILOL 3.12 MG: 3.12 TABLET, FILM COATED ORAL at 21:34

## 2023-12-27 RX ADMIN — PREGABALIN 75 MG: 75 CAPSULE ORAL at 08:42

## 2023-12-27 RX ADMIN — HEPARIN SODIUM 5000 UNITS: 5000 INJECTION INTRAVENOUS; SUBCUTANEOUS at 21:31

## 2023-12-27 RX ADMIN — CARVEDILOL 3.12 MG: 3.12 TABLET, FILM COATED ORAL at 08:42

## 2023-12-27 RX ADMIN — Medication 1 TABLET: at 08:42

## 2023-12-27 RX ADMIN — CLOPIDOGREL BISULFATE 75 MG: 75 TABLET ORAL at 08:42

## 2023-12-27 ASSESSMENT — COGNITIVE AND FUNCTIONAL STATUS - GENERAL
TOILETING: A LITTLE
DAILY ACTIVITIY SCORE: 20
DAILY ACTIVITIY SCORE: 23
WALKING IN HOSPITAL ROOM: TOTAL
MOBILITY SCORE: 17
TOILETING: A LITTLE
DRESSING REGULAR LOWER BODY CLOTHING: A LITTLE
HELP NEEDED FOR BATHING: A LITTLE
STANDING UP FROM CHAIR USING ARMS: TOTAL
EATING MEALS: A LITTLE
CLIMB 3 TO 5 STEPS WITH RAILING: A LITTLE

## 2023-12-27 ASSESSMENT — PAIN SCALES - GENERAL
PAINLEVEL_OUTOF10: 0 - NO PAIN

## 2023-12-27 ASSESSMENT — ACTIVITIES OF DAILY LIVING (ADL): HOME_MANAGEMENT_TIME_ENTRY: 15

## 2023-12-27 ASSESSMENT — PAIN - FUNCTIONAL ASSESSMENT: PAIN_FUNCTIONAL_ASSESSMENT: 0-10

## 2023-12-27 NOTE — PROGRESS NOTES
Occupational Therapy    OT Treatment    Patient Name: Behzad Diaz  MRN: [id number]  Today's Date: 12/27/2023  Time Calculation  Start Time: 1207  Stop Time: 1222  Time Calculation (min): 15 min         Assessment:  OT Assessment: Pt would benefit from low intensity OT to address ADLs, mobility, and endurance.  End of Session Communication: Bedside nurse  End of Session Patient Position: Bed, 2 rail up, Alarm off, not on at start of session     Plan:  Treatment Interventions: ADL retraining, Functional transfer training, Endurance training, Compensatory technique education  No Skilled OT: At baseline function  OT Frequency: 3 times per week  OT Discharge Recommendations: Low intensity level of continued care  Treatment Interventions: ADL retraining, Functional transfer training, Endurance training, Compensatory technique education    Subjective   Previous Visit Info:  OT Last Visit  OT Received On: 12/27/23  General:  General  Family/Caregiver Present: No  Prior to Session Communication: Bedside nurse  Patient Position Received: Bed, 2 rail up, Alarm off, not on at start of session  Preferred Learning Style: verbal, visual  Precautions:  Medical Precautions: Fall precautions (MITT)  Vital Signs:  Vital Signs  Heart Rate:  ( while standing at the sink performing oral hygiene)  Pain:  Pain Assessment  Pain Score: 0 - No pain    Objective    Cognition:  Cognition  Overall Cognitive Status: Within Functional Limits  Orientation Level: Oriented X4     Activities of Daily Living: Grooming  Grooming Level of Assistance: Distant supervision  Grooming Where Assessed: Standing sinkside  Grooming Comments:  (oral hygiene)     Bed Mobility/Transfers: Bed Mobility 1  Bed Mobility 1: Supine to sitting, Sitting to supine  Level of Assistance 1: Distant supervision  Bed Mobility Comments 1: log roll    Transfer 1  Technique 1: Sit to stand, Stand to sit  Transfer Device 1: Walker  Transfer Level of Assistance 1: Distant  supervision      Ambulation/Gait Training:  Ambulation/Gait Training  Ambulation/Gait Training Performed: Yes  Ambulation/Gait Training 1  Comments/Distance (ft) 1:  ((S) ambulating in room, to/from bathroom with wheeled walker, no acute LOB noted)  Sitting Balance:  Static Sitting Balance  Static Sitting-Level of Assistance: Independent      Outcome Measures:[facility] Daily Activity  Putting on and taking off regular lower body clothing: A little  Bathing (including washing, rinsing, drying): A little  Putting on and taking off regular upper body clothing: None  Toileting, which includes using toilet, bedpan or urinal: A little  Taking care of personal grooming such as brushing teeth: None  Eating Meals: A little  Daily Activity - Total Score: 20   and Brief Confusion Assessment Method (bCAM)  CAM Result: CAM -    Education Documentation  Body Mechanics, taught by Mary Pederson OT at 12/27/2023 12:37 PM.  Learner: Patient  Readiness: Acceptance  Method: Explanation  Response: Verbalizes Understanding    Precautions, taught by Mary Pederson OT at 12/27/2023 12:37 PM.  Learner: Patient  Readiness: Acceptance  Method: Explanation  Response: Verbalizes Understanding    ADL Training, taught by Mary Pederson OT at 12/27/2023 12:37 PM.  Learner: Patient  Readiness: Acceptance  Method: Explanation  Response: Verbalizes Understanding    Education Comments  No comments found.        Goals:  Encounter Problems       Encounter Problems (Active)       ADLs       Patient will perform UB and LB bathing with moderate assist level of assistance and extended tub bench and long-handled sponge.       Start:  12/22/23    Expected End:  01/05/24            Patient with complete upper body dressing with minimal assist  level of assistance donning and doffing all UE clothes with no adaptive equipment while edge of bed        Start:  12/22/23    Expected End:  01/05/24            Patient with complete lower body dressing with moderate assist  level of assistance donning and doffing all LE clothes  with reacher, shoe horn, and sock-aid while supported sitting and edge of bed        Start:  12/22/23    Expected End:  01/05/24            Patient will complete daily grooming tasks brushing teeth, shaving, and washing face/hair with supervision level of assistance and PRN adaptive equipment while standing.       Start:  12/22/23    Expected End:  01/05/24            Patient will complete toileting including hygiene clothing management/hygiene with minimal assist  level of assistance and raised toilet seat, grab bars.       Start:  12/22/23    Expected End:  01/05/24               Mobility       STG - Patient will ambulate 150 ft with LRAD and SBA. (Progressing)       Start:  12/21/23    Expected End:  01/04/24            STG - Patient will ambulate up and down a curb/step with LRAD and SBA. (Progressing)       Start:  12/21/23    Expected End:  01/04/24               Transfers       STG - Patient will perform bed mobility (log roll) independently.  (Progressing)       Start:  12/21/23    Expected End:  01/04/24            STG - Patient will transfer sit to and from stand with LRA and modified independence. (Progressing)       Start:  12/21/23    Expected End:  01/04/24

## 2023-12-27 NOTE — NURSING NOTE
"Mr. Diaz is resting in bed.  States he is feeling \"so much better after having this surgery\"  We reviewed his recent BG results and insulin regimen.  He is interested in trialing a CGM -- his phone will support a Dex Com.  Will place CGM tomorrow.  Mr. Diaz has noticed that his SSI needs have \"sor of gone away\" especially since starting Farxiga.  He is comfortable with the insulin pen.  He is comfortable with follow up again tomorrow to review his discharge plan for diabetes management.  Time spent:  15 mins.    "

## 2023-12-27 NOTE — CARE PLAN
The patient's goals for the shift include Keep informed    The clinical goals for the shift include Patient will remain safe within the shift

## 2023-12-27 NOTE — PROGRESS NOTES
"Behzad Diaz is a 58 y.o. male on day 15 of admission presenting with Coronary artery disease (CAD) excluded.    Subjective   Patient status post up for his CABG x 4  Pt does have small appetite, states he ate some dinner and lunch yesterday.  Pt states at home he rarely eats breakfast  Agreeable to sample CGM at discharge (has iphone), and  endo/pharmacy follow up    Objective   Review of Systems   All other systems reviewed and are negative.    Physical Exam  Constitutional:       Appearance: Normal appearance. He is obese.   HENT:      Head: Normocephalic and atraumatic.      Nose: Nose normal.      Mouth/Throat:      Mouth: Mucous membranes are moist.      Pharynx: Oropharynx is clear.   Eyes:      Extraocular Movements: Extraocular movements intact.      Conjunctiva/sclera: Conjunctivae normal.   Cardiovascular:      Rate and Rhythm: Normal rate and regular rhythm.      Pulses: Normal pulses.      Heart sounds: Normal heart sounds.   Pulmonary:      Effort: Pulmonary effort is normal.      Breath sounds: Normal breath sounds.   Abdominal:      General: Abdomen is flat. Bowel sounds are normal.      Palpations: Abdomen is soft.   Neurological:      General: No focal deficit present.      Mental Status: He is alert and oriented to person, place, and time. Mental status is at baseline.   Psychiatric:         Mood and Affect: Mood normal.         Behavior: Behavior normal.         Thought Content: Thought content normal.         Judgment: Judgment normal.           Last Recorded Vitals  Blood pressure 99/59, pulse 78, temperature 37 °C (98.6 °F), temperature source Temporal, resp. rate 18, height 1.854 m (6' 0.99\"), weight (!) 155 kg (342 lb 3.2 oz), SpO2 95 %.  Intake/Output last 3 Shifts:  I/O last 3 completed shifts:  In: 600 (3.9 mL/kg) [P.O.:600]  Out: 1200 (7.7 mL/kg) [Urine:1200 (0.2 mL/kg/hr)]  Weight: 155.2 kg     Relevant Results  Results from last 7 days   Lab Units 12/27/23  1323 12/27/23  0944 " 12/27/23  0829 12/26/23  2147 12/26/23  1813 12/26/23  1155 12/26/23  0733 12/26/23  0625 12/25/23  0732 12/25/23  0558 12/24/23  0759 12/24/23  0701 12/23/23  1144 12/23/23  1042   POCT GLUCOSE mg/dL 126*  --  100* 150* 98 118*   < >  --    < >  --    < >  --    < >  --    GLUCOSE mg/dL  --  114*  --   --   --   --   --  133*  --  89  --  144*  --  256*    < > = values in this interval not displayed.       Lab Review  Lab Results   Component Value Date    BILITOT 1.3 (H) 12/13/2023    CALCIUM 8.8 12/27/2023    CO2 26 12/27/2023     12/27/2023    CREATININE 0.90 12/27/2023    GLUCOSE 114 (H) 12/27/2023    ALKPHOS 58 12/13/2023    K 3.9 12/27/2023    PROT 6.5 12/13/2023     12/27/2023    AST 26 12/13/2023    ALT 26 12/13/2023    BUN 17 12/27/2023    ANIONGAP 13 12/27/2023    MG 2.10 12/27/2023    PHOS 3.8 12/27/2023    ALBUMIN 3.3 (L) 12/27/2023    LIPASE 13 09/22/2020    GFRMALE >90 07/07/2023     Lab Results   Component Value Date    TRIG 343 (H) 12/10/2023    CHOL 145 12/10/2023    LDLCALC 51 12/10/2023    HDL 25.2 12/10/2023     Lab Results   Component Value Date    HGBA1C 11.0 (H) 12/10/2023    HGBA1C 10.8 (A) 08/25/2023    HGBA1C 6.4 (A) 08/27/2021     The ASCVD Risk score (Perfecto CARR, et al., 2019) failed to calculate for the following reasons:    The patient has a prior MI or stroke diagnosis    Scheduled medications  acetaminophen, 650 mg, oral, q6h  amoxicillin, 500 mg, oral, q12h ISAIAH  aspirin, 81 mg, oral, Daily  atorvastatin, 80 mg, oral, Daily  bisacodyl, 10 mg, rectal, Daily  carvedilol, 3.125 mg, oral, BID  clopidogrel, 75 mg, oral, Daily  dapagliflozin propanediol, 10 mg, oral, Daily  [Held by provider] furosemide, 20 mg, intravenous, BID with meals  heparin, 5,000 Units, subcutaneous, q8h  insulin glargine, 45 Units, subcutaneous, Nightly  insulin lispro, 0-15 Units, subcutaneous, TID with meals  insulin lispro, 10 Units, subcutaneous, TID with meals  iron polysaccharides, 150 mg, oral,  Daily  lactulose, 20 g, oral, TID  multivitamin with minerals, 1 tablet, oral, Daily  polyethylene glycol, 17 g, oral, BID  pregabalin, 75 mg, oral, Daily  sennosides-docusate sodium, 2 tablet, oral, BID      Continuous medications       PRN medications  PRN medications: dextrose **OR** glucagon, traMADol    Assessment/Plan   Principal Problem:    Coronary artery disease (CAD) excluded  Active Problems:    Type 2 diabetes mellitus, without long-term current use of insulin (CMS/[facility])    HTN (hypertension)    NSTEMI (non-ST elevated myocardial infarction) (CMS/[facility])    Ischemic cardiomyopathy    HLD (hyperlipidemia)    Behzad Diaz is a 58 y.o. male with HTN, HLD, poorly controlled DM2, obesity class III, CAD, and history of discitis and epidural abscess s/p debridement who presents to [facility] for CABG evaluation, plan for 12/20.     Endocrinology is consulted to assist in diabetes management.  Diabetes history:  Diabetes duration: 4 to 5 years           Home blood glucose checks: Patient does not check his BG at home  Hypoglycemia (y/n, threshold and symptoms): No hypoglycemia  Home diabetes regimen: Metformin 1000 mg  BID   Home diet (how many meals a day): Patient reports poor diet as he is mostly eating out because of his work  Outpatient physician managing diabetes: PCP  Macrovascular complications: CVD [yes]     CVA [no]     PVD [no]  Microvascular complications: Retinopathy [unknown, patient has not had a retinal exam recently]            neuropathy [endorses neuropathy but attributes this to prior spinal surgery]   nephropathy [no microalbumin to creatinine ratio on chart, creatinine 0.7]  Last A1c: 11%       Recommendations:  - continue Farxiga 10mg daily  - decrease glargine 45 units at bedtime        NPO: reduce to 35 units qHS  - #3 sliding scale 3 times daily AC with meals only         NPO: adjust to ssi #2 q6hr  - continue lispro 10 units with meals        NPO or glucose <90mg/dL within 1hr before meal:  hold  -Accu-Cheks 3 times daily before meals and at bedtime.  Please obtain bedtime BG as it often missed.  - Hypoglycemia protocol  -We will follow and adjust regimen accordingly  - pt may be a good candidate for SGLT2i prior to discharge (suggest jardiance 25mg or farxiga 10mg)     SGLT2i tx may not be utilized in inpt setting unless the following conditions are met  1) pt is eating and drinking by mouth with a total daily carb intake exceeding 60g of CHO  2) pt is urinating independently of urinary catheters  3) pt can ambulate freely to the restroom in order to maintain personal hygiene  4) no current concern for UTI/genital or inguinal candidiasis  5) no plans for NPO within the next 48-72hr      Discharge planning:   (X) -referring to clinical pharmacy for follow up, pt aware and agreeable to  [place] Plan Pharmacist follow-up  Pt would benefit from the following: insulin at discharge after CABG, GLP-1 start and  PAP  (X) -email sent for scheduling in  hospital discharge diabetes clinic in [place] per pt request  (X) -pt would like to sample CGM prior to discharge, has sergio PARISI spent 35 minutes on care and coordination of this patient    Sera Shepard PA-C

## 2023-12-27 NOTE — PROGRESS NOTES
"CARDIAC SURGERY DAILY PROGRESS NOTE  Mr. Behzad Diaz is a 57 yo M who presents to the CTICU s/p Off Pump CABG X4 w/ LIMA to LAD, SVG to Diag, and SVG Sequenced to OM and PDA (taken off of other vein proximally) with Dr. Phelps on 12/20/23. His PMH is significant for CAD, HTN, HLD, T2DM, L4-5 discitis with epidural abscess s/p debridement. He initially presented to [facility] ED on 12/10 with left sided chest pain with radiation to L arm as well as other symptoms suspicios for ACS. Workup revealed NSTEMI. TTE 10/12 with EF 40-45% with global hypokinesis of LV. LHC 12/11 revealed 100% stenosis in mid LAD, 95% stenosis proximal to mid first diagonal branch, occluded LCx after early first OM, OM1 with occluded mid portion, OM2 occluded, and 95% stenosis mid RCA. The patient remained HDS and was transferred to [facility] for cardiac surgery workup.     Operation Procedure  12/20/23 Dr Phelps  Off Pump CABG X4 w/ LIMA to LAD, SVG to Diag, and SVG Sequenced to OM and PDA    CTICU uneventful  Transfer to T3 12/21/23  -----------------------  Interval History:   Uneventful   Awaiting precert for SNF    SUBJECTIVE:  Denies complaints    Objective   BP 99/59 (BP Location: Right arm, Patient Position: Lying)   Pulse 78   Temp 37 °C (98.6 °F) (Temporal)   Resp 18   Ht 1.854 m (6' 0.99\")   Wt (!) 155 kg (342 lb 3.2 oz)   SpO2 95%   BMI 45.16 kg/m²   Pain Score: 0 - No pain   3 Day Weight Change: Unable to Calculate    Heart Rate:  [66-99]   Temp:  [35.3 °C (95.5 °F)-37 °C (98.6 °F)]   Resp:  [16-18]   BP: ()/(57-75)   SpO2:  [95 %-100 %]     Intake and Output    Intake/Output Summary (Last 24 hours) at 12/27/2023 1506  Last data filed at 12/27/2023 1319  Gross per 24 hour   Intake 600 ml   Output 1200 ml   Net -600 ml       Physical Exam  Physical Exam  Vitals and nursing note reviewed.   Constitutional:       General: He is not in acute distress.     Appearance: He is obese.   HENT:      Head: Normocephalic.      Mouth/Throat:     "  Mouth: Mucous membranes are moist.   Eyes:      Conjunctiva/sclera: Conjunctivae normal.   Cardiovascular:      Rate and Rhythm: Normal rate and regular rhythm.      Pulses: Normal pulses.      Heart sounds: Normal heart sounds.      Comments: Tele NSR 70-80s  No wires  Pulmonary:      Effort: Pulmonary effort is normal.      Breath sounds: Normal breath sounds.      Comments: Sternum stable  Abdominal:      General: Bowel sounds are normal.      Palpations: Abdomen is soft.      Tenderness: There is no abdominal tenderness.      Comments: postop BM 12/26  Per pt normally has BM every 3-5 days   Genitourinary:     Comments: Voiding in urinal    Musculoskeletal:      Cervical back: Neck supple.      Right lower leg: No edema.      Left lower leg: No edema.   Skin:     General: Skin is warm and dry.      Comments: midsternal chest incision C/D/I, well approximated, no s/s infection, Right SVG incision C/D/I, well approximated, no s/s infection  Sternum stable    Neurological:      General: No focal deficit present.      Mental Status: He is alert and oriented to person, place, and time.   Psychiatric:         Mood and Affect: Mood normal.         Behavior: Behavior normal. Behavior is cooperative.       Medications  Scheduled medications  acetaminophen, 650 mg, oral, q6h  amoxicillin, 500 mg, oral, q12h ISAIAH  aspirin, 81 mg, oral, Daily  atorvastatin, 80 mg, oral, Daily  bisacodyl, 10 mg, rectal, Daily  carvedilol, 3.125 mg, oral, BID  clopidogrel, 75 mg, oral, Daily  dapagliflozin propanediol, 10 mg, oral, Daily  [Held by provider] furosemide, 20 mg, intravenous, BID with meals  heparin, 5,000 Units, subcutaneous, q8h  insulin glargine, 52 Units, subcutaneous, Nightly  insulin lispro, 0-15 Units, subcutaneous, TID with meals  insulin lispro, 10 Units, subcutaneous, TID with meals  iron polysaccharides, 150 mg, oral, Daily  lactulose, 20 g, oral, TID  multivitamin with minerals, 1 tablet, oral, Daily  polyethylene  glycol, 17 g, oral, BID  pregabalin, 75 mg, oral, Daily  sennosides-docusate sodium, 2 tablet, oral, BID    Continuous medications   PRN medications  PRN medications: dextrose **OR** glucagon, traMADol    Labs  Results for orders placed or performed during the hospital encounter of 12/12/23 (from the past 24 hour(s))   POCT GLUCOSE   Result Value Ref Range    POCT Glucose 98 74 - 99 mg/dL   POCT GLUCOSE   Result Value Ref Range    POCT Glucose 150 (H) 74 - 99 mg/dL   POCT GLUCOSE   Result Value Ref Range    POCT Glucose 100 (H) 74 - 99 mg/dL   CBC   Result Value Ref Range    WBC 7.4 4.4 - 11.3 x10*3/uL    nRBC 0.0 0.0 - 0.0 /100 WBCs    RBC 3.50 (L) 4.50 - 5.90 x10*6/uL    Hemoglobin 10.8 (L) 13.5 - 17.5 g/dL    Hematocrit 33.0 (L) 41.0 - 52.0 %    MCV 94 80 - 100 fL    MCH 30.9 26.0 - 34.0 pg    MCHC 32.7 32.0 - 36.0 g/dL    RDW 13.4 11.5 - 14.5 %    Platelets 291 150 - 450 x10*3/uL   Magnesium   Result Value Ref Range    Magnesium 2.10 1.60 - 2.40 mg/dL   Renal Function Panel   Result Value Ref Range    Glucose 114 (H) 74 - 99 mg/dL    Sodium 139 136 - 145 mmol/L    Potassium 3.9 3.5 - 5.3 mmol/L    Chloride 104 98 - 107 mmol/L    Bicarbonate 26 21 - 32 mmol/L    Anion Gap 13 10 - 20 mmol/L    Urea Nitrogen 17 6 - 23 mg/dL    Creatinine 0.90 0.50 - 1.30 mg/dL    eGFR >90 >60 mL/min/1.73m*2    Calcium 8.8 8.6 - 10.6 mg/dL    Phosphorus 3.8 2.5 - 4.9 mg/dL    Albumin 3.3 (L) 3.4 - 5.0 g/dL   POCT GLUCOSE   Result Value Ref Range    POCT Glucose 126 (H) 74 - 99 mg/dL           IMAGES    I have personally reviewed the following test result(s):    XR chest 2 views 12/23/2023    Narrative  Interpreted By:  Albin Hills,  STUDY:  XR CHEST 2 VIEWS;  12/23/2023 9:05 am    INDICATION:  Signs/Symptoms:s/p chest tube removal.    COMPARISON:  Radiograph dated 12/22/2023    ACCESSION NUMBER(S):  [id number]    ORDERING CLINICIAN:  INEZ YOUNG    FINDINGS:  Status post median sternotomy. The cardiac silhouette size  is stable.  Assessment is limited due to rotation to the right side.    Bibasilar opacity in the lungs appear unchanged. Cannot exclude small  bilateral pleural effusion. No abena edema or sizable pneumothorax.    Postsurgical changes in the cervical spine, partially imaged. No  acute osseous change.    Impression  1. No sizable pneumothorax.  2. Other stable findings as described        Signed by: Alvaro Capellan 12/23/2023 10:01 AM  Dictation workstation:   [id number]       Transthoracic Echo (TTE) Complete With Contrast 12/11/2023    Narrative  [facility]  [address] [address] [address]  Tel [phone] Fax [phone]    TRANSTHORACIC ECHOCARDIOGRAM REPORT      Patient Name:     DEANNE Oropeza Physician:  Phillip Post MD  Study Date:       12/11/2023          Ordering Provider:  Phillip POST  MRN/PID:          [id number]            Fellow:  Accession#:       [id number]        Nurse:  Date of           1965 / 58      Sonographer:        Erika Maldonado RD  Birth/Age:        years  Gender:           M                   Additional Staff:  Height:           182.88 cm           Admit Date:         12/9/2023  Weight:           158.76 kg           Admission Status:   Inpatient - Routine  BSA:              2.70 m2             Department          [facility]  Location:  Blood Pressure: 138 /83 mmHg    Study Type:    TRANSTHORACIC ECHO (TTE) COMPLETE  Diagnosis/ICD: Non ST elevation (NSTEMI) myocardial infarction-I21.4  Indication:    NSTEMI  CPT Codes:     Echo Complete w Full Doppler-29016  Study Detail: The following Echo studies were performed: 2D, M-Mode, Doppler and  color flow. Technically challenging study due to poor acoustic  windows and body habitus. Definity used as a contrast agent for  endocardial border definition. Total contrast used for this  procedure was 2 mL via IV push.      PHYSICIAN INTERPRETATION:  Left Ventricle: Left ventricular  systolic function is mildly decreased, with an estimated ejection fraction of 40-45%. Estimated left ventricular mass is increased. There is global hypokinesis of the left ventricle with minor regional variations. The left ventricular cavity size is upper limits of normal. The left ventricular septal wall thickness is mildly increased. There is mildly increased left ventricular posterior wall thickness. Left ventricular diastolic filling was indeterminate. There are normal left ventricular filling pressures.  LV Wall Scoring:  There is diffuse hypokinesis.    Left Atrium: The left atrium is upper limits of normal in size.  Right Ventricle: The right ventricle is normal in size. There is normal right ventricular global systolic function.  Right Atrium: The right atrium is normal in size.  Aortic Valve: The aortic valve is trileaflet. There is no evidence of aortic valve stenosis.  There is no evidence of aortic valve regurgitation. The peak instantaneous gradient of the aortic valve is 2.7 mmHg. The mean gradient of the aortic valve is 2.0 mmHg.  Mitral Valve: The mitral valve is normal in structure. There is trace mitral valve regurgitation.  Tricuspid Valve: The tricuspid valve is structurally normal. There is trace tricuspid regurgitation. The right ventricular systolic pressure is unable to be estimated.  Pulmonic Valve: The pulmonic valve was not assessed. The pulmonic valve regurgitation was not assessed.  Pericardium: There is no pericardial effusion noted.  Aorta: The aortic root is normal.  Systemic Veins: The inferior vena cava appears to be of normal size. The hepatic vein appears to be of normal size. There is IVC inspiratory collapse greater than 50%.      CONCLUSIONS:  1. Left ventricular systolic function is mildly decreased with a 40-45% estimated ejection fraction.  2. Multiple segmental abnormalities exist. See findings.  3. Increased LV mass.  4. Aortic valve stenosis is not present.  5. There is  global hypokinesis of the left ventricle with minor regional variations.  ** Final **           IMPRESSION & PLAN:  POD #7  s/p CABGx4 off pump  - Increase activity/ ambulation; PT/OT  - Encourage IS, C/DB; respiratory therapy; wean O2 as burke   - Cardiac rehab referral   - Continue cardiac meds: ASA, BB, statin   - continue LIFELONG Plavix per Dr. Phelps  - Pain and anticonstipation meds  - 2v CXR    - no wires  - Tele until discharge  - Optimize nutrition and electrolytes    Rhythm  - Tele: NSR 70-80s  - Continue carvedilol 3.125mg PO BID   - Adjust medications as tolerated    Acute Blood Loss Anemia   Recent Labs     23  0944 23  0625 23  0558 23  0701 23  1042 23  0729 23  0954   HGB 10.8* 11.1* 10.8* 10.7* 10.7* 10.9* 12.7*   HCT 33.0* 33.7* 32.8* 31.5* 31.2* 32.5* 35.1*     - MV, PO Iron x1mo  - Daily labs, transfuse as indicated    Volume/Electrolyte Status: Preop wt Weight: (!) 157 kg (345 lb 11.2 oz)   Vitals:    23 0410   Weight: (!) 155 kg (342 lb 3.2 oz)   - Weight: x, 155, 154kg,155, 157  - held lasix  with addition of farxiga; re-eval in am  - Adjust diuresis as needed for postop cardiac surgery hypervolemia  - Replete electrolytes for hypokalemia/hypomagnesemia/hypophosphatemia as needed,  replace Mg & Phos ,  replaced K, Mg, Phos  - Daily weights and strict I&Os  - Daily RFP while admitted    Hypertension: home meds: lisinopril   Systolic (24hrs), Av , Min:93 , Max:118   - continue holding home lisinopril; resume as BP allows   - additional antihypertensives as needed    Hyperlipidemia: home atorvastatin   Lab Results   Component Value Date    CHOL 145 12/10/2023    HDL 25.2 12/10/2023    VLDL 69 (H) 12/10/2023    TRIG 343 (H) 12/10/2023    NHDL 120 12/10/2023   - continue atorvastatin   - follow up lipid panel with PCP/ cardiologist for ongoing lipid management    Metabolic Syndrome   - Body mass index is 45.16 kg/m².  - referral  to [facility] program   - encourage lifestyle modification  - Nutrition consult      DM type II: home meds: Glucophage   Lab Results   Component Value Date    HGBA1C 11.0 (H) 12/10/2023     Results from last 7 days   Lab Units 12/27/23  1323 12/27/23  0829 12/26/23  2147 12/26/23  1813 12/26/23  1155 12/26/23  0733 12/25/23  2034   POCT GLUCOSE mg/dL 126* 100* 150* 98 118* 134* 146*     -accuchecks premeal and at bedtime  -diabetic diet  -lispro premeal corrective scale  - Endocrinology following- appreciate recs  - Lantus 52 units at HS  - Humalog 10units TID with meals  - 12/26 started farxiga 10mg daily  - Consulted diabetic educator 12/25  - Endo Discharge recs   --- pt may be a good candidate for SGLT2i prior to discharge (suggest jardiance 25mg or farxiga 10mg)    ---  [place] Plan Pharmacist   --- email sent for scheduling in  hospital discharge diabetes clinic in [place] per pt request   --- pt would like to sample CGM prior to discharge, has iphone     Discitis Lumbar region with epidural abscess s/p debridement.  - continue home pregabalin   - holding home flexeril   - resumed home amoxicillin for chronic discitis; follows with ID and is on lifelong chronic suppression    VTE Prophylaxis: SCDs/TEDs, ambulation, SQ heparin  Code Status: Full Code    Dispo  - PT/OT recs home with home care; patient would like to go to SNF and has provided facility choice to [facility]  - if home, would benefit from homecare for cardiac surgery carepath and RN visits  - Anticipate discharge pending fluid status, glucose control, facility acceptance/ disposition plan  - Will continue to assess discharge needs      DEANNA Bay-CNP  Cardiac Surgery [facility]  [facility]  Team Phone [phone]    12/27/2023  3:06 PM

## 2023-12-28 LAB
ALBUMIN SERPL BCP-MCNC: 3.3 G/DL (ref 3.4–5)
ALBUMIN SERPL BCP-MCNC: 3.8 G/DL (ref 3.4–5)
ANION GAP SERPL CALC-SCNC: 15 MMOL/L (ref 10–20)
ANION GAP SERPL CALC-SCNC: 15 MMOL/L (ref 10–20)
BUN SERPL-MCNC: 18 MG/DL (ref 6–23)
BUN SERPL-MCNC: 19 MG/DL (ref 6–23)
CALCIUM SERPL-MCNC: 8.2 MG/DL (ref 8.6–10.6)
CALCIUM SERPL-MCNC: 9.5 MG/DL (ref 8.6–10.6)
CHLORIDE SERPL-SCNC: 101 MMOL/L (ref 98–107)
CHLORIDE SERPL-SCNC: 104 MMOL/L (ref 98–107)
CO2 SERPL-SCNC: 22 MMOL/L (ref 21–32)
CO2 SERPL-SCNC: 26 MMOL/L (ref 21–32)
CREAT SERPL-MCNC: 0.86 MG/DL (ref 0.5–1.3)
CREAT SERPL-MCNC: 0.91 MG/DL (ref 0.5–1.3)
ERYTHROCYTE [DISTWIDTH] IN BLOOD BY AUTOMATED COUNT: 13.6 % (ref 11.5–14.5)
GFR SERPL CREATININE-BSD FRML MDRD: >90 ML/MIN/1.73M*2
GFR SERPL CREATININE-BSD FRML MDRD: >90 ML/MIN/1.73M*2
GLUCOSE BLD MANUAL STRIP-MCNC: 122 MG/DL (ref 74–99)
GLUCOSE BLD MANUAL STRIP-MCNC: 129 MG/DL (ref 74–99)
GLUCOSE BLD MANUAL STRIP-MCNC: 151 MG/DL (ref 74–99)
GLUCOSE BLD MANUAL STRIP-MCNC: 91 MG/DL (ref 74–99)
GLUCOSE SERPL-MCNC: 121 MG/DL (ref 74–99)
GLUCOSE SERPL-MCNC: 65 MG/DL (ref 74–99)
HCT VFR BLD AUTO: 33.8 % (ref 41–52)
HGB BLD-MCNC: 11.1 G/DL (ref 13.5–17.5)
MAGNESIUM SERPL-MCNC: 2.99 MG/DL (ref 1.6–2.4)
MCH RBC QN AUTO: 31.5 PG (ref 26–34)
MCHC RBC AUTO-ENTMCNC: 32.8 G/DL (ref 32–36)
MCV RBC AUTO: 96 FL (ref 80–100)
NRBC BLD-RTO: 0 /100 WBCS (ref 0–0)
PHOSPHATE SERPL-MCNC: 4.1 MG/DL (ref 2.5–4.9)
PHOSPHATE SERPL-MCNC: 4.7 MG/DL (ref 2.5–4.9)
PLATELET # BLD AUTO: 338 X10*3/UL (ref 150–450)
POTASSIUM SERPL-SCNC: 4.2 MMOL/L (ref 3.5–5.3)
POTASSIUM SERPL-SCNC: 5.9 MMOL/L (ref 3.5–5.3)
RBC # BLD AUTO: 3.52 X10*6/UL (ref 4.5–5.9)
SODIUM SERPL-SCNC: 135 MMOL/L (ref 136–145)
SODIUM SERPL-SCNC: 138 MMOL/L (ref 136–145)
WBC # BLD AUTO: 7.4 X10*3/UL (ref 4.4–11.3)

## 2023-12-28 PROCEDURE — 80069 RENAL FUNCTION PANEL: CPT | Performed by: CLINICAL NURSE SPECIALIST

## 2023-12-28 PROCEDURE — 96372 THER/PROPH/DIAG INJ SC/IM: CPT | Performed by: NURSE PRACTITIONER

## 2023-12-28 PROCEDURE — 2500000004 HC RX 250 GENERAL PHARMACY W/ HCPCS (ALT 636 FOR OP/ED): Performed by: NURSE PRACTITIONER

## 2023-12-28 PROCEDURE — 99232 SBSQ HOSP IP/OBS MODERATE 35: CPT | Performed by: PHYSICIAN ASSISTANT

## 2023-12-28 PROCEDURE — 2500000001 HC RX 250 WO HCPCS SELF ADMINISTERED DRUGS (ALT 637 FOR MEDICARE OP): Performed by: NURSE PRACTITIONER

## 2023-12-28 PROCEDURE — 36415 COLL VENOUS BLD VENIPUNCTURE: CPT | Performed by: CLINICAL NURSE SPECIALIST

## 2023-12-28 PROCEDURE — 97530 THERAPEUTIC ACTIVITIES: CPT | Mod: GP,CQ

## 2023-12-28 PROCEDURE — 85027 COMPLETE CBC AUTOMATED: CPT | Performed by: CLINICAL NURSE SPECIALIST

## 2023-12-28 PROCEDURE — 82947 ASSAY GLUCOSE BLOOD QUANT: CPT

## 2023-12-28 PROCEDURE — 99232 SBSQ HOSP IP/OBS MODERATE 35: CPT | Performed by: CLINICAL NURSE SPECIALIST

## 2023-12-28 PROCEDURE — 1200000002 HC GENERAL ROOM WITH TELEMETRY DAILY

## 2023-12-28 PROCEDURE — 83735 ASSAY OF MAGNESIUM: CPT | Performed by: CLINICAL NURSE SPECIALIST

## 2023-12-28 PROCEDURE — 97116 GAIT TRAINING THERAPY: CPT | Mod: GP,CQ

## 2023-12-28 RX ORDER — INSULIN GLARGINE 100 [IU]/ML
35 INJECTION, SOLUTION SUBCUTANEOUS NIGHTLY
Status: DISCONTINUED | OUTPATIENT
Start: 2023-12-28 | End: 2023-12-29

## 2023-12-28 RX ORDER — BLOOD-GLUCOSE SENSOR
1 EACH MISCELLANEOUS CONTINUOUS
Qty: 3 EACH | Refills: 11 | Status: SHIPPED | OUTPATIENT
Start: 2023-12-28 | End: 2024-01-27

## 2023-12-28 RX ADMIN — ASPIRIN 81 MG: 81 TABLET, COATED ORAL at 09:25

## 2023-12-28 RX ADMIN — AMOXICILLIN 500 MG: 500 CAPSULE ORAL at 09:24

## 2023-12-28 RX ADMIN — PREGABALIN 75 MG: 75 CAPSULE ORAL at 09:24

## 2023-12-28 RX ADMIN — HEPARIN SODIUM 5000 UNITS: 5000 INJECTION INTRAVENOUS; SUBCUTANEOUS at 05:34

## 2023-12-28 RX ADMIN — POLYSACCHARIDE-IRON COMPLEX 150 MG: 150 CAPSULE ORAL at 09:25

## 2023-12-28 RX ADMIN — DAPAGLIFLOZIN 10 MG: 10 TABLET, FILM COATED ORAL at 09:24

## 2023-12-28 RX ADMIN — CLOPIDOGREL BISULFATE 75 MG: 75 TABLET ORAL at 09:24

## 2023-12-28 RX ADMIN — HEPARIN SODIUM 5000 UNITS: 5000 INJECTION INTRAVENOUS; SUBCUTANEOUS at 20:28

## 2023-12-28 RX ADMIN — CARVEDILOL 3.12 MG: 3.12 TABLET, FILM COATED ORAL at 20:28

## 2023-12-28 RX ADMIN — SENNOSIDES AND DOCUSATE SODIUM 2 TABLET: 8.6; 5 TABLET ORAL at 20:31

## 2023-12-28 RX ADMIN — AMOXICILLIN 500 MG: 500 CAPSULE ORAL at 20:28

## 2023-12-28 RX ADMIN — Medication 1 TABLET: at 09:25

## 2023-12-28 RX ADMIN — INSULIN GLARGINE 35 UNITS: 100 INJECTION, SOLUTION SUBCUTANEOUS at 20:28

## 2023-12-28 RX ADMIN — ATORVASTATIN CALCIUM 80 MG: 80 TABLET, FILM COATED ORAL at 20:28

## 2023-12-28 RX ADMIN — HEPARIN SODIUM 5000 UNITS: 5000 INJECTION INTRAVENOUS; SUBCUTANEOUS at 13:41

## 2023-12-28 RX ADMIN — CARVEDILOL 3.12 MG: 3.12 TABLET, FILM COATED ORAL at 09:24

## 2023-12-28 ASSESSMENT — COGNITIVE AND FUNCTIONAL STATUS - GENERAL
STANDING UP FROM CHAIR USING ARMS: A LITTLE
MOBILITY SCORE: 18
MOVING FROM LYING ON BACK TO SITTING ON SIDE OF FLAT BED WITH BEDRAILS: A LITTLE
TURNING FROM BACK TO SIDE WHILE IN FLAT BAD: A LITTLE
STANDING UP FROM CHAIR USING ARMS: A LITTLE
TURNING FROM BACK TO SIDE WHILE IN FLAT BAD: A LITTLE
MOVING TO AND FROM BED TO CHAIR: A LITTLE
DAILY ACTIVITIY SCORE: 24
MOBILITY SCORE: 17
WALKING IN HOSPITAL ROOM: A LITTLE
CLIMB 3 TO 5 STEPS WITH RAILING: A LOT
CLIMB 3 TO 5 STEPS WITH RAILING: A LITTLE
MOVING TO AND FROM BED TO CHAIR: A LITTLE
WALKING IN HOSPITAL ROOM: A LITTLE
MOVING FROM LYING ON BACK TO SITTING ON SIDE OF FLAT BED WITH BEDRAILS: A LITTLE

## 2023-12-28 ASSESSMENT — PAIN - FUNCTIONAL ASSESSMENT: PAIN_FUNCTIONAL_ASSESSMENT: 0-10

## 2023-12-28 ASSESSMENT — PAIN SCALES - GENERAL
PAINLEVEL_OUTOF10: 0 - NO PAIN

## 2023-12-28 NOTE — PROGRESS NOTES
"Behzad Diaz is a 58 y.o. male on day 16 of admission presenting with Coronary artery disease (CAD) excluded.    Subjective   Patient status post up for his CABG x 4  Pt does have small appetite, states he ate some dinner and lunch yesterday.  Pt states at home he rarely eats breakfast  Agreeable to sample CGM at discharge (has iphone), and  endo/pharmacy follow up    Objective   Review of Systems   All other systems reviewed and are negative.    Physical Exam  Constitutional:       Appearance: Normal appearance. He is obese.   HENT:      Head: Normocephalic and atraumatic.      Nose: Nose normal.      Mouth/Throat:      Mouth: Mucous membranes are moist.      Pharynx: Oropharynx is clear.   Eyes:      Extraocular Movements: Extraocular movements intact.      Conjunctiva/sclera: Conjunctivae normal.   Cardiovascular:      Rate and Rhythm: Normal rate and regular rhythm.      Pulses: Normal pulses.      Heart sounds: Normal heart sounds.   Pulmonary:      Effort: Pulmonary effort is normal.      Breath sounds: Normal breath sounds.   Abdominal:      General: Abdomen is flat. Bowel sounds are normal.      Palpations: Abdomen is soft.   Neurological:      General: No focal deficit present.      Mental Status: He is alert and oriented to person, place, and time. Mental status is at baseline.   Psychiatric:         Mood and Affect: Mood normal.         Behavior: Behavior normal.         Thought Content: Thought content normal.         Judgment: Judgment normal.           Last Recorded Vitals  Blood pressure 106/65, pulse 75, temperature 35.9 °C (96.6 °F), resp. rate 18, height 1.854 m (6' 0.99\"), weight (!) 155 kg (342 lb 3.2 oz), SpO2 98 %.  Intake/Output last 3 Shifts:  I/O last 3 completed shifts:  In: 1080 (7 mL/kg) [P.O.:1080]  Out: 2125 (13.7 mL/kg) [Urine:2125 (0.4 mL/kg/hr)]  Weight: 155.2 kg     Relevant Results  Results from last 7 days   Lab Units 12/28/23  1813 12/28/23  1146 12/28/23  1049 12/28/23  0817 " 12/28/23  0720 12/27/23  2124 12/27/23  1725 12/27/23  1323 12/27/23  0944 12/26/23  0733 12/26/23  0625 12/25/23  0732 12/25/23  0558   POCT GLUCOSE mg/dL 129* 122*  --  91  --  154* 134*   < >  --    < >  --    < >  --    GLUCOSE mg/dL  --   --  121*  --  65*  --   --   --  114*  --  133*  --  89    < > = values in this interval not displayed.     Lab Review  Lab Results   Component Value Date    BILITOT 1.3 (H) 12/13/2023    CALCIUM 9.5 12/28/2023    CO2 26 12/28/2023     12/28/2023    CREATININE 0.91 12/28/2023    GLUCOSE 121 (H) 12/28/2023    ALKPHOS 58 12/13/2023    K 4.2 12/28/2023    PROT 6.5 12/13/2023     12/28/2023    AST 26 12/13/2023    ALT 26 12/13/2023    BUN 19 12/28/2023    ANIONGAP 15 12/28/2023    MG 2.99 (H) 12/28/2023    PHOS 4.1 12/28/2023    ALBUMIN 3.8 12/28/2023    LIPASE 13 09/22/2020    GFRMALE >90 07/07/2023     Lab Results   Component Value Date    TRIG 343 (H) 12/10/2023    CHOL 145 12/10/2023    LDLCALC 51 12/10/2023    HDL 25.2 12/10/2023     Lab Results   Component Value Date    HGBA1C 11.0 (H) 12/10/2023    HGBA1C 10.8 (A) 08/25/2023    HGBA1C 6.4 (A) 08/27/2021     The ASCVD Risk score (Perfecto CARR, et al., 2019) failed to calculate for the following reasons:    The patient has a prior MI or stroke diagnosis    Scheduled medications  acetaminophen, 650 mg, oral, q6h  amoxicillin, 500 mg, oral, q12h ISAIAH  aspirin, 81 mg, oral, Daily  atorvastatin, 80 mg, oral, Daily  bisacodyl, 10 mg, rectal, Daily  carvedilol, 3.125 mg, oral, BID  clopidogrel, 75 mg, oral, Daily  dapagliflozin propanediol, 10 mg, oral, Daily  [Held by provider] furosemide, 20 mg, intravenous, BID with meals  heparin, 5,000 Units, subcutaneous, q8h  insulin glargine, 35 Units, subcutaneous, Nightly  insulin lispro, 0-15 Units, subcutaneous, TID with meals  insulin lispro, 10 Units, subcutaneous, TID with meals  iron polysaccharides, 150 mg, oral, Daily  lactulose, 20 g, oral, TID  multivitamin with  minerals, 1 tablet, oral, Daily  polyethylene glycol, 17 g, oral, BID  pregabalin, 75 mg, oral, Daily  sennosides-docusate sodium, 2 tablet, oral, BID      Continuous medications       PRN medications  PRN medications: dextrose **OR** glucagon, traMADol    Assessment/Plan   Principal Problem:    Coronary artery disease (CAD) excluded  Active Problems:    Type 2 diabetes mellitus, without long-term current use of insulin (CMS/[facility])    HTN (hypertension)    NSTEMI (non-ST elevated myocardial infarction) (CMS/[facility])    Ischemic cardiomyopathy    HLD (hyperlipidemia)    Behzad Diaz is a 58 y.o. male with HTN, HLD, poorly controlled DM2, obesity class III, CAD, and history of discitis and epidural abscess s/p debridement who presents to [facility] for CABG evaluation, plan for 12/20.     Endocrinology is consulted to assist in diabetes management.  Diabetes history:  Diabetes duration: 4 to 5 years           Home blood glucose checks: Patient does not check his BG at home  Hypoglycemia (y/n, threshold and symptoms): No hypoglycemia  Home diabetes regimen: Metformin 1000 mg  BID   Home diet (how many meals a day): Patient reports poor diet as he is mostly eating out because of his work  Outpatient physician managing diabetes: PCP  Macrovascular complications: CVD [yes]     CVA [no]     PVD [no]  Microvascular complications: Retinopathy [unknown, patient has not had a retinal exam recently]            neuropathy [endorses neuropathy but attributes this to prior spinal surgery]   nephropathy [no microalbumin to creatinine ratio on chart, creatinine 0.7]  Last A1c: 11%       Recommendations:  -  endocrinology to sign off of pt's care as he can be expected to discharge on current regimen  - continue Farxiga 10mg daily  - decrease glargine 35 units at bedtime        NPO: reduce to 35 units qHS  - #3 sliding scale 3 times daily AC with meals only         NPO: adjust to ssi #2 q6hr  - continue lispro 10 units with meals        NPO or  "glucose <90mg/dL within 1hr before meal: hold  -Accu-Cheks 3 times daily before meals and at bedtime.  Please obtain bedtime BG as it often missed.  - Hypoglycemia protocol  -We will follow and adjust regimen accordingly  - continue farxiga 10mg qday    SGLT2i tx may not be utilized in inpt setting unless the following conditions are met  1) pt is eating and drinking by mouth with a total daily carb intake exceeding 60g of CHO  2) pt is urinating independently of urinary catheters  3) pt can ambulate freely to the restroom in order to maintain personal hygiene  4) no current concern for UTI/genital or inguinal candidiasis  5) no plans for NPO within the next 48-72hr      Discharge planning: pt can expect to discharge to snf or home on sglt2i, basal insulin, and bolus insulin as per regimen above.   (X) -referring to clinical pharmacy for follow up, pt aware and agreeable to  [place] Plan Pharmacist follow-up  Pt would benefit from the following: insulin at discharge after CABG, GLP-1 start and  PAP  (X) -email sent for scheduling in  hospital discharge diabetes clinic in [place] per pt request  (X) -pt would like to sample CGM prior to discharge, has iphone      - please order insulin glargine/basaglar U-100 insulin pen \"inject 35 units under the skin once before bed\" 30 days = 5 pens(15mL)  - please order insulin lispro U-100 insulin pen \"inject 10 units plus sliding scale three times daily with meals  = 0u, 151-200 = 2u, 201-250 = 4u, 251-300 = 6u, 301-350 = 8u, 351-400+ = 10u, use up to 30 units daily\" 45 days = 5 pens (15mL)  - please order insulin pen needles 32G 4mm for QID injections, 90 days = 400 pen needles  - please order glucose testing supplies for QID glucose measurement, 90 days = 1 glucose meter, 400 lancets and 400 strips  - please order alcohol swabs  - please order a form of glucagon rescue kit such as baqsimi or Gvoke hypopen  -endocrine provider ordered CGM supplies to home " "pharmacy     **write in comment section on all supplies ordered: \"substitutions may be made by pharmacist depending on insurance coverage and what the pharmacy has available\"         I spent 35 minutes on care and coordination of this patient    Sera Shepard PA-C      "

## 2023-12-28 NOTE — NURSING NOTE
"Mr. Diaz is resting in bed.  He cont to feel well.  Waiting to hear about insurance approval for SNF.  Dex Com G- 7 placed to left upper posterior arm.  He reviewed all the steps on his phone and viewed the videos for the information about the device.  We reviewed his insuin regimen and he is comfortable with using the pen and following the regimen.  Will follow up later for the CGM function.  Time spent:  30 mins.    Addendum @ 8737:  He is satisfied with the CGM and \"likes\" that he can see BG instantly.  Will follow as needed while inpatient.   "

## 2023-12-28 NOTE — PROGRESS NOTES
Physical Therapy    Physical Therapy Treatment    Patient Name: Behzad Diaz  MRN: [id number]  Today's Date: 12/28/2023  Time Calculation  Start Time: 1027  Stop Time: 1052  Time Calculation (min): 25 min       Assessment/Plan   PT Assessment  PT Assessment Results: Decreased endurance  Rehab Prognosis: Good  Barriers to Discharge: none  Evaluation/Treatment Tolerance: Patient tolerated treatment well  Medical Staff Made Aware: Yes  Strengths: Ability to acquire knowledge, Attitude of self  Barriers to Participation: Support of Caregivers  End of Session Communication: Bedside nurse  End of Session Patient Position:  (seated EOB)     PT Plan  Treatment/Interventions: Bed mobility, Transfer training, Gait training, Stair training, Balance training, Neuromuscular re-education, Strengthening, Neurodevelopmental intervention, Endurance training, Range of motion, Therapeutic exercise, Therapeutic activity  PT Plan: Skilled PT  PT Frequency: 3 times per week  PT Discharge Recommendations: Low intensity level of continued care  Equipment Recommended upon Discharge: Wheeled walker  PT Recommended Transfer Status: Assist x1, Assistive device, Contact guard  PT - OK to Discharge: Yes      General Visit Information:   PT  Visit  PT Received On: 12/28/23  Response to Previous Treatment: Patient with no complaints from previous session.  Prior to Session Communication: Bedside nurse  Patient Position Received: Bed, 2 rail up, Alarm off, not on at start of session  Family/Caregiver Present: No  General Comment: Patient agreeable to participate in therapy session     Subjective   Precautions:  Precautions  Hearing/Visual Limitations: hearing WFL and vision WFL with reading glasses  Medical Precautions: Cardiac precautions  Post-Surgical Precautions: Move in the Tube  Precautions Comment: Pt in compliance with precautions throughout session.  Vital Signs:  Vital Signs  Heart Rate: 84  Heart Rate Source: Monitor    Objective    Pain:  Pain Assessment  Pain Assessment: 0-10  Pain Score: 0 - No pain  Cognition:  Cognition  Overall Cognitive Status: Within Functional Limits  Orientation Level: Oriented X4  Lines/Tubes/Drains:       PT Treatments:  Therapeutic Exercise  Therapeutic Exercise Performed: Yes  Therapeutic Exercise Activity 1: Standing tyrese LE: heel raises x 10, marches in place x 12 total        Bed Mobility  Bed Mobility: Yes  Bed Mobility 1  Bed Mobility 1: Supine to sitting  Level of Assistance 1: Close supervision  Bed Mobility Comments 1: HOB elevated (log roll technique)  Ambulation/Gait Training  Ambulation/Gait Training Performed: Yes  Ambulation/Gait Training 1  Surface 1: Level tile  Device 1: Rolling walker  Assistance 1: Close supervision  Quality of Gait 1: Decreased step length  Comments/Distance (ft) 1: 100 ft, 75 ft  Transfers  Transfer: Yes  Transfer 1  Transfer From 1: Sit to  Transfer to 1: Stand  Technique 1: Sit to stand  Transfer Device 1: Walker  Transfer Level of Assistance 1: Close supervision  Trials/Comments 1: x7 trials  Transfers 2  Transfer From 2: Stand to  Transfer to 2: Sit  Technique 2: Stand to sit  Transfer Device 2: Walker  Transfer Level of Assistance 2: Close supervision  Trials/Comments 2: x7 trials  Stairs  Stairs: No          Outcome Measures:  [facility] Basic Mobility  Turning from your back to your side while in a flat bed without using bedrails: A little  Moving from lying on your back to sitting on the side of a flat bed without using bedrails: A little  Moving to and from bed to chair (including a wheelchair): A little  Standing up from a chair using your arms (e.g. wheelchair or bedside chair): A little  To walk in hospital room: A little  Climbing 3-5 steps with railing: A lot  Basic Mobility - Total Score: 17                       Other Measures  5x Sit to Stand: completed test in 14.66 sec with close sup and use of tyrese UEs.    Education Documentation  Body Mechanics, taught by Shayy  ROSALEE Gonzales at 12/28/2023 12:42 PM.  Learner: Patient  Readiness: Acceptance  Method: Explanation  Response: Verbalizes Understanding    Home Exercise Program, taught by Shayy Gonzales PTA at 12/28/2023 12:42 PM.  Learner: Patient  Readiness: Acceptance  Method: Explanation  Response: Verbalizes Understanding    Mobility Training, taught by Shayy Gonzales PTA at 12/28/2023 12:42 PM.  Learner: Patient  Readiness: Acceptance  Method: Explanation  Response: Verbalizes Understanding    Education Comments  No comments found.          OP EDUCATION:       Encounter Problems       Encounter Problems (Active)       Mobility       STG - Patient will ambulate 150 ft with LRAD and SBA. (Progressing)       Start:  12/21/23    Expected End:  01/04/24            STG - Patient will ambulate up and down a curb/step with LRAD and SBA. (Progressing)       Start:  12/21/23    Expected End:  01/04/24               Transfers       STG - Patient will perform bed mobility (log roll) independently.  (Progressing)       Start:  12/21/23    Expected End:  01/04/24            STG - Patient will transfer sit to and from stand with LRA and modified independence. (Progressing)       Start:  12/21/23    Expected End:  01/04/24 12/28/23 at 12:43 PM   Shayy Gonzales PTA   Rehab Office: [phone]

## 2023-12-28 NOTE — PROGRESS NOTES
"CARDIAC SURGERY DAILY PROGRESS NOTE  Mr. Behzad Diaz is a 57 yo M who presents to the CTICU s/p Off Pump CABG X4 w/ LIMA to LAD, SVG to Diag, and SVG Sequenced to OM and PDA (taken off of other vein proximally) with Dr. Phelps on 12/20/23. His PMH is significant for CAD, HTN, HLD, T2DM, L4-5 discitis with epidural abscess s/p debridement. He initially presented to [facility] ED on 12/10 with left sided chest pain with radiation to L arm as well as other symptoms suspicios for ACS. Workup revealed NSTEMI. TTE 10/12 with EF 40-45% with global hypokinesis of LV. LHC 12/11 revealed 100% stenosis in mid LAD, 95% stenosis proximal to mid first diagonal branch, occluded LCx after early first OM, OM1 with occluded mid portion, OM2 occluded, and 95% stenosis mid RCA. The patient remained HDS and was transferred to [facility] for cardiac surgery workup.     Operation Procedure  12/20/23 Dr Phelps  Off Pump CABG X4 w/ LIMA to LAD, SVG to Diag, and SVG Sequenced to OM and PDA    CTICU uneventful  Transfer to T3 12/21/23  -----------------------  Interval History:   Uneventful   Awaiting precert for SNF    SUBJECTIVE:  Denies complaints    Objective   /55 (BP Location: Left arm, Patient Position: Lying)   Pulse 73   Temp 35.9 °C (96.6 °F) (Temporal)   Resp 18   Ht 1.854 m (6' 0.99\")   Wt (!) 155 kg (342 lb 3.2 oz)   SpO2 98%   BMI 45.16 kg/m²   Pain Score: 0 - No pain   3 Day Weight Change: Unable to Calculate    Heart Rate:  [72-84]   Temp:  [35.7 °C (96.3 °F)-36.1 °C (97 °F)]   Resp:  [16-19]   BP: (100-119)/(55-75)   SpO2:  [96 %-100 %]     Intake and Output    Intake/Output Summary (Last 24 hours) at 12/28/2023 1533  Last data filed at 12/28/2023 0936  Gross per 24 hour   Intake 480 ml   Output 2125 ml   Net -1645 ml     Physical Exam  Physical Exam  Vitals and nursing note reviewed.   Constitutional:       General: He is not in acute distress.     Appearance: He is obese.   HENT:      Head: Normocephalic.      Mouth/Throat: "      Mouth: Mucous membranes are moist.   Eyes:      Conjunctiva/sclera: Conjunctivae normal.   Cardiovascular:      Rate and Rhythm: Normal rate and regular rhythm.      Pulses: Normal pulses.      Heart sounds: Normal heart sounds.      Comments: Tele NSR 60s-70s  No wires  Pulmonary:      Effort: Pulmonary effort is normal.      Breath sounds: Normal breath sounds.      Comments: Sternum stable  Abdominal:      General: Bowel sounds are normal.      Palpations: Abdomen is soft.      Tenderness: There is no abdominal tenderness.      Comments: postop BM 12/26  Per pt normally has BM every 3-5 days   Genitourinary:     Comments: Voiding in urinal    Musculoskeletal:      Cervical back: Neck supple.      Right lower leg: No edema.      Left lower leg: No edema.   Skin:     General: Skin is warm and dry.      Comments: midsternal chest incision C/D/I, well approximated, no s/s infection, Right SVG incision C/D/I, well approximated, no s/s infection  Sternum stable    Neurological:      General: No focal deficit present.      Mental Status: He is alert and oriented to person, place, and time.   Psychiatric:         Mood and Affect: Mood normal.         Behavior: Behavior normal. Behavior is cooperative.       Medications  Scheduled medications  acetaminophen, 650 mg, oral, q6h  amoxicillin, 500 mg, oral, q12h ISAIAH  aspirin, 81 mg, oral, Daily  atorvastatin, 80 mg, oral, Daily  bisacodyl, 10 mg, rectal, Daily  carvedilol, 3.125 mg, oral, BID  clopidogrel, 75 mg, oral, Daily  dapagliflozin propanediol, 10 mg, oral, Daily  [Held by provider] furosemide, 20 mg, intravenous, BID with meals  heparin, 5,000 Units, subcutaneous, q8h  insulin glargine, 35 Units, subcutaneous, Nightly  insulin lispro, 0-15 Units, subcutaneous, TID with meals  insulin lispro, 10 Units, subcutaneous, TID with meals  iron polysaccharides, 150 mg, oral, Daily  lactulose, 20 g, oral, TID  multivitamin with minerals, 1 tablet, oral,  Daily  polyethylene glycol, 17 g, oral, BID  pregabalin, 75 mg, oral, Daily  sennosides-docusate sodium, 2 tablet, oral, BID    Continuous medications   PRN medications  PRN medications: dextrose **OR** glucagon, traMADol    Labs  Results for orders placed or performed during the hospital encounter of 12/12/23 (from the past 24 hour(s))   POCT GLUCOSE   Result Value Ref Range    POCT Glucose 134 (H) 74 - 99 mg/dL   POCT GLUCOSE   Result Value Ref Range    POCT Glucose 154 (H) 74 - 99 mg/dL   Magnesium   Result Value Ref Range    Magnesium 2.99 (H) 1.60 - 2.40 mg/dL   Renal Function Panel   Result Value Ref Range    Glucose 65 (L) 74 - 99 mg/dL    Sodium 135 (L) 136 - 145 mmol/L    Potassium 5.9 (H) 3.5 - 5.3 mmol/L    Chloride 104 98 - 107 mmol/L    Bicarbonate 22 21 - 32 mmol/L    Anion Gap 15 10 - 20 mmol/L    Urea Nitrogen 18 6 - 23 mg/dL    Creatinine 0.86 0.50 - 1.30 mg/dL    eGFR >90 >60 mL/min/1.73m*2    Calcium 8.2 (L) 8.6 - 10.6 mg/dL    Phosphorus 4.7 2.5 - 4.9 mg/dL    Albumin 3.3 (L) 3.4 - 5.0 g/dL   POCT GLUCOSE   Result Value Ref Range    POCT Glucose 91 74 - 99 mg/dL   Renal function panel   Result Value Ref Range    Glucose 121 (H) 74 - 99 mg/dL    Sodium 138 136 - 145 mmol/L    Potassium 4.2 3.5 - 5.3 mmol/L    Chloride 101 98 - 107 mmol/L    Bicarbonate 26 21 - 32 mmol/L    Anion Gap 15 10 - 20 mmol/L    Urea Nitrogen 19 6 - 23 mg/dL    Creatinine 0.91 0.50 - 1.30 mg/dL    eGFR >90 >60 mL/min/1.73m*2    Calcium 9.5 8.6 - 10.6 mg/dL    Phosphorus 4.1 2.5 - 4.9 mg/dL    Albumin 3.8 3.4 - 5.0 g/dL   POCT GLUCOSE   Result Value Ref Range    POCT Glucose 122 (H) 74 - 99 mg/dL   CBC   Result Value Ref Range    WBC 7.4 4.4 - 11.3 x10*3/uL    nRBC 0.0 0.0 - 0.0 /100 WBCs    RBC 3.52 (L) 4.50 - 5.90 x10*6/uL    Hemoglobin 11.1 (L) 13.5 - 17.5 g/dL    Hematocrit 33.8 (L) 41.0 - 52.0 %    MCV 96 80 - 100 fL    MCH 31.5 26.0 - 34.0 pg    MCHC 32.8 32.0 - 36.0 g/dL    RDW 13.6 11.5 - 14.5 %    Platelets 338 150  - 450 x10*3/uL           IMAGES    I have personally reviewed the following test result(s):    XR chest 2 views 12/23/2023    Narrative  Interpreted By:  Alvaro Hills,  STUDY:  XR CHEST 2 VIEWS;  12/23/2023 9:05 am    INDICATION:  Signs/Symptoms:s/p chest tube removal.    COMPARISON:  Radiograph dated 12/22/2023    ACCESSION NUMBER(S):  [id number]    ORDERING CLINICIAN:  INEZ YOUNG    FINDINGS:  Status post median sternotomy. The cardiac silhouette size is stable.  Assessment is limited due to rotation to the right side.    Bibasilar opacity in the lungs appear unchanged. Cannot exclude small  bilateral pleural effusion. No abena edema or sizable pneumothorax.    Postsurgical changes in the cervical spine, partially imaged. No  acute osseous change.    Impression  1. No sizable pneumothorax.  2. Other stable findings as described        Signed by: Alvaro Capellan 12/23/2023 10:01 AM  Dictation workstation:   [id number]       Transthoracic Echo (TTE) Complete With Contrast 12/11/2023    Narrative  [facility]  [address] [address]  Tel [phone] Fax [phone]    TRANSTHORACIC ECHOCARDIOGRAM REPORT      Patient Name:     DEANNE MAYORGA         Reading Physician:  Phillip Post MD  Study Date:       12/11/2023          Ordering Provider:  Phillip POST  MRN/PID:          [id number]            Fellow:  Accession#:       [id number]        Nurse:  Date of           1965 / 58      Sonographer:        Erika Maldonado RDCS  Birth/Age:        years  Gender:           M                   Additional Staff:  Height:           182.88 cm           Admit Date:         12/9/2023  Weight:           158.76 kg           Admission Status:   Inpatient - Routine  BSA:              2.70 m2             Department          [facility]  Location:  Blood Pressure: 138 /83 mmHg    Study Type:    TRANSTHORACIC ECHO (TTE) COMPLETE  Diagnosis/ICD: Non ST elevation (NSTEMI)  myocardial infarction-I21.4  Indication:    NSTEMI  CPT Codes:     Echo Complete w Full Doppler-19384  Study Detail: The following Echo studies were performed: 2D, M-Mode, Doppler and  color flow. Technically challenging study due to poor acoustic  windows and body habitus. Definity used as a contrast agent for  endocardial border definition. Total contrast used for this  procedure was 2 mL via IV push.      PHYSICIAN INTERPRETATION:  Left Ventricle: Left ventricular systolic function is mildly decreased, with an estimated ejection fraction of 40-45%. Estimated left ventricular mass is increased. There is global hypokinesis of the left ventricle with minor regional variations. The left ventricular cavity size is upper limits of normal. The left ventricular septal wall thickness is mildly increased. There is mildly increased left ventricular posterior wall thickness. Left ventricular diastolic filling was indeterminate. There are normal left ventricular filling pressures.  LV Wall Scoring:  There is diffuse hypokinesis.    Left Atrium: The left atrium is upper limits of normal in size.  Right Ventricle: The right ventricle is normal in size. There is normal right ventricular global systolic function.  Right Atrium: The right atrium is normal in size.  Aortic Valve: The aortic valve is trileaflet. There is no evidence of aortic valve stenosis.  There is no evidence of aortic valve regurgitation. The peak instantaneous gradient of the aortic valve is 2.7 mmHg. The mean gradient of the aortic valve is 2.0 mmHg.  Mitral Valve: The mitral valve is normal in structure. There is trace mitral valve regurgitation.  Tricuspid Valve: The tricuspid valve is structurally normal. There is trace tricuspid regurgitation. The right ventricular systolic pressure is unable to be estimated.  Pulmonic Valve: The pulmonic valve was not assessed. The pulmonic valve regurgitation was not assessed.  Pericardium: There is no pericardial  effusion noted.  Aorta: The aortic root is normal.  Systemic Veins: The inferior vena cava appears to be of normal size. The hepatic vein appears to be of normal size. There is IVC inspiratory collapse greater than 50%.      CONCLUSIONS:  1. Left ventricular systolic function is mildly decreased with a 40-45% estimated ejection fraction.  2. Multiple segmental abnormalities exist. See findings.  3. Increased LV mass.  4. Aortic valve stenosis is not present.  5. There is global hypokinesis of the left ventricle with minor regional variations.  ** Final **      IMPRESSION & PLAN:  POD #8 s/p CABGx4 off pump  - Increase activity/ ambulation; PT/OT  - Encourage IS, C/DB; respiratory therapy; wean O2 as burke   - Cardiac rehab referral   - Continue cardiac meds: ASA, BB, statin   - continue L[facility] Plavix per Dr. Phelps  - Pain and anticonstipation meds  - 2v CXR    - no wires  - Tele until discharge  - Optimize nutrition and electrolytes    Rhythm  - Tele: NSR 60s-70s  - Continue carvedilol 3.125mg PO BID   - Adjust medications as tolerated    Acute Blood Loss Anemia   Recent Labs     23  1259 23  0944 23  0625 23  0558 23  0701 23  1042 23  0729   HGB 11.1* 10.8* 11.1* 10.8* 10.7* 10.7* 10.9*   HCT 33.8* 33.0* 33.7* 32.8* 31.5* 31.2* 32.5*   - MV, PO Iron x1mo  - Daily labs, transfuse as indicated    Volume/Electrolyte Status: Preop wt Weight: (!) 157 kg (345 lb 11.2 oz)   Vitals:   - Weight: XX, XX, 155, 154kg,155, 157  - held lasix  with addition of farxiga  - Adjust diuresis as needed for postop cardiac surgery hypervolemia  - Replete electrolytes for hypokalemia/hypomagnesemia/hypophosphatemia as needed,  replace Mg & Phos ,  replaced K, Mg, Phos  - Daily weights and strict I&Os  - Daily RFP while admitted    Hypertension: home meds: lisinopril   Systolic (24hrs), Av , Min:100 , Max:119   - continue holding home lisinopril; resume as BP allows   -  additional antihypertensives as needed    Hyperlipidemia: home atorvastatin   Lab Results   Component Value Date    CHOL 145 12/10/2023    HDL 25.2 12/10/2023    VLDL 69 (H) 12/10/2023    TRIG 343 (H) 12/10/2023    NHDL 120 12/10/2023   - continue atorvastatin   - follow up lipid panel with PCP/ cardiologist for ongoing lipid management    Metabolic Syndrome   - Body mass index is 45.16 kg/m².  - referral to [facility] program   - encourage lifestyle modification  - Nutrition consult      DM type II: home meds: Glucophage   Lab Results   Component Value Date    HGBA1C 11.0 (H) 12/10/2023     Results from last 7 days   Lab Units 12/28/23  1146 12/28/23  0817 12/27/23  2124 12/27/23  1725 12/27/23  1323 12/27/23  0829 12/26/23  2147   POCT GLUCOSE mg/dL 122* 91 154* 134* 126* 100* 150*   -accuchecks premeal and at bedtime  -diabetic diet  -lispro premeal corrective scale  - Endocrinology following- appreciate recs  - Lantus 35 units at HS  - Humalog 10units TID with meals  - 12/26 started farxiga 10mg daily  - Consulted diabetic educator 12/25  - Endo Discharge recs   --- pt may be a good candidate for SGLT2i prior to discharge (suggest jardiance 25mg or farxiga 10mg)    ---  [place] Plan Pharmacist   --- email sent for scheduling in  hospital discharge diabetes clinic in [place] per pt request   --- pt would like to sample CGM prior to discharge, has iphone     Discitis Lumbar region with epidural abscess s/p debridement  - continue home pregabalin   - holding home flexeril   - resumed home amoxicillin for chronic discitis; follows with ID and is on lifelong chronic suppression    VTE Prophylaxis: SCDs/TEDs, ambulation, SQ heparin  Code Status: Full Code    Dispo  - PT/OT recs home with home care; patient would like to go to SNF and has provided facility choice to [facility]; precert pending   - if home, would benefit from homecare for cardiac surgery carepath and RN visits  - Anticipate discharge pending fluid status,  glucose control, facility acceptance/ disposition plan  - Will continue to assess discharge needs    DEANNA Kimbrough-CNS  Cardiac Surgery [facility]UN[facility]AR  [facility]  Team Phone [phone]    12/28/2023  3:37 PM

## 2023-12-29 ENCOUNTER — NURSING HOME VISIT (OUTPATIENT)
Dept: POST ACUTE CARE | Facility: EXTERNAL LOCATION | Age: 58
End: 2023-12-29
Payer: COMMERCIAL

## 2023-12-29 VITALS
DIASTOLIC BLOOD PRESSURE: 60 MMHG | OXYGEN SATURATION: 97 % | HEART RATE: 77 BPM | HEIGHT: 73 IN | SYSTOLIC BLOOD PRESSURE: 108 MMHG | TEMPERATURE: 96.8 F | WEIGHT: 315 LBS | RESPIRATION RATE: 19 BRPM | BODY MASS INDEX: 41.75 KG/M2

## 2023-12-29 DIAGNOSIS — Z95.1 S/P CORONARY ARTERY BYPASS GRAFT X 4: ICD-10-CM

## 2023-12-29 DIAGNOSIS — I10 HYPERTENSION, UNSPECIFIED TYPE: ICD-10-CM

## 2023-12-29 DIAGNOSIS — E78.5 HYPERLIPIDEMIA, UNSPECIFIED HYPERLIPIDEMIA TYPE: ICD-10-CM

## 2023-12-29 DIAGNOSIS — E11.59 TYPE 2 DIABETES MELLITUS WITH OTHER CIRCULATORY COMPLICATION, WITH LONG-TERM CURRENT USE OF INSULIN (MULTI): ICD-10-CM

## 2023-12-29 DIAGNOSIS — E66.01 CLASS 3 SEVERE OBESITY DUE TO EXCESS CALORIES WITH SERIOUS COMORBIDITY AND BODY MASS INDEX (BMI) OF 50.0 TO 59.9 IN ADULT (MULTI): ICD-10-CM

## 2023-12-29 DIAGNOSIS — I25.10 CORONARY ARTERY DISEASE INVOLVING NATIVE CORONARY ARTERY OF NATIVE HEART WITHOUT ANGINA PECTORIS: Primary | ICD-10-CM

## 2023-12-29 DIAGNOSIS — E87.1 HYPONATREMIA: ICD-10-CM

## 2023-12-29 DIAGNOSIS — Z79.4 TYPE 2 DIABETES MELLITUS WITH OTHER CIRCULATORY COMPLICATION, WITH LONG-TERM CURRENT USE OF INSULIN (MULTI): ICD-10-CM

## 2023-12-29 LAB
ALBUMIN SERPL BCP-MCNC: 3.4 G/DL (ref 3.4–5)
ANION GAP SERPL CALC-SCNC: 11 MMOL/L (ref 10–20)
BUN SERPL-MCNC: 21 MG/DL (ref 6–23)
CALCIUM SERPL-MCNC: 8.9 MG/DL (ref 8.6–10.6)
CHLORIDE SERPL-SCNC: 103 MMOL/L (ref 98–107)
CO2 SERPL-SCNC: 28 MMOL/L (ref 21–32)
CREAT SERPL-MCNC: 0.77 MG/DL (ref 0.5–1.3)
ERYTHROCYTE [DISTWIDTH] IN BLOOD BY AUTOMATED COUNT: 13.7 % (ref 11.5–14.5)
GFR SERPL CREATININE-BSD FRML MDRD: >90 ML/MIN/1.73M*2
GLUCOSE BLD MANUAL STRIP-MCNC: 109 MG/DL (ref 74–99)
GLUCOSE BLD MANUAL STRIP-MCNC: 131 MG/DL (ref 74–99)
GLUCOSE BLD MANUAL STRIP-MCNC: 139 MG/DL (ref 74–99)
GLUCOSE BLD MANUAL STRIP-MCNC: 74 MG/DL (ref 74–99)
GLUCOSE SERPL-MCNC: 95 MG/DL (ref 74–99)
HCT VFR BLD AUTO: 34.1 % (ref 41–52)
HGB BLD-MCNC: 10.8 G/DL (ref 13.5–17.5)
MAGNESIUM SERPL-MCNC: 2.25 MG/DL (ref 1.6–2.4)
MCH RBC QN AUTO: 30 PG (ref 26–34)
MCHC RBC AUTO-ENTMCNC: 31.7 G/DL (ref 32–36)
MCV RBC AUTO: 95 FL (ref 80–100)
NRBC BLD-RTO: 0 /100 WBCS (ref 0–0)
PHOSPHATE SERPL-MCNC: 3.8 MG/DL (ref 2.5–4.9)
PLATELET # BLD AUTO: 337 X10*3/UL (ref 150–450)
POTASSIUM SERPL-SCNC: 3.7 MMOL/L (ref 3.5–5.3)
RBC # BLD AUTO: 3.6 X10*6/UL (ref 4.5–5.9)
SODIUM SERPL-SCNC: 138 MMOL/L (ref 136–145)
WBC # BLD AUTO: 7.9 X10*3/UL (ref 4.4–11.3)

## 2023-12-29 PROCEDURE — 99232 SBSQ HOSP IP/OBS MODERATE 35: CPT | Performed by: CLINICAL NURSE SPECIALIST

## 2023-12-29 PROCEDURE — 2500000001 HC RX 250 WO HCPCS SELF ADMINISTERED DRUGS (ALT 637 FOR MEDICARE OP): Performed by: NURSE PRACTITIONER

## 2023-12-29 PROCEDURE — 83735 ASSAY OF MAGNESIUM: CPT | Performed by: CLINICAL NURSE SPECIALIST

## 2023-12-29 PROCEDURE — 99306 1ST NF CARE HIGH MDM 50: CPT | Performed by: FAMILY MEDICINE

## 2023-12-29 PROCEDURE — 80069 RENAL FUNCTION PANEL: CPT | Performed by: CLINICAL NURSE SPECIALIST

## 2023-12-29 PROCEDURE — 2500000004 HC RX 250 GENERAL PHARMACY W/ HCPCS (ALT 636 FOR OP/ED): Performed by: CLINICAL NURSE SPECIALIST

## 2023-12-29 PROCEDURE — 82947 ASSAY GLUCOSE BLOOD QUANT: CPT

## 2023-12-29 PROCEDURE — 85027 COMPLETE CBC AUTOMATED: CPT | Performed by: CLINICAL NURSE SPECIALIST

## 2023-12-29 PROCEDURE — 2500000004 HC RX 250 GENERAL PHARMACY W/ HCPCS (ALT 636 FOR OP/ED): Performed by: NURSE PRACTITIONER

## 2023-12-29 PROCEDURE — 96372 THER/PROPH/DIAG INJ SC/IM: CPT | Performed by: NURSE PRACTITIONER

## 2023-12-29 PROCEDURE — 99232 SBSQ HOSP IP/OBS MODERATE 35: CPT | Performed by: PHYSICIAN ASSISTANT

## 2023-12-29 RX ORDER — CARVEDILOL 3.12 MG/1
3.12 TABLET ORAL 2 TIMES DAILY
Start: 2023-12-29 | End: 2024-02-06 | Stop reason: SDUPTHER

## 2023-12-29 RX ORDER — CLOPIDOGREL BISULFATE 75 MG/1
75 TABLET ORAL DAILY
Start: 2023-12-30 | End: 2024-01-24 | Stop reason: SDUPTHER

## 2023-12-29 RX ORDER — BISACODYL 10 MG/1
10 SUPPOSITORY RECTAL DAILY PRN
COMMUNITY
Start: 2023-12-29 | End: 2024-01-10 | Stop reason: ALTCHOICE

## 2023-12-29 RX ORDER — AMOXICILLIN 250 MG
2 CAPSULE ORAL 2 TIMES DAILY
COMMUNITY
Start: 2023-12-29 | End: 2024-01-10 | Stop reason: ALTCHOICE

## 2023-12-29 RX ORDER — INSULIN GLARGINE 100 [IU]/ML
25 INJECTION, SOLUTION SUBCUTANEOUS NIGHTLY
Start: 2023-12-29 | End: 2024-01-10 | Stop reason: SDUPTHER

## 2023-12-29 RX ORDER — ACETAMINOPHEN 325 MG/1
650 TABLET ORAL EVERY 6 HOURS PRN
COMMUNITY
Start: 2023-12-29 | End: 2024-01-10 | Stop reason: ALTCHOICE

## 2023-12-29 RX ORDER — POTASSIUM CHLORIDE 20 MEQ/1
40 TABLET, EXTENDED RELEASE ORAL ONCE
Status: COMPLETED | OUTPATIENT
Start: 2023-12-29 | End: 2023-12-29

## 2023-12-29 RX ORDER — INSULIN GLARGINE 100 [IU]/ML
25 INJECTION, SOLUTION SUBCUTANEOUS NIGHTLY
Status: DISCONTINUED | OUTPATIENT
Start: 2023-12-29 | End: 2023-12-29 | Stop reason: HOSPADM

## 2023-12-29 RX ORDER — INSULIN LISPRO 100 [IU]/ML
0-10 INJECTION, SOLUTION INTRAVENOUS; SUBCUTANEOUS
Start: 2023-12-29 | End: 2024-04-11 | Stop reason: WASHOUT

## 2023-12-29 RX ORDER — TRAMADOL HYDROCHLORIDE 50 MG/1
50 TABLET ORAL EVERY 6 HOURS PRN
Qty: 12 TABLET | Refills: 0 | Status: SHIPPED | OUTPATIENT
Start: 2023-12-29 | End: 2024-01-01

## 2023-12-29 RX ORDER — POLYETHYLENE GLYCOL 3350 17 G/17G
17 POWDER, FOR SOLUTION ORAL DAILY
COMMUNITY
Start: 2023-12-29 | End: 2024-01-10 | Stop reason: ALTCHOICE

## 2023-12-29 RX ORDER — INSULIN LISPRO 100 [IU]/ML
5 INJECTION, SOLUTION INTRAVENOUS; SUBCUTANEOUS
Start: 2023-12-29 | End: 2024-01-10 | Stop reason: SDUPTHER

## 2023-12-29 RX ORDER — MULTIVIT-MIN/IRON FUM/FOLIC AC 7.5 MG-4
1 TABLET ORAL DAILY
COMMUNITY
Start: 2023-12-30

## 2023-12-29 RX ORDER — DAPAGLIFLOZIN 10 MG/1
10 TABLET, FILM COATED ORAL DAILY
Start: 2023-12-30 | End: 2024-01-10

## 2023-12-29 RX ORDER — INSULIN LISPRO 100 [IU]/ML
0-10 INJECTION, SOLUTION INTRAVENOUS; SUBCUTANEOUS
Status: DISCONTINUED | OUTPATIENT
Start: 2023-12-29 | End: 2023-12-29 | Stop reason: HOSPADM

## 2023-12-29 RX ORDER — INSULIN LISPRO 100 [IU]/ML
5 INJECTION, SOLUTION INTRAVENOUS; SUBCUTANEOUS
Status: DISCONTINUED | OUTPATIENT
Start: 2023-12-29 | End: 2023-12-29 | Stop reason: HOSPADM

## 2023-12-29 RX ORDER — ASPIRIN 81 MG/1
81 TABLET ORAL DAILY
COMMUNITY
Start: 2023-12-30

## 2023-12-29 RX ADMIN — ASPIRIN 81 MG: 81 TABLET, COATED ORAL at 09:24

## 2023-12-29 RX ADMIN — HEPARIN SODIUM 5000 UNITS: 5000 INJECTION INTRAVENOUS; SUBCUTANEOUS at 06:07

## 2023-12-29 RX ADMIN — INSULIN LISPRO 10 UNITS: 100 INJECTION, SOLUTION INTRAVENOUS; SUBCUTANEOUS at 09:30

## 2023-12-29 RX ADMIN — PREGABALIN 75 MG: 75 CAPSULE ORAL at 09:24

## 2023-12-29 RX ADMIN — CARVEDILOL 3.12 MG: 3.12 TABLET, FILM COATED ORAL at 09:24

## 2023-12-29 RX ADMIN — SENNOSIDES AND DOCUSATE SODIUM 2 TABLET: 8.6; 5 TABLET ORAL at 09:24

## 2023-12-29 RX ADMIN — CLOPIDOGREL BISULFATE 75 MG: 75 TABLET ORAL at 09:24

## 2023-12-29 RX ADMIN — POLYSACCHARIDE-IRON COMPLEX 150 MG: 150 CAPSULE ORAL at 09:24

## 2023-12-29 RX ADMIN — Medication 1 TABLET: at 09:24

## 2023-12-29 RX ADMIN — POTASSIUM CHLORIDE 40 MEQ: 1500 TABLET, EXTENDED RELEASE ORAL at 10:40

## 2023-12-29 RX ADMIN — DAPAGLIFLOZIN 10 MG: 10 TABLET, FILM COATED ORAL at 09:24

## 2023-12-29 RX ADMIN — AMOXICILLIN 500 MG: 500 CAPSULE ORAL at 09:24

## 2023-12-29 ASSESSMENT — PAIN SCALES - GENERAL: PAINLEVEL_OUTOF10: 0 - NO PAIN

## 2023-12-29 ASSESSMENT — PAIN - FUNCTIONAL ASSESSMENT: PAIN_FUNCTIONAL_ASSESSMENT: 0-10

## 2023-12-29 NOTE — NURSING NOTE
Patient called out and stated that his own blood sugar machine alerted him that his blood sugar was 67. Patient stated that he feels fine. Spot check of his blood glucose showed 131. Will continue to monitor patient's blood glucose.

## 2023-12-29 NOTE — LETTER
Patient: Behzad Diaz  : 1965    Encounter Date: 2023    Documentation at [facility]    Problem List Items Addressed This Visit       HTN (hypertension)    HLD (hyperlipidemia)     Other Visit Diagnoses       Coronary artery disease involving native coronary artery of native heart without angina pectoris    -  Primary    S/P coronary artery bypass graft x 4        Type 2 diabetes mellitus with other circulatory complication, with long-term current use of insulin (CMS/[facility])        Hyponatremia        Class 3 severe obesity due to excess calories with serious comorbidity and body mass index (BMI) of 50.0 to 59.9 in adult (CMS/[facility])              No family listed in admission record      Electronically Signed By: Kaleb Brown DO   23  5:17 PM

## 2023-12-29 NOTE — PROGRESS NOTES
"Behzad Diaz is a 58 y.o. male on day 17 of admission presenting with Coronary artery disease (CAD) excluded.    Subjective   Patient status post up for his CABG x 4  Pt does have small appetite, states he ate some dinner and lunch yesterday.  Pt states at home he rarely eats breakfast  Agreeable to sample CGM at discharge (has iphone), and  endo/pharmacy follow up    Objective   Review of Systems   All other systems reviewed and are negative.    Physical Exam  Constitutional:       Appearance: Normal appearance. He is obese.   HENT:      Head: Normocephalic and atraumatic.      Nose: Nose normal.      Mouth/Throat:      Mouth: Mucous membranes are moist.      Pharynx: Oropharynx is clear.   Eyes:      Extraocular Movements: Extraocular movements intact.      Conjunctiva/sclera: Conjunctivae normal.   Cardiovascular:      Rate and Rhythm: Normal rate and regular rhythm.      Pulses: Normal pulses.      Heart sounds: Normal heart sounds.   Pulmonary:      Effort: Pulmonary effort is normal.      Breath sounds: Normal breath sounds.   Abdominal:      General: Abdomen is flat. Bowel sounds are normal.      Palpations: Abdomen is soft.   Neurological:      General: No focal deficit present.      Mental Status: He is alert and oriented to person, place, and time. Mental status is at baseline.   Psychiatric:         Mood and Affect: Mood normal.         Behavior: Behavior normal.         Thought Content: Thought content normal.         Judgment: Judgment normal.           Last Recorded Vitals  Blood pressure 116/68, pulse 67, temperature 36.6 °C (97.9 °F), resp. rate 18, height 1.854 m (6' 0.99\"), weight (!) 152 kg (335 lb 4.8 oz), SpO2 97 %.  Intake/Output last 3 Shifts:  I/O last 3 completed shifts:  In: 720 (4.7 mL/kg) [P.O.:720]  Out: 2325 (15.3 mL/kg) [Urine:2325 (0.4 mL/kg/hr)]  Weight: 152.1 kg     Relevant Results  Results from last 7 days   Lab Units 12/29/23  0858 12/29/23  0835 12/29/23  0730 12/29/23  0127 " 12/28/23  2112 12/28/23  1813 12/28/23  1146 12/28/23  1049 12/28/23  0817 12/28/23  0720 12/27/23  1323 12/27/23  0944 12/26/23  0733 12/26/23  0625   POCT GLUCOSE mg/dL 109* 74  --  131* 151* 129*   < >  --    < >  --    < >  --    < >  --    GLUCOSE mg/dL  --   --  95  --   --   --   --  121*  --  65*  --  114*  --  133*    < > = values in this interval not displayed.       Lab Review  Lab Results   Component Value Date    BILITOT 1.3 (H) 12/13/2023    CALCIUM 8.9 12/29/2023    CO2 28 12/29/2023     12/29/2023    CREATININE 0.77 12/29/2023    GLUCOSE 95 12/29/2023    ALKPHOS 58 12/13/2023    K 3.7 12/29/2023    PROT 6.5 12/13/2023     12/29/2023    AST 26 12/13/2023    ALT 26 12/13/2023    BUN 21 12/29/2023    ANIONGAP 11 12/29/2023    MG 2.25 12/29/2023    PHOS 3.8 12/29/2023    ALBUMIN 3.4 12/29/2023    LIPASE 13 09/22/2020    GFRMALE >90 07/07/2023     Lab Results   Component Value Date    TRIG 343 (H) 12/10/2023    CHOL 145 12/10/2023    LDLCALC 51 12/10/2023    HDL 25.2 12/10/2023     Lab Results   Component Value Date    HGBA1C 11.0 (H) 12/10/2023    HGBA1C 10.8 (A) 08/25/2023    HGBA1C 6.4 (A) 08/27/2021     The ASCVD Risk score (Perfecto CARR, et al., 2019) failed to calculate for the following reasons:    The patient has a prior MI or stroke diagnosis    Scheduled medications  acetaminophen, 650 mg, oral, q6h  amoxicillin, 500 mg, oral, q12h ISAIAH  aspirin, 81 mg, oral, Daily  atorvastatin, 80 mg, oral, Daily  bisacodyl, 10 mg, rectal, Daily  carvedilol, 3.125 mg, oral, BID  clopidogrel, 75 mg, oral, Daily  dapagliflozin propanediol, 10 mg, oral, Daily  [Held by provider] furosemide, 20 mg, intravenous, BID with meals  heparin, 5,000 Units, subcutaneous, q8h  insulin glargine, 35 Units, subcutaneous, Nightly  insulin lispro, 0-15 Units, subcutaneous, TID with meals  insulin lispro, 10 Units, subcutaneous, TID with meals  iron polysaccharides, 150 mg, oral, Daily  lactulose, 20 g, oral,  TID  multivitamin with minerals, 1 tablet, oral, Daily  polyethylene glycol, 17 g, oral, BID  potassium chloride CR, 40 mEq, oral, Once  pregabalin, 75 mg, oral, Daily  sennosides-docusate sodium, 2 tablet, oral, BID      Continuous medications       PRN medications  PRN medications: dextrose **OR** glucagon, traMADol    Assessment/Plan   Principal Problem:    Coronary artery disease (CAD) excluded  Active Problems:    Type 2 diabetes mellitus, without long-term current use of insulin (CMS/[facility])    HTN (hypertension)    NSTEMI (non-ST elevated myocardial infarction) (CMS/[facility])    Ischemic cardiomyopathy    HLD (hyperlipidemia)    Behzad Diaz is a 58 y.o. male with HTN, HLD, poorly controlled DM2, obesity class III, CAD, and history of discitis and epidural abscess s/p debridement who presents to [facility] for CABG evaluation, plan for 12/20.     Endocrinology is consulted to assist in diabetes management.  Diabetes history:  Diabetes duration: 4 to 5 years           Home blood glucose checks: Patient does not check his BG at home  Hypoglycemia (y/n, threshold and symptoms): No hypoglycemia  Home diabetes regimen: Metformin 1000 mg  BID   Home diet (how many meals a day): Patient reports poor diet as he is mostly eating out because of his work  Outpatient physician managing diabetes: PCP  Macrovascular complications: CVD [yes]     CVA [no]     PVD [no]  Microvascular complications: Retinopathy [unknown, patient has not had a retinal exam recently]            neuropathy [endorses neuropathy but attributes this to prior spinal surgery]   nephropathy [no microalbumin to creatinine ratio on chart, creatinine 0.7]  Last A1c: 11%       Recommendations:  -  endocrinology to sign off of pt's care as he can be expected to discharge on current regimen  - continue Farxiga 10mg daily  - decrease glargine 25 units at bedtime        NPO: reduce to 35 units qHS  - adjust to #2 sliding scale 3 times daily AC with meals only         NPO:  "adjust to ssi #2 q6hr  - decrease lispro 5 units with meals        NPO or glucose <90mg/dL within 1hr before meal: hold  -Accu-Cheks 3 times daily before meals and at bedtime.  Please obtain bedtime BG as it often missed.  - Hypoglycemia protocol  -We will follow and adjust regimen accordingly  - continue farxiga 10mg qday    SGLT2i tx may not be utilized in inpt setting unless the following conditions are met  1) pt is eating and drinking by mouth with a total daily carb intake exceeding 60g of CHO  2) pt is urinating independently of urinary catheters  3) pt can ambulate freely to the restroom in order to maintain personal hygiene  4) no current concern for UTI/genital or inguinal candidiasis  5) no plans for NPO within the next 48-72hr      Discharge planning: pt can expect to discharge to snf or home on sglt2i, basal insulin, and bolus insulin as per regimen above.   (X) -referring to clinical pharmacy for follow up, pt aware and agreeable to  [place] Plan Pharmacist follow-up  Pt would benefit from the following: insulin at discharge after CABG, GLP-1 start and  PAP  (X) -email sent for scheduling in  hospital discharge diabetes clinic in [place] per pt request  (X) -pt would like to sample CGM prior to discharge, has iphone      - please order insulin glargine/basaglar U-100 insulin pen \"inject 25 units under the skin once before bed\" 30 days = 5 pens(15mL)  - please order insulin lispro U-100 insulin pen \"inject 5 units plus sliding scale three times daily with meals  = 0u, 151-200 = 2u, 201-250 = 4u, 251-300 = 6u, 301-350 = 8u, 351-400+ = 10u, use up to 30 units daily\" 45 days = 5 pens (15mL)  - please order insulin pen needles 32G 4mm for QID injections, 90 days = 400 pen needles  - please order glucose testing supplies for QID glucose measurement, 90 days = 1 glucose meter, 400 lancets and 400 strips  - please order alcohol swabs  - please order a form of glucagon rescue kit such as baqsimi " "or Gvoke hypopen  -endocrine provider ordered CGM supplies to home pharmacy     **write in comment section on all supplies ordered: \"substitutions may be made by pharmacist depending on insurance coverage and what the pharmacy has available\"         I spent 25 minutes on care and coordination of this patient    Sera Shepard PA-C      "

## 2023-12-29 NOTE — PROGRESS NOTES
12/29/23  1100 Transitional Care Coordinator   Hitesh [facility] has authorization # [id number]. Patient is in transport with [facility] for 1145  . Report to [phone] Susie Drew RN

## 2023-12-29 NOTE — PROGRESS NOTES
"CARDIAC SURGERY DAILY PROGRESS NOTE  Mr. Behzad Diaz is a 59 yo M who presents to the CTICU s/p Off Pump CABG X4 w/ LIMA to LAD, SVG to Diag, and SVG Sequenced to OM and PDA (taken off of other vein proximally) with Dr. Phelps on 12/20/23. His PMH is significant for CAD, HTN, HLD, T2DM, L4-5 discitis with epidural abscess s/p debridement. He initially presented to [facility] ED on 12/10 with left sided chest pain with radiation to L arm as well as other symptoms suspicios for ACS. Workup revealed NSTEMI. TTE 10/12 with EF 40-45% with global hypokinesis of LV. LHC 12/11 revealed 100% stenosis in mid LAD, 95% stenosis proximal to mid first diagonal branch, occluded LCx after early first OM, OM1 with occluded mid portion, OM2 occluded, and 95% stenosis mid RCA. The patient remained HDS and was transferred to [facility] for cardiac surgery workup.     Operation Procedure  12/20/23 Dr Phelps  Off Pump CABG X4 w/ LIMA to LAD, SVG to Diag, and SVG Sequenced to OM and PDA    CTICU uneventful  Transfer to T3 12/21/23  -----------------------  Interval History:   Uneventful   Percert obtained for [facility] [place] today     SUBJECTIVE:  Denies complaints    Objective   /68   Pulse 67   Temp 36.6 °C (97.9 °F)   Resp 18   Ht 1.854 m (6' 0.99\")   Wt (!) 152 kg (335 lb 4.8 oz)   SpO2 97%   BMI 44.25 kg/m²   Pain Score: 0 - No pain   3 Day Weight Change: -1.136 kg (-2 lb 8.1 oz) per day    Heart Rate:  [67-83]   Temp:  [35.8 °C (96.4 °F)-36.6 °C (97.9 °F)]   Resp:  [18]   BP: (100-122)/(55-76)   Weight:  [152 kg (335 lb 4.8 oz)]   SpO2:  [97 %-99 %]     Intake and Output    Intake/Output Summary (Last 24 hours) at 12/29/2023 1033  Last data filed at 12/29/2023 0945  Gross per 24 hour   Intake 600 ml   Output 1150 ml   Net -550 ml       Physical Exam  Physical Exam  Vitals and nursing note reviewed.   Constitutional:       General: He is not in acute distress.     Appearance: He is obese.   HENT:      Head: Normocephalic.      " Mouth/Throat:      Mouth: Mucous membranes are moist.   Eyes:      Conjunctiva/sclera: Conjunctivae normal.   Cardiovascular:      Rate and Rhythm: Normal rate and regular rhythm.      Pulses: Normal pulses.      Heart sounds: Normal heart sounds.      Comments: Tele NSR 60s-70s  No wires  Pulmonary:      Effort: Pulmonary effort is normal.      Breath sounds: Normal breath sounds.      Comments: Sternum stable  Abdominal:      General: Bowel sounds are normal.      Palpations: Abdomen is soft.      Tenderness: There is no abdominal tenderness.      Comments: postop BM 12/26  Per pt normally has BM every 3-5 days   Genitourinary:     Comments: Voiding in urinal    Musculoskeletal:      Cervical back: Neck supple.      Right lower leg: No edema.      Left lower leg: No edema.   Skin:     General: Skin is warm and dry.      Comments: midsternal chest incision C/D/I, well approximated, no s/s infection, Right SVG incision C/D/I, well approximated, no s/s infection  Sternum stable    Neurological:      General: No focal deficit present.      Mental Status: He is alert and oriented to person, place, and time.   Psychiatric:         Mood and Affect: Mood normal.         Behavior: Behavior normal. Behavior is cooperative.       Medications  Scheduled medications  acetaminophen, 650 mg, oral, q6h  amoxicillin, 500 mg, oral, q12h ISAIAH  aspirin, 81 mg, oral, Daily  atorvastatin, 80 mg, oral, Daily  bisacodyl, 10 mg, rectal, Daily  carvedilol, 3.125 mg, oral, BID  clopidogrel, 75 mg, oral, Daily  dapagliflozin propanediol, 10 mg, oral, Daily  [Held by provider] furosemide, 20 mg, intravenous, BID with meals  heparin, 5,000 Units, subcutaneous, q8h  insulin glargine, 25 Units, subcutaneous, Nightly  insulin lispro, 0-10 Units, subcutaneous, TID with meals  insulin lispro, 5 Units, subcutaneous, TID with meals  iron polysaccharides, 150 mg, oral, Daily  lactulose, 20 g, oral, TID  multivitamin with minerals, 1 tablet, oral,  Daily  polyethylene glycol, 17 g, oral, BID  potassium chloride CR, 40 mEq, oral, Once  pregabalin, 75 mg, oral, Daily  sennosides-docusate sodium, 2 tablet, oral, BID    Continuous medications   PRN medications  PRN medications: dextrose **OR** glucagon, traMADol    Labs  Results for orders placed or performed during the hospital encounter of 12/12/23 (from the past 24 hour(s))   Renal function panel   Result Value Ref Range    Glucose 121 (H) 74 - 99 mg/dL    Sodium 138 136 - 145 mmol/L    Potassium 4.2 3.5 - 5.3 mmol/L    Chloride 101 98 - 107 mmol/L    Bicarbonate 26 21 - 32 mmol/L    Anion Gap 15 10 - 20 mmol/L    Urea Nitrogen 19 6 - 23 mg/dL    Creatinine 0.91 0.50 - 1.30 mg/dL    eGFR >90 >60 mL/min/1.73m*2    Calcium 9.5 8.6 - 10.6 mg/dL    Phosphorus 4.1 2.5 - 4.9 mg/dL    Albumin 3.8 3.4 - 5.0 g/dL   POCT GLUCOSE   Result Value Ref Range    POCT Glucose 122 (H) 74 - 99 mg/dL   CBC   Result Value Ref Range    WBC 7.4 4.4 - 11.3 x10*3/uL    nRBC 0.0 0.0 - 0.0 /100 WBCs    RBC 3.52 (L) 4.50 - 5.90 x10*6/uL    Hemoglobin 11.1 (L) 13.5 - 17.5 g/dL    Hematocrit 33.8 (L) 41.0 - 52.0 %    MCV 96 80 - 100 fL    MCH 31.5 26.0 - 34.0 pg    MCHC 32.8 32.0 - 36.0 g/dL    RDW 13.6 11.5 - 14.5 %    Platelets 338 150 - 450 x10*3/uL   POCT GLUCOSE   Result Value Ref Range    POCT Glucose 129 (H) 74 - 99 mg/dL   POCT GLUCOSE   Result Value Ref Range    POCT Glucose 151 (H) 74 - 99 mg/dL   POCT GLUCOSE   Result Value Ref Range    POCT Glucose 131 (H) 74 - 99 mg/dL   CBC   Result Value Ref Range    WBC 7.9 4.4 - 11.3 x10*3/uL    nRBC 0.0 0.0 - 0.0 /100 WBCs    RBC 3.60 (L) 4.50 - 5.90 x10*6/uL    Hemoglobin 10.8 (L) 13.5 - 17.5 g/dL    Hematocrit 34.1 (L) 41.0 - 52.0 %    MCV 95 80 - 100 fL    MCH 30.0 26.0 - 34.0 pg    MCHC 31.7 (L) 32.0 - 36.0 g/dL    RDW 13.7 11.5 - 14.5 %    Platelets 337 150 - 450 x10*3/uL   Magnesium   Result Value Ref Range    Magnesium 2.25 1.60 - 2.40 mg/dL   Renal Function Panel   Result Value Ref  Range    Glucose 95 74 - 99 mg/dL    Sodium 138 136 - 145 mmol/L    Potassium 3.7 3.5 - 5.3 mmol/L    Chloride 103 98 - 107 mmol/L    Bicarbonate 28 21 - 32 mmol/L    Anion Gap 11 10 - 20 mmol/L    Urea Nitrogen 21 6 - 23 mg/dL    Creatinine 0.77 0.50 - 1.30 mg/dL    eGFR >90 >60 mL/min/1.73m*2    Calcium 8.9 8.6 - 10.6 mg/dL    Phosphorus 3.8 2.5 - 4.9 mg/dL    Albumin 3.4 3.4 - 5.0 g/dL   POCT GLUCOSE   Result Value Ref Range    POCT Glucose 74 74 - 99 mg/dL   POCT GLUCOSE   Result Value Ref Range    POCT Glucose 109 (H) 74 - 99 mg/dL           IMAGES    I have personally reviewed the following test result(s):    XR chest 2 views 12/23/2023    Narrative  Interpreted By:  Alvaro Hills,  STUDY:  XR CHEST 2 VIEWS;  12/23/2023 9:05 am    INDICATION:  Signs/Symptoms:s/p chest tube removal.    COMPARISON:  Radiograph dated 12/22/2023    ACCESSION NUMBER(S):  [id number]    ORDERING CLINICIAN:  INEZ YOUNG    FINDINGS:  Status post median sternotomy. The cardiac silhouette size is stable.  Assessment is limited due to rotation to the right side.    Bibasilar opacity in the lungs appear unchanged. Cannot exclude small  bilateral pleural effusion. No abena edema or sizable pneumothorax.    Postsurgical changes in the cervical spine, partially imaged. No  acute osseous change.    Impression  1. No sizable pneumothorax.  2. Other stable findings as described        Signed by: Alvaro Capellan 12/23/2023 10:01 AM  Dictation workstation:   [id number]       Transthoracic Echo (TTE) Complete With Contrast 12/11/2023    [place]  [facility] [place]  [address] [id number]  Tel [phone] Fax [phone]    TRANSTHORACIC ECHOCARDIOGRAM REPORT      Patient Name:     DEANNE MAYORGA         Johnna Physician:  [id number] Post MD  Study Date:       12/11/2023          Ordering Provider:  Phillip POST  MRN/PID:          [id number]            Fellow:  Accession#:       [id number]         Nurse:  Date of           1965 / 58      Sonographer:        Erika Maldonado RDCS  Birth/Age:        years  Gender:           M                   Additional Staff:  Height:           182.88 cm           Admit Date:         12/9/2023  Weight:           158.76 kg           Admission Status:   Inpatient - Routine  BSA:              2.70 m2             Department          [facility] CCU  Location:  Blood Pressure: 138 /83 mmHg    Study Type:    TRANSTHORACIC ECHO (TTE) COMPLETE  Diagnosis/ICD: Non ST elevation (NSTEMI) myocardial infarction-I21.4  Indication:    NSTEMI  CPT Codes:     Echo Complete w Full Doppler-13301  Study Detail: The following Echo studies were performed: 2D, M-Mode, Doppler and  color flow. Technically challenging study due to poor acoustic  windows and body habitus. Definity used as a contrast agent for  endocardial border definition. Total contrast used for this  procedure was 2 mL via IV push.      PHYSICIAN INTERPRETATION:  Left Ventricle: Left ventricular systolic function is mildly decreased, with an estimated ejection fraction of 40-45%. Estimated left ventricular mass is increased. There is global hypokinesis of the left ventricle with minor regional variations. The left ventricular cavity size is upper limits of normal. The left ventricular septal wall thickness is mildly increased. There is mildly increased left ventricular posterior wall thickness. Left ventricular diastolic filling was indeterminate. There are normal left ventricular filling pressures.  LV Wall Scoring:  There is diffuse hypokinesis.    Left Atrium: The left atrium is upper limits of normal in size.  Right Ventricle: The right ventricle is normal in size. There is normal right ventricular global systolic function.  Right Atrium: The right atrium is normal in size.  Aortic Valve: The aortic valve is trileaflet. There is no evidence of aortic valve stenosis.  There is no evidence of aortic valve regurgitation. The  peak instantaneous gradient of the aortic valve is 2.7 mmHg. The mean gradient of the aortic valve is 2.0 mmHg.  Mitral Valve: The mitral valve is normal in structure. There is trace mitral valve regurgitation.  Tricuspid Valve: The tricuspid valve is structurally normal. There is trace tricuspid regurgitation. The right ventricular systolic pressure is unable to be estimated.  Pulmonic Valve: The pulmonic valve was not assessed. The pulmonic valve regurgitation was not assessed.  Pericardium: There is no pericardial effusion noted.  Aorta: The aortic root is normal.  Systemic Veins: The inferior vena cava appears to be of normal size. The hepatic vein appears to be of normal size. There is IVC inspiratory collapse greater than 50%.      CONCLUSIONS:  1. Left ventricular systolic function is mildly decreased with a 40-45% estimated ejection fraction.  2. Multiple segmental abnormalities exist. See findings.  3. Increased LV mass.  4. Aortic valve stenosis is not present.  5. There is global hypokinesis of the left ventricle with minor regional variations.  ** Final **      IMPRESSION & PLAN:  POD #9 s/p CABGx4 off pump  - Increase activity/ ambulation; PT/OT  - Encourage IS, C/DB; respiratory therapy; wean O2 as burke   - Cardiac rehab referral   - Continue cardiac meds: ASA, BB, statin   - continue LIFELONG Plavix per Dr. Phelps  - Pain and anticonstipation meds  - 2v CXR 12/23   - no wires  - Tele until discharge  - Optimize nutrition and electrolytes    Rhythm  - Tele: NSR 60s-80s  - Continue carvedilol 3.125mg PO BID   - Adjust medications as tolerated    Acute Blood Loss Anemia   Recent Labs     12/29/23  0630 12/28/23  1259 12/27/23  0944 12/26/23  0625 12/25/23  0558 12/24/23  0701 12/23/23  1042   HGB 10.8* 11.1* 10.8* 11.1* 10.8* 10.7* 10.7*   HCT 34.1* 33.8* 33.0* 33.7* 32.8* 31.5* 31.2*     - MV, PO Iron x1mo  - Daily labs, transfuse as indicated    Volume/Electrolyte Status: Preop wt Weight: (!) 157 kg (345  lb 11.2 oz)   Vitals:    23 0602   Weight: (!) 152 kg (335 lb 4.8 oz)   - Weight: 152, XX, XX, 155, 154kg,155, 157  - held lasix  with addition of farxiga  - Adjust diuresis as needed for postop cardiac surgery hypervolemia  - Replete electrolytes for hypokalemia/hypomagnesemia/hypophosphatemia as needed,  replace Mg & Phos , replaced K, Mg, Phos;  replaced K  - Daily weights and strict I&Os  - Daily RFP while admitted    Hypertension: home meds: lisinopril   Systolic (24hrs), Av , Min:100 , Max:122   - continue holding home lisinopril; resume as BP allows   - additional antihypertensives as needed    Hyperlipidemia: home atorvastatin   Lab Results   Component Value Date    CHOL 145 12/10/2023    HDL 25.2 12/10/2023    VLDL 69 (H) 12/10/2023    TRIG 343 (H) 12/10/2023    NHDL 120 12/10/2023   - continue atorvastatin   - follow up lipid panel with PCP/ cardiologist for ongoing lipid management    Metabolic Syndrome   - Body mass index is 44.25 kg/m².  - referral to [facility] program   - encourage lifestyle modification  - Nutrition consult      DM type II: home meds: Glucophage   Lab Results   Component Value Date    HGBA1C 11.0 (H) 12/10/2023     Results from last 7 days   Lab Units 23  0858 23  0835 23  0127 23  2112 23  1813 23  1146 23  0817   POCT GLUCOSE mg/dL 109* 74 131* 151* 129* 122* 91     -accuchecks premeal and at bedtime  -diabetic diet  -lispro premeal corrective scale  - Endocrinology following- appreciate recs  - Lantus 25 units at HS  - Humalog 5units TID with meals  -  started farxiga 10mg daily  - Consulted diabetic educator   - Endo Discharge recs   --- pt may be a good candidate for SGLT2i prior to discharge (suggest jardiance 25mg or farxiga 10mg)    ---  [place] Plan Pharmacist   --- email sent for scheduling in  hospital discharge diabetes clinic in [place] per pt request   --- pt would like to sample CGM  prior to discharge, has iphone     Discitis Lumbar region with epidural abscess s/p debridement  - continue home pregabalin   - holding home flexeril   - resumed home amoxicillin for chronic discitis; follows with ID and is on lifelong chronic suppression    VTE Prophylaxis: SCDs/TEDs, ambulation, SQ heparin  Code Status: Full Code    Dispo  - PT/OT recs home with home care; [facility]; precert obtained 12/29/23  - Discharge today 12/29/23 1145am   - Will continue to assess discharge needs    DEANNA Kimbrough-CNS  Cardiac Surgery [facility]UN[facility]AR  [facility]  Team Phone [phone]    12/29/2023  10:33 AM

## 2023-12-29 NOTE — NURSING NOTE
Nursing Discharge Note 12/29/2023   Patient discharged to [facility]. Removed peripheral IVs; tips intact. D/C'd off tele monitor. Nursing assistant helped patient gather belongings. Transported via wheelchair to community [facility] ambulance.  Marylou Cheatham RN

## 2023-12-29 NOTE — DISCHARGE SUMMARY
Cardiac Surgery Inpatient Discharge Summary    BRIEF OVERVIEW  Admitting Provider: Tony Krishnan MD PhD  Discharge Provider: Mattie Phelps MD  Primary Care Physician at Discharge: Tomer Arce [phone]   Cardiologist at Discharge: referred    Admission Date: 12/12/2023     Discharge Date: 12/29/2023    Primary Discharge Diagnosis  CAD   Secondary Discharge Diagnosis  Diabetes     Discharge Disposition  Short Term Acute Hospital  Code Status at Discharge: Full Code    Outpatient Follow-Up  Future Appointments   Date Time Provider Department Center   1/4/2024  8:20 AM CARDSURG [facility] [id number] NURSE [id number] Academic   1/10/2024  2:40 PM Tomer Arce DO [id number] [place]   1/24/2024  2:00 PM Mattie Phelps MD [id number] [facility]   2/6/2024  2:00 PM Sera Shepard PA-C [id number] [place]       Test Results Pending at Discharge  Pending Labs       Order Current Status    Heparin Assay Collected (12/16/23 0604)          DETAILS OF HOSPITAL STAY    Presenting Problem/History of Present Illness  Coronary artery disease (CAD) excluded [Z03.89]    Hospital Course Mr. Behzad Diaz is a 57 yo M who presents to the CTICU s/p Off Pump CABG X4 w/ LIMA to LAD, SVG to Diag, and SVG Sequenced to OM and PDA (taken off of other vein proximally) with Dr. Phelps on 12/20/23. His PMH is significant for CAD, HTN, HLD, T2DM, L4-5 discitis with epidural abscess s/p debridement. He initially presented to [facility] ED on 12/10 with left sided chest pain with radiation to L arm as well as other symptoms suspicios for ACS. Workup revealed NSTEMI. TTE 10/12 with EF 40-45% with global hypokinesis of LV. LHC 12/11 revealed 100% stenosis in mid LAD, 95% stenosis proximal to mid first diagonal branch, occluded LCx after early first OM, OM1 with occluded mid portion, OM2 occluded, and 95% stenosis mid RCA. The patient remained HDS and was transferred to [facility] for cardiac surgery workup.     Operation Procedure  12/20/23   "Gray  Off Pump CABG X4 w/ LIMA to LAD, SVG to Diag, and SVG Sequenced to OM and PDA    CTICU uneventful  Transfer to T3 12/21/23  -----------------------  Floor Course:  - Patient was diuresed for fluid volume overload post cardiac surgery; Preop weight: Weight: (!) 157 kg (345 lb 11.2 oz)kg, discharge wt:   Vitals:    12/29/23 0602   Weight: (!) 152 kg (335 lb 4.8 oz)   Kg  /60 (BP Location: Left arm, Patient Position: Lying)   Pulse 77   Temp 36 °C (96.8 °F) (Temporal)   Resp 19   Ht 1.854 m (6' 0.99\")   Wt (!) 152 kg (335 lb 4.8 oz)   SpO2 97%   BMI 44.25 kg/m²     IMPRESSION & PLAN:  POD #9 s/p CABGx4 off pump  - Increase activity/ ambulation; PT/OT  - Encourage IS, C/DB; respiratory therapy; wean O2 as burke   - Cardiac rehab referral   - Continue cardiac meds: ASA, BB, statin   - continue LIFELONG Plavix per Dr. Phelps  - Pain and anticonstipation meds  - 2v CXR 12/23   - no wires  - Tele until discharge  - Optimize nutrition and electrolytes     Rhythm  - Tele: NSR 60s-80s  - Continue carvedilol 3.125mg PO BID   - Adjust medications as tolerated     Acute Blood Loss Anemia             Recent Labs     12/29/23  0630 12/28/23  1259 12/27/23  0944 12/26/23  0625 12/25/23  0558 12/24/23  0701 12/23/23  1042   HGB 10.8* 11.1* 10.8* 11.1* 10.8* 10.7* 10.7*   HCT 34.1* 33.8* 33.0* 33.7* 32.8* 31.5* 31.2*      - MV, PO Iron x1mo  - Daily labs, transfuse as indicated     Volume/Electrolyte Status: Preop wt Weight: (!) 157 kg (345 lb 11.2 oz)   Vitals       Vitals:     12/29/23 0602   Weight: (!) 152 kg (335 lb 4.8 oz)      - Weight: 152, XX, XX, 155, 154kg,155, 157  - held lasix 12/26 with addition of farxiga  - Adjust diuresis as needed for postop cardiac surgery hypervolemia  - Replete electrolytes for hypokalemia/hypomagnesemia/hypophosphatemia as needed, 12/23 replace Mg & Phos ,12/24 replaced K, Mg, Phos; 12/29 replaced K  - Daily weights and strict I&Os  - Daily RFP while admitted     Hypertension: home " meds: lisinopril   Systolic (24hrs), Av , Min:100 , Max:122   - continue holding home lisinopril; resume as BP allows   - additional antihypertensives as needed     Hyperlipidemia: home atorvastatin         Lab Results   Component Value Date     CHOL 145 12/10/2023     HDL 25.2 12/10/2023     VLDL 69 (H) 12/10/2023     TRIG 343 (H) 12/10/2023     NHDL 120 12/10/2023   - continue atorvastatin   - follow up lipid panel with PCP/ cardiologist for ongoing lipid management     Metabolic Syndrome   - Body mass index is 44.25 kg/m².  - referral to [employer] program   - encourage lifestyle modification  - Nutrition consult       DM type II: home meds: Glucophage         Lab Results   Component Value Date     HGBA1C 11.0 (H) 12/10/2023                 Results from last 7 days   Lab Units 23  0858 23  0835 23  0127 23  2112 23  1813 23  1146 23  0817   POCT GLUCOSE mg/dL 109* 74 131* 151* 129* 122* 91      -accuchecks premeal and at bedtime  -diabetic diet  -lispro premeal corrective scale  - Endocrinology following- appreciate recs  - Lantus 25 units at HS  - Humalog 5units TID with meals  -  started farxiga 10mg daily  - Consulted diabetic educator   - Endo Discharge recs   --- pt may be a good candidate for SGLT2i prior to discharge (suggest jardiance 25mg or farxiga 10mg)    ---  [place] Plan Pharmacist   --- email sent for scheduling in  hospital discharge diabetes clinic in [place] per pt request   --- pt would like to sample CGM prior to discharge, has iphone      Discitis Lumbar region with epidural abscess s/p debridement  - continue home pregabalin   - holding home flexeril   - resumed home amoxicillin for chronic discitis; follows with ID and is on lifelong chronic suppression     VTE Prophylaxis: SCDs/TEDs, ambulation, SQ heparin  Code Status: Full Code     Dispo  - PT/OT recs home with home care; patient would [facility] to go to [facility];  precert obtained 12/29/23  - Discharge today 12/29/23 1145am   - Will continue to assess discharge needs       On day of discharge, vital signs were stable and no acute distress was noted. All questions were answered. After VS and labs were reviewed it was determined the patient was stable for discharge.   Hospital day of discharge management- spent >30 minutes coordinating the discharge and counseling/educating patient and family regarding discharge instructions.  Past Medical History:  Diagnosis Date  · Discitis    · Discitis    · HLD (hyperlipidemia)    · HTN (hypertension)    · T2DM (type 2 diabetes mellitus) (CMS/HCC)        Past Surgical History:  · BACK SURGERY      · CARDIAC CATHETERIZATION N/A 12/11/2023  · CT GUIDED PERCUTANEOUS BIOPSY BONE DEEP   09/10/2021    CT GUIDED PERCUTANEOUS BIOPSY BONE DEEP 9/10/2021 [facility] CLINICAL LEGACY    Scrotal surgery         Prescriptions Prior to Admission  · amoxicillin (Amoxil) 500 mg capsule Take 1 capsule (500 mg) by mouth twice a day.       · aspirin 81 mg chewable tablet Chew 1 tablet (81 mg) once daily.   · atorvastatin (Lipitor) 80 mg tablet Take 1 tablet (80 mg) by mouth once daily at bedtime.    · carvedilol (Coreg) 6.25 mg tablet TAKE 1 TABLET BY MOUTH TWICE A DAY WITH MEALS Taking differently: Taking 1 tablet by mouth once a day  · cyclobenzaprine (Flexeril) 10 mg tablet Take 1 tablet (10 mg) by mouth 2 times a day as needed for muscle spasms.       · lisinopril 20 mg tablet Take 1 tablet (20 mg) by mouth once daily. 90 tablet 3   · metFORMIN (Glucophage) 1,000 mg tablet Take 1 tablet (1,000 mg) by mouth once daily with a meal. Patient choice       · pregabalin (Lyrica) 75 mg capsule Take 1 capsule (75 mg) by mouth once daily. Per patient statement            Patient has no known allergies.    Social History     Tobacco Use  · Smoking status: Some Days      Types: Cigars  · Smokeless tobacco: Never  Vaping Use  · Vaping Use: Never used  Substance Use  Topics  · Alcohol use: Yes      Comment: occasional  · Drug use: Never     Family History  · Coronary artery disease Mother           Consults:  endocrine     Physical Exam at Discharge  Discharge Condition: good  Heart Rate: 77  Resp: 19  BP: 108/60  Temp: 36 °C (96.8 °F)  Weight: (!) 152 kg (335 lb 4.8 oz)    Physical Exam  Vitals and nursing note reviewed.   Constitutional:       General: He is not in acute distress.     Appearance: He is obese.   HENT:      Head: Normocephalic.      Mouth/Throat:      Mouth: Mucous membranes are moist.   Eyes:      Conjunctiva/sclera: Conjunctivae normal.   Cardiovascular:      Rate and Rhythm: Normal rate and regular rhythm.      Pulses: Normal pulses.      Heart sounds: Normal heart sounds.      Comments: Tele NSR 60s-70s  No wires  Pulmonary:      Effort: Pulmonary effort is normal.      Breath sounds: Normal breath sounds.      Comments: Sternum stable  Abdominal:      General: Bowel sounds are normal.      Palpations: Abdomen is soft.      Tenderness: There is no abdominal tenderness.      Comments: postop BM 12/26  Per pt normally has BM every 3-5 days   Genitourinary:     Comments: Voiding in urinal    Musculoskeletal:      Cervical back: Neck supple.      Right lower leg: No edema.      Left lower leg: No edema.   Skin:     General: Skin is warm and dry.      Comments: midsternal chest incision C/D/I, well approximated, no s/s infection, Right SVG incision C/D/I, well approximated, no s/s infection  Sternum stable    Neurological:      General: No focal deficit present.      Mental Status: He is alert and oriented to person, place, and time.   Psychiatric:         Mood and Affect: Mood normal.         Behavior: Behavior normal. Behavior is cooperative.     Discharge Medication List     Medication List      START taking these medications     acetaminophen 325 mg tablet; Commonly known as: Tylenol; Take 2 tablets   (650 mg) by mouth every 6 hours if needed for mild pain (1  - 3) or   moderate pain (4 - 6).   aspirin 81 mg EC tablet; Take 1 tablet (81 mg) by mouth once daily. Do   not start before December 30, 2023.; Start taking on: December 30, 2023;   Replaces: aspirin 81 mg chewable tablet   bisacodyl 10 mg suppository; Commonly known as: Dulcolax; Insert 1   suppository (10 mg) into the rectum once daily as needed for constipation.   clopidogrel 75 mg tablet; Commonly known as: Plavix; Take 1 tablet (75   mg) by mouth once daily. Do not start before December 30, 2023.; Start   taking on: December 30, 2023   dapagliflozin propanediol 10 mg; Commonly known as: Farxiga; Take 1   tablet (10 mg) by mouth once daily. Do not start before December 30, 2023.; Start taking on: December 30, 2023   Dexcom G7 Sensor device; Generic drug: blood-glucose sensor; 1 each   continuously. Use to check glucose, change every 10 days   insulin glargine 100 unit/mL injection; Commonly known as: Lantus;   Inject 25 Units under the skin once daily at bedtime. Take as directed per   insulin instructions.   * insulin lispro 100 unit/mL injection; Commonly known as: HumaLOG;   Inject 0.05 mL (5 Units) under the skin 3 times a day with meals. Take as   directed per insulin instructions.   * insulin lispro 100 unit/mL injection; Commonly known as: HumaLOG;   Inject 0-0.1 mL (0-10 Units) under the skin 3 times a day with meals. Take   as directed per insulin instructions.   multivitamin with minerals tablet; Take 1 tablet by mouth once daily. Do   not start before December 30, 2023.; Start taking on: December 30, 2023   polyethylene glycol 17 gram packet; Commonly known as: Glycolax,   Miralax; Take 17 g by mouth once daily.   sennosides-docusate sodium 8.6-50 mg tablet; Commonly known as:   Lizzette-Colace; Take 2 tablets by mouth 2 times a day.   traMADol 50 mg tablet; Commonly known as: Ultram; Take 1 tablet (50 mg)   by mouth every 6 hours if needed for severe pain (7 - 10) for up to 3   days.  * This list has  2 medication(s) that are the same as other medications   prescribed for you. Read the directions carefully, and ask your doctor or   other care provider to review them with you.     CHANGE how you take these medications     carvedilol 3.125 mg tablet; Commonly known as: Coreg; Take 1 tablet   (3.125 mg) by mouth 2 times a day.; What changed: medication strength, how   much to take, when to take this     CONTINUE taking these medications     amoxicillin 500 mg capsule; Commonly known as: Amoxil   atorvastatin 80 mg tablet; Commonly known as: Lipitor; TAKE 1 TABLET (80   MG) BY MOUTH ONCE DAILY AT BEDTIME.   cyclobenzaprine 10 mg tablet; Commonly known as: Flexeril   pregabalin 75 mg capsule; Commonly known as: Lyrica     STOP taking these medications     aspirin 81 mg chewable tablet; Replaced by: aspirin 81 mg EC tablet   lisinopril 20 mg tablet   metFORMIN 1,000 mg tablet; Commonly known as: Glucophage        On day of discharge, vital signs were stable and no acute distress was noted. All questions were and answered and much support was given. After VS and labs were reviewed it was determined the patient was stable for discharge. Hospital day of discharge management- spent >30 minutes coordinating the discharge and counseling/educating patient and family regarding discharge instructions.

## 2023-12-31 NOTE — PROGRESS NOTES
Documentation at [facility] [place], operated by [facility]    Problem List Items Addressed This Visit       HTN (hypertension)    HLD (hyperlipidemia)     Other Visit Diagnoses       Coronary artery disease involving native coronary artery of native heart without angina pectoris    -  Primary    S/P coronary artery bypass graft x 4        Type 2 diabetes mellitus with other circulatory complication, with long-term current use of insulin (CMS/[facility])        Hyponatremia        Class 3 severe obesity due to excess calories with serious comorbidity and body mass index (BMI) of 50.0 to 59.9 in adult (CMS/[facility])              No family listed in admission record

## 2024-01-03 ENCOUNTER — TELEPHONE (OUTPATIENT)
Dept: PRIMARY CARE | Facility: CLINIC | Age: 59
End: 2024-01-03
Payer: COMMERCIAL

## 2024-01-04 ENCOUNTER — DOCUMENTATION (OUTPATIENT)
Dept: PRIMARY CARE | Facility: CLINIC | Age: 59
End: 2024-01-04

## 2024-01-04 ENCOUNTER — PATIENT OUTREACH (OUTPATIENT)
Dept: PRIMARY CARE | Facility: CLINIC | Age: 59
End: 2024-01-04

## 2024-01-04 ENCOUNTER — TELEMEDICINE CLINICAL SUPPORT (OUTPATIENT)
Dept: CARDIAC SURGERY | Facility: HOSPITAL | Age: 59
End: 2024-01-04
Payer: COMMERCIAL

## 2024-01-04 DIAGNOSIS — Z03.89 CORONARY ARTERY DISEASE (CAD) EXCLUDED: Primary | ICD-10-CM

## 2024-01-04 NOTE — PROGRESS NOTES
Patient is 15 days s/p CABG x 4 with Dr. Phelps. Patient returned home from SNF yesterday, and will be receiving home health care. Patient denies chest pain, shortness of breath, and swelling of lower extremities.  Patient states that incision looks to be healing well. Denies bleeding/draining/odor coming from incision. Appetite has increased since discharge. Denies any complications with constipation. Patient is ambulating often, with rest periods in between. Continues to use breathing exercise devices as instructed at discharge. Reviewed medications and future follow up visits with patient including chest xray to be done prior to P/OP visit. Referral placed for cardiology, and patient advised to call to establish care with general cardiology.     Patient educated on the importance of increasing level of activity with rest periods in between. Patient educated on lifting, pushing, and pulling restrictions, as well as driving restrictions Encouraged continued use of breathing exercise devices to increase lung expansion. Reviewed patient's medications and dosing schedule. Confirmed knowledge of future follow up doctor appointments.    It was pleasure speaking with Mr. Diaz today.

## 2024-01-04 NOTE — PROGRESS NOTES
Discharge Facility: [facility]  Discharge Diagnosis: CAD  S/P coronary artery bypass graft x 4    Admission Date: 12/29/2023  Discharge Date: 1/3/2024    PCP Appointment Date: 1/10/2024  Specialist Appointment Date: cardiology 1/18/2024  Endo 2/6/2024  Hospital Encounter and Summary: Linked   See discharge assessment below for further details    START taking:   acetaminophen (Tylenol)   aspirin  Start taking on: December 30, 2023   bisacodyl (Dulcolax) clopidogrel (Plavix) Start taking on: December 30, 2023   dapagliflozin propanediol (Farxiga) Start taking on: December 30, 2023   Dexcom G7 Sensor (blood-glucose sensor) insulin glargine (Lantus) insulin lispro (HumaLOG)   multivitamin with minerals Start taking on: December 30, 2023 polyethylene glycol (Glycolax, Miralax)   sennosides-docusate sodium (Lizzette-Colace)   traMADol (Ultram)     CHANGE how you take:   carvedilol (Coreg)     STOP taking:   aspirin 81 mg chewable tablet Replaced by a similar medication.   lisinopril 20 mg tablet   metFORMIN 1,000 mg tablet (Glucophage)     Engagement  Call Start Time: 1136 (1/4/2024 11:36 AM)    Medications  Medications reviewed with patient/caregiver?: Yes (1/4/2024 11:36 AM)  Is the patient having any side effects they believe may be caused by any medication additions or changes?: No (1/4/2024 11:36 AM)  Does the patient have all medications ordered at discharge?: Yes (1/4/2024 11:36 AM)  Care Management Interventions: No intervention needed (1/4/2024 11:36 AM)  Prescription Comments: limited info from SNF (1/4/2024 11:36 AM)  Is the patient taking all medications as directed (includes completed medication regime)?: Yes (1/4/2024 11:36 AM)  Medication Comments: see med list (1/4/2024 11:36 AM)    Appointments  Does the patient have a primary care provider?: Yes (1/4/2024 11:36 AM)  Care Management Interventions: Verified appointment date/time/provider (1/4/2024 11:36 AM)  Has the patient kept scheduled  appointments due by today?: Yes (1/4/2024 11:36 AM)  Care Management Interventions: Advised patient to keep appointment (1/4/2024 11:36 AM)    Self Management  What is the home health agency?: [facility] home health (1/4/2024 11:36 AM)  Has home health visited the patient within 72 hours of discharge?: Call prior to 72 hours (1/4/2024 11:36 AM)    Patient Teaching  Does the patient have access to their discharge instructions?: Yes (1/4/2024 11:36 AM)  Care Management Interventions: Reviewed instructions with patient (1/4/2024 11:36 AM)  What is the patient's perception of their health status since discharge?: Improving (1/4/2024 11:36 AM)  Is the patient/caregiver able to teach back the hierarchy of who to call/visit for symptoms/problems? PCP, Specialist, Home Health nurse, Urgent Care, ED, 911: Yes (1/4/2024 11:36 AM)    Wrap Up  Wrap Up Additional Comments: patient was admitted to [facility] 12/12/2023 with CAD. discharged to SNF 12/29/2023. Discharged home for SNF 1/3/2024.  Limited information with SNF on medications. CTS spoke with patient, He stated that he is doing well. Denies any chest pains, shortness of breath or pain. He did have a couple of questions regarding medications. Spoke with the nurse at the SNF and she assured me that they sent him home with enough of the lantus and humalog to get him to his appt with PCP. Advised patient to take all of his discharge papers with him to his appt with PCP and do not change any medications until he sees the PCP. No other questions or concerns at this time. (1/4/2024 11:36 AM)  Call End Time: 1145 (1/4/2024 11:36 AM)

## 2024-01-10 ENCOUNTER — OFFICE VISIT (OUTPATIENT)
Dept: PRIMARY CARE | Facility: CLINIC | Age: 59
End: 2024-01-10
Payer: COMMERCIAL

## 2024-01-10 ENCOUNTER — TELEPHONE (OUTPATIENT)
Dept: PEDIATRICS | Facility: CLINIC | Age: 59
End: 2024-01-10

## 2024-01-10 VITALS
HEIGHT: 72 IN | WEIGHT: 315 LBS | DIASTOLIC BLOOD PRESSURE: 80 MMHG | RESPIRATION RATE: 14 BRPM | TEMPERATURE: 98 F | BODY MASS INDEX: 42.66 KG/M2 | SYSTOLIC BLOOD PRESSURE: 144 MMHG | HEART RATE: 94 BPM | OXYGEN SATURATION: 95 %

## 2024-01-10 DIAGNOSIS — Z03.89 CORONARY ARTERY DISEASE (CAD) EXCLUDED: ICD-10-CM

## 2024-01-10 DIAGNOSIS — K76.0 NAFLD (NONALCOHOLIC FATTY LIVER DISEASE): ICD-10-CM

## 2024-01-10 DIAGNOSIS — I10 PRIMARY HYPERTENSION: Primary | ICD-10-CM

## 2024-01-10 DIAGNOSIS — Z79.4 TYPE 2 DIABETES MELLITUS WITH OTHER CIRCULATORY COMPLICATION, WITH LONG-TERM CURRENT USE OF INSULIN (MULTI): ICD-10-CM

## 2024-01-10 DIAGNOSIS — Z95.1 S/P CABG X 4: ICD-10-CM

## 2024-01-10 DIAGNOSIS — E11.59 TYPE 2 DIABETES MELLITUS WITH OTHER CIRCULATORY COMPLICATION, WITH LONG-TERM CURRENT USE OF INSULIN (MULTI): ICD-10-CM

## 2024-01-10 DIAGNOSIS — D64.9 ANEMIA, UNSPECIFIED TYPE: ICD-10-CM

## 2024-01-10 PROBLEM — R19.7 DIARRHEA IN ADULT PATIENT: Status: RESOLVED | Noted: 2023-08-25 | Resolved: 2024-01-10

## 2024-01-10 PROCEDURE — 3077F SYST BP >= 140 MM HG: CPT | Performed by: INTERNAL MEDICINE

## 2024-01-10 PROCEDURE — 3008F BODY MASS INDEX DOCD: CPT | Performed by: INTERNAL MEDICINE

## 2024-01-10 PROCEDURE — RXMED WILLOW AMBULATORY MEDICATION CHARGE

## 2024-01-10 PROCEDURE — 3066F NEPHROPATHY DOC TX: CPT | Performed by: INTERNAL MEDICINE

## 2024-01-10 PROCEDURE — 99496 TRANSJ CARE MGMT HIGH F2F 7D: CPT | Performed by: INTERNAL MEDICINE

## 2024-01-10 PROCEDURE — 3079F DIAST BP 80-89 MM HG: CPT | Performed by: INTERNAL MEDICINE

## 2024-01-10 RX ORDER — ISOPROPYL ALCOHOL 70 ML/100ML
1 SWAB TOPICAL 4 TIMES DAILY
COMMUNITY
End: 2024-01-10 | Stop reason: SDUPTHER

## 2024-01-10 RX ORDER — PEN NEEDLE, DIABETIC 30 GX3/16"
1 NEEDLE, DISPOSABLE MISCELLANEOUS 4 TIMES DAILY
Qty: 120 EACH | Refills: 0 | Status: SHIPPED | OUTPATIENT
Start: 2024-01-10 | End: 2024-02-09

## 2024-01-10 RX ORDER — INSULIN LISPRO 100 [IU]/ML
5 INJECTION, SOLUTION INTRAVENOUS; SUBCUTANEOUS
Qty: 15 ML | Refills: 3 | Status: SHIPPED | OUTPATIENT
Start: 2024-01-10 | End: 2024-05-07 | Stop reason: WASHOUT

## 2024-01-10 RX ORDER — INSULIN GLARGINE 100 [IU]/ML
25 INJECTION, SOLUTION SUBCUTANEOUS NIGHTLY
Qty: 15 ML | Refills: 3 | Status: SHIPPED | OUTPATIENT
Start: 2024-01-10 | End: 2024-03-07 | Stop reason: SDUPTHER

## 2024-01-10 RX ORDER — ISOPROPYL ALCOHOL 70 ML/100ML
1 SWAB TOPICAL 4 TIMES DAILY
Qty: 100 EACH | Refills: 0 | Status: SHIPPED | OUTPATIENT
Start: 2024-01-10 | End: 2024-02-06 | Stop reason: SDUPTHER

## 2024-01-10 RX ORDER — TIRZEPATIDE 2.5 MG/.5ML
2.5 INJECTION, SOLUTION SUBCUTANEOUS
Qty: 6 ML | Refills: 1 | Status: SHIPPED | OUTPATIENT
Start: 2024-01-10 | End: 2024-02-06 | Stop reason: WASHOUT

## 2024-01-10 RX ORDER — PEN NEEDLE, DIABETIC 30 GX3/16"
1 NEEDLE, DISPOSABLE MISCELLANEOUS 4 TIMES DAILY
COMMUNITY
End: 2024-01-10 | Stop reason: SDUPTHER

## 2024-01-10 ASSESSMENT — ENCOUNTER SYMPTOMS
WHEEZING: 0
NAUSEA: 0
CONSTIPATION: 0
PALPITATIONS: 0
COUGH: 0
ABDOMINAL PAIN: 0
SHORTNESS OF BREATH: 0
DIARRHEA: 0

## 2024-01-10 NOTE — TELEPHONE ENCOUNTER
[facility] requesting verbal orders for physical therapy 1x week for 5 weeks.  [phone]

## 2024-01-10 NOTE — PROGRESS NOTES
"Subjective   Patient ID: Behzad Diaz is a 58 y.o. male who presents for Hypertension and Hospital Follow-up.  Admitted 12/29 and discharged on 1/3.  Transition of care phone call made.   Seen today - 1/10 -  within 7 days of discharge.   S/P open heart surgery.    He is at home and getting around without issues.  Pain controlled.  No SOB/RIDDLE.    His blood sugars were controlled at hospital , but not at home.  He is no longer on SGLT, but he was tolerating this well.      Review of Systems   Respiratory:  Negative for cough, shortness of breath and wheezing.    Cardiovascular:  Negative for chest pain and palpitations.   Gastrointestinal:  Negative for abdominal pain, constipation, diarrhea and nausea.       Objective   /80 (BP Location: Left arm, Patient Position: Sitting, BP Cuff Size: Adult)   Pulse 94   Temp 36.7 °C (98 °F) (Tympanic)   Resp 14   Ht 1.829 m (6')   Wt (!) 153 kg (338 lb)   SpO2 95%   BMI 45.84 kg/m²     Physical Exam  Vitals reviewed.   Constitutional:       Appearance: Normal appearance.   HENT:      Head: Normocephalic.   Cardiovascular:      Rate and Rhythm: Normal rate and regular rhythm.   Pulmonary:      Effort: Pulmonary effort is normal.      Breath sounds: Normal breath sounds.   Musculoskeletal:         General: Normal range of motion.   Neurological:      General: No focal deficit present.      Mental Status: He is alert.   Psychiatric:         Mood and Affect: Mood normal.         Assessment/Plan   Problem List Items Addressed This Visit             ICD-10-CM    Anemia D64.9    Type 2 diabetes mellitus with other circulatory complication, with long-term current use of insulin (CMS/[facility]) E11.59, Z79.4    Relevant Medications    pen needle, diabetic (BD Ultra-Fine Mini Pen Needle) 31 gauge x 3/16\" needle    alcohol swabs (Alcohol Pads) pads, medicated    empagliflozin (Jardiance) 25 mg    tirzepatide (Mounjaro) 2.5 mg/0.5 mL pen injector    Other Relevant Orders    Referral " to Clinical Pharmacy    RESOLVED: Primary hypertension - Primary I10    NAFLD (nonalcoholic fatty liver disease) K76.0    Coronary artery disease (CAD) excluded Z03.89     Other Visit Diagnoses         Codes    S/P CABG x 4     Z95.1    Relevant Medications    insulin glargine (Lantus) 100 unit/mL (3 mL) pen    insulin lispro (HumaLOG) 100 unit/mL injection        We reviewed and discussed all of the above.    He has been recovering from his open heart well.  No issues or complications currently,  We discussed need for better blood sugar control.  We reviewed and discussed diet.  We discussed medications.    We reviewed and discussed hospital records.    Mild anemia.  No symptoms.   We discussed DM medications and we will send to  pharmacy for possible assistance with coverage.    Follow up in 6-8 weeks - sooner if any issues.

## 2024-01-11 ENCOUNTER — PHARMACY VISIT (OUTPATIENT)
Dept: PHARMACY | Facility: CLINIC | Age: 59
End: 2024-01-11
Payer: COMMERCIAL

## 2024-01-11 ENCOUNTER — TELEPHONE (OUTPATIENT)
Dept: PRIMARY CARE | Facility: CLINIC | Age: 59
End: 2024-01-11

## 2024-01-11 NOTE — TELEPHONE ENCOUNTER
[facility] OT, left vm stating she is not picking up pt for [facility] OT.  Pt does not feel he has any OT needs. Call back w/questions.    [phone]

## 2024-01-16 ENCOUNTER — PATIENT OUTREACH (OUTPATIENT)
Dept: PRIMARY CARE | Facility: CLINIC | Age: 59
End: 2024-01-16

## 2024-01-16 NOTE — PROGRESS NOTES
Call regarding appt. with PCP on 1/10/2024 after hospitalization.  At time of outreach call the patient feels as if their condition has improved since last visit.  Reviewed the PCP appointment with the pt and addressed any questions or concerns.

## 2024-01-18 ENCOUNTER — OFFICE VISIT (OUTPATIENT)
Dept: CARDIOLOGY | Facility: CLINIC | Age: 59
End: 2024-01-18
Payer: COMMERCIAL

## 2024-01-18 ENCOUNTER — TELEMEDICINE (OUTPATIENT)
Dept: PHARMACY | Facility: HOSPITAL | Age: 59
End: 2024-01-18
Payer: COMMERCIAL

## 2024-01-18 VITALS
HEART RATE: 94 BPM | HEIGHT: 73 IN | BODY MASS INDEX: 41.75 KG/M2 | SYSTOLIC BLOOD PRESSURE: 90 MMHG | DIASTOLIC BLOOD PRESSURE: 58 MMHG | WEIGHT: 315 LBS

## 2024-01-18 DIAGNOSIS — Z95.1 S/P CABG X 4: ICD-10-CM

## 2024-01-18 DIAGNOSIS — E11.59 TYPE 2 DIABETES MELLITUS WITH OTHER CIRCULATORY COMPLICATION, WITHOUT LONG-TERM CURRENT USE OF INSULIN (MULTI): ICD-10-CM

## 2024-01-18 DIAGNOSIS — I25.10 CORONARY ARTERY DISEASE INVOLVING NATIVE CORONARY ARTERY OF NATIVE HEART WITHOUT ANGINA PECTORIS: Primary | ICD-10-CM

## 2024-01-18 DIAGNOSIS — E78.49 OTHER HYPERLIPIDEMIA: ICD-10-CM

## 2024-01-18 PROBLEM — I25.810 ATHEROSCLEROSIS OF CORONARY ARTERY BYPASS GRAFT: Status: ACTIVE | Noted: 2023-12-12

## 2024-01-18 PROBLEM — R73.9 HYPERGLYCEMIA: Status: ACTIVE | Noted: 2024-01-18

## 2024-01-18 PROCEDURE — 3066F NEPHROPATHY DOC TX: CPT | Performed by: INTERNAL MEDICINE

## 2024-01-18 PROCEDURE — 93000 ELECTROCARDIOGRAM COMPLETE: CPT | Performed by: INTERNAL MEDICINE

## 2024-01-18 PROCEDURE — 3008F BODY MASS INDEX DOCD: CPT | Performed by: INTERNAL MEDICINE

## 2024-01-18 PROCEDURE — 3078F DIAST BP <80 MM HG: CPT | Performed by: INTERNAL MEDICINE

## 2024-01-18 PROCEDURE — 99214 OFFICE O/P EST MOD 30 MIN: CPT | Performed by: INTERNAL MEDICINE

## 2024-01-18 PROCEDURE — 3074F SYST BP LT 130 MM HG: CPT | Performed by: INTERNAL MEDICINE

## 2024-01-18 RX ORDER — AMOXICILLIN 500 MG/1
500 CAPSULE ORAL EVERY 12 HOURS SCHEDULED
COMMUNITY
End: 2024-03-26 | Stop reason: SDUPTHER

## 2024-01-18 NOTE — ASSESSMENT & PLAN NOTE
Patient's diabetes is uncontrolled with most recent A1c = 11.  Continue Jardiance 25 mg daily, Mounjaro 2.5 mg weekly, Lantus 25 units daily, and Humalog sliding scale.  Answered all patient's questions about medications.  Sent invite to share data with Dexcom clarity so we can make medication adjustments as needed at future follow ups.  Follow up: 2/1 at 10am

## 2024-01-18 NOTE — PROGRESS NOTES
"Chief Complaint:   Please see below.     History Of Present Illness:    Behzad Diaz is a 58 y.o. male presenting with CAD.    This 58-year-old hypertensive, diabetic, hyperlipidemic man with a BMI of 44.4 was admitted to [facility] with symptoms of chest discomfort, and sustained a non-ST segment elevation MI.  He stabilized on medical therapy, underwent cardiac catheterization, and transferred to [facility] for bypass surgery.  On December 20, 2023 he underwent CABGx 4 (LIMA to LAD, SVG to diagonal, SVG sequential to OM and PDA taken off of the other vein.  His echo ejection fraction was 40 to 45%.  He did well and was discharged.    The patient denies angina, dyspnea, palpitations, orthopnea, PND, syncope, and near syncope.    PMH: epidural abscess, ,S/P surgical repair, reoperation for infection approximately 3 years ago     Last Recorded Vitals:  Vitals:    01/18/24 1300   BP: 90/58   BP Location: Left arm   Patient Position: Sitting   Pulse: 94   Weight: (!) 153 kg (337 lb)   Height: 1.854 m (6' 1\")   Body mass index is 44.46 kg/m².      Past Medical History:  He has a past medical history of Discitis, Discitis, HLD (hyperlipidemia), HTN (hypertension), and T2DM (type 2 diabetes mellitus) (CMS/[facility]).    Past Surgical History:  He has a past surgical history that includes Other surgical history (07/30/2020); Other surgical history (07/30/2020); CT guided percutaneous biopsy bone deep (09/10/2021); Cardiac catheterization (N/A, 12/11/2023); Back surgery; and Coronary artery bypass graft (N/A, 12/20/2023).      Social History:  He reports that he has been smoking cigars. He has never used smokeless tobacco. He reports current alcohol use. He reports that he does not use drugs.    Family History:  Family History   Problem Relation Name Age of Onset    Coronary artery disease Mother          Allergies:  Patient has no known allergies.    Outpatient Medications:  Current Outpatient " "Medications   Medication Instructions    alcohol swabs (Alcohol Pads) pads, medicated Use 1 swab 4 times a day.    amoxicillin (AMOXIL) 500 mg, oral, Every 12 hours scheduled    aspirin 81 mg, oral, Daily    atorvastatin (LIPITOR) 80 mg, oral, Nightly    blood-glucose sensor (Dexcom G7 Sensor) device 1 each, miscellaneous, Continuous, Use to check glucose, change every 10 days    carvedilol (COREG) 3.125 mg, oral, 2 times daily    clopidogrel (PLAVIX) 75 mg, oral, Daily    cyclobenzaprine (FLEXERIL) 10 mg, oral, 2 times daily PRN    insulin lispro (HumaLOG) 100 unit/mL injection Inject 5 Units under the skin 3 times a day with meals. Take as directed per insulin instructions.    insulin lispro (HUMALOG) 0-10 Units, subcutaneous, 3 times daily with meals, Take as directed per insulin instructions.    Jardiance 25 mg, oral, Daily    Lantus Solostar U-100 Insulin 25 Units, subcutaneous, Nightly    Mounjaro 2.5 mg, subcutaneous, Weekly    multivitamin with minerals tablet 1 tablet, oral, Daily    pen needle, diabetic (BD Ultra-Fine Mini Pen Needle) 31 gauge x 3/16\" needle Use 1 pen needle to inject insulin 4 times a day.    pregabalin (LYRICA) 75 mg, oral, Daily, Per patient statement       Physical Exam:  GENERAL:  pleasant 58 year-old  HEENT: No xanthelasma  NECK: Supple, no palpable adenopathy or thyromegaly  CHEST: Clear to auscultation, respiratory effort unlabored  CARDIAC: RRR, normal S1 and S2, no audible murmur, rub, gallop, carotids are brisk, PMI is not displaced  ABD: Active bowel sounds, nontender, no organomegaly, no evidence of ascites  EXT: No clubbing, cyanosis, edema, or tenderness  NEURO: Awake, alert, appropriate, speech is fluent       Last Labs:  CBC -  Lab Results   Component Value Date    WBC 7.9 12/29/2023    HGB 10.8 (L) 12/29/2023    HCT 34.1 (L) 12/29/2023    MCV 95 12/29/2023     12/29/2023       CMP -  Lab Results   Component Value Date    CALCIUM 8.9 12/29/2023    PHOS 3.8 " 12/29/2023    PROT 6.5 12/13/2023    ALBUMIN 3.4 12/29/2023    AST 26 12/13/2023    ALT 26 12/13/2023    ALKPHOS 58 12/13/2023    BILITOT 1.3 (H) 12/13/2023       LIPID PANEL -   Lab Results   Component Value Date    CHOL 145 12/10/2023    TRIG 343 (H) 12/10/2023    HDL 25.2 12/10/2023    CHHDL 5.8 12/10/2023    LDLF 82 08/27/2021    VLDL 69 (H) 12/10/2023    NHDL 120 12/10/2023       RENAL FUNCTION PANEL -   Lab Results   Component Value Date    GLUCOSE 95 12/29/2023     12/29/2023    K 3.7 12/29/2023     12/29/2023    CO2 28 12/29/2023    ANIONGAP 11 12/29/2023    BUN 21 12/29/2023    CREATININE 0.77 12/29/2023    GFRMALE >90 07/07/2023    CALCIUM 8.9 12/29/2023    PHOS 3.8 12/29/2023    ALBUMIN 3.4 12/29/2023        Lab Results   Component Value Date    BNP 91 12/13/2023    HGBA1C 11.0 (H) 12/10/2023    HGBA1C 10.8 (A) 08/25/2023         Lab review: I have Chemistry CMP:   Lab Results   Component Value Date    ALBUMIN 3.4 12/29/2023    CALCIUM 8.9 12/29/2023    CO2 28 12/29/2023    CREATININE 0.77 12/29/2023    GLUCOSE 95 12/29/2023    BILITOT 1.3 (H) 12/13/2023    PROT 6.5 12/13/2023    ALT 26 12/13/2023    AST 26 12/13/2023    ALKPHOS 58 12/13/2023   , Chemistry BMP   Lab Results   Component Value Date    GLUCOSE 95 12/29/2023    CALCIUM 8.9 12/29/2023    CO2 28 12/29/2023    CREATININE 0.77 12/29/2023   , CBC:  Lab Results   Component Value Date    WBC 7.9 12/29/2023    RBC 3.60 (L) 12/29/2023    HGB 10.8 (L) 12/29/2023    HCT 34.1 (L) 12/29/2023    MCV 95 12/29/2023    MCH 30.0 12/29/2023    MCHC 31.7 (L) 12/29/2023    RDW 13.7 12/29/2023    NRBC 0.0 12/29/2023   , and Lipids:   Lab Results   Component Value Date    CHOL 145 12/10/2023    HDL 25.2 12/10/2023    LDLCALC 51 12/10/2023    TRIG 343 (H) 12/10/2023     Diagnostic review: I have personally reviewed the result(s) of the Echocardiogram and cardiac catheterization.  Please see the respective notes for complete details.    Assessment/Plan    Problem List Items Addressed This Visit          Cardiac and Vasculature    HLD (hyperlipidemia)     Other Visit Diagnoses       Coronary artery disease involving native coronary artery of native heart without angina pectoris    -  Primary    Relevant Orders    Follow Up In Cardiology    S/P CABG x 4        Relevant Orders    ECG 12 lead (Clinic Performed)    Referral to Cardiac Rehab    BMI 40.0-44.9, adult (CMS/[facility])              1.  Status post NSTEMI December 2023 with subsequent CABG x 3 as per HPI:  -Continue medical therapy  -Referral to cardiac rehab  -He will be seeing the surgical team soon, to whom we will defer regarding surgical issues, sternal healing, driving instructions etc.    2.  HTN:  BP is well controlled.   We discussed sodium restriction, lifestyle modification, and the DASH diet.  I advised the patient to check BPs at home daily, and to contact me with an update in one month.    Advised the patient to focus on diet, exercise, and weight loss, according to the guidelines of the American Heart Association.      Avtar Venegas MD

## 2024-01-18 NOTE — PATIENT INSTRUCTIONS
"It was my pleasure to meet you.  I look forward to being your cardiologist.  I am a huge believer in communicating with my patients.  Please contact me at any time, if anything is not clear to you regarding anything we have discussed, or if new questions occur to you.     You should increase your intake of fresh fruits and vegetables.  Try to consume 9-12 servings per day of such foods.  You should increase your intake of deep sea fish such as salmon and tuna.  Try to get two servings per week of fish, but if you are a pregnant woman, talk to your obstetrician before increasing your fish intake.  You should increase your intake of unprocessed nuts such as walnuts or almonds.  Increase your intake of plant-based protein.  You should avoid fried foods.  Don't consume sugary or starchy foods and sugary drinks.  Avoid saturated fats.  Try not to dine at restaurants more than once per month, and don't dine at fast food places.  Try to get 7-9 hours of sleep every night.  Try to get 150 minutes per week of moderate intensity exercise (after I have cleared you to start an exercise program).  Try to maintain the appropriate weight for your height based on body mass index (BMI). Maintain your cholesterol, blood sugar, and blood pressure in the recommended respective normal ranges.  There is a wealth of information on the American Heart Association's website regarding this.  Just Google \"Life's Essential 8\" for more information.   Ask me about any of these details  if you have questions.    As your cardiologist, I will be available to you at any time to answer any question you have concerning your heart health.  My staff, Joe can also answer any questions you may have.  Best of luck.     It is important for us to have an accurate list of the medications, supplements, and their doses.  It is also important for us to have an accurate list of your allergies.  Please bring this information to every appointment.  " This is a vital part of the quality of care you receive through all of your providers.

## 2024-01-18 NOTE — PROGRESS NOTES
Pharmacy Post-Discharge Visit  Behzad Diaz is a 58 y.o. male was referred to Clinical Pharmacy Team to complete a post-discharge medication optimization and monitoring visit.  The patient was referred for their No chief complaint on file..    Admission Date: 12/12/23  Discharge Date: 12/29/23    Referring Provider: RACHEL Arias    Subjective   No Known Allergies    [id number] - [place], [place] - [address] [facility]  [address] [place] [address]  Phone: [phone] Fax: [phone]    [facility]  [address], [address]  [place] [id number]  Phone: [phone] Fax: [phone]      Social History     Social History Narrative    Not on file      Notable Medication changes following discharge:  Start:   bisacodyl 10 mg suppository   clopidogrel 75 mg tablet   dapagliflozin propanediol 10 mg   Dexcom G7 Sensor device   insulin glargine 100 unit/mL injection   * insulin lispro 100 unit/mL injection   * insulin lispro 100 unit/mL injection   multivitamin with minerals tablet   polyethylene glycol 17 gram packet   sennosides-docusate sodium 8.6-50 mg tablet   traMADol 50 mg tablet  Stop: lisinopril 20 mg, metformin 1000 mg  Change: n/a    HPI  Diabetes    - Jardiance $400/ Mounjaro $300 for 3 month supplies  - Patient doesn't qualify  PAP upon assessment    Patient is using: glucometer  The patient is currently checking the blood glucose continuously  3 day glucose summary  Avg = 156  Lowest 90s in afternoon    Hypoglycemia frequency: None  Hypoglycemia awareness: Yes     Diet:   2 meals/day  Small breakfast 10-11 am; poptarts, ham sandwich  Dinner 5-7 pm; meat, veggies, carb  Snacks - not usually  Drinks - water, one 12 oz can regular coke daily  Desserts - not usually    Physical Activity   Getting physical therapy  No other exercise currently     Current Diabetes Medications:   Jardiance 25 mg daily  Lantus 25 units daily  Humalog 5 units + SS  Mounjaro 2.5 mg  weekly    Historical Diabetes Medications:  N/A     Secondary Prevention  Statin? Yes  ACE-I/ARB? No  Aspirin? Yes    Pertinent PMH Review:  PMH of Pancreatitis: No  PMH of Retinopathy: No  PMH of Urinary Tract Infections: No  PMH of MTC: No     Review of Systems    Medication System Management:  Affordability/Accessibility: Mounjaro/Jardiance expensive, but patient had deductible he hadn't met yet  Adherence/Organization: No issues  Adverse Effects: No issues    Objective     There were no vitals taken for this visit.     LAB  Lab Results   Component Value Date    BILITOT 1.3 (H) 12/13/2023    CALCIUM 8.9 12/29/2023    CO2 28 12/29/2023     12/29/2023    CREATININE 0.77 12/29/2023    GLUCOSE 95 12/29/2023    ALKPHOS 58 12/13/2023    K 3.7 12/29/2023    PROT 6.5 12/13/2023     12/29/2023    AST 26 12/13/2023    ALT 26 12/13/2023    BUN 21 12/29/2023    ANIONGAP 11 12/29/2023    MG 2.25 12/29/2023    PHOS 3.8 12/29/2023    ALBUMIN 3.4 12/29/2023    LIPASE 13 09/22/2020    GFRMALE >90 07/07/2023     Lab Results   Component Value Date    TRIG 343 (H) 12/10/2023    CHOL 145 12/10/2023    LDLCALC 51 12/10/2023    HDL 25.2 12/10/2023     Lab Results   Component Value Date    HGBA1C 11.0 (H) 12/10/2023         Current Outpatient Medications on File Prior to Visit   Medication Sig Dispense Refill    alcohol swabs (Alcohol Pads) pads, medicated Use 1 swab 4 times a day. 100 each 0    amoxicillin (Amoxil) 500 mg capsule Take 1 capsule (500 mg) by mouth every 12 hours.      aspirin 81 mg EC tablet Take 1 tablet (81 mg) by mouth once daily. Do not start before December 30, 2023.      atorvastatin (Lipitor) 80 mg tablet TAKE 1 TABLET (80 MG) BY MOUTH ONCE DAILY AT BEDTIME. 90 tablet 0    blood-glucose sensor (Dexcom G7 Sensor) device 1 each continuously. Use to check glucose, change every 10 days 3 each 11    carvedilol (Coreg) 3.125 mg tablet Take 1 tablet (3.125 mg) by mouth 2 times a day.      clopidogrel (Plavix)  "75 mg tablet Take 1 tablet (75 mg) by mouth once daily. Do not start before December 30, 2023.      cyclobenzaprine (Flexeril) 10 mg tablet Take 1 tablet (10 mg) by mouth 2 times a day as needed for muscle spasms.      empagliflozin (Jardiance) 25 mg Take 1 tablet (25 mg) by mouth once daily. 90 tablet 3    insulin glargine (Lantus) 100 unit/mL (3 mL) pen Inject 25 Units under the skin once daily at bedtime. 15 mL 3    insulin lispro (HumaLOG) 100 unit/mL injection Inject 0-0.1 mL (0-10 Units) under the skin 3 times a day with meals. Take as directed per insulin instructions.      insulin lispro (HumaLOG) 100 unit/mL injection Inject 5 Units under the skin 3 times a day with meals. Take as directed per insulin instructions. 15 mL 3    multivitamin with minerals tablet Take 1 tablet by mouth once daily. Do not start before December 30, 2023.      pen needle, diabetic (BD Ultra-Fine Mini Pen Needle) 31 gauge x 3/16\" needle Use 1 pen needle to inject insulin 4 times a day. 120 each 0    pregabalin (Lyrica) 75 mg capsule Take 1 capsule (75 mg) by mouth once daily. Per patient statement      tirzepatide (Mounjaro) 2.5 mg/0.5 mL pen injector Inject 2.5 mg under the skin 1 (one) time per week. 6 mL 1     No current facility-administered medications on file prior to visit.      DRUG INTERATIONS  - none    Assessment/Plan   Problem List Items Addressed This Visit       Type 2 diabetes mellitus with circulatory disorder, without long-term current use of insulin (CMS/[facility])     Patient's diabetes is uncontrolled with most recent A1c = 11.  Continue Jardiance 25 mg daily, Mounjaro 2.5 mg weekly, Lantus 25 units daily, and Humalog sliding scale.  Answered all patient's questions about medications.  Sent invite to share data with Dexcom clarity so we can make medication adjustments as needed at future follow ups.  Follow up: 2/1 at 10am         Relevant Orders    Follow Up In Clinical Pharmacy     Continue all meds under the " continuation of care with the referring provider and clinical pharmacy team.    Patricia Church, PharmD     Verbal consent to manage patient's drug therapy was obtained from [the patient and/or an individual authorized to act on behalf of a patient]. They were informed they may decline to participate or withdraw from participation in pharmacy services at any time.

## 2024-01-24 ENCOUNTER — HOSPITAL ENCOUNTER (OUTPATIENT)
Dept: RADIOLOGY | Facility: HOSPITAL | Age: 59
Discharge: HOME | End: 2024-01-24
Payer: COMMERCIAL

## 2024-01-24 ENCOUNTER — OFFICE VISIT (OUTPATIENT)
Dept: CARDIAC SURGERY | Facility: HOSPITAL | Age: 59
End: 2024-01-24
Payer: COMMERCIAL

## 2024-01-24 VITALS
WEIGHT: 315 LBS | BODY MASS INDEX: 41.75 KG/M2 | OXYGEN SATURATION: 95 % | HEIGHT: 73 IN | HEART RATE: 86 BPM | SYSTOLIC BLOOD PRESSURE: 136 MMHG | DIASTOLIC BLOOD PRESSURE: 86 MMHG

## 2024-01-24 DIAGNOSIS — Z95.1 S/P CABG X 4: ICD-10-CM

## 2024-01-24 DIAGNOSIS — Z03.89 CORONARY ARTERY DISEASE (CAD) EXCLUDED: Primary | ICD-10-CM

## 2024-01-24 DIAGNOSIS — Z03.89 CORONARY ARTERY DISEASE (CAD) EXCLUDED: ICD-10-CM

## 2024-01-24 PROCEDURE — 99024 POSTOP FOLLOW-UP VISIT: CPT | Performed by: STUDENT IN AN ORGANIZED HEALTH CARE EDUCATION/TRAINING PROGRAM

## 2024-01-24 PROCEDURE — 3079F DIAST BP 80-89 MM HG: CPT | Performed by: STUDENT IN AN ORGANIZED HEALTH CARE EDUCATION/TRAINING PROGRAM

## 2024-01-24 PROCEDURE — 71046 X-RAY EXAM CHEST 2 VIEWS: CPT

## 2024-01-24 PROCEDURE — 3075F SYST BP GE 130 - 139MM HG: CPT | Performed by: STUDENT IN AN ORGANIZED HEALTH CARE EDUCATION/TRAINING PROGRAM

## 2024-01-24 PROCEDURE — 71046 X-RAY EXAM CHEST 2 VIEWS: CPT | Performed by: RADIOLOGY

## 2024-01-24 PROCEDURE — 3066F NEPHROPATHY DOC TX: CPT | Performed by: STUDENT IN AN ORGANIZED HEALTH CARE EDUCATION/TRAINING PROGRAM

## 2024-01-24 PROCEDURE — 3008F BODY MASS INDEX DOCD: CPT | Performed by: STUDENT IN AN ORGANIZED HEALTH CARE EDUCATION/TRAINING PROGRAM

## 2024-01-24 RX ORDER — CLOPIDOGREL BISULFATE 75 MG/1
75 TABLET ORAL DAILY
Qty: 90 TABLET | Refills: 2 | Status: SHIPPED | OUTPATIENT
Start: 2024-01-24

## 2024-01-24 ASSESSMENT — PAIN SCALES - GENERAL: PAINLEVEL: 0-NO PAIN

## 2024-01-24 NOTE — PROGRESS NOTES
Chief Complaint      HPI:   Mr. Behzad Diaz is a 58 y.o. male, who presents for post-operative evaluation.   He is now s/p CABG x4, and is recovering nicely.  He has resumed normal activities and his appetite is returned to normal.  He has no chest pain, no shortness of breath and denies palpitations, dizziness, or syncope.     Past Medical History:   Diagnosis Date    Discitis     Discitis     HLD (hyperlipidemia)     HTN (hypertension)     T2DM (type 2 diabetes mellitus) (CMS/HCC)        Past Surgical History:   Procedure Laterality Date    BACK SURGERY      CARDIAC CATHETERIZATION N/A 12/11/2023    Procedure: Left Heart Cath, With LV;  Surgeon: Kaleb Lim MD;  Location: [facility] Cardiac Cath Lab;  Service: Cardiovascular;  Laterality: N/A;    CORONARY ARTERY BYPASS GRAFT N/A 12/20/2023    CT GUIDED PERCUTANEOUS BIOPSY BONE DEEP  09/10/2021    CT GUIDED PERCUTANEOUS BIOPSY BONE DEEP 9/10/2021 [facility] CLINICAL [facility]GAC[facility]    OTHER SURGICAL HISTORY  07/30/2020    Scrotal surgery    OTHER SURGICAL HISTORY  07/30/2020    Back surgery       Family History   Problem Relation Name Age of Onset    Coronary artery disease Mother         Social History     Socioeconomic History    Marital status: Single     Spouse name: Not on file    Number of children: Not on file    Years of education: Not on file    Highest education level: Not on file   Occupational History    Not on file   Tobacco Use    Smoking status: Some Days     Types: Cigars    Smokeless tobacco: Never    Tobacco comments:     01/18/2024:  3 cigars per Year.   Vaping Use    Vaping Use: Never used   Substance and Sexual Activity    Alcohol use: Yes     Comment: Monthly.    Drug use: Never    Sexual activity: Defer   Other Topics Concern    Not on file   Social History Narrative    Not on file     Social Determinants of Health     Financial Resource Strain: Low Risk  (12/12/2023)    Overall Financial Resource Strain (CARDIA)     Difficulty of Paying Living Expenses: Not  hard at all   Food Insecurity: No Food Insecurity (12/12/2023)    Hunger Vital Sign     Worried About Running Out of Food in the Last Year: Never true     Ran Out of Food in the Last Year: Never true   Transportation Needs: No Transportation Needs (12/12/2023)    PRAPARE - Transportation     Lack of Transportation (Medical): No     Lack of Transportation (Non-Medical): No   Physical Activity: Inactive (12/12/2023)    Exercise Vital Sign     Days of Exercise per Week: 0 days     Minutes of Exercise per Session: 0 min   Stress: Stress Concern Present (12/12/2023)    [place] [employer] - Occupational Stress Questionnaire     Feeling of Stress : To some extent   Social Connections: Socially Isolated (12/12/2023)    Social Connection and Isolation Panel [NHANES]     Frequency of Communication with Friends and Family: More than three times a week     Frequency of Social Gatherings with Friends and Family: Not on file     Attends Muslim Services: Never     Active Member of Clubs or Organizations: No     Attends Club or Organization Meetings: Never     Marital Status: Never    Intimate Partner Violence: Not At Risk (12/12/2023)    Humiliation, Afraid, Rape, and Kick questionnaire     Fear of Current or Ex-Partner: No     Emotionally Abused: No     Physically Abused: No     Sexually Abused: No   Housing Stability: Low Risk  (12/12/2023)    Housing Stability Vital Sign     Unable to Pay for Housing in the Last Year: No     Number of Places Lived in the Last Year: 1     Unstable Housing in the Last Year: No       No Known Allergies    Outpatient Encounter Medications as of 1/24/2024   Medication Sig Dispense Refill    alcohol swabs (Alcohol Pads) pads, medicated Use 1 swab 4 times a day. 100 each 0    amoxicillin (Amoxil) 500 mg capsule Take 1 capsule (500 mg) by mouth every 12 hours.      aspirin 81 mg EC tablet Take 1 tablet (81 mg) by mouth once daily. Do not start before December 30, 2023.    "   atorvastatin (Lipitor) 80 mg tablet TAKE 1 TABLET (80 MG) BY MOUTH ONCE DAILY AT BEDTIME. 90 tablet 0    blood-glucose sensor (Dexcom G7 Sensor) device 1 each continuously. Use to check glucose, change every 10 days 3 each 11    carvedilol (Coreg) 3.125 mg tablet Take 1 tablet (3.125 mg) by mouth 2 times a day.      clopidogrel (Plavix) 75 mg tablet Take 1 tablet (75 mg) by mouth once daily. Do not start before December 30, 2023.      cyclobenzaprine (Flexeril) 10 mg tablet Take 1 tablet (10 mg) by mouth 2 times a day as needed for muscle spasms.      empagliflozin (Jardiance) 25 mg Take 1 tablet (25 mg) by mouth once daily. 90 tablet 3    insulin glargine (Lantus) 100 unit/mL (3 mL) pen Inject 25 Units under the skin once daily at bedtime. 15 mL 3    insulin lispro (HumaLOG) 100 unit/mL injection Inject 0-0.1 mL (0-10 Units) under the skin 3 times a day with meals. Take as directed per insulin instructions.      insulin lispro (HumaLOG) 100 unit/mL injection Inject 5 Units under the skin 3 times a day with meals. Take as directed per insulin instructions. 15 mL 3    multivitamin with minerals tablet Take 1 tablet by mouth once daily. Do not start before December 30, 2023.      pen needle, diabetic (BD Ultra-Fine Mini Pen Needle) 31 gauge x 3/16\" needle Use 1 pen needle to inject insulin 4 times a day. 120 each 0    pregabalin (Lyrica) 75 mg capsule Take 1 capsule (75 mg) by mouth once daily. Per patient statement      tirzepatide (Mounjaro) 2.5 mg/0.5 mL pen injector Inject 2.5 mg under the skin 1 (one) time per week. 6 mL 1     No facility-administered encounter medications on file as of 1/24/2024.       Physical Exam  General: no acute distress  Cardiovascular: Regular rate and rhythm  Respiratory: symmetrical chest rise and fall, no increased work of breathing  Wound: incisions well healed  Extremities: no edema      Results: CXR reviewed, lungs clear    Assessment and Plan:    Mr. Behzad Diaz is a 58 y.o. " male, who is recovering well after surgery.      Referral in place for cardiac rehab  Ok to drive, ok to return to normal activities   Continue to follow up with cardiology   Does not need to return to clinic, but was instructed to call if any issues arise       Mattie Phelps MD  01/24/24  2:29 PM

## 2024-01-26 NOTE — OP NOTE
OPERATIVE REPORT     Date: 2023  OR Location: [facility] OR    Name: Behzad Diaz, : 1965, Age: 58 y.o., MRN: [id number], Sex: male    Diagnosis  Pre-op Diagnosis     * Coronary artery disease (CAD) excluded [Z03.89]  Principal Problem:    Coronary artery disease (CAD) excluded  Active Problems:    Type 2 diabetes mellitus with circulatory disorder, without long-term current use of insulin (CMS/HCC)    NSTEMI (non-ST elevated myocardial infarction) (CMS/HCC)    Ischemic cardiomyopathy    HLD (hyperlipidemia)   Post-op Diagnosis     * Coronary artery disease (CAD) excluded [Z03.89]  Principal Problem:    Coronary artery disease (CAD) excluded  Active Problems:    Type 2 diabetes mellitus with circulatory disorder, without long-term current use of insulin (CMS/HCC)    NSTEMI (non-ST elevated myocardial infarction) (CMS/HCC)    Ischemic cardiomyopathy    HLD (hyperlipidemia)       Procedures  Off Pump CABG X4 w/ LIMA to LAD, SVG to Diag, and SVG Sequenced to OM and PDA        Surgeons   Mattie Phelps MD      Resident/Fellow/Other Assistant:  Surgeon(s) and Role:  Eder Reyes     Procedure Summary  Anesthesia: General  ASA: IV  Anesthesia Staff:   Anesthesiologist: Rodolfo Heredia MD; Darrell Iglesias MD  C-AA: SID Cole  Anesthesia Resident: Alessandra Valles MD; Chino Spangler MD  Perfusionist: Amor Negrete; Dominique Basilio    Estimated Blood Loss: 250mL    Specimen: No specimens collected     Staff:   Circulator: Amira Herrera RN  Relief Circulator: Medina Cervantes RN  Relief Scrub: Francesco Gonzales  Scrub Person: April Beltran        Findings: Good flows in all grafts     Indications: Behzad Diaz is an 58 y.o. male with hx of coronary artery disease with plan for CABG    The risks benefits and alternatives were discussed with the patient and include but are not limited to, bleeding, infection, injury to other structures, need for re-operation, arrhythmia, respiratory  failure, prolonged intubation, stroke, and death.  These were discussed with the patient in detail, all questions were answered and informed consent was obtained.      Procedure Details:   The patient was taken to the operating room by anesthesia, placed supine on the operating table, intubated and placed under general anesthesia.  A central line and catrachita were placed. ANDREZ was performed which confirmed the preoperative findings of EF of 55% without significant valvular disease.  The patient was prepped and draped in the usual sterile fashion.  A time out was performed. The sternotomy was made in the usual fashion.  The left internal mammary artery was taken down in a skeletonized fashion. The saphenous vein graft was harvested endoscopically at the same time. Heparin was administered and the LIMA was divided distally.  It was noted to have excellent pulsatile flow.  Papaverine was applied and the LIMA was wrapped in a papaverine soaked gauze.  The chest retractor was placed, and the pericardium was opened.  At this time the heart was noted to be shifted deeply to the right with the ascending aorta well within the right chest.  Because of this, I decided to proceed with off pump cabg given the difficulty associated with central cannulation due to the position of the heart.   The LAD was identified and the mammary was prepared.  The stabilizer was placed and the LAD opened it was small and severely diseased.  A 1.5mm shunt was placed.  The lima was anastomosed with a running 7-0 prolene suture.  Following completion of the anastomosis the graft was checked with the flow probe and noted to have a reasonable flow with a low PI.  The stabilizer was released and we turned our attention to the large diagonal that was about twice the size of the LAD.  The stabilizer was placed and the vessel opened.  A 1.75mm shunt was placed.  A vein was taken to the diagonal and the anastomosis was completed with a 7-0 prolene suture.   The vein was then brought up to the aorta, measured and divided. Following this flow was checked in the vein graft and noted to be excellent with a low PI.  I then exposed the PDA with the urchin.  Once exposure was obtained the stabilizer was placed and the vessel opened.  A 1.75mm shunt was placed and the vein was anastomosed using a running 7-0 prolene suture.  The vein was then taken around the left of the heart where the OM was exposed and opened.  A 1.75mm shunt was placed and a side to side anastomosis was performed.  There was a soft portion of the ascending aorta and the Heart String device was used to creat a proximal anastomosis on the ascending aorta with a running 6-0 prolene suture. The vein graft on the diagonal was then taken off the proximal portion of this graft with a running 7-0 prolene suture. Flows were checked in all grafts and noted to be excellent. Protamine was administered. Hemostasis was secured.  Chest tubes were placed.  The sternum was closed with wires.  The subcutaneous tissue was closed in layers with running vicryl sutures.  The skin was closed with running vicryl suture and covered with dermabond.  The patient tolerated the procedure well.  All instrument, needle and sponge counts were correct at the end of the case.  The patient was left intubated and transferred to the SICU in stable but critical condition.            Mattie Phelps MD  Cardiac Surgery  01/26/24  12:06 PM

## 2024-01-30 ENCOUNTER — CLINICAL SUPPORT (OUTPATIENT)
Dept: CARDIAC REHAB | Facility: CLINIC | Age: 59
End: 2024-01-30
Payer: COMMERCIAL

## 2024-01-30 DIAGNOSIS — Z95.1 S/P CABG X 4: Primary | ICD-10-CM

## 2024-01-30 NOTE — PROGRESS NOTES
"Name: Behzad Diaz   : 1965   Diagnosis: CABGx4   MRN: [id number]   Onset Date: 23  Today's Date: 24      Cardiovascular   HX: CABGx4, NSTEMI 12/10/23  Family HX of CAD:  Yes  Angina: Tightness, nausea, chills, radiating to L arm   Describe: Only experienced on 12/10/23 - called 911 after looking up s/s's of MI   Last Episode: 12/10/23  History of Heart Failure: No  EF: 40-45%  Onset of HF: NA   Last HF hospitalization: NA   Family HX of HF:  No  HF symptoms: NA     Devices:  Denies   HX of PAD: No    Arrythmias: normal sinus rhythm  Apical: regular  Heart Rate: 89bpm  BP: 130/80  Radial pulses:  R Present 2+ L Present 1+    Comments:      Respiratory  HX: Denies   Dyspnea:  Yes  Describe: Mild, exertional   HX LELE:  No  CPAP Use:  No  Family History of Lung Disease:  No    Resting O2 sat: 98%  Lung Sounds: Clear   Locations: all lobes    Comments:    Neurological   Orientation: oriented to person, place, time, and general circumstances  HX: Denies   History of stroke/TIA?: Denies    Comments:      Skin  Skin Color: normal, no cyanosis, jaundice, pallor or bruising  Edema: trace     Comments:    Gastrointestinal/Genitourinary  HX: Denies   Comments: Umbilical hernia for last 30 years, never repaired       Psychosocial  Marital status: single  Children: Denies   Lives alone:  Yes  Lives with:  Drives:  Yes  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  Will return to work on 24    Caretaker of family member?:  No  Do you feel safe at home?:  Yes    Caffeinated drinks per day: 1 coke cola daily   Alcoholic drinks per day/week: Occasional - less than once every 2 weeks - \"good bourbon or scotch\"   HX drug or alcohol abuse: No   Current use of illicit drugs: No    Comments:    Musculoskeletal  HX of injury/surgery: Back surgeries - thoracic and cervical stenosis 14 & 16 years ago - has 2 rods, 8 screws; Lumbar discitis (L4-5) repaired with two cages with epidural abscess with " "debridement and continual use of antibiotics.     Comments: Patient reports neuropathy in bilateral lower legs as result of spine surgeries. Reports \"70-75% feeling in both feet\".      Pain Assessment  Current pain: chronic - sees pain management physician   Location: Back and R knee - stiffness   Description: Pain has improved over last 6 months, pain medications have been reduced     Comments:    Fall Risk Assessment  Assistive device: cane - mostly when in public   Needs assistance:  No  Afraid of falling:  No  Fall within the past 6 months?: No  Injured with fall:  Fall risk results: high    "

## 2024-01-30 NOTE — PROGRESS NOTES
"INDIVIDUAL CARDIAC TREATMENT PLAN-INITIAL ASSESSMENT     Name: Behzad Diaz   Today's Date: 24   : 1965   Primary Provider: ARCELIA Arce DO   MRN: [id number]   Referring Physician: ARCELIA Veneags MD      Diagnosis: CABGx4   Onset Date: 23      Risk Stratification: High      NUTRITION ASSESSMENT  Lipids:   Lipid Lab Date: 12/10/23  Total Chol: 145  HDL: 25.2  LDL: 51  Tri  Cholesterol Med: Atorvastatin 80mg daily     Diabetes: Yes T2DM   HgbA1c:  11.0  Date Checked: 12/10/23  Monitors glucose at home: Yes  Fasting Blood Sugar Range: 130-140's   Frequency: Has CGM - watches regularly  Hypoglycemic Episode: Rarely but had one yesterday 24 (low reading with slight lightheadedness)    Weight Management  Weight: 336lbs  Height: 73\"  BMI:  44.3  Pre Body Composition: NA secondary to BMI>35   Post Body Composition: NA   Pre Waist Circumference: NA secondary to BMI>35  Post Waist Circumference: NA   Current Diet: Regular - 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 Improve Picture Your Plate assessment results by discharge.  2. Make changes to diet to include heart healthy options while in the program.    Nutrition Intervention/Education:   *Sent dietician Picture Your Plate assessment for scoring and recommendations.   *Perform weekly weight checks on .     OTHER CORE COMPONENTS/ RISK FACTORS ASSESSMENT  Medication compliance: good compliance  Using pill box: Yes  Carries medication list: No but reviewed importance of doing so     Blood Pressure Management:  History of High BP: Yes  Resting BP: 130/80    Tobacco: SMOKER/minimal 3 cigars/year  Quit Date: NA   Other forms of tobacco: Cigar   Anyone in the house smoke: No    Initial Knowledge Test Score: 9/15  Discharge Knowledge Test Score: NA " "    OTHER CORE COMPONENTS/ RISK FACTOR PLAN   Other Core components/Risk Factor Goals:                                                                                                                                                      1. Achieve and maintain a resting blood pressure less than 130/80 while in the program.  2. Gain knowledge of cardiac disease and lifestyle modifications related to exercise and ADL's prior to discharge.    Other Components/ Risk Factors Intervention/Education:  *Will monitor HR, BP, dyspnea and arrhythmias each session.   *Will meet with patient to discuss goals & progress.  *Encouraged review of education materials.       PSYCHOSOCIAL ASSESSMENT  Patient reported stress level: moderate  Using stress management skills: Yes - \"I like to lozano for fun and relaxation\"   HX of anxiety: No  HX of depression: No  Patient questioned regarding any new stress, depression and anxiety symptoms: Yes    Family/Support System: Friends   Seeing mental health provider: No  Psychosocial medications:    Initial PHQ-9 score: 4  Discharge PHQ-9 score: NA   Was PHQ-9 faxed to provider: No  Date faxed:    Quality of Life Survey:   Pre PCS: 38.52  Post PCS: NA    Pre MCS: 37.46  Post MCS: NA      Stages of change:  Contemplation    PSYCHOSOCIAL PLAN  Psychosocial Goals:  1. Improve stage of change from Contemplation to Preparation while in the program.  2. To maintain PHQ-9 category classification (no risk for depression) while in the program.     Psychosocial Interventions/ Education:  * Provided one on one emotional support and will facilitate peer support within the context of other phase II patients while in the program.       EXERCISE ASSESSMENT  Home Exercise: No   Frequency: once weekly   Mode: Home physical therapy for last couple weeks     EXERCISE PLAN  Exercise Goals:   1. Goal of 3.8 METs by discharge.  2. Have a plan in place for continued exercise after the program by discharge.    Exercise " Prescription:   Based on 12 Minute Walk Test  Frequency: 3 days per week  Duration (total aerobic min.): 30 minutes  Intensity RPE: 11-14  Target HR: Rest+30 (110-143bpm)  MET Level Range: 1.7-2.3    Date of first exercise session:     Modality METS Load  Duration   1 Warm Up    05:00   2 NuStep 2.1 40 Dutta L3 06:00   3 NuStep 2.1 40 Dutta L3 06:00   4 Arm Ergometer 2.1 5 Dutta   06:00   5 Recumbent Bike 2.2 10 Dutta   06:00   6 Walking 1.7 3 laps   06:00   7 Cool Down     05:00     Exercise Intervention:   *Aim to progress 0.5 MET every 4 Weeks or as tolerated.  *Incorporate resistance training for muscular endurance and strength.    LEARNING ASSESSMENT & BARRIERS  Readiness to Learn: Eager to get started, asking appropriate questions   Barriers: None    FALL RISK  HIGH  Comments: Uses cane only when in public      INDIVIDUAL PATIENT GOALS:  1.To improve strength and endurance of legs by discharge   2.To increase walking duration by discharge      MEDICATIONS  Amoxicillin 500mg BID, Aspirin 81mg daily, Atorvastatin 80mg @HS, Carvedilol 3.125mg BID, Clopidogrel 75mg daily, M. Vit. 1 tab daily, Mounjaro 2.5mg subcutaneous weekly, Jardiance 25mg daily, Humalog 5 units with meals (2x/day) and sliding scale, Lantus 25 units @HS, Flexeril 10mg daily and BID PRN, Lyrica 75mg daily      STAFF COMMENTS:   Here for orientation into phase 2 cardiac rehab, following NSTEMI on 12/10/23 and CABGx4 12/30/23. Patient has had uncomplicated recovery, incision healing well.  He has resumed driving and plans to return to work on 2/5/24.  Chief issue at this time is with the sensors for his CGM, but hopes to also receive glucometer that he can have as back up when CGM does not work for him. Upon review of Behzad's health history, his main limitation will be orthopedic in nature with extensive back surgeries and weakness.  Deferred 12 minute walk test on treadmill, but completed 6 minute walk test, due to chronic back issues and  neuropathy in bilateral feet - uses cane. Achieved 2.1 METs with no reported pain or dyspnea.  Telemetry showing SR-ST with no ectopy noted but he did exceed THR after 4:30 minutes of walking.  Behzad stopped at 5:20 due to generalized fatigue as he has done no regular movement since his surgery. He is motivated to make lifestyle changes to improve risk factor management.

## 2024-02-01 ENCOUNTER — TELEMEDICINE (OUTPATIENT)
Dept: PHARMACY | Facility: HOSPITAL | Age: 59
End: 2024-02-01
Payer: COMMERCIAL

## 2024-02-01 ENCOUNTER — TELEPHONE (OUTPATIENT)
Dept: PRIMARY CARE | Facility: CLINIC | Age: 59
End: 2024-02-01

## 2024-02-01 DIAGNOSIS — E11.59 TYPE 2 DIABETES MELLITUS WITH OTHER CIRCULATORY COMPLICATION, WITHOUT LONG-TERM CURRENT USE OF INSULIN (MULTI): ICD-10-CM

## 2024-02-01 NOTE — PROGRESS NOTES
INITIAL PICTURE YOUR PLATE ASSESSMENT  CARDIAC REHAB    SCORES:  Vegetables & Fruit (out of 12)                 5   Breads, Grains & Cereals (out of 12)        2  Red & Processed Meat (out of 12)         3  Poultry (out of 2)                                   0  Fish & Shellfish (out of 4)                      0  Beans, Nuts & Seeds (out of 4)             0  Milk & Dairy Foods (out of 6)              3  Toppings, Oils, Seasonings & Salt (out of 20)    4  Sweets, Snacks & Restaurant Food (out of 14)    4  Beverages (out of 10)          9     Overall Score (out of 96)    30    DIAGNOSIS  Inadequate intake of fruts and vegetables.  Greater than recommended intake of red meat and processed meat intake.     GOALS  Aim to consume at least 5 fruits and vegetables/d.   Fruit and Vegetable Tip Sheet provided.    Aim to limit intake of red meat and high fat processed meats to <2x/wk.  Red Meat Tips Sheet provided.     Patient provided dietitian phone number for any further diet related questions.

## 2024-02-01 NOTE — ASSESSMENT & PLAN NOTE
Patient's diabetes is uncontrolled with most recent A1c = 11.  Continue Jardiance 25 mg daily, Mounjaro 2.5 mg weekly, Lantus 25 units daily.  INCREASE Humalog to 7 units + SS  Answered all patient's questions about medications.  Sent invite to share data with Dexcom clarity so we can make medication adjustments as needed at future follow ups.  Follow up: 2/15 at 1pm

## 2024-02-01 NOTE — PROGRESS NOTES
Pharmacy Post-Discharge Visit  Behzad Diaz is a 58 y.o. male was referred to Clinical Pharmacy Team to complete a post-discharge medication optimization and monitoring visit.  The patient was referred for their No chief complaint on file..    Admission Date: 12/12/23  Discharge Date: 12/29/23    Referring Provider: RACHEL Arias    Subjective   No Known Allergies    [id number] - [place], [place] - [address] [facility] [address] [facility] [place]  [address] [address]  Phone: [phone] Fax: [phone]    [facility]  [address], [address]  [place] [id number]  Phone: [phone] Fax: [phone]      Social History     Social History Narrative    Not on file      Notable Medication changes following discharge:  Start:   bisacodyl 10 mg suppository   clopidogrel 75 mg tablet   dapagliflozin propanediol 10 mg   Dexcom G7 Sensor device   insulin glargine 100 unit/mL injection   * insulin lispro 100 unit/mL injection   * insulin lispro 100 unit/mL injection   multivitamin with minerals tablet   polyethylene glycol 17 gram packet   sennosides-docusate sodium 8.6-50 mg tablet   traMADol 50 mg tablet  Stop: lisinopril 20 mg, metformin 1000 mg  Change: n/a    HECTOR  Diabetes    - Jardiance $400/ Mounjaro $300 for 3 month supplies  - Patient doesn't qualify  PAP upon assessment    Patient is using: CGM; dexcom  The patient is currently checking the blood glucose continuously  - Having mealtime highs still    -----------------------------  Dexcom Clarity  -----------------------------  Behzad Diaz  YOB: 1965  Generated at: Thu, Feb 1, 2024 4:29 PM EST  Reporting period: Fri Jan 19, 2024 - Thu Feb 1, 2024  -----------------------------  Glucose Details  Average glucose: 172 mg/dL  Standard deviation: 38 mg/dL  GMI: N/A  -----------------------------  Time in Range  Very High: 4%  High: 30%  In Range: 65%  Low: 0%  Very Low: <1%    Target Range    mg/dL    -----------------------------  CGM Details  Sensor usage: 79%  Days with CGM data: 11/14        Hypoglycemia frequency: None  Hypoglycemia awareness: Yes     Diet:   2 meals/day  Small breakfast 10-11 am; poptarts, ham sandwich  Dinner 5-7 pm; meat, veggies, carb  Snacks - not usually  Drinks - water, one 12 oz can regular coke daily  Desserts - not usually    Physical Activity   Getting physical therapy  No other exercise currently     Current Diabetes Medications:   Jardiance 25 mg daily  Lantus 25 units daily  Humalog 5 units + SS  150-200 2 units  200-250 4 units   Mounjaro 2.5 mg weekly    Historical Diabetes Medications:  N/A     Secondary Prevention  Statin? Yes  ACE-I/ARB? No  Aspirin? Yes    Pertinent PMH Review:  PMH of Pancreatitis: No  PMH of Retinopathy: No  PMH of Urinary Tract Infections: No  PMH of MTC: No     Review of Systems    Medication System Management:  Affordability/Accessibility: Mounjaro/Jardiance expensive, but patient had deductible he hadn't met yet  Adherence/Organization: No issues  Adverse Effects: No issues    Objective     There were no vitals taken for this visit.     LAB  Lab Results   Component Value Date    BILITOT 1.3 (H) 12/13/2023    CALCIUM 8.9 12/29/2023    CO2 28 12/29/2023     12/29/2023    CREATININE 0.77 12/29/2023    GLUCOSE 95 12/29/2023    ALKPHOS 58 12/13/2023    K 3.7 12/29/2023    PROT 6.5 12/13/2023     12/29/2023    AST 26 12/13/2023    ALT 26 12/13/2023    BUN 21 12/29/2023    ANIONGAP 11 12/29/2023    MG 2.25 12/29/2023    PHOS 3.8 12/29/2023    ALBUMIN 3.4 12/29/2023    LIPASE 13 09/22/2020    GFRMALE >90 07/07/2023     Lab Results   Component Value Date    TRIG 343 (H) 12/10/2023    CHOL 145 12/10/2023    LDLCALC 51 12/10/2023    HDL 25.2 12/10/2023     Lab Results   Component Value Date    HGBA1C 11.0 (H) 12/10/2023         Current Outpatient Medications on File Prior to Visit   Medication Sig Dispense Refill    alcohol swabs (Alcohol  "Pads) pads, medicated Use 1 swab 4 times a day. 100 each 0    amoxicillin (Amoxil) 500 mg capsule Take 1 capsule (500 mg) by mouth every 12 hours.      aspirin 81 mg EC tablet Take 1 tablet (81 mg) by mouth once daily. Do not start before 2023.      atorvastatin (Lipitor) 80 mg tablet TAKE 1 TABLET (80 MG) BY MOUTH ONCE DAILY AT BEDTIME. 90 tablet 0    [] blood-glucose sensor (Dexcom G7 Sensor) device 1 each continuously. Use to check glucose, change every 10 days 3 each 11    carvedilol (Coreg) 3.125 mg tablet Take 1 tablet (3.125 mg) by mouth 2 times a day.      clopidogrel (Plavix) 75 mg tablet Take 1 tablet (75 mg) by mouth once daily. 90 tablet 2    cyclobenzaprine (Flexeril) 10 mg tablet Take 1 tablet (10 mg) by mouth 2 times a day as needed for muscle spasms.      empagliflozin (Jardiance) 25 mg Take 1 tablet (25 mg) by mouth once daily. 90 tablet 3    insulin glargine (Lantus) 100 unit/mL (3 mL) pen Inject 25 Units under the skin once daily at bedtime. 15 mL 3    insulin lispro (HumaLOG) 100 unit/mL injection Inject 0-0.1 mL (0-10 Units) under the skin 3 times a day with meals. Take as directed per insulin instructions.      insulin lispro (HumaLOG) 100 unit/mL injection Inject 5 Units under the skin 3 times a day with meals. Take as directed per insulin instructions. 15 mL 3    multivitamin with minerals tablet Take 1 tablet by mouth once daily. Do not start before 2023.      pen needle, diabetic (BD Ultra-Fine Mini Pen Needle) 31 gauge x 3/16\" needle Use 1 pen needle to inject insulin 4 times a day. 120 each 0    pregabalin (Lyrica) 75 mg capsule Take 1 capsule (75 mg) by mouth once daily. Per patient statement      tirzepatide (Mounjaro) 2.5 mg/0.5 mL pen injector Inject 2.5 mg under the skin 1 (one) time per week. 6 mL 1     No current facility-administered medications on file prior to visit.      DRUG INTERATIONS  - none    Assessment/Plan   Problem List Items Addressed " This Visit       Type 2 diabetes mellitus with circulatory disorder, without long-term current use of insulin (CMS/[facility])     Patient's diabetes is uncontrolled with most recent A1c = 11.  Continue Jardiance 25 mg daily, Mounjaro 2.5 mg weekly, Lantus 25 units daily.  INCREASE Humalog to 7 units + SS  Answered all patient's questions about medications.  Sent invite to share data with Dexcom clarity so we can make medication adjustments as needed at future follow ups.  Follow up: 2/15 at 1pm         Relevant Orders    Follow Up In Clinical Pharmacy     Continue all meds under the continuation of care with the referring provider and clinical pharmacy team.    Patricia Church, PharmD     Verbal consent to manage patient's drug therapy was obtained from [the patient and/or an individual authorized to act on behalf of a patient]. They were informed they may decline to participate or withdraw from participation in pharmacy services at any time.

## 2024-02-01 NOTE — TELEPHONE ENCOUNTER
Britany From [facility] called informed, pt will be discharged from Home care tomorrow Friday 2/2/24 and will start cardiac rehab on Monday 2/5/24. [phone]

## 2024-02-05 ENCOUNTER — CLINICAL SUPPORT (OUTPATIENT)
Dept: CARDIAC REHAB | Facility: CLINIC | Age: 59
End: 2024-02-05
Payer: COMMERCIAL

## 2024-02-05 DIAGNOSIS — Z95.1 S/P CABG X 4: ICD-10-CM

## 2024-02-05 PROCEDURE — 93798 PHYS/QHP OP CAR RHAB W/ECG: CPT | Performed by: INTERNAL MEDICINE

## 2024-02-06 ENCOUNTER — OFFICE VISIT (OUTPATIENT)
Dept: ENDOCRINOLOGY | Facility: CLINIC | Age: 59
End: 2024-02-06
Payer: COMMERCIAL

## 2024-02-06 VITALS
BODY MASS INDEX: 41.75 KG/M2 | DIASTOLIC BLOOD PRESSURE: 90 MMHG | HEIGHT: 73 IN | OXYGEN SATURATION: 97 % | WEIGHT: 315 LBS | SYSTOLIC BLOOD PRESSURE: 147 MMHG | HEART RATE: 89 BPM

## 2024-02-06 DIAGNOSIS — Z95.1 S/P CABG X 4: ICD-10-CM

## 2024-02-06 DIAGNOSIS — E11.59 TYPE 2 DIABETES MELLITUS WITH OTHER CIRCULATORY COMPLICATION, WITH LONG-TERM CURRENT USE OF INSULIN (MULTI): ICD-10-CM

## 2024-02-06 DIAGNOSIS — R73.9 HYPERGLYCEMIA: ICD-10-CM

## 2024-02-06 DIAGNOSIS — Z79.4 TYPE 2 DIABETES MELLITUS WITH OTHER CIRCULATORY COMPLICATION, WITH LONG-TERM CURRENT USE OF INSULIN (MULTI): ICD-10-CM

## 2024-02-06 DIAGNOSIS — E11.65 TYPE 2 DIABETES MELLITUS WITH HYPERGLYCEMIA, WITH LONG-TERM CURRENT USE OF INSULIN (MULTI): Primary | ICD-10-CM

## 2024-02-06 DIAGNOSIS — E66.01 OBESITY, CLASS III, BMI 40-49.9 (MORBID OBESITY) (MULTI): ICD-10-CM

## 2024-02-06 DIAGNOSIS — E11.59 TYPE 2 DIABETES MELLITUS WITH OTHER CIRCULATORY COMPLICATION, WITHOUT LONG-TERM CURRENT USE OF INSULIN (MULTI): ICD-10-CM

## 2024-02-06 DIAGNOSIS — Z79.4 TYPE 2 DIABETES MELLITUS WITH HYPERGLYCEMIA, WITH LONG-TERM CURRENT USE OF INSULIN (MULTI): Primary | ICD-10-CM

## 2024-02-06 PROCEDURE — 95251 CONT GLUC MNTR ANALYSIS I&R: CPT | Performed by: PHYSICIAN ASSISTANT

## 2024-02-06 PROCEDURE — 99214 OFFICE O/P EST MOD 30 MIN: CPT | Performed by: PHYSICIAN ASSISTANT

## 2024-02-06 PROCEDURE — 3080F DIAST BP >= 90 MM HG: CPT | Performed by: PHYSICIAN ASSISTANT

## 2024-02-06 PROCEDURE — RXMED WILLOW AMBULATORY MEDICATION CHARGE

## 2024-02-06 PROCEDURE — 3077F SYST BP >= 140 MM HG: CPT | Performed by: PHYSICIAN ASSISTANT

## 2024-02-06 PROCEDURE — 3008F BODY MASS INDEX DOCD: CPT | Performed by: PHYSICIAN ASSISTANT

## 2024-02-06 RX ORDER — DEXTROSE 4 G
1 TABLET,CHEWABLE ORAL 4 TIMES DAILY
Qty: 1 EACH | Refills: 3 | Status: SHIPPED | OUTPATIENT
Start: 2024-02-06 | End: 2024-05-06

## 2024-02-06 RX ORDER — IBUPROFEN 200 MG
1 CAPSULE ORAL 4 TIMES DAILY
Qty: 400 STRIP | Refills: 3 | Status: SHIPPED | OUTPATIENT
Start: 2024-02-06 | End: 2025-02-05

## 2024-02-06 RX ORDER — TIRZEPATIDE 5 MG/.5ML
5 INJECTION, SOLUTION SUBCUTANEOUS
Qty: 2 ML | Refills: 5 | Status: SHIPPED | OUTPATIENT
Start: 2024-02-06 | End: 2024-03-07 | Stop reason: ALTCHOICE

## 2024-02-06 RX ORDER — CARVEDILOL 3.12 MG/1
3.12 TABLET ORAL 2 TIMES DAILY
Qty: 60 TABLET | Refills: 3 | Status: SHIPPED | OUTPATIENT
Start: 2024-02-06 | End: 2024-02-16 | Stop reason: SDUPTHER

## 2024-02-06 RX ORDER — PEN NEEDLE, DIABETIC 30 GX3/16"
1 NEEDLE, DISPOSABLE MISCELLANEOUS 4 TIMES DAILY
Qty: 400 EACH | Refills: 3 | Status: SHIPPED | OUTPATIENT
Start: 2024-02-06 | End: 2024-05-07 | Stop reason: WASHOUT

## 2024-02-06 RX ORDER — ISOPROPYL ALCOHOL 70 ML/100ML
1 SWAB TOPICAL 4 TIMES DAILY
Qty: 400 EACH | Refills: 3 | Status: SHIPPED | OUTPATIENT
Start: 2024-02-06 | End: 2024-07-07

## 2024-02-06 RX ORDER — LANCETS
1 EACH MISCELLANEOUS 4 TIMES DAILY
Qty: 400 EACH | Refills: 3 | Status: SHIPPED | OUTPATIENT
Start: 2024-02-06 | End: 2024-05-08

## 2024-02-06 RX ORDER — BLOOD-GLUCOSE SENSOR
EACH MISCELLANEOUS
Qty: 3 EACH | Refills: 11 | Status: SHIPPED | OUTPATIENT
Start: 2024-02-06 | End: 2024-03-07 | Stop reason: ALTCHOICE

## 2024-02-06 ASSESSMENT — ENCOUNTER SYMPTOMS
CONSTIPATION: 1
DIARRHEA: 0
PALPITATIONS: 0
HEMATURIA: 0
SHORTNESS OF BREATH: 0
EYE PAIN: 0
NAUSEA: 0
COLOR CHANGE: 0
COUGH: 0
HALLUCINATIONS: 0
RHINORRHEA: 0
ABDOMINAL PAIN: 0
SPEECH DIFFICULTY: 0
APPETITE CHANGE: 0
TROUBLE SWALLOWING: 0
SORE THROAT: 0
POLYDIPSIA: 0
FEVER: 0
VOMITING: 0
CONFUSION: 0
HEADACHES: 0
CHILLS: 0
DIFFICULTY URINATING: 0

## 2024-02-06 ASSESSMENT — PAIN SCALES - GENERAL: PAINLEVEL: 0-NO PAIN

## 2024-02-06 NOTE — PROGRESS NOTES
Subjective   Behzad Diaz is a 58 y.o. male who presents for initial visit for evaluation of Type 2 diabetes mellitus. The initial diagnosis of diabetes was made  about 4 years ago . The patient does have a known family history of diabetes. Behzad says he had some issues with his first two G7 sensor giving him low readings, but it has since resolved.    Known complications due to diabetes included peripheral neuropathy, cardiovascular disease, and obesity    Cardiovascular risk factors include advanced age (older than 55 for men, 65 for women), diabetes mellitus, hypertension, male gender, obesity (BMI >= 30 kg/m2), and sedentary lifestyle. The patient is not on an ACE inhibitor or angiotensin II receptor blocker.  The patient has not been previously hospitalized due to diabetic ketoacidosis.     Current symptoms/problems include hyperglycemia and paresthesia of the feet. His clinical course has fluctuated.     Current diabetes regimen is as follows: Insulin injections: Insulin dosage review with Behzad suggested compliance all of the time.  Sliding scale used with meals. Mounjaro 2.5mg weekly, Jardiance 25mg daily  Pre-breakfast Humalog 7 units   Pre-lunch Humalog 7 units   Pre-dinner Humalog 7 units   Bedtime Lantus 25 units   Insulin injections are given by patient.   Rotation of sites for injection: abdominal wall     The patient is currently checking the blood glucose at least 3x times per day.    Patient is using: continuous glucose monitor, Dexcom G7.        Hypoglycemia frequency: NO  Hypoglycemia awareness: Yes     Exercise: three times a week. Mr. Diaz has difficulty with mobility, but has been in cardiac physical therapy.  Meal panning: He is using avoidance of concentrated sweets. He naturally tends to have about 2 meals a day and rarely snacks.    Review of Systems   Constitutional:  Negative for appetite change, chills and fever.   HENT:  Negative for ear discharge, ear pain, rhinorrhea, sore throat and  "trouble swallowing.    Eyes:  Negative for pain and visual disturbance.   Respiratory:  Negative for cough and shortness of breath.    Cardiovascular:  Negative for chest pain and palpitations.   Gastrointestinal:  Positive for constipation. Negative for abdominal pain, diarrhea, nausea and vomiting.   Endocrine: Negative for polydipsia and polyuria.   Genitourinary:  Negative for difficulty urinating and hematuria.   Skin:  Negative for color change and rash.   Neurological:  Negative for speech difficulty and headaches.   Psychiatric/Behavioral:  Negative for behavioral problems, confusion and hallucinations.        Objective   /90 (BP Location: Left arm, Patient Position: Sitting)   Pulse 89   Ht 1.854 m (6' 1\")   Wt (!) 151 kg (332 lb)   SpO2 97%   BMI 43.80 kg/m²   Physical Exam  Constitutional:       Appearance: Normal appearance. He is obese.   HENT:      Nose: Nose normal.      Mouth/Throat:      Mouth: Mucous membranes are moist.   Eyes:      Extraocular Movements: Extraocular movements intact.      Conjunctiva/sclera: Conjunctivae normal.   Cardiovascular:      Rate and Rhythm: Normal rate and regular rhythm.      Pulses: Normal pulses.      Heart sounds: Normal heart sounds.   Pulmonary:      Effort: Pulmonary effort is normal.      Breath sounds: Normal breath sounds.   Abdominal:      General: Bowel sounds are normal.      Palpations: Abdomen is soft.   Skin:     General: Skin is warm and dry.   Neurological:      Mental Status: He is alert and oriented to person, place, and time.   Psychiatric:         Mood and Affect: Mood normal.         Behavior: Behavior normal.         Thought Content: Thought content normal.         Lab Review  Glucose (mg/dL)   Date Value   12/29/2023 95   12/28/2023 121 (H)   12/28/2023 65 (L)     POC HEMOGLOBIN A1c (%)   Date Value   08/25/2023 10.8 (A)     Hemoglobin A1C (%)   Date Value   12/10/2023 11.0 (H)   08/27/2021 6.4 (A)   11/01/2020 6.0   09/17/2020 6.7 " "    Bicarbonate (mmol/L)   Date Value   12/29/2023 28   12/28/2023 26   12/28/2023 22     Urea Nitrogen (mg/dL)   Date Value   12/29/2023 21   12/28/2023 19   12/28/2023 18     Creatinine (mg/dL)   Date Value   12/29/2023 0.77   12/28/2023 0.91   12/28/2023 0.86       Health Maintenance:   Foot Exam: due for update  Eye Exam: due for update  Lipid Panel: up to date- 12/10/23  Urine Albumin: due for update    Assessment/Plan   Diagnoses and all orders for this visit:  Type 2 diabetes mellitus with hyperglycemia, with long-term current use of insulin (CMS/[facility])  -     blood-glucose sensor (Dexcom G7 Sensor) device; Use to check glucose, change every 10 days  -     alcohol swabs pads, medicated; Apply topically 4 times a day. Use prior to checking glucose or injecting insulin  -     blood-glucose meter misc; 1 each 4 times a day. Use to check glucose 4 times daily, before meals and at bedtime  -     lancets misc; Use to check glucose 4 times daily, before meals and at bedtime  -     pen needle, diabetic 32 gauge x 5/32\" needle; Use to inject insulin 4 times daily  -     blood sugar diagnostic (Blood Glucose Test) strip; Use to check glucose 4 times daily, before meals and at bedtime  Type 2 diabetes mellitus with other circulatory complication, with long-term current use of insulin (CMS/[facility])  -     tirzepatide (Mounjaro) 5 mg/0.5 mL pen injector; Inject 5 mg under the skin 1 (one) time per week.  -     empagliflozin (Jardiance) 25 mg; Take 1 tablet (25 mg) by mouth once daily.  S/P CABG x 4  -     carvedilol (Coreg) 3.125 mg tablet; Take 1 tablet (3.125 mg) by mouth 2 times a day.  Type 2 diabetes mellitus with other circulatory complication, without long-term current use of insulin (CMS/[facility])  Obesity, Class III, BMI 40-49.9 (morbid obesity) (CMS/[facility])  Hyperglycemia      Type 2 diabetes mellitus, is not at goal. We will continue to make changes when necessary and try to work towards lowering the need for insulin.    RX " changes: none. Continue lispro 5u injections before mealtimes and glargine 25u at bedtime. Continue 1 tablet of Jardiance 25mg daily. Continue Mounjaro 2.5mg injection weekly for the remainder of this month. After this first month, start 2 doses a week of Mounjaro 2.5mg- both injections can be taken at the same time.  Education:  interpretation of lab results  Follow up: I recommend diabetes care be in 3 months and then transfer of care to Dr. Pool or his PCP if T2DM is stable.

## 2024-02-06 NOTE — PATIENT INSTRUCTIONS
"Type 2 diabetes mellitus with hyperglycemia, with long-term current use of insulin (CMS/[facility])  -     blood-glucose sensor (Dexcom G7 Sensor) device; Use to check glucose, change every 10 days  -     alcohol swabs pads, medicated; Apply topically 4 times a day. Use prior to checking glucose or injecting insulin  -     blood-glucose meter misc; 1 each 4 times a day. Use to check glucose 4 times daily, before meals and at bedtime  -     lancets misc; Use to check glucose 4 times daily, before meals and at bedtime  -     pen needle, diabetic 32 gauge x 5/32\" needle; Use to inject insulin 4 times daily  -     blood sugar diagnostic (Blood Glucose Test) strip; 1 each 4 times a day. Use to check glucose 4 times daily, before meals and at bedtime  Type 2 diabetes mellitus with other circulatory complication, with long-term current use of insulin (CMS/[facility])  -     tirzepatide (Mounjaro) 5 mg/0.5 mL pen injector; Inject 5 mg under the skin 1 (one) time per week.  -     empagliflozin (Jardiance) 25 mg; Take 1 tablet (25 mg) by mouth once daily.  S/P CABG x 4  -     carvedilol (Coreg) 3.125 mg tablet; Take 1 tablet (3.125 mg) by mouth 2 times a day.  Type 2 diabetes mellitus with other circulatory complication, without long-term current use of insulin (CMS/[facility])  Obesity, Class III, BMI 40-49.9 (morbid obesity) (CMS/[facility])  Hyperglycemia      Type 2 diabetes mellitus, is not at goal. We will continue to make changes when necessary and try to work towards lowering the need for insulin.    RX changes: none. Continue lispro 5-7u injections before mealtimes and glargine 25u at bedtime. Continue 1 tablet of Jardiance 25mg daily. Continue Mounjaro 2.5mg injection weekly for the remainder of this month. After this first month, start 2 doses a week of Mounjaro 2.5mg- both injections can be taken at the same time.  Education:  interpretation of lab results  Follow up: I recommend diabetes care be in 3 months and then transfer of care to " Yrn or his PCP if T2DM is stable.

## 2024-02-07 ENCOUNTER — CLINICAL SUPPORT (OUTPATIENT)
Dept: CARDIAC REHAB | Facility: CLINIC | Age: 59
End: 2024-02-07
Payer: COMMERCIAL

## 2024-02-07 DIAGNOSIS — Z95.1 S/P CABG X 4: ICD-10-CM

## 2024-02-07 PROCEDURE — 93798 PHYS/QHP OP CAR RHAB W/ECG: CPT | Performed by: INTERNAL MEDICINE

## 2024-02-08 ENCOUNTER — PHARMACY VISIT (OUTPATIENT)
Dept: PHARMACY | Facility: CLINIC | Age: 59
End: 2024-02-08
Payer: COMMERCIAL

## 2024-02-09 ENCOUNTER — CLINICAL SUPPORT (OUTPATIENT)
Dept: CARDIAC REHAB | Facility: CLINIC | Age: 59
End: 2024-02-09
Payer: COMMERCIAL

## 2024-02-09 DIAGNOSIS — Z95.1 S/P CABG X 4: ICD-10-CM

## 2024-02-09 PROCEDURE — 93798 PHYS/QHP OP CAR RHAB W/ECG: CPT

## 2024-02-12 ENCOUNTER — CLINICAL SUPPORT (OUTPATIENT)
Dept: CARDIAC REHAB | Facility: CLINIC | Age: 59
End: 2024-02-12
Payer: COMMERCIAL

## 2024-02-12 DIAGNOSIS — Z95.1 S/P CABG X 4: ICD-10-CM

## 2024-02-12 PROCEDURE — 93798 PHYS/QHP OP CAR RHAB W/ECG: CPT | Performed by: INTERNAL MEDICINE

## 2024-02-14 ENCOUNTER — APPOINTMENT (OUTPATIENT)
Dept: CARDIAC REHAB | Facility: CLINIC | Age: 59
End: 2024-02-14
Payer: COMMERCIAL

## 2024-02-15 ENCOUNTER — PATIENT OUTREACH (OUTPATIENT)
Dept: PRIMARY CARE | Facility: CLINIC | Age: 59
End: 2024-02-15

## 2024-02-15 ENCOUNTER — TELEMEDICINE (OUTPATIENT)
Dept: PHARMACY | Facility: HOSPITAL | Age: 59
End: 2024-02-15
Payer: COMMERCIAL

## 2024-02-15 DIAGNOSIS — E11.59 TYPE 2 DIABETES MELLITUS WITH OTHER CIRCULATORY COMPLICATION, WITHOUT LONG-TERM CURRENT USE OF INSULIN (MULTI): ICD-10-CM

## 2024-02-15 NOTE — ASSESSMENT & PLAN NOTE
Patient's diabetes is uncontrolled with most recent A1c = 11.  Continue Jardiance 25 mg daily, Lantus 25 units daily, and Humalog 7 units + SS  INCREASE Mounjaro to 5 mg weekly on Sunday  Answered all patient's questions about medications.  Sent invite to share data with Dexcom clarity so we can make medication adjustments as needed at future follow ups.  Follow up: 3/7 at 1pm

## 2024-02-15 NOTE — PROGRESS NOTES
Call placed regarding one month post discharge follow up call.  At time of outreach call the patient feels as if their condition has improved since initial visit with PCP or specialist.  Questions or concerns regarding recovery period addressed at this time. In cardiac rehab and started back to work on a limited schedule.   Reviewed any PCP or specialists progress notes/labs/radiology reports if applicable and addressed any questions or concerns.

## 2024-02-15 NOTE — PROGRESS NOTES
Pharmacy Post-Discharge Visit  Behzad Diaz is a 58 y.o. male was referred to Clinical Pharmacy Team to complete a post-discharge medication optimization and monitoring visit.  The patient was referred for their Diabetes.    Admission Date: 12/12/23  Discharge Date: 12/29/23    Referring Provider: RACHEL Arias    Subjective   No Known Allergies    [id number] - [place], [place] - [address] [address] [address] [address] [facility]  [address] [id number]  Phone: [phone] Fax: [phone]    [facility]  [address], [address]  [place] [id number]  Phone: [phone] Fax: [phone]      Social History     Social History Narrative    Not on file      Notable Medication changes following discharge:  Start:   bisacodyl 10 mg suppository   clopidogrel 75 mg tablet   dapagliflozin propanediol 10 mg   Dexcom G7 Sensor device   insulin glargine 100 unit/mL injection   * insulin lispro 100 unit/mL injection   * insulin lispro 100 unit/mL injection   multivitamin with minerals tablet   polyethylene glycol 17 gram packet   sennosides-docusate sodium 8.6-50 mg tablet   traMADol 50 mg tablet  Stop: lisinopril 20 mg, metformin 1000 mg  Change: n/a    HPI  Diabetes    - Jardiance $400/ Mounjaro $300 for 3 month supplies  - Patient doesn't qualify  PAP upon assessment  - Plans to increase Mounjaro to 5 mg on Sunday    Patient is using: CGM; dexcom  The patient is currently checking the blood glucose continuously  - BG much improved this time!    -----------------------------  Dexcom Clarity  -----------------------------  Behzad Diaz  YOB: 1965  Generated at: Thu, Feb 15, 2024 1:19 PM EST  Reporting period: Fri Feb 2, 2024 - Thu Feb 15, 2024  -----------------------------  Glucose Details  Average glucose: 148 mg/dL  Standard deviation: 29 mg/dL  GMI: 6.8%  -----------------------------  Time in Range  Very High: <1%  High: 13%  In Range: 86%  Low: 0%  Very Low: 0%    Target  Range   mg/dL    -----------------------------  CGM Details  Sensor usage: 93%  Days with CGM data: 13/14      Hypoglycemia frequency: None  Hypoglycemia awareness: Yes     Diet:   2 meals/day  Small breakfast 10-11 am; poptarts, ham sandwich  Dinner 5-7 pm; meat, veggies, carb  Snacks - not usually  Drinks - water, one 12 oz can regular coke daily  Desserts - not usually    Physical Activity   Getting physical therapy  No other exercise currently     Current Diabetes Medications:   Jardiance 25 mg daily  Lantus 25 units daily  Humalog 5 units + SS  150-200 2 units  200-250 4 units   Mounjaro 2.5 mg weekly    Historical Diabetes Medications:  N/A     Secondary Prevention  Statin? Yes  ACE-I/ARB? No  Aspirin? Yes    Pertinent PMH Review:  PMH of Pancreatitis: No  PMH of Retinopathy: No  PMH of Urinary Tract Infections: No  PMH of MTC: No     Review of Systems    Medication System Management:  Affordability/Accessibility: Mounjaro/Jardiance expensive, but patient had deductible he hadn't met yet  Adherence/Organization: No issues  Adverse Effects: No issues    Objective     There were no vitals taken for this visit.     LAB  Lab Results   Component Value Date    BILITOT 1.3 (H) 12/13/2023    CALCIUM 8.9 12/29/2023    CO2 28 12/29/2023     12/29/2023    CREATININE 0.77 12/29/2023    GLUCOSE 95 12/29/2023    ALKPHOS 58 12/13/2023    K 3.7 12/29/2023    PROT 6.5 12/13/2023     12/29/2023    AST 26 12/13/2023    ALT 26 12/13/2023    BUN 21 12/29/2023    ANIONGAP 11 12/29/2023    MG 2.25 12/29/2023    PHOS 3.8 12/29/2023    ALBUMIN 3.4 12/29/2023    LIPASE 13 09/22/2020    GFRMALE >90 07/07/2023     Lab Results   Component Value Date    TRIG 343 (H) 12/10/2023    CHOL 145 12/10/2023    LDLCALC 51 12/10/2023    HDL 25.2 12/10/2023     Lab Results   Component Value Date    HGBA1C 11.0 (H) 12/10/2023       Current Outpatient Medications on File Prior to Visit   Medication Sig Dispense Refill    alcohol swabs  "pads, medicated Apply topically 4 times a day. Use prior to checking glucose or injecting insulin 400 each 3    amoxicillin (Amoxil) 500 mg capsule Take 1 capsule (500 mg) by mouth every 12 hours.      aspirin 81 mg EC tablet Take 1 tablet (81 mg) by mouth once daily. Do not start before 2023.      atorvastatin (Lipitor) 80 mg tablet TAKE 1 TABLET (80 MG) BY MOUTH ONCE DAILY AT BEDTIME. 90 tablet 0    blood sugar diagnostic (Blood Glucose Test) strip Use to check glucose 4 times daily, before meals and at bedtime 400 strip 3    blood-glucose meter misc Use to check glucose 4 times daily, before meals and at bedtime 1 each 3    blood-glucose sensor (Dexcom G7 Sensor) device Use to check glucose, change every 10 days 3 each 11    carvedilol (Coreg) 3.125 mg tablet Take 1 tablet (3.125 mg) by mouth 2 times a day. 60 tablet 3    clopidogrel (Plavix) 75 mg tablet Take 1 tablet (75 mg) by mouth once daily. 90 tablet 2    cyclobenzaprine (Flexeril) 10 mg tablet Take 1 tablet (10 mg) by mouth 2 times a day as needed for muscle spasms.      empagliflozin (Jardiance) 25 mg Take 1 tablet (25 mg) by mouth once daily. 90 tablet 3    insulin glargine (Lantus) 100 unit/mL (3 mL) pen Inject 25 Units under the skin once daily at bedtime. 15 mL 3    insulin lispro (HumaLOG) 100 unit/mL injection Inject 0-0.1 mL (0-10 Units) under the skin 3 times a day with meals. Take as directed per insulin instructions.      insulin lispro (HumaLOG) 100 unit/mL injection Inject 5 Units under the skin 3 times a day with meals. Take as directed per insulin instructions. 15 mL 3    lancets misc Use to check glucose 4 times daily, before meals and at bedtime 400 each 3    multivitamin with minerals tablet Take 1 tablet by mouth once daily. Do not start before 2023.      [] pen needle, diabetic (BD Ultra-Fine Mini Pen Needle) 31 gauge x 3/16\" needle Use 1 pen needle to inject insulin 4 times a day. 120 each 0    pen " "needle, diabetic 32 gauge x 5/32\" needle Use to inject insulin 4 times daily 400 each 3    pregabalin (Lyrica) 75 mg capsule Take 1 capsule (75 mg) by mouth once daily. Per patient statement      tirzepatide (Mounjaro) 5 mg/0.5 mL pen injector Inject 5 mg under the skin 1 (one) time per week. 2 mL 5     No current facility-administered medications on file prior to visit.      DRUG INTERATIONS  - none    Assessment/Plan   Problem List Items Addressed This Visit       Type 2 diabetes mellitus with circulatory disorder, without long-term current use of insulin (CMS/[facility])     Patient's diabetes is uncontrolled with most recent A1c = 11.  Continue Jardiance 25 mg daily, Lantus 25 units daily, and Humalog 7 units + SS  INCREASE Mounjaro to 5 mg weekly on Sunday  Answered all patient's questions about medications.  Sent invite to share data with Dexcom clarity so we can make medication adjustments as needed at future follow ups.  Follow up: 3/7 at 1pm         Relevant Orders    Follow Up In Clinical Pharmacy     Continue all meds under the continuation of care with the referring provider and clinical pharmacy team.    Patricia Church, PharmD     Verbal consent to manage patient's drug therapy was obtained from [the patient and/or an individual authorized to act on behalf of a patient]. They were informed they may decline to participate or withdraw from participation in pharmacy services at any time.   "

## 2024-02-16 ENCOUNTER — CLINICAL SUPPORT (OUTPATIENT)
Dept: CARDIAC REHAB | Facility: CLINIC | Age: 59
End: 2024-02-16
Payer: COMMERCIAL

## 2024-02-16 ENCOUNTER — OFFICE VISIT (OUTPATIENT)
Dept: PRIMARY CARE | Facility: CLINIC | Age: 59
End: 2024-02-16
Payer: COMMERCIAL

## 2024-02-16 VITALS
OXYGEN SATURATION: 98 % | RESPIRATION RATE: 14 BRPM | HEIGHT: 73 IN | TEMPERATURE: 97.9 F | DIASTOLIC BLOOD PRESSURE: 70 MMHG | WEIGHT: 315 LBS | BODY MASS INDEX: 41.75 KG/M2 | HEART RATE: 93 BPM | SYSTOLIC BLOOD PRESSURE: 140 MMHG

## 2024-02-16 DIAGNOSIS — Z95.1 S/P CABG X 4: ICD-10-CM

## 2024-02-16 DIAGNOSIS — M46.40 DISCITIS, UNSPECIFIED SPINAL REGION: ICD-10-CM

## 2024-02-16 DIAGNOSIS — E11.59 TYPE 2 DIABETES MELLITUS WITH OTHER CIRCULATORY COMPLICATION, WITHOUT LONG-TERM CURRENT USE OF INSULIN (MULTI): ICD-10-CM

## 2024-02-16 DIAGNOSIS — Z12.5 SCREENING FOR PROSTATE CANCER: ICD-10-CM

## 2024-02-16 DIAGNOSIS — I10 ESSENTIAL (PRIMARY) HYPERTENSION: Primary | ICD-10-CM

## 2024-02-16 PROCEDURE — 3077F SYST BP >= 140 MM HG: CPT | Performed by: INTERNAL MEDICINE

## 2024-02-16 PROCEDURE — 93798 PHYS/QHP OP CAR RHAB W/ECG: CPT | Performed by: INTERNAL MEDICINE

## 2024-02-16 PROCEDURE — 99214 OFFICE O/P EST MOD 30 MIN: CPT | Performed by: INTERNAL MEDICINE

## 2024-02-16 PROCEDURE — 3078F DIAST BP <80 MM HG: CPT | Performed by: INTERNAL MEDICINE

## 2024-02-16 PROCEDURE — 3008F BODY MASS INDEX DOCD: CPT | Performed by: INTERNAL MEDICINE

## 2024-02-16 RX ORDER — CARVEDILOL 6.25 MG/1
6.25 TABLET ORAL 2 TIMES DAILY
Qty: 60 TABLET | Refills: 3 | Status: SHIPPED | OUTPATIENT
Start: 2024-02-16 | End: 2024-03-04

## 2024-02-16 ASSESSMENT — ENCOUNTER SYMPTOMS
ABDOMINAL PAIN: 0
WHEEZING: 0
PALPITATIONS: 0
CONSTIPATION: 0
COUGH: 0
NAUSEA: 0
SHORTNESS OF BREATH: 0
DIARRHEA: 0

## 2024-02-16 NOTE — PROGRESS NOTES
"Subjective   Patient ID: Behzad Diaz is a 58 y.o. male who presents for Hypertension.    He has been feeling better and better.  Recovering from surgery.  Getting around fairly well.  No issues with CP, SOB or dizzy spells.    No hypoglycemia.    We reviewed current medications.       Review of Systems   Respiratory:  Negative for cough, shortness of breath and wheezing.    Cardiovascular:  Negative for chest pain and palpitations.   Gastrointestinal:  Negative for abdominal pain, constipation, diarrhea and nausea.       Objective   /70 (BP Location: Left arm, Patient Position: Sitting, BP Cuff Size: Adult)   Pulse 93   Temp 36.6 °C (97.9 °F) (Tympanic)   Resp 14   Ht 1.854 m (6' 1\")   Wt (!) 151 kg (333 lb)   SpO2 98%   BMI 43.93 kg/m²     Physical Exam  Vitals reviewed.   Constitutional:       Appearance: Normal appearance.   HENT:      Head: Normocephalic.   Cardiovascular:      Rate and Rhythm: Normal rate.   Pulmonary:      Effort: Pulmonary effort is normal.   Musculoskeletal:         General: Normal range of motion.   Neurological:      General: No focal deficit present.      Mental Status: He is alert.   Psychiatric:         Mood and Affect: Mood normal.         Assessment/Plan   Problem List Items Addressed This Visit             ICD-10-CM    Type 2 diabetes mellitus with circulatory disorder, without long-term current use of insulin (CMS/[facility]) E11.59    Discitis M46.40    Relevant Orders    Referral to Infectious Disease     Other Visit Diagnoses         Codes    Essential (primary) hypertension    -  Primary I10    Relevant Orders    Comprehensive Metabolic Panel    S/P CABG x 4     Z95.1    Relevant Medications    carvedilol (Coreg) 6.25 mg tablet    Other Relevant Orders    Lipid Panel    Screening for prostate cancer     Z12.5    Relevant Orders    Prostate Specific Antigen        We reviewed and discussed all of the above.    HTN suboptimally controlled.  Discussed changes.  Discussed " patient's current blood pressure and discussed goal blood pressure of 120/80.  We discussed the benefit of low salt diet and regular physical activity of at least 150 min/week.  We discussed checking their blood pressure outside of the office 2-3 x/week.  Patient is to document their results and notify the office if blood pressure results are regularly over 130/85.  All questions answered.     Discussed his blood sugars as well as well as diet and exercise with medications.     We discussed his BMI and need for continued efforts on weight loss.    He will need to follow with ID as well as Endo.  Follow up with me in 6 months - sooner if any issues.

## 2024-02-19 ENCOUNTER — APPOINTMENT (OUTPATIENT)
Dept: CARDIAC REHAB | Facility: CLINIC | Age: 59
End: 2024-02-19
Payer: COMMERCIAL

## 2024-02-21 ENCOUNTER — CLINICAL SUPPORT (OUTPATIENT)
Dept: CARDIAC REHAB | Facility: CLINIC | Age: 59
End: 2024-02-21
Payer: COMMERCIAL

## 2024-02-21 DIAGNOSIS — Z95.1 S/P CABG X 4: ICD-10-CM

## 2024-02-21 PROCEDURE — 93798 PHYS/QHP OP CAR RHAB W/ECG: CPT | Performed by: INTERNAL MEDICINE

## 2024-02-23 ENCOUNTER — CLINICAL SUPPORT (OUTPATIENT)
Dept: CARDIAC REHAB | Facility: CLINIC | Age: 59
End: 2024-02-23
Payer: COMMERCIAL

## 2024-02-23 DIAGNOSIS — Z95.1 S/P CABG X 4: ICD-10-CM

## 2024-02-23 PROCEDURE — 93798 PHYS/QHP OP CAR RHAB W/ECG: CPT | Performed by: INTERNAL MEDICINE

## 2024-02-26 ENCOUNTER — CLINICAL SUPPORT (OUTPATIENT)
Dept: CARDIAC REHAB | Facility: CLINIC | Age: 59
End: 2024-02-26
Payer: COMMERCIAL

## 2024-02-26 DIAGNOSIS — Z95.1 S/P CABG X 4: ICD-10-CM

## 2024-02-26 PROCEDURE — 93798 PHYS/QHP OP CAR RHAB W/ECG: CPT | Performed by: INTERNAL MEDICINE

## 2024-02-28 ENCOUNTER — CLINICAL SUPPORT (OUTPATIENT)
Dept: CARDIAC REHAB | Facility: CLINIC | Age: 59
End: 2024-02-28
Payer: COMMERCIAL

## 2024-02-28 DIAGNOSIS — Z95.1 S/P CABG X 4: ICD-10-CM

## 2024-02-28 PROCEDURE — 93798 PHYS/QHP OP CAR RHAB W/ECG: CPT | Performed by: INTERNAL MEDICINE

## 2024-02-28 NOTE — PROGRESS NOTES
"INDIVIDUAL CARDIAC TREATMENT PLAN 30 DAY REASSESSMENT     Name: Behzad Diaz   Today's Date: 24   : 1965   Primary Provider: ARCELIA Arce DO   MRN: [id number]   Referring Physician: ARCELIA Venegas MD      Diagnosis: CABGx4   Onset Date: 23      Risk Stratification: High      NUTRITION REASSESSMENT   Lipids:   Lipid Lab Date: 12/10/23  Total Chol: 145  HDL: 25.2  LDL: 51  Tri  Cholesterol Med: Atorvastatin 80mg daily     Diabetes: Yes T2DM   HgbA1c:  11.0  Date Checked: 12/10/23  Monitors glucose at home: Yes  Fasting Blood Sugar Range: 130-140's   Frequency: Has CGM - watches regularly  Hypoglycemic Episode: Rarely but had one yesterday 24 (low reading with slight lightheadedness)    Weight Management  Weight: 336lbs  Height: 73\"  BMI:  44.3  Pre Body Composition: NA secondary to BMI>35   Post Body Composition: NA   Pre Waist Circumference: NA secondary to BMI>35  Post Waist Circumference: NA   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 Improve Picture Your Plate assessment results by discharge. Pt working towards goals  2. Make changes to diet to include heart healthy options while in the program. Pt actively participating in the educational nutrition lectures.    Nutrition Intervention/Education:   *Sent dietician Picture Your Plate assessment for scoring and recommendations.   *Perform weekly weight checks on .   Education Provided: Managing Cholesterol, Individual Cholesterol Levels; Nutrition: heart healthy eating, sodium shaking the habit, current topics - Q&A:    OTHER CORE COMPONENTS/ RISK FACTORS REASSESSMENT   Medication compliance: good compliance  Using pill box: Yes  Carries medication list: No but reviewed importance of doing so     Blood Pressure Management:  History of " "High BP: Yes  Resting BP: 110/70    Tobacco: SMOKER/minimal 3 cigars/year  Quit Date: NA   Other forms of tobacco: Cigar   Anyone in the house smoke: No    Initial Knowledge Test Score:  9/15  Discharge Knowledge Test Score: NA     OTHER CORE COMPONENTS/ RISK FACTOR PLAN   Other Core components/Risk Factor Goals:                                                                                                                                                      1. Achieve and maintain a resting blood pressure less than 130/80 while in the program. Behzad usually remains in the parameters.  2. Gain knowledge of cardiac disease and lifestyle modifications related to exercise and ADL's prior to discharge. Pt participated in cardiac education lectures.    Other Components/ Risk Factors Intervention/Education:  *Will monitor HR, BP, dyspnea and arrhythmias each session.   *Will meet with patient to discuss goals & progress.  *Encouraged review of education materials.   Education Provided:      PSYCHOSOCIAL REASSESSMENT   Patient reported stress level: moderate  Using stress management skills: Yes - \"I like to lozano for fun and relaxation\"   HX of anxiety: No  HX of depression: No  Patient questioned regarding any new stress, depression and anxiety symptoms: Yes    Family/Support System: Friends   Seeing mental health provider: No  Psychosocial medications: NA    Initial PHQ-9 score: 4  Discharge PHQ-9 score: NA   Was PHQ-9 faxed to provider: No  Date faxed: NA    Quality of Life Survey:   Pre PCS:  38.52  Post PCS:  NA    Pre MCS:  37.46  Post MCS:  NA      Stages of change:  Contemplation    PSYCHOSOCIAL PLAN  Psychosocial Goals:  1. Improve stage of change from Contemplation to Preparation while in the program. Pt working towards goals.   2. To maintain PHQ-9 category classification (no risk for depression) while in the program.     Psychosocial Interventions/ Education:  * Provided one on one emotional support *  *Will " facilitate peer support within the context of other phase II patients while in the program.   Education Provided: Emotional Aspects of Heart Disease - Depression and Attitude, Happiness is a Choice, Support Groups;       EXERCISE REASSESSMENT   Home Exercise: No   Frequency: once weekly   Mode: Home physical therapy for last couple weeks     EXERCISE PLAN  Exercise Goals:   1. Goal of 3.8 METs by discharge. Current MET Level 3; Pt working towards goals.  2. Have a plan in place for continued exercise after the program by discharge. Pt returns to work next week and will decide later what exercises he will do upon graduation of the program.    Exercise Prescription:   Based on 12 Minute Walk Test  Frequency: 3 days per week  Duration (total aerobic min.): 30 minutes  Intensity RPE: 11-14  Target HR: Rest+30 (110-143bpm)  MET Level Range: 1.7-2.3    Date of first exercise session: 2/5/24     Modality METS Load  Duration   1 Warm Up    05:00   2 NuStep 2.5 74 Dutta L5 06:00   3 NuStep 2.6 84 Dutta L6 06:00   4 Arm Ergometer 2.2 12 Dutta   06:00   5 Steps 3 16@4in.  06:00   6 Walking 2.2 4.5 laps   06:00   7 Cool Down     05:00     Exercise Intervention:   *Aim to progress 0.5 MET every 4 Weeks or as tolerated.  *Incorporate resistance training for muscular endurance and strength.    LEARNING ASSESSMENT & BARRIERS  Readiness to Learn: Eager to get started, asking appropriate questions   Barriers: None    FALL RISK  HIGH  Comments: Uses cane only when in public      INDIVIDUAL PATIENT GOALS:  1.To improve strength and endurance of legs by discharge   2.To increase walking duration by discharge      MEDICATIONS  Amoxicillin 500mg BID, Aspirin 81mg daily, Atorvastatin 80mg @HS, Carvedilol 3.125mg BID, Clopidogrel 75mg daily, M. Vit. 1 tab daily, Mounjaro 2.5mg subcutaneous weekly, Jardiance 25mg daily, Humalog 5 units with meals (2x/day) and sliding scale, Lantus 25 units @HS, Flexeril 10mg daily and BID PRN, Lyrica 75mg  daily    STAFF COMMENTS:   Behzad has attended 8/36 sessions of cardiac rehab after NSTEMI on 12/10/23 and CABGx4 12/30/23. Patient has had uncomplicated recovery, and his is incision healing well.He is tolerating prescribed workloads well without reports of dyspnea, angina or acute pain that does not resolve with rest. Stable cardiovascular response to exercise. Telemetry shows SR-ST without ectopy, but he exceeds THR while walking the track.  Met with patient one on one to discuss goals and progress in the program. Doing well in CR and enjoys coming to class. Behzad's main limitation will be orthopedic in nature due to extensive back surgeries and weakness.  He also ambulates with a cane due to chronic back issues and neuropathy in bilateral feet.  Will continue to adjust workloads as tolerated. Thank you for allowing us to participate in Behzad's care.

## 2024-03-01 ENCOUNTER — CLINICAL SUPPORT (OUTPATIENT)
Dept: CARDIAC REHAB | Facility: CLINIC | Age: 59
End: 2024-03-01
Payer: COMMERCIAL

## 2024-03-01 DIAGNOSIS — Z95.1 S/P CABG X 4: ICD-10-CM

## 2024-03-01 PROCEDURE — 93798 PHYS/QHP OP CAR RHAB W/ECG: CPT | Performed by: INTERNAL MEDICINE

## 2024-03-02 DIAGNOSIS — Z95.1 S/P CABG X 4: ICD-10-CM

## 2024-03-04 ENCOUNTER — CLINICAL SUPPORT (OUTPATIENT)
Dept: CARDIAC REHAB | Facility: CLINIC | Age: 59
End: 2024-03-04
Payer: COMMERCIAL

## 2024-03-04 DIAGNOSIS — Z95.1 S/P CABG X 4: ICD-10-CM

## 2024-03-04 PROCEDURE — 93798 PHYS/QHP OP CAR RHAB W/ECG: CPT | Performed by: INTERNAL MEDICINE

## 2024-03-04 RX ORDER — CARVEDILOL 6.25 MG/1
TABLET ORAL
Qty: 180 TABLET | Refills: 1 | Status: SHIPPED | OUTPATIENT
Start: 2024-03-04

## 2024-03-06 ENCOUNTER — CLINICAL SUPPORT (OUTPATIENT)
Dept: CARDIAC REHAB | Facility: CLINIC | Age: 59
End: 2024-03-06
Payer: COMMERCIAL

## 2024-03-06 DIAGNOSIS — Z95.1 S/P CABG X 4: ICD-10-CM

## 2024-03-06 PROCEDURE — 93798 PHYS/QHP OP CAR RHAB W/ECG: CPT | Performed by: INTERNAL MEDICINE

## 2024-03-07 ENCOUNTER — TELEMEDICINE (OUTPATIENT)
Dept: PHARMACY | Facility: HOSPITAL | Age: 59
End: 2024-03-07
Payer: COMMERCIAL

## 2024-03-07 DIAGNOSIS — Z95.1 S/P CABG X 4: ICD-10-CM

## 2024-03-07 DIAGNOSIS — E11.59 TYPE 2 DIABETES MELLITUS WITH OTHER CIRCULATORY COMPLICATION, WITHOUT LONG-TERM CURRENT USE OF INSULIN (MULTI): ICD-10-CM

## 2024-03-07 PROCEDURE — RXMED WILLOW AMBULATORY MEDICATION CHARGE

## 2024-03-07 RX ORDER — TIRZEPATIDE 7.5 MG/.5ML
7.5 INJECTION, SOLUTION SUBCUTANEOUS
Qty: 2 ML | Refills: 0 | Status: SHIPPED | OUTPATIENT
Start: 2024-03-07 | End: 2024-04-11 | Stop reason: ALTCHOICE

## 2024-03-07 RX ORDER — INSULIN GLARGINE 100 [IU]/ML
25 INJECTION, SOLUTION SUBCUTANEOUS NIGHTLY
Qty: 15 ML | Refills: 3 | Status: SHIPPED | OUTPATIENT
Start: 2024-03-07 | End: 2024-05-07 | Stop reason: WASHOUT

## 2024-03-07 RX ORDER — BLOOD-GLUCOSE SENSOR
EACH MISCELLANEOUS
Qty: 2 EACH | Refills: 11 | Status: SHIPPED | OUTPATIENT
Start: 2024-03-07

## 2024-03-07 NOTE — PROGRESS NOTES
Pharmacy Post-Discharge Visit  Behzad Diaz is a 58 y.o. male was referred to Clinical Pharmacy Team to complete a post-discharge medication optimization and monitoring visit.  The patient was referred for their Diabetes.    Admission Date: 12/12/23  Discharge Date: 12/29/23    Referring Provider: RACHEL Arias    Subjective   No Known Allergies    [id number] - [place], [place] - [address] [facility] [address] [facility]  [address] [address] [place] [address]  Phone: [phone] Fax: [phone]    [facility]  [address], [address]  [place] [address]  Phone: [phone] Fax: [phone]      Social History     Social History Narrative    Not on file      Notable Medication changes following discharge:  Start:   bisacodyl 10 mg suppository   clopidogrel 75 mg tablet   dapagliflozin propanediol 10 mg   Dexcom G7 Sensor device   insulin glargine 100 unit/mL injection   * insulin lispro 100 unit/mL injection   * insulin lispro 100 unit/mL injection   multivitamin with minerals tablet   polyethylene glycol 17 gram packet   sennosides-docusate sodium 8.6-50 mg tablet   traMADol 50 mg tablet  Stop: lisinopril 20 mg, metformin 1000 mg  Change: n/a    HPI  Diabetes    - Referred by Rossy Edwards for post-discharge management; also referral sent by PCP for DM management  - Jardiance $400/ Mounjaro $300 for 3 month supplies  - Patient doesn't qualify  PAP upon assessment    Patient is using: CGM; dexcom  The patient is currently checking the blood glucose continuously        -----------------------------  Dexcom Clarity  -----------------------------  Behzad Diaz  YOB: 1965  Generated at: Mon, Mar 11, 2024 8:58 PM EDT  Reporting period: Thu Feb 22, 2024 - Thu Mar 7, 2024  -----------------------------  Glucose Details  Average glucose: 150 mg/dL  Standard deviation: 26 mg/dL  GMI: 6.9%  -----------------------------  Time in Range  Very High: <1%  High: 11%  In Range: 88%  Low:  <1%  Very Low: <1%    Target Range   mg/dL    -----------------------------  CGM Details  Sensor usage: 87%  Days with CGM data: 13/15      Hypoglycemia frequency: None  Hypoglycemia awareness: Yes     Diet:   2 meals/day  Small breakfast 10-11 am; poptarts, ham sandwich  Dinner 5-7 pm; meat, veggies, carb  Snacks - not usually  Drinks - water, one 12 oz can regular coke daily  Desserts - not usually    Physical Activity   Getting physical therapy  No other exercise currently     Current Diabetes Medications:   Jardiance 25 mg daily  Lantus 25 units daily  Humalog 7 units + SS  150-200 2 units  200-250 4 units   Mounjaro 5 mg weekly Sundays    Historical Diabetes Medications:  N/A     Secondary Prevention  Statin? Yes  ACE-I/ARB? No  Aspirin? Yes    Pertinent PMH Review:  PMH of Pancreatitis: No  PMH of Retinopathy: No  PMH of Urinary Tract Infections: No  PMH of MTC: No     Review of Systems    Medication System Management:  Affordability/Accessibility: Mounjaro/Jardiance expensive, but patient had deductible he hadn't met yet  Adherence/Organization: No issues  Adverse Effects: No issues    Objective     There were no vitals taken for this visit.     LAB  Lab Results   Component Value Date    BILITOT 1.3 (H) 12/13/2023    CALCIUM 8.9 12/29/2023    CO2 28 12/29/2023     12/29/2023    CREATININE 0.77 12/29/2023    GLUCOSE 95 12/29/2023    ALKPHOS 58 12/13/2023    K 3.7 12/29/2023    PROT 6.5 12/13/2023     12/29/2023    AST 26 12/13/2023    ALT 26 12/13/2023    BUN 21 12/29/2023    ANIONGAP 11 12/29/2023    MG 2.25 12/29/2023    PHOS 3.8 12/29/2023    ALBUMIN 3.4 12/29/2023    LIPASE 13 09/22/2020    GFRMALE >90 07/07/2023     Lab Results   Component Value Date    TRIG 343 (H) 12/10/2023    CHOL 145 12/10/2023    LDLCALC 51 12/10/2023    HDL 25.2 12/10/2023     Lab Results   Component Value Date    HGBA1C 11.0 (H) 12/10/2023       Current Outpatient Medications on File Prior to Visit   Medication  "Sig Dispense Refill    alcohol swabs pads, medicated Apply topically 4 times a day. Use prior to checking glucose or injecting insulin 400 each 3    amoxicillin (Amoxil) 500 mg capsule Take 1 capsule (500 mg) by mouth every 12 hours.      aspirin 81 mg EC tablet Take 1 tablet (81 mg) by mouth once daily. Do not start before December 30, 2023.      atorvastatin (Lipitor) 80 mg tablet TAKE 1 TABLET (80 MG) BY MOUTH ONCE DAILY AT BEDTIME. 90 tablet 0    blood sugar diagnostic (Blood Glucose Test) strip Use to check glucose 4 times daily, before meals and at bedtime 400 strip 3    blood-glucose meter misc Use to check glucose 4 times daily, before meals and at bedtime 1 each 3    carvedilol (Coreg) 6.25 mg tablet TAKE 1 TABLET (6.25 MG) BY MOUTH TWICE A  tablet 1    clopidogrel (Plavix) 75 mg tablet Take 1 tablet (75 mg) by mouth once daily. 90 tablet 2    cyclobenzaprine (Flexeril) 10 mg tablet Take 1 tablet (10 mg) by mouth 2 times a day as needed for muscle spasms.      empagliflozin (Jardiance) 25 mg Take 1 tablet (25 mg) by mouth once daily. 90 tablet 3    insulin lispro (HumaLOG) 100 unit/mL injection Inject 0-0.1 mL (0-10 Units) under the skin 3 times a day with meals. Take as directed per insulin instructions.      insulin lispro (HumaLOG) 100 unit/mL injection Inject 5 Units under the skin 3 times a day with meals. Take as directed per insulin instructions. (Patient taking differently: Inject 7 Units under the skin 3 times a day with meals.) 15 mL 3    lancets misc Use to check glucose 4 times daily, before meals and at bedtime 400 each 3    multivitamin with minerals tablet Take 1 tablet by mouth once daily. Do not start before December 30, 2023.      pen needle, diabetic 32 gauge x 5/32\" needle Use to inject insulin 4 times daily 400 each 3    pregabalin (Lyrica) 75 mg capsule Take 1 capsule (75 mg) by mouth once daily. Per patient statement      [DISCONTINUED] blood-glucose sensor (Dexcom G7 Sensor) " device Use to check glucose, change every 10 days 3 each 11    [DISCONTINUED] insulin glargine (Lantus) 100 unit/mL (3 mL) pen Inject 25 Units under the skin once daily at bedtime. 15 mL 3    [DISCONTINUED] tirzepatide (Mounjaro) 5 mg/0.5 mL pen injector Inject 5 mg under the skin 1 (one) time per week. 2 mL 5     No current facility-administered medications on file prior to visit.      DRUG INTERATIONS  - none    Assessment/Plan   Problem List Items Addressed This Visit       Type 2 diabetes mellitus with circulatory disorder, without long-term current use of insulin (CMS/[facility])     Patient's diabetes is uncontrolled with most recent A1c = 11.  Continue Jardiance 25 mg daily, Lantus 25 units daily, and Humalog 7 units + SS  FBG in goal range and still having some mealtime spikes; will continue current insulin dosing even with Mounjaro increase. Will monitor closely  INCREASE Mounjaro to 7.5 mg weekly once finish current supply of 5 mg dose  Answered all patient's questions about medications.  Sharing data with Dexcom Clarity  Follow up: 4/11 at 1pm         Relevant Medications    blood-glucose sensor (FreeStyle Jhonatan 3 Sensor) device    tirzepatide (Mounjaro) 7.5 mg/0.5 mL pen injector    Other Relevant Orders    Follow Up In Clinical Pharmacy     Other Visit Diagnoses       S/P CABG x 4        Relevant Medications    insulin glargine (Lantus) 100 unit/mL (3 mL) pen          Continue all meds under the continuation of care with the referring provider and clinical pharmacy team.    Patricia Church, PharmD     Verbal consent to manage patient's drug therapy was obtained from [the patient and/or an individual authorized to act on behalf of a patient]. They were informed they may decline to participate or withdraw from participation in pharmacy services at any time.

## 2024-03-08 ENCOUNTER — PHARMACY VISIT (OUTPATIENT)
Dept: PHARMACY | Facility: CLINIC | Age: 59
End: 2024-03-08
Payer: COMMERCIAL

## 2024-03-08 ENCOUNTER — APPOINTMENT (OUTPATIENT)
Dept: CARDIAC REHAB | Facility: CLINIC | Age: 59
End: 2024-03-08
Payer: COMMERCIAL

## 2024-03-11 ENCOUNTER — APPOINTMENT (OUTPATIENT)
Dept: CARDIAC REHAB | Facility: CLINIC | Age: 59
End: 2024-03-11
Payer: COMMERCIAL

## 2024-03-12 NOTE — ASSESSMENT & PLAN NOTE
Patient's diabetes is uncontrolled with most recent A1c = 11.  Continue Jardiance 25 mg daily, Lantus 25 units daily, and Humalog 7 units + SS  FBG in goal range and still having some mealtime spikes; will continue current insulin dosing even with Mounjaro increase. Will monitor closely  INCREASE Mounjaro to 7.5 mg weekly once finish current supply of 5 mg dose  Answered all patient's questions about medications.  Sharing data with Dexcom Clarity  Follow up: 4/11 at 1pm

## 2024-03-13 ENCOUNTER — CLINICAL SUPPORT (OUTPATIENT)
Dept: CARDIAC REHAB | Facility: CLINIC | Age: 59
End: 2024-03-13
Payer: COMMERCIAL

## 2024-03-13 DIAGNOSIS — Z95.1 S/P CABG X 4: ICD-10-CM

## 2024-03-13 PROCEDURE — 93798 PHYS/QHP OP CAR RHAB W/ECG: CPT

## 2024-03-15 ENCOUNTER — CLINICAL SUPPORT (OUTPATIENT)
Dept: CARDIAC REHAB | Facility: CLINIC | Age: 59
End: 2024-03-15
Payer: COMMERCIAL

## 2024-03-15 DIAGNOSIS — Z95.1 S/P CABG X 4: ICD-10-CM

## 2024-03-15 PROCEDURE — 93798 PHYS/QHP OP CAR RHAB W/ECG: CPT | Performed by: INTERNAL MEDICINE

## 2024-03-18 ENCOUNTER — PATIENT OUTREACH (OUTPATIENT)
Dept: PRIMARY CARE | Facility: CLINIC | Age: 59
End: 2024-03-18
Payer: COMMERCIAL

## 2024-03-18 ENCOUNTER — APPOINTMENT (OUTPATIENT)
Dept: CARDIAC REHAB | Facility: CLINIC | Age: 59
End: 2024-03-18
Payer: COMMERCIAL

## 2024-03-20 ENCOUNTER — CLINICAL SUPPORT (OUTPATIENT)
Dept: CARDIAC REHAB | Facility: CLINIC | Age: 59
End: 2024-03-20
Payer: COMMERCIAL

## 2024-03-20 ENCOUNTER — TELEPHONE (OUTPATIENT)
Dept: PEDIATRICS | Facility: CLINIC | Age: 59
End: 2024-03-20

## 2024-03-20 DIAGNOSIS — I24.9 ACS (ACUTE CORONARY SYNDROME) (MULTI): ICD-10-CM

## 2024-03-20 DIAGNOSIS — Z95.1 S/P CABG X 4: ICD-10-CM

## 2024-03-20 PROCEDURE — 93798 PHYS/QHP OP CAR RHAB W/ECG: CPT | Performed by: INTERNAL MEDICINE

## 2024-03-20 PROCEDURE — RXMED WILLOW AMBULATORY MEDICATION CHARGE

## 2024-03-20 RX ORDER — ATORVASTATIN CALCIUM 80 MG/1
80 TABLET, FILM COATED ORAL NIGHTLY
Qty: 90 TABLET | Refills: 3 | Status: SHIPPED | OUTPATIENT
Start: 2024-03-20 | End: 2024-05-09 | Stop reason: SDUPTHER

## 2024-03-20 RX ORDER — ATORVASTATIN CALCIUM 80 MG/1
80 TABLET, FILM COATED ORAL NIGHTLY
Qty: 90 TABLET | Refills: 0 | Status: SHIPPED | OUTPATIENT
Start: 2024-03-20 | End: 2024-03-20 | Stop reason: SDUPTHER

## 2024-03-22 ENCOUNTER — CLINICAL SUPPORT (OUTPATIENT)
Dept: CARDIAC REHAB | Facility: CLINIC | Age: 59
End: 2024-03-22
Payer: COMMERCIAL

## 2024-03-22 ENCOUNTER — PHARMACY VISIT (OUTPATIENT)
Dept: PHARMACY | Facility: CLINIC | Age: 59
End: 2024-03-22
Payer: COMMERCIAL

## 2024-03-22 DIAGNOSIS — Z95.1 S/P CABG X 4: ICD-10-CM

## 2024-03-22 PROCEDURE — 93798 PHYS/QHP OP CAR RHAB W/ECG: CPT | Performed by: INTERNAL MEDICINE

## 2024-03-25 ENCOUNTER — CLINICAL SUPPORT (OUTPATIENT)
Dept: CARDIAC REHAB | Facility: CLINIC | Age: 59
End: 2024-03-25
Payer: COMMERCIAL

## 2024-03-25 DIAGNOSIS — Z95.1 S/P CABG X 4: ICD-10-CM

## 2024-03-25 PROCEDURE — 93798 PHYS/QHP OP CAR RHAB W/ECG: CPT | Performed by: INTERNAL MEDICINE

## 2024-03-25 NOTE — PROGRESS NOTES
"INDIVIDUAL CARDIAC TREATMENT PLAN 60 DAY REASSESSMENT      Name: Behzad Diaz   Today's Date: 24   : 1965   Primary Provider: ARCELIA Arce DO   MRN: [id number]   Referring Physician: ARCELIA Venegas MD      Diagnosis: CABGx4   Onset Date: 23      Risk Stratification: High      NUTRITION REASSESSMENT   Lipids:   Lipid Lab Date: 12/10/23  Total Chol: 145  HDL: 25.2  LDL: 51  Tri  Cholesterol Med: Atorvastatin 80mg daily     Diabetes: Yes T2DM   HgbA1c:  11.0  Date Checked: 12/10/23  Monitors glucose at home: Yes  Fasting Blood Sugar Range: 130-140's   Frequency: Has CGM - watches regularly  Hypoglycemic Episode: Rarely but had one yesterday 24 (low reading with slight lightheadedness)    Weight Management  Weight: 325 lbs  Height: 73\"  BMI:  42.9  Pre Body Composition: NA secondary to BMI>35   Post Body Composition: NA   Pre Waist Circumference: NA secondary to BMI>35  Post Waist Circumference: NA   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 Improve Picture Your Plate assessment results by discharge. Pt working towards goals. Pt participated in the RD lecture and asks appropriate questions concerning diet and blood glucose management.   2. Make changes to diet to include heart healthy options while in the program. Pt actively participating in the educational nutrition lectures. Reviewed dietary assessment and recommendations with pt.     Nutrition Intervention/Education:   *Sent dietician Picture Your Plate assessment for scoring and recommendations.   *Perform weekly weight checks on .   Education Provided: Managing Cholesterol, Individual Cholesterol Levels; Nutrition: heart healthy eating, sodium shaking the habit, current topics - Q&A:    OTHER CORE COMPONENTS/ RISK FACTORS REASSESSMENT " "  Medication compliance: good compliance  Using pill box: Yes  Carries medication list: No but reviewed importance of doing so     Blood Pressure Management:  History of High BP: Yes  Resting BP: 110/70    Tobacco: SMOKER/minimal 3 cigars/year  Quit Date: NA   Other forms of tobacco: Cigar   Anyone in the house smoke: No    Initial Knowledge Test Score:  9/15  Discharge Knowledge Test Score: NA     OTHER CORE COMPONENTS/ RISK FACTOR PLAN   Other Core components/Risk Factor Goals:                                                                                                                                                      1. Achieve and maintain a resting blood pressure less than 130/80 while in the program. Behzad usually remains in the parameters.  2. Gain knowledge of cardiac disease and lifestyle modifications related to exercise and ADL's prior to discharge. Pt participated in cardiac education lectures. Encouraged review of educational handouts with family members.     Other Components/ Risk Factors Intervention/Education:  *Will monitor HR, BP, dyspnea and arrhythmias each session.   *Will meet with patient to discuss goals & progress.  *Encouraged review of education materials.   Education Provided:  Pharmacology lecture with hospital resident pharmacist, PAD, Heart Failure, Hands Only CPR, Temperature Extremes, Stroke Awareness,     PSYCHOSOCIAL REASSESSMENT   Patient reported stress level: moderate  Using stress management skills: Yes - \"I like to lozano for fun and relaxation\"   HX of anxiety: No  HX of depression: No  Patient questioned regarding any new stress, depression and anxiety symptoms: Yes    Family/Support System: Friends   Seeing mental health provider: No  Psychosocial medications: NA    Initial PHQ-9 score: 4  Discharge PHQ-9 score: NA   Was PHQ-9 faxed to provider: No  Date faxed: NA    Quality of Life Survey:   Pre PCS:  38.52  Post PCS:  NA    Pre MCS:  37.46  Post MCS:  NA      Stages " of change:  Contemplation    PSYCHOSOCIAL PLAN  Psychosocial Goals:  1. Improve stage of change from Contemplation to Preparation while in the program. Pt working towards goals, but his work schedule/travel makes it difficult for him.   2. To maintain PHQ-9 category classification (no risk for depression) while in the program. Pt remains without complaint of depression.     Psychosocial Interventions/ Education:  * Provided one on one emotional support *  *Will facilitate peer support within the context of other phase II patients while in the program.   Education Provided: Emotional Aspects of Heart Disease - Depression and Attitude, Happiness is a Choice, Support Groups;       EXERCISE REASSESSMENT   Home Exercise: No   Frequency: NA   Mode: Home physical therapy for last couple weeks     EXERCISE PLAN  Exercise Goals:   1. Goal of 3.8 METs by discharge. Current MET Level 3.4; Pt working towards goals.  2. Have a plan in place for continued exercise after the program by discharge. Pt returns to work next week and will decide later what exercises he will do upon graduation of the program.    Exercise Prescription:   Based on 12 Minute Walk Test  Frequency: 3 days per week  Duration (total aerobic min.): 30 minutes  Intensity RPE: 11-14  Target HR: Rest+30 (110-143bpm)  MET Level Range: 2.4-3.4    Date of first exercise session: 2/5/24     Modality METS Load  Duration   1 Warm Up    05:00   2 NuStep 2.8 96 Dutta L7 06:00   3 NuStep 2.8 96 Dutta L7 06:00   4 Weights  100 Load  06:00   5 Recumbent Bike 3.4 68 Dutta L 4 06:00   6 Walking 2.4 6 laps   06:00   7 Cool Down     05:00     Exercise Intervention:   *Aim to progress 0.5 MET every 4 Weeks or as tolerated.   *Incorporate resistance training for muscular endurance and strength.  Education Provided:  Diabetes and Exercise, BMI and Body Composition,      LEARNING ASSESSMENT & BARRIERS  Readiness to Learn: Eager to get started, asking appropriate questions    Barriers: None    FALL RISK  HIGH  Comments: Uses cane only when in public      INDIVIDUAL PATIENT GOALS:  1.To improve strength and endurance of legs by discharge. Pt began resistance training and is pleased with his progress.  2.To increase walking duration by discharge. Pt continues to improve.      MEDICATIONS  Amoxicillin 500mg BID, Aspirin 81mg daily, Atorvastatin 80mg @HS, Carvedilol 3.125mg BID, Clopidogrel 75mg daily, M. Vit. 1 tab daily, Mounjaro 2.5mg subcutaneous weekly, Jardiance 25mg daily, Humalog 5 units with meals (2x/day) and sliding scale, Lantus 25 units @HS, Flexeril 10mg daily and BID PRN, Lyrica 75mg daily    STAFF COMMENTS:   Behzad has attended 16/36 sessions of cardiac rehab after NSTEMI on 12/10/23 and CABGx4 12/30/23. He is tolerating prescribed workloads well without reports of dyspnea, angina or acute pain that does not resolve with rest. Stable cardiovascular response to exercise. Telemetry shows SR-ST with rare PVC. Met with patient one on one to discuss goals and progress in the program. Behzad recently returned to work and has therefore had a few absences. He continues to do well in the program with his main limitation being orthopedic in nature due to extensive back surgeries and weakness.  Will continue to adjust workloads as tolerated. Thank you for allowing us to participate in Behzad's care.

## 2024-03-26 ENCOUNTER — TELEPHONE (OUTPATIENT)
Dept: PEDIATRICS | Facility: CLINIC | Age: 59
End: 2024-03-26
Payer: COMMERCIAL

## 2024-03-26 DIAGNOSIS — M46.20 OSTEOMYELITIS OF SPINE (MULTI): Primary | ICD-10-CM

## 2024-03-26 RX ORDER — AMOXICILLIN 500 MG/1
500 CAPSULE ORAL EVERY 12 HOURS SCHEDULED
Qty: 60 CAPSULE | Refills: 0 | Status: SHIPPED | OUTPATIENT
Start: 2024-03-26 | End: 2024-04-25

## 2024-03-26 NOTE — TELEPHONE ENCOUNTER
amoxicillin (Amoxil) 500 mg capsule [[id number]]   [facility].   States he is switching to a new infectious disease doctor and needs a refill.

## 2024-03-27 ENCOUNTER — APPOINTMENT (OUTPATIENT)
Dept: CARDIAC REHAB | Facility: CLINIC | Age: 59
End: 2024-03-27
Payer: COMMERCIAL

## 2024-04-01 ENCOUNTER — CLINICAL SUPPORT (OUTPATIENT)
Dept: CARDIAC REHAB | Facility: CLINIC | Age: 59
End: 2024-04-01
Payer: COMMERCIAL

## 2024-04-01 DIAGNOSIS — Z95.1 S/P CABG X 4: ICD-10-CM

## 2024-04-01 PROCEDURE — 93798 PHYS/QHP OP CAR RHAB W/ECG: CPT | Performed by: INTERNAL MEDICINE

## 2024-04-03 ENCOUNTER — CLINICAL SUPPORT (OUTPATIENT)
Dept: CARDIAC REHAB | Facility: CLINIC | Age: 59
End: 2024-04-03
Payer: COMMERCIAL

## 2024-04-03 DIAGNOSIS — Z95.1 S/P CABG X 4: ICD-10-CM

## 2024-04-03 PROCEDURE — 93798 PHYS/QHP OP CAR RHAB W/ECG: CPT | Performed by: INTERNAL MEDICINE

## 2024-04-05 ENCOUNTER — CLINICAL SUPPORT (OUTPATIENT)
Dept: CARDIAC REHAB | Facility: CLINIC | Age: 59
End: 2024-04-05
Payer: COMMERCIAL

## 2024-04-05 DIAGNOSIS — Z95.1 S/P CABG X 4: ICD-10-CM

## 2024-04-05 PROCEDURE — 93798 PHYS/QHP OP CAR RHAB W/ECG: CPT

## 2024-04-08 ENCOUNTER — APPOINTMENT (OUTPATIENT)
Dept: CARDIAC REHAB | Facility: CLINIC | Age: 59
End: 2024-04-08
Payer: COMMERCIAL

## 2024-04-10 ENCOUNTER — APPOINTMENT (OUTPATIENT)
Dept: CARDIAC REHAB | Facility: CLINIC | Age: 59
End: 2024-04-10
Payer: COMMERCIAL

## 2024-04-11 ENCOUNTER — TELEMEDICINE (OUTPATIENT)
Dept: PHARMACY | Facility: HOSPITAL | Age: 59
End: 2024-04-11
Payer: COMMERCIAL

## 2024-04-11 DIAGNOSIS — E11.59 TYPE 2 DIABETES MELLITUS WITH OTHER CIRCULATORY COMPLICATION, WITHOUT LONG-TERM CURRENT USE OF INSULIN (MULTI): ICD-10-CM

## 2024-04-11 PROCEDURE — RXMED WILLOW AMBULATORY MEDICATION CHARGE

## 2024-04-11 RX ORDER — TIRZEPATIDE 10 MG/.5ML
10 INJECTION, SOLUTION SUBCUTANEOUS
Qty: 2 ML | Refills: 0 | Status: SHIPPED | OUTPATIENT
Start: 2024-04-14 | End: 2024-05-09 | Stop reason: SDUPTHER

## 2024-04-11 NOTE — PROGRESS NOTES
Pharmacy Post-Discharge Visit    Behzad Diaz is a 58 y.o. male was referred to Clinical Pharmacy Team to complete a post-discharge medication optimization and monitoring visit.  The patient was referred for their Diabetes.    Admission Date: 12/12/23   Discharge Date: 12/29/23    Referring Provider: Rossy Edwards PA-C  PCP: Tomer Arce, DO - last visit: 2/16/24, next visit: 8/16/24      Subjective   No Known Allergies    [facility]/pharmacy #[id number] - [place], [place] - [address] [facility] AT [facility]  [address] [id number]  Phone: [phone] Fax: [phone]    [facility]  [address] , [address]  [place] [id number]  Phone: [phone] Fax: [phone]      Medication System Management:  Affordability/Accessibility:  - Jardiance $400/ Mounjaro $300 for 3 month supplies  - Patient doesn't qualify  PAP upon assessment  Adherence/Organization: none      Social History     Social History Narrative    Not on file        Notable Medication changes following discharge:  Start:   bisacodyl 10 mg suppository  clopidogrel 75 mg tablet  dapagliflozin propanediol 10 mg  Dexcom G7 Sensor device --> Freestyle Jhonatan 3 due to cost  insulin glargine 100 unit/mL injection  insulin lispro 100 unit/mL injection  insulin lispro 100 unit/mL injection  multivitamin with minerals tablet  polyethylene glycol 17 gram packet  sennosides-docusate sodium 8.6-50 mg tablet  traMADol 50 mg tablet  Stop:   lisinopril 20 mg  metformin 1000 mg   Change: n/a    HPI  Diabetes    Patient is using: both glucometer and continuous glucose monitor  Freestyle Jhonatan 3 switched from Dexcom due to cost  Currently using friend's unused Dexcom after he accidentally knocked off Freestyle Jhonatan  The patient is currently checking the blood glucose continuously.  -----------------------------  Dexcom Clarity  -----------------------------  Behzad Diaz  YOB: 1965  Generated at: Thu, Apr 11, 2024 12:51 PM  "EDT  Reporting period: Fri Mar 29, 2024 - Thu Apr 11, 2024  -----------------------------  Glucose Details  Average glucose: 141 mg/dL  Standard deviation: 30 mg/dL  GMI: N/A  -----------------------------  Time in Range  Very High: <1%  High: 10%  In Range: 89%  Low: <1%  Very Low: <1%    Target Range   mg/dL    -----------------------------  CGM Details  Sensor usage: 50%  Days with CGM data: 7/14    Hypoglycemia frequency: none  Hypoglycemia awareness: Yes     Diet:   2 meals/day  1st meal (~4-4:30pm); biggest meal, eats out due to always travelling out of town for work   Last meal (midnight); smaller portion of food; bigger than a snack, smaller than a normal meal  Snacks - not usually  Drinks - water, two 12 oz can Coke Zero daily  Desserts - not usually    Physical Activity   Cardiac rehab 3x/week  Considering treadmill at home when working remotely  Currently no other forms of exercise due to sedentary work life of 70 hrs/week  Some limitation with pain, but he is improving and working on Lyrica taper with pain management    Weight: 320-325 lbs (lately)    Current Diabetes Medications:   Jardiance 25 mg daily  Lantus 25 units daily  Humalog 7 units + SS  150-200 2 units  200-250 4 units   Mounjaro 7.5 mg weekly Sundays (started 4/7)    Historical Diabetes Medications:  Mounjaro 5mg weekly: 3/17-3/31(patient didn't want to have 1 pen left over after 4 weeks of 5mg)  Mounjaro 2.5mg - 2 doses (5mg total) weekly due to \"messing up 1 pen\": 3/3-3/10    Secondary Prevention  Statin? Yes  ACE-I/ARB? No  Aspirin? Yes    Pertinent PMH Review:  PMH of Pancreatitis: No  PMH of Retinopathy: No  PMH of Urinary Tract Infections: No  PMH of MTC: No      Review of Systems        Objective     There were no vitals taken for this visit.   BP Readings from Last 4 Encounters:   02/16/24 140/70   02/06/24 147/90   01/24/24 136/86   01/18/24 90/58      There were no vitals filed for this visit.     LAB  Lab Results "   Component Value Date    BILITOT 1.3 (H) 12/13/2023    CALCIUM 8.9 12/29/2023    CO2 28 12/29/2023     12/29/2023    CREATININE 0.77 12/29/2023    GLUCOSE 95 12/29/2023    ALKPHOS 58 12/13/2023    K 3.7 12/29/2023    PROT 6.5 12/13/2023     12/29/2023    AST 26 12/13/2023    ALT 26 12/13/2023    BUN 21 12/29/2023    ANIONGAP 11 12/29/2023    MG 2.25 12/29/2023    PHOS 3.8 12/29/2023    ALBUMIN 3.4 12/29/2023    LIPASE 13 09/22/2020    GFRMALE >90 07/07/2023     Lab Results   Component Value Date    TRIG 343 (H) 12/10/2023    CHOL 145 12/10/2023    LDLCALC 51 12/10/2023    HDL 25.2 12/10/2023     Lab Results   Component Value Date    HGBA1C 11.0 (H) 12/10/2023         Current Outpatient Medications   Medication Instructions    alcohol swabs pads, medicated Apply topically 4 times a day. Use prior to checking glucose or injecting insulin    amoxicillin (AMOXIL) 500 mg, oral, Every 12 hours scheduled    aspirin 81 mg, oral, Daily    atorvastatin (LIPITOR) 80 mg, oral, Nightly    blood sugar diagnostic (Blood Glucose Test) strip Use to check glucose 4 times daily, before meals and at bedtime    blood-glucose meter misc Use to check glucose 4 times daily, before meals and at bedtime    blood-glucose sensor (FreeStyle Jhonatan 3 Sensor) device Use as directed. Replace every 14 days    carvedilol (Coreg) 6.25 mg tablet TAKE 1 TABLET (6.25 MG) BY MOUTH TWICE A DAY    clopidogrel (PLAVIX) 75 mg, oral, Daily    cyclobenzaprine (FLEXERIL) 10 mg, oral, 2 times daily PRN    insulin lispro (HumaLOG) 100 unit/mL injection Inject 5 Units under the skin 3 times a day with meals. Take as directed per insulin instructions.    Jardiance 25 mg, oral, Daily    lancets misc Use to check glucose 4 times daily, before meals and at bedtime    Lantus Solostar U-100 Insulin 25 Units, subcutaneous, Nightly    [START ON 4/14/2024] Mounjaro 10 mg, subcutaneous, Once Weekly    multivitamin with minerals tablet 1 tablet, oral, Daily     "pen needle, diabetic 32 gauge x 5/32\" needle Use to inject insulin 4 times daily    pregabalin (LYRICA) 75 mg, oral, Daily, Per patient statement      DRUG INTERACTIONS  None requiring intervention at this time      Assessment/Plan   Problem List Items Addressed This Visit       Type 2 diabetes mellitus with circulatory disorder, without long-term current use of insulin (CMS/[facility])     Patients diabetes needs improvement with most recent A1c of 11.0% on 12/10/23 (Goal < 7%). He didn't want to waste any of his Mounjaro pens, so he just started Mounjaro 7.5mg this past Sunday and reports no issues. He feels comfortable with increasing to 10mg once finished with 7.5mg. FBG has been stable with some lower numbers whereas PPBG may have occasional elevation with current regimen 5mg and now 7.5mg, so appropriate to decrease Lantus but keep Humalog as is.  Decrease: Lantus 22 units daily  Continue:   Jardiance 25 mg daily  Humalog 7 units + SS  150-200 2 units  200-250 4 units   Mounjaro 7.5 mg weekly Sundays (4/7-4/28). Then start Mounjaro 10mg on 5/5/24  Mounjaro 10mg weekly script sent now with consideration to backorder. Patient knows to contact pharmacy team if unable to obtain Mounjaro 10mg by 4/28  Compliance at present is estimated to be excellent. Efforts to improve compliance (if necessary) will be directed at dietary modifications: decrease Coke Zero can to 1/day, increased exercise, and regular blood sugar monitoring: share Freestyle Jhonatan 3 with [facility]  Education Provided to Patient: contact [facility] to obtain new sensor if able. All other questions about medications were answered  Follow-up: 5/9/24 at 1pm           Relevant Medications    tirzepatide (Mounjaro) 10 mg/0.5 mL pen injector (Start on 4/14/2024)    Other Relevant Orders    Follow Up In Clinical Pharmacy     Continue all meds under the continuation of care with the referring provider and clinical pharmacy team.    Lexii Marc, PharmD "     Verbal consent to manage patient's drug therapy was obtained from the patient. They were informed they may decline to participate or withdraw from participation in pharmacy services at any time.

## 2024-04-11 NOTE — ASSESSMENT & PLAN NOTE
Patients diabetes needs improvement with most recent A1c of 11.0% on 12/10/23 (Goal < 7%). He didn't want to waste any of his Mounjaro pens, so he just started Mounjaro 7.5mg this past Sunday and reports no issues. He feels comfortable with increasing to 10mg once finished with 7.5mg. FBG has been stable with some lower numbers whereas PPBG may have occasional elevation with current regimen 5mg and now 7.5mg, so appropriate to decrease Lantus but keep Humalog as is.  Decrease: Lantus 22 units daily  Continue:   Jardiance 25 mg daily  Humalog 7 units + SS  150-200 2 units  200-250 4 units   Mounjaro 7.5 mg weekly Sundays (4/7-4/28). Then start Mounjaro 10mg on 5/5/24  Mounjaro 10mg weekly script sent now with consideration to backorder. Patient knows to contact pharmacy team if unable to obtain Mounjaro 10mg by 4/28  Compliance at present is estimated to be excellent. Efforts to improve compliance (if necessary) will be directed at dietary modifications: decrease Coke Zero can to 1/day, increased exercise, and regular blood sugar monitoring: share Freestyle Jhonatan 3 with [employer]  Education Provided to Patient: contact [facility] to obtain new sensor if able. All other questions about medications were answered  Follow-up: 5/9/24 at 1pm

## 2024-04-12 ENCOUNTER — CLINICAL SUPPORT (OUTPATIENT)
Dept: CARDIAC REHAB | Facility: CLINIC | Age: 59
End: 2024-04-12
Payer: COMMERCIAL

## 2024-04-12 DIAGNOSIS — Z95.1 S/P CABG X 4: ICD-10-CM

## 2024-04-12 PROCEDURE — 93798 PHYS/QHP OP CAR RHAB W/ECG: CPT

## 2024-04-15 ENCOUNTER — APPOINTMENT (OUTPATIENT)
Dept: CARDIAC REHAB | Facility: CLINIC | Age: 59
End: 2024-04-15
Payer: COMMERCIAL

## 2024-04-15 NOTE — PROGRESS NOTES
I reviewed the progress note and agree with the resident’s findings and plans as written. Case discussed with resident.    Patricia Church, PharmD

## 2024-04-17 ENCOUNTER — CLINICAL SUPPORT (OUTPATIENT)
Dept: CARDIAC REHAB | Facility: CLINIC | Age: 59
End: 2024-04-17
Payer: COMMERCIAL

## 2024-04-17 DIAGNOSIS — Z95.1 S/P CABG X 4: ICD-10-CM

## 2024-04-17 PROCEDURE — 93798 PHYS/QHP OP CAR RHAB W/ECG: CPT | Performed by: INTERNAL MEDICINE

## 2024-04-19 ENCOUNTER — CLINICAL SUPPORT (OUTPATIENT)
Dept: CARDIAC REHAB | Facility: CLINIC | Age: 59
End: 2024-04-19
Payer: COMMERCIAL

## 2024-04-19 ENCOUNTER — PHARMACY VISIT (OUTPATIENT)
Dept: PHARMACY | Facility: CLINIC | Age: 59
End: 2024-04-19
Payer: COMMERCIAL

## 2024-04-19 DIAGNOSIS — Z95.1 S/P CABG X 4: ICD-10-CM

## 2024-04-19 PROCEDURE — 93798 PHYS/QHP OP CAR RHAB W/ECG: CPT | Performed by: INTERNAL MEDICINE

## 2024-04-19 PROCEDURE — RXMED WILLOW AMBULATORY MEDICATION CHARGE

## 2024-04-22 ENCOUNTER — CLINICAL SUPPORT (OUTPATIENT)
Dept: CARDIAC REHAB | Facility: CLINIC | Age: 59
End: 2024-04-22
Payer: COMMERCIAL

## 2024-04-22 DIAGNOSIS — Z95.1 S/P CABG X 4: ICD-10-CM

## 2024-04-22 PROCEDURE — 93798 PHYS/QHP OP CAR RHAB W/ECG: CPT | Performed by: INTERNAL MEDICINE

## 2024-04-22 NOTE — PROGRESS NOTES
"INDIVIDUAL CARDIAC TREATMENT PLAN 90 DAY REASSESSMENT      Name: Behzad Diaz   Today's Date: 24   : 1965   Primary Provider: ARCELIA Arce DO   MRN: [id number]   Referring Physician: ARCELIA Venegas MD      Diagnosis: CABGx4   Onset Date: 23      Risk Stratification: High      NUTRITION REASSESSMENT   Lipids:   Lipid Lab Date: 12/10/23  Total Chol: 145  HDL: 25.2  LDL: 51  Tri  Cholesterol Med: Atorvastatin 80mg daily     Diabetes: Yes T2DM   HgbA1c:  11.0  Date Checked: 12/10/23  Monitors glucose at home: Yes  Fasting Blood Sugar Range: 130-140's   Frequency: Has CGM - watches regularly  Hypoglycemic Episode: Rarely but had one yesterday 24 (low reading with slight lightheadedness)    Weight Management  Weight: 325 lbs  Height: 73\"  BMI:  42.9  Pre Body Composition: NA secondary to BMI>35   Post Body Composition: NA   Pre Waist Circumference: NA secondary to BMI>35  Post Waist Circumference: NA   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 Improve Picture Your Plate assessment results by discharge. Pt working towards goals. Pt participated in the RD lecture and asks appropriate questions concerning diet and blood glucose management. Pt participated in second lecture series with RD. Will readminister dietary assessment quiz prior to discharge.   2. Make changes to diet to include heart healthy options while in the program. Pt actively participating in the educational nutrition lectures. Reviewed dietary assessment and recommendations with pt.     Nutrition Intervention/Education:   *Sent dietician Picture Your Plate assessment for scoring and recommendations.   *Perform weekly weight checks on .   Education Provided: Managing Cholesterol, Individual Cholesterol Levels; Nutrition:  : " "Healthier Eating Out & Snacking, Heart Healthy Shopping, Cooking & Portion Distortion, Focus for Heart Healthy Eating, Sodium: Shaking the Habit, Current Topics in Nutrition    OTHER CORE COMPONENTS/ RISK FACTORS REASSESSMENT   Medication compliance: good compliance  Using pill box: Yes  Carries medication list: No but reviewed importance of doing so     Blood Pressure Management:  History of High BP: Yes  Resting BP: 112/72    Tobacco: SMOKER/minimal 3 cigars/year  Quit Date: NA   Other forms of tobacco: Cigar   Anyone in the house smoke: No    Initial Knowledge Test Score:  9/15  Discharge Knowledge Test Score: NA     OTHER CORE COMPONENTS/ RISK FACTOR PLAN   Other Core components/Risk Factor Goals:                                                                                                                                                      1. Achieve and maintain a resting blood pressure less than 130/80 while in the program. Behzad usually remains in the parameters. Discussed the importance of arriving early to rehab to ensure time for adequate resting BP measurement.   2. Gain knowledge of cardiac disease and lifestyle modifications related to exercise and ADL's prior to discharge. Pt participated in cardiac education lectures. Encouraged review of educational handouts with family members. Encouraged engagement in education specific to risk factor management     Other Components/ Risk Factors Intervention/Education:  *Will monitor HR, BP, dyspnea and arrhythmias each session.   *Will meet with patient to discuss goals & progress.  *Encouraged review of education materials.   Education Provided:  Pharmacology lecture with hospital resident pharmacist, PAD, Heart Failure, Hands Only CPR, Temperature Extremes, Stroke Awareness,     PSYCHOSOCIAL REASSESSMENT   Patient reported stress level: moderate  Using stress management skills: Yes - \"I like to [place]zano for fun and relaxation\"   HX of anxiety: No  HX of " depression: No  Patient questioned regarding any new stress, depression and anxiety symptoms: Yes    Family/Support System: Friends   Seeing mental health provider: No  Psychosocial medications: NA    Initial PHQ-9 score: 4  Discharge PHQ-9 score: NA   Was PHQ-9 faxed to provider: No  Date faxed: GREGOR    Quality of Life Survey:   Pre PCS:  38.52  Post PCS:  NA    Pre MCS:  37.46  Post MCS:  NA      Stages of change:  Contemplation    PSYCHOSOCIAL PLAN  Psychosocial Goals:  1. Improve stage of change from Contemplation to Preparation while in the program. Pt working towards goals, but his work schedule/travel makes it difficult for him. Provide 1:1 emotional support as needed.  2. To maintain PHQ-9 category classification (no risk for depression) while in the program. Pt remains without complaint of depression. Encouraging review of emotional and stress educational handouts in order to facilitate use of appropriate coping mechanisms.      Psychosocial Interventions/ Education:  * Provided one on one emotional support *  *Will facilitate peer support within the context of other phase II patients while in the program.   Education Provided: Emotional Aspects of Heart Disease - Depression and Attitude, Happiness is a Choice, Support Groups; Stress Series Parts 1-3: The Good, Bad & Ugly, Short-Circuiting Stress, Mastering Stress      EXERCISE REASSESSMENT   Home Exercise: No   Frequency: NA   Mode: Home physical therapy for last couple weeks     EXERCISE PLAN  Exercise Goals:   1. Goal of 3.8 METs by discharge. Current MET Level 3.4; Pt working towards goals.  2. Have a plan in place for continued exercise after the program by discharge. Pt returns to work next week and will decide later what exercises he will do upon graduation of the program.    Exercise Prescription:   Based on 12 Minute Walk Test  Frequency: 3 days per week  Duration (total aerobic min.): 30 minutes  Intensity RPE: 11-14  Target HR: Rest+30  (110-143bpm)  MET Level Range: 2.4-3.4    Date of first exercise session: 2/5/24     Modality METS Load  Duration   1 Warm Up    05:00   2 NuStep 2.8 96 Dutta L 8 06:00   3 NuStep 2.8 98 Dutta L 8 06:00   4 Weights  130 Load  06:00   5 Recumbent Bike 3.4 68 Dutta L 5 06:00   6 Walking 2.6 7 laps   06:00   7 Cool Down     05:00     Exercise Intervention:   *Aim to progress 0.5 MET every 4 Weeks or as tolerated. Pt progressing as expected.  *Incorporate resistance training for muscular endurance and strength. Pt enjoys weights and is progressing better than expected  Education Provided:  Diabetes and Exercise, BMI and Body Composition,      LEARNING ASSESSMENT & BARRIERS  Readiness to Learn: Eager to get started, asking appropriate questions   Barriers: None    FALL RISK  HIGH  Comments: Uses cane only when in public      INDIVIDUAL PATIENT GOALS:  1.To improve strength and endurance of legs by discharge. Pt began resistance training and is pleased with his progress.  2.To increase walking duration by discharge. Pt continues to improve.      MEDICATIONS  Amoxicillin 500mg BID, Aspirin 81mg daily, Atorvastatin 80mg @HS, Carvedilol 3.125mg BID, Clopidogrel 75mg daily, M. Vit. 1 tab daily, Mounjaro 2.5mg subcutaneous weekly, Jardiance 25mg daily, Humalog 5 units with meals (2x/day) and sliding scale, Lantus 25 units @HS, Flexeril 10mg daily and BID PRN, Lyrica 75mg daily    STAFF COMMENTS:   Behzad has attended 24/36 sessions of cardiac rehab after NSTEMI on 12/10/23 and CABGx4 12/30/23. He is tolerating prescribed workloads well without reports of dyspnea, angina or acute pain that does not resolve with rest. Pt has had recent muscular pain due to preparing his mother's house for sale. He has had a stable cardiovascular response to exercise. Telemetry shows SR-ST with rare PVC. He continues to do well in the program with his main limitation being orthopedic in nature due to extensive back surgeries and weakness.  Behzad  has lost 15 pounds since beginning rehab. He currently is eating dinner and then again later in the evening. Discussed the importance of eating more regularly and ending his eating time a little earlier.  Will continue to adjust workloads as tolerated. Thank you for allowing us to participate in Behzad's care.

## 2024-04-22 NOTE — PROGRESS NOTES
"INDIVIDUAL CARDIAC TREATMENT PLAN 60 DAY REASSESSMENT      Name: Behzad Diaz   Today's Date: 24   : 1965   Primary Provider: ARCELIA Arce DO   MRN: [id number]   Referring Physician: ARCELIA Venegas MD      Diagnosis: CABGx4   Onset Date: 23      Risk Stratification: High      NUTRITION REASSESSMENT   Lipids:   Lipid Lab Date: 12/10/23  Total Chol: 145  HDL: 25.2  LDL: 51  Tri  Cholesterol Med: Atorvastatin 80mg daily     Diabetes: Yes T2DM   HgbA1c:  11.0  Date Checked: 12/10/23  Monitors glucose at home: Yes  Fasting Blood Sugar Range: 130-140's   Frequency: Has CGM - watches regularly  Hypoglycemic Episode: Rarely but had one yesterday 24 (low reading with slight lightheadedness)    Weight Management  Weight: 325 lbs  Height: 73\"  BMI:  42.9  Pre Body Composition: NA secondary to BMI>35   Post Body Composition: NA   Pre Waist Circumference: NA secondary to BMI>35  Post Waist Circumference: NA   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 Improve Picture Your Plate assessment results by discharge. Pt working towards goals. Pt participated in the RD lecture and asks appropriate questions concerning diet and blood glucose management.   2. Make changes to diet to include heart healthy options while in the program. Pt actively participating in the educational nutrition lectures. Reviewed dietary assessment and recommendations with pt.     Nutrition Intervention/Education:   *Sent dietician Picture Your Plate assessment for scoring and recommendations.   *Perform weekly weight checks on .   Education Provided: Managing Cholesterol, Individual Cholesterol Levels; Nutrition: heart healthy eating, sodium shaking the habit, current topics - Q&A:    OTHER CORE COMPONENTS/ RISK FACTORS REASSESSMENT " "  Medication compliance: good compliance  Using pill box: Yes  Carries medication list: No but reviewed importance of doing so     Blood Pressure Management:  History of High BP: Yes  Resting BP: 110/70    Tobacco: SMOKER/minimal 3 cigars/year  Quit Date: NA   Other forms of tobacco: Cigar   Anyone in the house smoke: No    Initial Knowledge Test Score:  9/15  Discharge Knowledge Test Score: NA     OTHER CORE COMPONENTS/ RISK FACTOR PLAN   Other Core components/Risk Factor Goals:                                                                                                                                                      1. Achieve and maintain a resting blood pressure less than 130/80 while in the program. Behzad usually remains in the parameters.  2. Gain knowledge of cardiac disease and lifestyle modifications related to exercise and ADL's prior to discharge. Pt participated in cardiac education lectures. Encouraged review of educational handouts with family members.     Other Components/ Risk Factors Intervention/Education:  *Will monitor HR, BP, dyspnea and arrhythmias each session.   *Will meet with patient to discuss goals & progress.  *Encouraged review of education materials.   Education Provided:  Pharmacology lecture with hospital resident pharmacist, PAD, Heart Failure, Hands Only CPR, Temperature Extremes, Stroke Awareness,     PSYCHOSOCIAL REASSESSMENT   Patient reported stress level: moderate  Using stress management skills: Yes - \"I like to lozano for fun and relaxation\"   HX of anxiety: No  HX of depression: No  Patient questioned regarding any new stress, depression and anxiety symptoms: Yes    Family/Support System: Friends   Seeing mental health provider: No  Psychosocial medications: NA    Initial PHQ-9 score: 4  Discharge PHQ-9 score: NA   Was PHQ-9 faxed to provider: No  Date faxed: NA    Quality of Life Survey:   Pre PCS:  38.52  Post PCS:  NA    Pre MCS:  37.46  Post MCS:  NA      Stages " of change:  Contemplation    PSYCHOSOCIAL PLAN  Psychosocial Goals:  1. Improve stage of change from Contemplation to Preparation while in the program. Pt working towards goals, but his work schedule/travel makes it difficult for him.   2. To maintain PHQ-9 category classification (no risk for depression) while in the program. Pt remains without complaint of depression.     Psychosocial Interventions/ Education:  * Provided one on one emotional support *  *Will facilitate peer support within the context of other phase II patients while in the program.   Education Provided: Emotional Aspects of Heart Disease - Depression and Attitude, Happiness is a Choice, Support Groups;       EXERCISE REASSESSMENT   Home Exercise: No   Frequency: NA   Mode: Home physical therapy for last couple weeks     EXERCISE PLAN  Exercise Goals:   1. Goal of 3.8 METs by discharge. Current MET Level 3.4; Pt working towards goals.  2. Have a plan in place for continued exercise after the program by discharge. Pt returns to work next week and will decide later what exercises he will do upon graduation of the program.    Exercise Prescription:   Based on 12 Minute Walk Test  Frequency: 3 days per week  Duration (total aerobic min.): 30 minutes  Intensity RPE: 11-14  Target HR: Rest+30 (110-143bpm)  MET Level Range: 2.4-3.4    Date of first exercise session: 2/5/24     Modality METS Load  Duration   1 Warm Up    05:00   2 NuStep 2.8 96 Dutta L7 06:00   3 NuStep 2.8 96 Dutat L7 06:00   4 Weights  100 Load  06:00   5 Recumbent Bike 3.4 68 Dutta L 4 06:00   6 Walking 2.4 6 laps   06:00   7 Cool Down     05:00     Exercise Intervention:   *Aim to progress 0.5 MET every 4 Weeks or as tolerated.   *Incorporate resistance training for muscular endurance and strength.  Education Provided:  Diabetes and Exercise, BMI and Body Composition,      LEARNING ASSESSMENT & BARRIERS  Readiness to Learn: Eager to get started, asking appropriate questions    Barriers: None    FALL RISK  HIGH  Comments: Uses cane only when in public      INDIVIDUAL PATIENT GOALS:  1.To improve strength and endurance of legs by discharge. Pt began resistance training and is pleased with his progress.  2.To increase walking duration by discharge. Pt continues to improve.      MEDICATIONS  Amoxicillin 500mg BID, Aspirin 81mg daily, Atorvastatin 80mg @HS, Carvedilol 3.125mg BID, Clopidogrel 75mg daily, M. Vit. 1 tab daily, Mounjaro 2.5mg subcutaneous weekly, Jardiance 25mg daily, Humalog 5 units with meals (2x/day) and sliding scale, Lantus 25 units @HS, Flexeril 10mg daily and BID PRN, Lyrica 75mg daily    STAFF COMMENTS:   Behzad has attended 16/36 sessions of cardiac rehab after NSTEMI on 12/10/23 and CABGx4 12/30/23. He is tolerating prescribed workloads well without reports of dyspnea, angina or acute pain that does not resolve with rest. Stable cardiovascular response to exercise. Telemetry shows SR-ST with rare PVC. Met with patient one on one to discuss goals and progress in the program. Behzad recently returned to work and has therefore had a few absences. He continues to do well in the program with his main limitation being orthopedic in nature due to extensive back surgeries and weakness.  Will continue to adjust workloads as tolerated. Thank you for allowing us to participate in Behzad's care.

## 2024-04-24 ENCOUNTER — CLINICAL SUPPORT (OUTPATIENT)
Dept: CARDIAC REHAB | Facility: CLINIC | Age: 59
End: 2024-04-24
Payer: COMMERCIAL

## 2024-04-24 DIAGNOSIS — Z95.1 S/P CABG X 4: ICD-10-CM

## 2024-04-24 PROCEDURE — 93798 PHYS/QHP OP CAR RHAB W/ECG: CPT

## 2024-04-26 ENCOUNTER — PHARMACY VISIT (OUTPATIENT)
Dept: PHARMACY | Facility: CLINIC | Age: 59
End: 2024-04-26
Payer: COMMERCIAL

## 2024-04-26 ENCOUNTER — APPOINTMENT (OUTPATIENT)
Dept: CARDIAC REHAB | Facility: CLINIC | Age: 59
End: 2024-04-26
Payer: COMMERCIAL

## 2024-04-26 ENCOUNTER — OFFICE VISIT (OUTPATIENT)
Dept: INFECTIOUS DISEASES | Facility: CLINIC | Age: 59
End: 2024-04-26
Payer: COMMERCIAL

## 2024-04-26 VITALS
RESPIRATION RATE: 16 BRPM | DIASTOLIC BLOOD PRESSURE: 91 MMHG | HEIGHT: 73 IN | OXYGEN SATURATION: 96 % | BODY MASS INDEX: 41.75 KG/M2 | HEART RATE: 82 BPM | SYSTOLIC BLOOD PRESSURE: 161 MMHG | WEIGHT: 315 LBS

## 2024-04-26 DIAGNOSIS — A49.8 PROTEUS MIRABILIS INFECTION: ICD-10-CM

## 2024-04-26 DIAGNOSIS — M46.40 DISCITIS, UNSPECIFIED SPINAL REGION: Primary | ICD-10-CM

## 2024-04-26 PROCEDURE — 99215 OFFICE O/P EST HI 40 MIN: CPT | Performed by: INTERNAL MEDICINE

## 2024-04-26 PROCEDURE — 3008F BODY MASS INDEX DOCD: CPT | Performed by: INTERNAL MEDICINE

## 2024-04-26 PROCEDURE — 3080F DIAST BP >= 90 MM HG: CPT | Performed by: INTERNAL MEDICINE

## 2024-04-26 PROCEDURE — 3077F SYST BP >= 140 MM HG: CPT | Performed by: INTERNAL MEDICINE

## 2024-04-26 RX ORDER — AMOXICILLIN 500 MG/1
500 CAPSULE ORAL 2 TIMES DAILY
Qty: 180 CAPSULE | Refills: 1 | Status: SHIPPED | OUTPATIENT
Start: 2024-04-26 | End: 2024-07-25

## 2024-04-26 NOTE — LETTER
04/26/24    Tomer Arce DO  [address] [facility] [place], [place] [address]      Dear Dr. Tomer Arce DO,    Thank you for referring your patient, Behzad Diaz, to receive care in my office. I have enclosed a summary of the care provided to Behzad on 04/26/24.    Please contact me with any questions you may have regarding the visit.    Sincerely,         Fritz Duff MD  [address] [facility] [address] [place] [id number]  [phone]    CC: No Recipients

## 2024-04-26 NOTE — LETTER
04/26/24    Tomer Arce DO  [address] [facility] [place] [address]      Dear Dr. Tomer Arce DO,    I am writing to confirm that your patient, Behzad Diaz, received care in my office on 04/26/24. I have enclosed a summary of the care provided to Behzad for your reference.    Please contact me with any questions you may have regarding the visit.    Sincerely,         Fritz Duff MD  [address] [facility] [address]  [facility] [id number]  [phone]    CC: No Recipients

## 2024-04-29 ENCOUNTER — CLINICAL SUPPORT (OUTPATIENT)
Dept: CARDIAC REHAB | Facility: CLINIC | Age: 59
End: 2024-04-29
Payer: COMMERCIAL

## 2024-04-29 DIAGNOSIS — Z95.1 S/P CABG X 4: ICD-10-CM

## 2024-04-29 PROCEDURE — 93798 PHYS/QHP OP CAR RHAB W/ECG: CPT

## 2024-05-01 ENCOUNTER — CLINICAL SUPPORT (OUTPATIENT)
Dept: CARDIAC REHAB | Facility: CLINIC | Age: 59
End: 2024-05-01
Payer: COMMERCIAL

## 2024-05-01 DIAGNOSIS — Z95.1 S/P CABG X 4: ICD-10-CM

## 2024-05-01 PROCEDURE — 93798 PHYS/QHP OP CAR RHAB W/ECG: CPT | Performed by: INTERNAL MEDICINE

## 2024-05-03 ENCOUNTER — APPOINTMENT (OUTPATIENT)
Dept: CARDIAC REHAB | Facility: CLINIC | Age: 59
End: 2024-05-03
Payer: COMMERCIAL

## 2024-05-06 ENCOUNTER — CLINICAL SUPPORT (OUTPATIENT)
Dept: CARDIAC REHAB | Facility: CLINIC | Age: 59
End: 2024-05-06
Payer: COMMERCIAL

## 2024-05-06 DIAGNOSIS — Z95.1 S/P CABG X 4: ICD-10-CM

## 2024-05-06 PROCEDURE — 93798 PHYS/QHP OP CAR RHAB W/ECG: CPT | Performed by: INTERNAL MEDICINE

## 2024-05-07 ENCOUNTER — OFFICE VISIT (OUTPATIENT)
Dept: ENDOCRINOLOGY | Facility: CLINIC | Age: 59
End: 2024-05-07
Payer: COMMERCIAL

## 2024-05-07 VITALS
DIASTOLIC BLOOD PRESSURE: 84 MMHG | BODY MASS INDEX: 41.75 KG/M2 | HEIGHT: 73 IN | WEIGHT: 315 LBS | SYSTOLIC BLOOD PRESSURE: 129 MMHG | HEART RATE: 79 BPM | OXYGEN SATURATION: 97 %

## 2024-05-07 DIAGNOSIS — E66.01 OBESITY, CLASS III, BMI 40-49.9 (MORBID OBESITY) (MULTI): ICD-10-CM

## 2024-05-07 DIAGNOSIS — E11.59 TYPE 2 DIABETES MELLITUS WITH OTHER CIRCULATORY COMPLICATION, WITHOUT LONG-TERM CURRENT USE OF INSULIN (MULTI): Primary | ICD-10-CM

## 2024-05-07 PROBLEM — R73.9 HYPERGLYCEMIA: Status: RESOLVED | Noted: 2024-01-18 | Resolved: 2024-05-07

## 2024-05-07 PROCEDURE — 3079F DIAST BP 80-89 MM HG: CPT | Performed by: PHYSICIAN ASSISTANT

## 2024-05-07 PROCEDURE — 3074F SYST BP LT 130 MM HG: CPT | Performed by: PHYSICIAN ASSISTANT

## 2024-05-07 PROCEDURE — 99214 OFFICE O/P EST MOD 30 MIN: CPT | Performed by: PHYSICIAN ASSISTANT

## 2024-05-07 PROCEDURE — 3008F BODY MASS INDEX DOCD: CPT | Performed by: PHYSICIAN ASSISTANT

## 2024-05-07 ASSESSMENT — ENCOUNTER SYMPTOMS
CONSTIPATION: 1
SORE THROAT: 0
RHINORRHEA: 0
SHORTNESS OF BREATH: 0
APPETITE CHANGE: 0
CONFUSION: 0
TROUBLE SWALLOWING: 0
HALLUCINATIONS: 0
FEVER: 0
HEADACHES: 0
COUGH: 0
PALPITATIONS: 0
CHILLS: 0
HEMATURIA: 0
NAUSEA: 0
SPEECH DIFFICULTY: 0
ABDOMINAL PAIN: 0
POLYDIPSIA: 0
DIARRHEA: 0
VOMITING: 0
EYE PAIN: 0
COLOR CHANGE: 0
DIFFICULTY URINATING: 0

## 2024-05-07 ASSESSMENT — COLUMBIA-SUICIDE SEVERITY RATING SCALE - C-SSRS
2. HAVE YOU ACTUALLY HAD ANY THOUGHTS OF KILLING YOURSELF?: NO
1. IN THE PAST MONTH, HAVE YOU WISHED YOU WERE DEAD OR WISHED YOU COULD GO TO SLEEP AND NOT WAKE UP?: NO
6. HAVE YOU EVER DONE ANYTHING, STARTED TO DO ANYTHING, OR PREPARED TO DO ANYTHING TO END YOUR LIFE?: NO

## 2024-05-07 ASSESSMENT — PAIN SCALES - GENERAL: PAINLEVEL: 0-NO PAIN

## 2024-05-07 ASSESSMENT — PATIENT HEALTH QUESTIONNAIRE - PHQ9
2. FEELING DOWN, DEPRESSED OR HOPELESS: NOT AT ALL
SUM OF ALL RESPONSES TO PHQ9 QUESTIONS 1 AND 2: 0
1. LITTLE INTEREST OR PLEASURE IN DOING THINGS: NOT AT ALL

## 2024-05-07 NOTE — PATIENT INSTRUCTIONS
Type 2 diabetes mellitus, is not at goal. We will continue to make changes when necessary and try to work towards lowering the need for insulin.    RX changes: Continue Jardiance 25mg daily. Continue Mounjaro 10mg injection weekly, stop both insulins.  If your morning glucose is trending over 120mg/dL on your ge, please restart your lantus 10 units at bedtime.   Education:  interpretation of lab results  Follow up: I recommend diabetes care be transferred to PCP.

## 2024-05-07 NOTE — PROGRESS NOTES
Subjective   Behzad Diaz is a 58 y.o. male who presents for initial visit for evaluation of Type 2 diabetes mellitus. The initial diagnosis of diabetes was made  about 4 years ago . The patient does have a known family history of diabetes. Behzad says he had some issues with his first two G7 sensor giving him low readings, but it has since resolved.    Known complications due to diabetes included peripheral neuropathy, cardiovascular disease, and obesity    Cardiovascular risk factors include advanced age (older than 55 for men, 65 for women), diabetes mellitus, hypertension, male gender, obesity (BMI >= 30 kg/m2), and sedentary lifestyle. The patient is not on an ACE inhibitor or angiotensin II receptor blocker.  The patient has not been previously hospitalized due to diabetic ketoacidosis.     Current symptoms/problems include hyperglycemia and paresthesia of the feet. His clinical course has fluctuated.     Current diabetes regimen is as follows: Insulin injections: Insulin dosage review with Behzad suggested compliance all of the time.  Sliding scale used with meals. Mounjaro 2.5mg weekly, Jardiance 25mg daily  Pre-breakfast Humalog 7 units   Pre-lunch Humalog 7 units   Pre-dinner Humalog 7 units   Bedtime Lantus 25 units   Insulin injections are given by patient.   Rotation of sites for injection: abdominal wall     The patient is currently checking the blood glucose at least 3x times per day.    Patient is using: continuous glucose monitor, Dexcom G7.    No current data available on clarity at time of appointment    Hypoglycemia frequency: NO  Hypoglycemia awareness: Yes     Exercise: three times a week. Mr. Diaz has difficulty with mobility, but has been in cardiac physical therapy.  Meal panning: He is using avoidance of concentrated sweets. He naturally tends to have about 2 meals a day and rarely snacks.    Review of Systems   Constitutional:  Negative for appetite change, chills and fever.   HENT:   "Negative for ear discharge, ear pain, rhinorrhea, sore throat and trouble swallowing.    Eyes:  Negative for pain and visual disturbance.   Respiratory:  Negative for cough and shortness of breath.    Cardiovascular:  Negative for chest pain and palpitations.   Gastrointestinal:  Positive for constipation. Negative for abdominal pain, diarrhea, nausea and vomiting.   Endocrine: Negative for polydipsia and polyuria.   Genitourinary:  Negative for difficulty urinating and hematuria.   Skin:  Negative for color change and rash.   Neurological:  Negative for speech difficulty and headaches.   Psychiatric/Behavioral:  Negative for behavioral problems, confusion and hallucinations.        Objective   /84 (BP Location: Right arm, Patient Position: Sitting, BP Cuff Size: Large adult)   Pulse 79   Ht 1.854 m (6' 1\")   Wt 146 kg (322 lb 11.2 oz)   SpO2 97%   BMI 42.58 kg/m²   Physical Exam  Constitutional:       Appearance: Normal appearance. He is obese.   HENT:      Nose: Nose normal.      Mouth/Throat:      Mouth: Mucous membranes are moist.   Eyes:      Extraocular Movements: Extraocular movements intact.      Conjunctiva/sclera: Conjunctivae normal.   Cardiovascular:      Rate and Rhythm: Normal rate and regular rhythm.      Pulses: Normal pulses.      Heart sounds: Normal heart sounds.   Pulmonary:      Effort: Pulmonary effort is normal.      Breath sounds: Normal breath sounds.   Abdominal:      General: Bowel sounds are normal.      Palpations: Abdomen is soft.   Skin:     General: Skin is warm and dry.   Neurological:      Mental Status: He is alert and oriented to person, place, and time.   Psychiatric:         Mood and Affect: Mood normal.         Behavior: Behavior normal.         Thought Content: Thought content normal.         Lab Review  Glucose (mg/dL)   Date Value   12/29/2023 95   12/28/2023 121 (H)   12/28/2023 65 (L)     POC HEMOGLOBIN A1c (%)   Date Value   08/25/2023 10.8 (A)     Hemoglobin " A1C (%)   Date Value   12/10/2023 11.0 (H)   08/27/2021 6.4 (A)   11/01/2020 6.0   09/17/2020 6.7     Bicarbonate (mmol/L)   Date Value   12/29/2023 28   12/28/2023 26   12/28/2023 22     Urea Nitrogen (mg/dL)   Date Value   12/29/2023 21   12/28/2023 19   12/28/2023 18     Creatinine (mg/dL)   Date Value   12/29/2023 0.77   12/28/2023 0.91   12/28/2023 0.86       Health Maintenance:   Foot Exam: due for update  Eye Exam: due for update  Lipid Panel: up to date- 12/10/23  Urine Albumin: due for update    Assessment/Plan   Diagnoses and all orders for this visit:  Type 2 diabetes mellitus with other circulatory complication, without long-term current use of insulin (Multi)  Obesity, Class III, BMI 40-49.9 (morbid obesity) (Multi)      Type 2 diabetes mellitus, is not at goal. We will continue to make changes when necessary and try to work towards lowering the need for insulin.    RX changes: Continue Jardiance 25mg daily. Continue Mounjaro 10mg injection weekly, stop both insulins.  If your morning glucose is trending over 120mg/dL on your ge, please restart your lantus 10 units at bedtime.   Education:  interpretation of lab results  Follow up: I recommend diabetes care be transferred to PCP.

## 2024-05-08 ENCOUNTER — CLINICAL SUPPORT (OUTPATIENT)
Dept: CARDIAC REHAB | Facility: CLINIC | Age: 59
End: 2024-05-08
Payer: COMMERCIAL

## 2024-05-08 DIAGNOSIS — Z95.1 S/P CABG X 4: ICD-10-CM

## 2024-05-08 PROCEDURE — 93798 PHYS/QHP OP CAR RHAB W/ECG: CPT | Performed by: INTERNAL MEDICINE

## 2024-05-09 ENCOUNTER — TELEMEDICINE (OUTPATIENT)
Dept: PHARMACY | Facility: HOSPITAL | Age: 59
End: 2024-05-09
Payer: COMMERCIAL

## 2024-05-09 DIAGNOSIS — E11.59 TYPE 2 DIABETES MELLITUS WITH OTHER CIRCULATORY COMPLICATION, WITHOUT LONG-TERM CURRENT USE OF INSULIN (MULTI): ICD-10-CM

## 2024-05-09 DIAGNOSIS — I24.9 ACS (ACUTE CORONARY SYNDROME) (MULTI): ICD-10-CM

## 2024-05-09 PROCEDURE — RXMED WILLOW AMBULATORY MEDICATION CHARGE

## 2024-05-09 RX ORDER — ATORVASTATIN CALCIUM 80 MG/1
80 TABLET, FILM COATED ORAL NIGHTLY
Qty: 90 TABLET | Refills: 3 | Status: SHIPPED | OUTPATIENT
Start: 2024-05-09 | End: 2025-05-09

## 2024-05-09 RX ORDER — TIRZEPATIDE 10 MG/.5ML
10 INJECTION, SOLUTION SUBCUTANEOUS
Qty: 2 ML | Refills: 11 | Status: SHIPPED | OUTPATIENT
Start: 2024-05-12

## 2024-05-09 NOTE — PROGRESS NOTES
Pharmacy Post-Discharge Visit    Behzad Diaz is a 58 y.o. male was referred to Clinical Pharmacy Team to complete a post-discharge medication optimization and monitoring visit.  The patient was referred for their diabetes  Admission Date: 12/12/23   Discharge Date: 12/29/23    Referring Provider: Rossy Edwards PA-C  PCP: Tomer Arce, DO - last visit: 2/16/24, next visit: 8/16/24      Subjective   No Known Allergies    [facility]/pharmacy #[id number] - [place], [place] - [address] AT [address] [facility]  [address] [id number]  Phone: [phone] Fax: [phone]    [facility]  [address] , [address]  [place] [id number]  Phone: [phone] Fax: [phone]      Medication System Management:  Affordability/Accessibility:  - Jardiance $400/ Mounjaro $300 for 3 month supplies  - Patient doesn't qualify  PAP upon assessment  Adherence/Organization: none      Social History     Social History Narrative    Not on file     HPI  Diabetes    Patient is using: both glucometer and continuous glucose monitor  Now using freestyle ge 3  The patient is currently checking the blood glucose continuously.        Hypoglycemia frequency: none  Hypoglycemia awareness: Yes     Diet:   2 meals/day  1st meal (~4-4:30pm); biggest meal, eats out due to always travelling out of town for work   Last meal (midnight); smaller portion of food; bigger than a snack, smaller than a normal meal  Snacks - not usually  Drinks - water, two 12 oz can Coke Zero daily  Desserts - not usually    Physical Activity   Cardiac rehab 3x/week  Considering treadmill at home when working remotely  Currently no other forms of exercise due to sedentary work life of 70 hrs/week  Some limitation with pain, but he is improving and working on Lyrica taper with pain management    Weight: 320-325 lbs (lately)    Current Diabetes Medications:   Jardiance 25 mg daily  Mounjaro 10 mg weekly Sundays (just started 10 mg on  Sunday)    Secondary Prevention  Statin? Yes  ACE-I/ARB? No  Aspirin? Yes    Pertinent PMH Review:  PMH of Pancreatitis: No  PMH of Retinopathy: No  PMH of Urinary Tract Infections: No  PMH of MTC: No      Review of Systems    Objective     There were no vitals taken for this visit.   BP Readings from Last 4 Encounters:   05/07/24 129/84   04/26/24 (!) 161/91   02/16/24 140/70   02/06/24 147/90      There were no vitals filed for this visit.     LAB  Lab Results   Component Value Date    BILITOT 1.3 (H) 12/13/2023    CALCIUM 8.9 12/29/2023    CO2 28 12/29/2023     12/29/2023    CREATININE 0.77 12/29/2023    GLUCOSE 95 12/29/2023    ALKPHOS 58 12/13/2023    K 3.7 12/29/2023    PROT 6.5 12/13/2023     12/29/2023    AST 26 12/13/2023    ALT 26 12/13/2023    BUN 21 12/29/2023    ANIONGAP 11 12/29/2023    MG 2.25 12/29/2023    PHOS 3.8 12/29/2023    ALBUMIN 3.4 12/29/2023    LIPASE 13 09/22/2020    GFRMALE >90 07/07/2023     Lab Results   Component Value Date    TRIG 343 (H) 12/10/2023    CHOL 145 12/10/2023    LDLCALC 51 12/10/2023    HDL 25.2 12/10/2023     Lab Results   Component Value Date    HGBA1C 11.0 (H) 12/10/2023         Current Outpatient Medications   Medication Instructions    alcohol swabs pads, medicated Apply topically 4 times a day. Use prior to checking glucose or injecting insulin    amoxicillin (AMOXIL) 500 mg, oral, 2 times daily    aspirin 81 mg, oral, Daily    atorvastatin (LIPITOR) 80 mg, oral, Nightly    blood sugar diagnostic (Blood Glucose Test) strip Use to check glucose 4 times daily, before meals and at bedtime    blood-glucose sensor (FreeStyle Jhonatan 3 Sensor) device Use as directed. Replace every 14 days    carvedilol (Coreg) 6.25 mg tablet TAKE 1 TABLET (6.25 MG) BY MOUTH TWICE A DAY    clopidogrel (PLAVIX) 75 mg, oral, Daily    cyclobenzaprine (FLEXERIL) 10 mg, oral, 2 times daily PRN    Jardiance 25 mg, oral, Daily    [START ON 5/12/2024] Mounjaro 10 mg, subcutaneous, Once  Weekly    multivitamin with minerals tablet 1 tablet, oral, Daily    pregabalin (LYRICA) 75 mg, oral, Daily, Per patient statement      DRUG INTERACTIONS  None requiring intervention at this time      Assessment/Plan   Problem List Items Addressed This Visit       Type 2 diabetes mellitus with circulatory disorder, without long-term current use of insulin (Multi)    Relevant Medications    tirzepatide (Mounjaro) 10 mg/0.5 mL pen injector (Start on 5/12/2024)    Other Relevant Orders    Hemoglobin A1c    Follow Up In Clinical Pharmacy    ACS (acute coronary syndrome) (Multi)    Relevant Medications    atorvastatin (Lipitor) 80 mg tablet     ASSESSMENT:  Patient's diabetes is uncontrolled with most A1c = 11 (goal A1c <7). Patient saw endocrinology and stopped insulin due to multiple low events.     PLAN:  Continue Mounjaro 10 mg weekly and Jardiance 25 mg daily  Continue testing blood sugar continuously with Freestyle Jhonatan 3   Continue working on diet and exercise  Recommend A1c check before next PCP appointment  Follow up: 6/6 1pm    Continue all meds under the continuation of care with the referring provider and clinical pharmacy team.    Patricia Church, PharmD     Verbal consent to manage patient's drug therapy was obtained from the patient. They were informed they may decline to participate or withdraw from participation in pharmacy services at any time.

## 2024-05-10 ENCOUNTER — APPOINTMENT (OUTPATIENT)
Dept: CARDIAC REHAB | Facility: CLINIC | Age: 59
End: 2024-05-10
Payer: COMMERCIAL

## 2024-05-10 PROCEDURE — RXMED WILLOW AMBULATORY MEDICATION CHARGE

## 2024-05-11 ENCOUNTER — PHARMACY VISIT (OUTPATIENT)
Dept: PHARMACY | Facility: CLINIC | Age: 59
End: 2024-05-11
Payer: COMMERCIAL

## 2024-05-11 PROCEDURE — RXMED WILLOW AMBULATORY MEDICATION CHARGE

## 2024-05-13 ENCOUNTER — APPOINTMENT (OUTPATIENT)
Dept: CARDIAC REHAB | Facility: CLINIC | Age: 59
End: 2024-05-13
Payer: COMMERCIAL

## 2024-05-15 ENCOUNTER — CLINICAL SUPPORT (OUTPATIENT)
Dept: CARDIAC REHAB | Facility: CLINIC | Age: 59
End: 2024-05-15
Payer: COMMERCIAL

## 2024-05-15 DIAGNOSIS — Z95.1 S/P CABG X 4: ICD-10-CM

## 2024-05-15 PROCEDURE — 93798 PHYS/QHP OP CAR RHAB W/ECG: CPT | Performed by: INTERNAL MEDICINE

## 2024-05-17 ENCOUNTER — CLINICAL SUPPORT (OUTPATIENT)
Dept: CARDIAC REHAB | Facility: CLINIC | Age: 59
End: 2024-05-17
Payer: COMMERCIAL

## 2024-05-17 DIAGNOSIS — Z95.1 S/P CABG X 4: ICD-10-CM

## 2024-05-17 PROCEDURE — 93798 PHYS/QHP OP CAR RHAB W/ECG: CPT | Performed by: INTERNAL MEDICINE

## 2024-05-20 ENCOUNTER — CLINICAL SUPPORT (OUTPATIENT)
Dept: CARDIAC REHAB | Facility: CLINIC | Age: 59
End: 2024-05-20
Payer: COMMERCIAL

## 2024-05-20 DIAGNOSIS — Z95.1 S/P CABG X 4: ICD-10-CM

## 2024-05-20 PROCEDURE — 93798 PHYS/QHP OP CAR RHAB W/ECG: CPT | Performed by: INTERNAL MEDICINE

## 2024-05-20 NOTE — PROGRESS NOTES
FOLLOW UP PICTURE YOUR PLATE DIET ASSESSMENT  CARDIAC REHAB    SCORES:  Vegetables & Fruit (out of 12)                 4   Breads, Grains & Cereals (out of 12)        5  Red & Processed Meat (out of 12)         3  Poultry (out of 2)                                   0  Fish & Shellfish (out of 4)                      0  Beans, Nuts & Seeds (out of 4)             1  Milk & Dairy Foods (out of 6)              3  Toppings, Oils, Seasonings & Salt (out of 20)    7  Sweets, Snacks & Restaurant Food (out of 14)    6  Beverages (out of 10)          10     Overall Score (out of 96)    39  Pre vs Post Overall Score Change:  INCREASE/DECREASE: increase from 30    GOALS  Aim to consume at least 5 fruits and vegetables/d.     Aim to limit intake of red meat and high fat processed meats to <2x/wk.     GOALS MET:  YES /NO: No  YES /NO: No

## 2024-05-22 ENCOUNTER — CLINICAL SUPPORT (OUTPATIENT)
Dept: CARDIAC REHAB | Facility: CLINIC | Age: 59
End: 2024-05-22
Payer: COMMERCIAL

## 2024-05-22 DIAGNOSIS — Z95.1 S/P CABG X 4: ICD-10-CM

## 2024-05-22 PROCEDURE — 93798 PHYS/QHP OP CAR RHAB W/ECG: CPT | Performed by: INTERNAL MEDICINE

## 2024-05-24 ENCOUNTER — CLINICAL SUPPORT (OUTPATIENT)
Dept: CARDIAC REHAB | Facility: CLINIC | Age: 59
End: 2024-05-24
Payer: COMMERCIAL

## 2024-05-24 DIAGNOSIS — Z95.1 S/P CABG X 4: ICD-10-CM

## 2024-05-24 PROCEDURE — 93798 PHYS/QHP OP CAR RHAB W/ECG: CPT | Performed by: INTERNAL MEDICINE

## 2024-05-24 NOTE — PROGRESS NOTES
"INDIVIDUAL CARDIAC TREATMENT PLAN 120 DAY REASSESSMENT      Name: Behzad Diaz   Today's Date: 24   : 1965   Primary Provider: ARCELIA Arce DO   MRN: [id number]   Referring Physician: ARCELIA Venegas MD      Diagnosis: CABGx4   Onset Date: 23      Risk Stratification: High      NUTRITION REASSESSMENT   Lipids:   Lipid Lab Date: 12/10/23  Total Chol: 145  HDL: 25.2  LDL: 51  Tri  Cholesterol Med: Atorvastatin 80mg daily     Diabetes: Yes T2DM   HgbA1c:  11.0  Date Checked: 12/10/23  Monitors glucose at home: Yes  Fasting Blood Sugar Range: 130-140's   Frequency: Has CGM - watches regularly  Hypoglycemic Episode: Rarely but had one yesterday 24 (low reading with slight lightheadedness)    Weight Management  Weight: 325 lbs  Height: 73\"  BMI:  42.9  Pre Body Composition: NA secondary to BMI>35   Post Body Composition: NA   Pre Waist Circumference: NA secondary to BMI>35  Post Waist Circumference: NA   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 Improve Picture Your Plate assessment results by discharge. Pt working towards goals. Pt participated in the RD lecture and asks appropriate questions concerning diet and blood glucose management. Pt participated in second lecture series with RD. Will readminister dietary assessment quiz prior to discharge. Awaiting final dietary assessment scores  2. Make changes to diet to include heart healthy options while in the program. Pt actively participating in the educational nutrition lectures. Reviewed dietary assessment and recommendations with pt. Discussed the importance of eating meals at earlier times during the day.    Nutrition Intervention/Education:   *Sent dietician Picture Your Plate assessment for scoring and recommendations.   *Perform weekly weight " "checks on Wednesdays.   Education Provided: Managing Cholesterol, Individual Cholesterol Levels; Nutrition:  : Healthier Eating Out & Snacking, Heart Healthy Shopping, Cooking & Portion Distortion, Focus for Heart Healthy Eating, Sodium: Shaking the Habit, Current Topics in Nutrition    OTHER CORE COMPONENTS/ RISK FACTORS REASSESSMENT   Medication compliance: good compliance  Using pill box: Yes  Carries medication list: No but reviewed importance of doing so     Blood Pressure Management:  History of High BP: Yes  Resting BP: 104/60    Tobacco: SMOKER/minimal 3 cigars/year  Quit Date: NA   Other forms of tobacco: Cigar   Anyone in the house smoke: No    Initial Knowledge Test Score:  9/15  Discharge Knowledge Test Score: NA     OTHER CORE COMPONENTS/ RISK FACTOR PLAN   Other Core components/Risk Factor Goals:                                                                                                                                                      1. Achieve and maintain a resting blood pressure less than 130/80 while in the program. Goal Met  2. Gain knowledge of cardiac disease and lifestyle modifications related to exercise and ADL's prior to discharge. Goal Met  Other Components/ Risk Factors Intervention/Education:  *Will monitor HR, BP, dyspnea and arrhythmias each session.   *Will meet with patient to discuss goals & progress.  *Encouraged review of education materials.   Education Provided:  Pharmacology lecture with hospital resident pharmacist, PAD, Heart Failure, Hands Only CPR, Temperature Extremes, Stroke Awareness, Managing Cholesterol, Cholesterol Levels, Hypertension    PSYCHOSOCIAL REASSESSMENT   Patient reported stress level: moderate  Using stress management skills: Yes - \"I like to lozano for fun and relaxation\"   HX of anxiety: No  HX of depression: No  Patient questioned regarding any new stress, depression and anxiety symptoms: Yes    Family/Support System: Friends   Seeing mental " health provider: No  Psychosocial medications: NA    Initial PHQ-9 score: 4  Discharge PHQ-9 score: NA   Was PHQ-9 faxed to provider: No  Date faxed: NA    Quality of Life Survey:   Pre PCS:  38.52  Post PCS:  NA    Pre MCS:  37.46  Post MCS:  NA      Stages of change:  Preperation    PSYCHOSOCIAL PLAN  Psychosocial Goals:  1. Improve stage of change from Contemplation to Preparation while in the program. Goal Met  2. To maintain PHQ-9 category classification (no risk for depression) while in the program. Pt remains without complaint of depression. Encouraging review of emotional and stress educational handouts in order to facilitate use of appropriate coping mechanisms. Awaiting final scores of PHQ9.       Psychosocial Interventions/ Education:  * Provided one on one emotional support *  *Will facilitate peer support within the context of other phase II patients while in the program.   Education Provided: Emotional Aspects of Heart Disease - Depression and Attitude, Happiness is a Choice, Support Groups; Stress Series Parts 1-3: The Good, Bad & Ugly, Short-Circuiting Stress, Mastering Stress      EXERCISE REASSESSMENT   Home Exercise: No   Frequency: NA   Mode: Home physical therapy for last couple weeks     EXERCISE PLAN  Exercise Goals:   1. Goal of 3.8 METs by discharge. Current MET Level 4 on the recumbent bike; Pt working towards goals.  2. Have a plan in place for continued exercise after the program by discharge. Pt returns to work next week and will decide later what exercises he will do upon graduation of the program. Will discuss exercise plans at discharge meeting.    Exercise Prescription:   Based on 12 Minute Walk Test  Frequency: 3 days per week  Duration (total aerobic min.): 30 minutes  Intensity RPE: 11-14  Target HR: Rest+30 (110-143bpm)  MET Level Range: 2.4-3.4    Date of first exercise session: 2/5/24     Modality METS Load  Duration   1 Warm Up    05:00   2 NuStep 3.2 116 Dutta L 8 06:00   3  NuStep 3.7 142 Dutta L 10 06:00   4 Weights  170 Load  06:00   5 Recumbent Bike 3.4 94 Dutta L 6 06:00   6 Walking 2.6 7 laps   06:00   7 Cool Down     05:00     Exercise Intervention:   *Aim to progress 0.5 MET every 4 Weeks or as tolerated. Pt progressing as expected.  *Incorporate resistance training for muscular endurance and strength. Pt enjoys weights and is progressing better than expected  Education Provided:  Diabetes and Exercise, BMI and Body Composition, Resistance training parts 1-2, Flexibility    LEARNING ASSESSMENT & BARRIERS  Readiness to Learn: Eager to get started, asking appropriate questions   Barriers: None    FALL RISK  HIGH  Comments: Uses cane only when in public      INDIVIDUAL PATIENT GOALS:  1.To improve strength and endurance of legs by discharge. Pt began resistance training and is pleased with his progress.  2.To increase walking duration by discharge. Pt continues to improve.      MEDICATIONS  Amoxicillin 500mg BID, Aspirin 81mg daily, Atorvastatin 80mg @HS, Carvedilol 3.125mg BID, Clopidogrel 75mg daily, M. Vit. 1 tab daily, Mounjaro 2.5mg subcutaneous weekly, Jardiance 25mg daily, Humalog 5 units with meals (2x/day) and sliding scale, Lantus 25 units @HS, Flexeril 10mg daily and BID PRN, Lyrica 75mg daily    STAFF COMMENTS:   Behzad has attended 33/36 sessions of cardiac rehab after NSTEMI on 12/10/23 and CABGx4 12/30/23. He is tolerating prescribed workloads well without reports of dyspnea, angina or acute pain that does not resolve with rest. Behzad has aggravated a previous shoulder injury due to lifting boxes in preparation for selling his mother's house. He has needed to decrease the chest press, but continues to do well with all other resistance training exercises.  He has had a stable cardiovascular response to exercise. Telemetry shows SR-ST with rare PVC.s He continues to do well in the program with his main limitation being orthopedic in nature due to extensive back  surgeries and weakness.  Behzad will graduate from the program next Wednesday, 5/29/24. Will continue to adjust workloads as tolerated. Thank you for allowing us to participate in Behzad's care.

## 2024-05-29 ENCOUNTER — CLINICAL SUPPORT (OUTPATIENT)
Dept: CARDIAC REHAB | Facility: CLINIC | Age: 59
End: 2024-05-29
Payer: COMMERCIAL

## 2024-05-29 ENCOUNTER — PHARMACY VISIT (OUTPATIENT)
Dept: PHARMACY | Facility: CLINIC | Age: 59
End: 2024-05-29
Payer: COMMERCIAL

## 2024-05-29 DIAGNOSIS — Z95.1 S/P CABG X 4: ICD-10-CM

## 2024-05-29 PROCEDURE — 93798 PHYS/QHP OP CAR RHAB W/ECG: CPT | Performed by: INTERNAL MEDICINE

## 2024-05-31 ENCOUNTER — APPOINTMENT (OUTPATIENT)
Dept: CARDIAC REHAB | Facility: CLINIC | Age: 59
End: 2024-05-31
Payer: COMMERCIAL

## 2024-05-31 NOTE — PROGRESS NOTES
"INDIVIDUAL CARDIAC TREATMENT PLAN DISCHARGE SUMMARY      Name: Behzad Diaz   Today's Date: 24   : 1965   Primary Provider: ARCELIA Arce DO   MRN: [id number]   Referring Physician: ARCELIA Venegas MD      Diagnosis: CABGx4   Onset Date: 23      Risk Stratification: High      NUTRITION DISCHARGE/ FOLLOW-UP   Lipids:   Lipid Lab Date: 12/10/23  Total Chol: 145  HDL: 25.2  LDL: 51  Tri  Cholesterol Med: Atorvastatin 80mg daily     Diabetes: Yes T2DM   HgbA1c:  11.0  Date Checked: 12/10/23  Monitors glucose at home: Yes  Fasting Blood Sugar Range: 110-120s   Frequency: Has CGM - watches regularly  Hypoglycemic Episode: Rarely     Weight Management  Weight: 317 lbs  Height: 73\"  BMI:  41.8  Pre Body Composition: NA secondary to BMI>35   Post Body Composition: NA   Pre Waist Circumference: NA secondary to BMI>35  Post Waist Circumference: NA   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 Improve Picture Your Plate assessment results by discharge. Pt working towards goals. Pt participated in the RD lecture and asks appropriate questions concerning diet and blood glucose management. Pt participated in second lecture series with RD. Will readminister dietary assessment quiz prior to discharge. Awaiting final dietary assessment scores. Goal Met  2. Make changes to diet to include heart healthy options while in the program. Pt actively participating in the educational nutrition lectures. Reviewed dietary assessment and recommendations with pt. Discussed the importance of eating meals at earlier times during the day. Goal partially met - pt doesn't eat for the majority of the day then eats dinner late at night due to work.    Nutrition Intervention/Education:   *Sent dietician Picture Your Plate assessment for scoring " "and recommendations.   *Perform weekly weight checks on Wednesdays.   Education Provided: Managing Cholesterol, Individual Cholesterol Levels; Nutrition: Healthier Eating Out & Snacking, Heart Healthy Shopping, Cooking & Portion Distortion, Focus for Heart Healthy Eating, Sodium: Shaking the Habit, Current Topics in Nutrition    OTHER CORE COMPONENTS/ RISK FACTORS DISCHARGE/ FOLLOW-UP   Medication compliance: good compliance  Using pill box: Yes  Carries medication list: No but reviewed importance of doing so     Blood Pressure Management:  History of High BP: Yes  Resting BP: 122/80    Tobacco: SMOKER/minimal 3 cigars/year  Quit Date: NA   Other forms of tobacco: Cigar   Anyone in the house smoke: No    Initial Knowledge Test Score:  9/15  Discharge Knowledge Test Score: 14/15    OTHER CORE COMPONENTS/ RISK FACTOR PLAN   Other Core components/Risk Factor Goals:                                                                                                                                                      1. Achieve and maintain a resting blood pressure less than 130/80 while in the program. Goal Met  2. Gain knowledge of cardiac disease and lifestyle modifications related to exercise and ADL's prior to discharge. Goal Met  Other Components/ Risk Factors Intervention/Education:  *Will monitor HR, BP, dyspnea and arrhythmias each session.   *Will meet with patient to discuss goals & progress.  *Encouraged review of education materials.   Education Provided:  Pharmacology lecture with hospital resident pharmacist, PAD, Heart Failure, Hands Only CPR, Temperature Extremes, Stroke Awareness, Managing Cholesterol, Cholesterol Levels, Hypertension    PSYCHOSOCIAL DISCHARGE/ FOLLOW-UP   Patient reported stress level: moderate  Using stress management skills: Yes - \"I like to loz[place] for fun and relaxation\"   HX of anxiety: No  HX of depression: No  Patient questioned regarding any new stress, depression and anxiety " symptoms: Yes    Family/Support System: Friends   Seeing mental health provider: No  Psychosocial medications: NA    Initial PHQ-9 score: 4  Discharge PHQ-9 score: 0   Was PHQ-9 faxed to provider: No  Date faxed: NA    Quality of Life Survey:   Pre PCS:  38.52  Post PCS:  45.06    Pre MCS:  27.46  Post MCS:  53.30      Stages of change:  Preperation    PSYCHOSOCIAL PLAN  Psychosocial Goals:  1. Improve stage of change from Contemplation to Preparation while in the program. Goal Met  2. To maintain PHQ-9 category classification (no risk for depression) while in the program. Pt remains without complaint of depression. Encouraging review of emotional and stress educational handouts in order to facilitate use of appropriate coping mechanisms. Awaiting final scores of PHQ9. Goal Met      Psychosocial Interventions/ Education:  * Provided one on one emotional support *  *Will facilitate peer support within the context of other phase II patients while in the program.   Education Provided: Emotional Aspects of Heart Disease - Depression and Attitude, Happiness is a Choice, Support Groups; Stress Series Parts 1-3: The Good, Bad & Ugly, Short-Circuiting Stress, Mastering Stress    EXERCISE REASSESSMENT   Home Exercise: yes  Frequency: intermittent  Mode: pt has a treadmill at home, but has been physically working by cleaning out his mother's home    EXERCISE PLAN  Exercise Goals:   1. Goal of 3.8 METs by discharge. Current MET Level 4 on the recumbent bike; Pt working towards goals. Goal met  2. Have a plan in place for continued exercise after the program by discharge. Pt returns to work next week and will decide later what exercises he will do upon graduation of the program. Will discuss exercise plans at discharge meeting. Goal not met    Exercise Prescription:   Based on 12 Minute Walk Test  Frequency: 3 days per week  Duration (total aerobic min.): 30 minutes  Intensity RPE: 11-14  Target HR: Rest+30 (110-143bpm)  MET  Level Range: 2.4-3.4    Date of first exercise session: 2/5/24     Modality METS Load  Duration   1 Warm Up    05:00   2 NuStep 2.9 100 Dutta L 8 06:00   3 NuStep 3 112 Dutta L 10 06:00   4 Weights  200 Load  06:00   5 Recumbent Bike 4 94 Dutta L 6 06:00   6 Recumbent Bike 4 94 Dutta L 6 06:00   7 Cool Down     05:00     Exercise Intervention:   *Aim to progress 0.5 MET every 4 Weeks or as tolerated. Pt progressing as expected.  *Incorporate resistance training for muscular endurance and strength. Pt enjoys weights and is progressing better than expected  Education Provided:  Diabetes and Exercise, BMI and Body Composition, Resistance training parts 1-2, Flexibility    LEARNING ASSESSMENT & BARRIERS  Readiness to Learn: Eager to get started, asking appropriate questions   Barriers: None    FALL RISK  HIGH  Comments: Uses cane only when in public      INDIVIDUAL PATIENT GOALS:  1.To improve strength and endurance of legs by discharge. Pt began resistance training and is pleased with his progress. Goal Met  2.To increase walking duration by discharge. Pt continues to improve. Goal Met      MEDICATIONS  Amoxicillin 500mg BID, Aspirin 81mg daily, Atorvastatin 80mg @HS, Carvedilol 3.125mg BID, Clopidogrel 75mg daily, M. Vit. 1 tab daily, Mounjaro 2.5mg subcutaneous weekly, Jardiance 25mg daily, Humalog 5 units with meals (2x/day) and sliding scale, Lantus 25 units @HS, Flexeril 10mg daily and BID PRN, Lyrica 75mg daily    STAFF COMMENTS:   Behzad has attended 35/36 sessions of cardiac rehab after NSTEMI on 12/10/23 and CABGx4 12/30/23. He is tolerated prescribed workloads well without report of dyspnea, angina or acute pain that did not resolve with rest. Behzad aggravated a previous shoulder injury due to lifting boxes in preparation for selling his mother's house. He needed to decrease the chest press, but continues to do well with all other resistance training exercises.  He  had a stable cardiovascular response to  exercise. Telemetry showed SR-ST with rare PVC.s He did well in the program with his main limitation being orthopedic in nature due to extensive back surgeries and weakness.  Thank you for allowing us to participate in Behzad's care.

## 2024-06-06 ENCOUNTER — TELEMEDICINE (OUTPATIENT)
Dept: PHARMACY | Facility: HOSPITAL | Age: 59
End: 2024-06-06
Payer: COMMERCIAL

## 2024-06-06 DIAGNOSIS — E11.59 TYPE 2 DIABETES MELLITUS WITH OTHER CIRCULATORY COMPLICATION, WITHOUT LONG-TERM CURRENT USE OF INSULIN (MULTI): ICD-10-CM

## 2024-06-06 PROCEDURE — RXMED WILLOW AMBULATORY MEDICATION CHARGE

## 2024-06-06 NOTE — PROGRESS NOTES
Pharmacy Post-Discharge Visit    Behzad Diaz is a 58 y.o. male was referred to Clinical Pharmacy Team to complete a post-discharge medication optimization and monitoring visit.  The patient was referred for their diabetes  Admission Date: 12/12/23   Discharge Date: 12/29/23    Referring Provider: Rossy Edwards PA-C  PCP: Tomer Arce, DO - last visit: 2/16/24, next visit: 8/16/24      Subjective   No Known Allergies    [facility]/pharmacy #[id number] - [place], [place] - [address] AT [facility]  [address] [address] [id number]  Phone: [phone] Fax: [phone]    [facility]  [address] , [address]  [place] [id number]  Phone: [phone] Fax: [phone]      Medication System Management:  Affordability/Accessibility:  - Jardiance $400/ Mounjaro $300 for 3 month supplies  - Patient doesn't qualify  PAP upon assessment  Adherence/Organization: none      Social History     Social History Narrative    Not on file     HPI  Diabetes    Patient is using: both glucometer and continuous glucose monitor  Now using freestyle ge 3  The patient is currently checking the blood glucose continuously.        Hypoglycemia frequency: none  Hypoglycemia awareness: Yes     Diet:   2 meals/day  1st meal (~4-4:30pm); biggest meal, eats out due to always travelling out of town for work   Last meal (midnight); smaller portion of food; bigger than a snack, smaller than a normal meal  Snacks - not usually  Drinks - water, two 12 oz can Coke Zero daily  Desserts - not usually    Physical Activity   Cardiac rehab 3x/week  Considering treadmill at home when working remotely  Currently no other forms of exercise due to sedentary work life of 70 hrs/week  Some limitation with pain, but he is improving and working on Lyrica taper with pain management    Weight: 320-325 lbs (lately)    Current Diabetes Medications:   Jardiance 25 mg daily  Mounjaro 10 mg weekly Sundays (just started 10 mg on  Sunday)    Secondary Prevention  Statin? Yes  ACE-I/ARB? No  Aspirin? Yes    Pertinent PMH Review:  PMH of Pancreatitis: No  PMH of Retinopathy: No  PMH of Urinary Tract Infections: No  PMH of MTC: No      Review of Systems    Objective     There were no vitals taken for this visit.   BP Readings from Last 4 Encounters:   05/07/24 129/84   04/26/24 (!) 161/91   02/16/24 140/70   02/06/24 147/90      There were no vitals filed for this visit.     LAB  Lab Results   Component Value Date    BILITOT 1.3 (H) 12/13/2023    CALCIUM 8.9 12/29/2023    CO2 28 12/29/2023     12/29/2023    CREATININE 0.77 12/29/2023    GLUCOSE 95 12/29/2023    ALKPHOS 58 12/13/2023    K 3.7 12/29/2023    PROT 6.5 12/13/2023     12/29/2023    AST 26 12/13/2023    ALT 26 12/13/2023    BUN 21 12/29/2023    ANIONGAP 11 12/29/2023    MG 2.25 12/29/2023    PHOS 3.8 12/29/2023    ALBUMIN 3.4 12/29/2023    LIPASE 13 09/22/2020    GFRMALE >90 07/07/2023     Lab Results   Component Value Date    TRIG 343 (H) 12/10/2023    CHOL 145 12/10/2023    LDLCALC 51 12/10/2023    HDL 25.2 12/10/2023     Lab Results   Component Value Date    HGBA1C 11.0 (H) 12/10/2023         Current Outpatient Medications   Medication Instructions    alcohol swabs pads, medicated Apply topically 4 times a day. Use prior to checking glucose or injecting insulin    amoxicillin (AMOXIL) 500 mg, oral, 2 times daily    aspirin 81 mg, oral, Daily    atorvastatin (LIPITOR) 80 mg, oral, Nightly    blood sugar diagnostic (Blood Glucose Test) strip Use to check glucose 4 times daily, before meals and at bedtime    blood-glucose sensor (FreeStyle Jhonatan 3 Sensor) device Use as directed. Replace every 14 days    carvedilol (Coreg) 6.25 mg tablet TAKE 1 TABLET (6.25 MG) BY MOUTH TWICE A DAY    clopidogrel (PLAVIX) 75 mg, oral, Daily    cyclobenzaprine (FLEXERIL) 10 mg, oral, 2 times daily PRN    Jardiance 25 mg, oral, Daily    Mounjaro 10 mg, subcutaneous, Once Weekly    multivitamin  with minerals tablet 1 tablet, oral, Daily    pregabalin (LYRICA) 75 mg, oral, Daily, Per patient statement      DRUG INTERACTIONS  None requiring intervention at this time      Assessment/Plan   Problem List Items Addressed This Visit       Type 2 diabetes mellitus with circulatory disorder, without long-term current use of insulin (Multi)     ASSESSMENT:  Patient's diabetes is uncontrolled with most A1c = 11 (goal A1c <7). Patient saw endocrinology and stopped insulin due to multiple low events.     PLAN:  Continue Mounjaro 10 mg weekly and Jardiance 25 mg daily  Continue testing blood sugar continuously with Freestyle Jhonatan 3   Continue working on diet and exercise  Recommend A1c check before next PCP appointment  Follow up: PRN; patient well controlled    Continue all meds under the continuation of care with the referring provider and clinical pharmacy team.    Patricia Church, PharmD     Verbal consent to manage patient's drug therapy was obtained from the patient. They were informed they may decline to participate or withdraw from participation in pharmacy services at any time.

## 2024-06-10 ENCOUNTER — PHARMACY VISIT (OUTPATIENT)
Dept: PHARMACY | Facility: CLINIC | Age: 59
End: 2024-06-10
Payer: COMMERCIAL

## 2024-06-10 PROCEDURE — RXMED WILLOW AMBULATORY MEDICATION CHARGE

## 2024-06-18 PROCEDURE — RXMED WILLOW AMBULATORY MEDICATION CHARGE

## 2024-06-20 ENCOUNTER — PHARMACY VISIT (OUTPATIENT)
Dept: PHARMACY | Facility: CLINIC | Age: 59
End: 2024-06-20
Payer: COMMERCIAL

## 2024-07-01 ENCOUNTER — PHARMACY VISIT (OUTPATIENT)
Dept: PHARMACY | Facility: CLINIC | Age: 59
End: 2024-07-01
Payer: COMMERCIAL

## 2024-07-01 PROCEDURE — RXMED WILLOW AMBULATORY MEDICATION CHARGE

## 2024-07-12 ENCOUNTER — PHARMACY VISIT (OUTPATIENT)
Dept: PHARMACY | Facility: CLINIC | Age: 59
End: 2024-07-12
Payer: COMMERCIAL

## 2024-07-12 PROCEDURE — RXMED WILLOW AMBULATORY MEDICATION CHARGE

## 2024-07-22 ENCOUNTER — APPOINTMENT (OUTPATIENT)
Dept: CARDIOLOGY | Facility: CLINIC | Age: 59
End: 2024-07-22
Payer: COMMERCIAL

## 2024-07-22 VITALS
HEIGHT: 73 IN | SYSTOLIC BLOOD PRESSURE: 110 MMHG | HEART RATE: 76 BPM | DIASTOLIC BLOOD PRESSURE: 70 MMHG | BODY MASS INDEX: 41.48 KG/M2 | WEIGHT: 313 LBS

## 2024-07-22 DIAGNOSIS — I25.10 CORONARY ARTERY DISEASE INVOLVING NATIVE CORONARY ARTERY OF NATIVE HEART WITHOUT ANGINA PECTORIS: ICD-10-CM

## 2024-07-22 DIAGNOSIS — E78.49 OTHER HYPERLIPIDEMIA: Primary | ICD-10-CM

## 2024-07-22 PROBLEM — R00.0 SINUS TACHYCARDIA: Status: RESOLVED | Noted: 2023-08-25 | Resolved: 2024-07-22

## 2024-07-22 PROBLEM — I24.9 ACS (ACUTE CORONARY SYNDROME) (MULTI): Status: RESOLVED | Noted: 2023-12-10 | Resolved: 2024-07-22

## 2024-07-22 PROBLEM — I21.4 NSTEMI (NON-ST ELEVATED MYOCARDIAL INFARCTION) (MULTI): Status: RESOLVED | Noted: 2023-12-10 | Resolved: 2024-07-22

## 2024-07-22 PROCEDURE — 3008F BODY MASS INDEX DOCD: CPT | Performed by: INTERNAL MEDICINE

## 2024-07-22 PROCEDURE — 3074F SYST BP LT 130 MM HG: CPT | Performed by: INTERNAL MEDICINE

## 2024-07-22 PROCEDURE — 3078F DIAST BP <80 MM HG: CPT | Performed by: INTERNAL MEDICINE

## 2024-07-22 PROCEDURE — 99214 OFFICE O/P EST MOD 30 MIN: CPT | Performed by: INTERNAL MEDICINE

## 2024-07-22 NOTE — ASSESSMENT & PLAN NOTE
Advised the patient to focus on diet, exercise, and weight loss, according to the guidelines of the American [employer] Association.

## 2024-07-22 NOTE — ASSESSMENT & PLAN NOTE
1.  CAD: The patient has stable, functional class I CAD, and is doing well.  No additional testing is necessary at present. Important aspects of lifestyle modification were discussed in detail with the patient.  Recent labs and testing have been reviewed.  Orders:    Follow Up In Cardiology    Follow Up In Cardiology; Future

## 2024-07-22 NOTE — PROGRESS NOTES
"Chief Complaint:   Please see below.     History Of Present Illness:    Behzad Diaz is a 59 y.o. male presenting with CAD.    This 59-year-old hypertensive, diabetic, hyperlipidemic man with a BMI of 41.3 is S/P a non-ST segment elevation MI in early December 2023 he stabilized on medical therapy, underwent cardiac catheterization, and transferred to [facility] for bypass surgery. On December 20, 2023 he underwent CABGx 4 (LIMA to LAD, SVG to diagonal, SVG sequential to OM and PDA taken off of the other vein. His echo ejection fraction was 40 to 45%.     The patient denies chest discomfort, dyspnea, palpitations, orthopnea, PND, syncope, and near syncope  He has lost 14 lbs since his last visit her in December, 2023.    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       Last Recorded Vitals:  Vitals:    07/22/24 1300   BP: 110/70   BP Location: Left arm   Patient Position: Sitting   Pulse: 76   Weight: 142 kg (313 lb)   Height: 1.854 m (6' 1\")       Past Medical History:  He has a past medical history of Discitis, Discitis, HLD (hyperlipidemia), HTN (hypertension), and T2DM (type 2 diabetes mellitus) (Multi).    Past Surgical History:  He has a past surgical history that includes Other surgical history (07/30/2020); Other surgical history (07/30/2020); CT guided percutaneous biopsy bone deep (09/10/2021); Cardiac catheterization (N/A, 12/11/2023); Back surgery; and Coronary artery bypass graft (N/A, 12/20/2023).      Social History:  He reports that he has quit smoking. His smoking use included cigars. He has never used smokeless tobacco. He reports current alcohol use. He reports that he does not use drugs.    Family History:  Family History   Problem Relation Name Age of Onset    Coronary artery disease Mother          Allergies:  Patient has no known allergies.    Outpatient Medications:  Current Outpatient Medications "   Medication Instructions    amoxicillin (AMOXIL) 500 mg, oral, 2 times daily    aspirin 81 mg, oral, Daily    atorvastatin (LIPITOR) 80 mg, oral, Nightly    blood sugar diagnostic (Blood Glucose Test) strip Use to check glucose 4 times daily, before meals and at bedtime    blood-glucose sensor (FreeStyle Jhonatan 3 Sensor) device Use as directed. Replace every 14 days    carvedilol (Coreg) 6.25 mg tablet TAKE 1 TABLET (6.25 MG) BY MOUTH TWICE A DAY    clopidogrel (PLAVIX) 75 mg, oral, Daily    cyclobenzaprine (FLEXERIL) 10 mg, oral, 2 times daily PRN    Jardiance 25 mg, oral, Daily    Mounjaro 10 mg, subcutaneous, Once Weekly    multivitamin with minerals tablet 1 tablet, oral, Daily       Physical Exam:  GENERAL:  pleasant 59 year-old  HEENT: No xanthelasma  NECK: Supple, no palpable adenopathy or thyromegaly  CHEST: Clear to auscultation, respiratory effort unlabored  CARDIAC: RRR, normal S1 and S2, no audible murmur, rub, gallop, carotids are brisk, PMI is not displaced  ABD: Active bowel sounds, nontender, no organomegaly, no evidence of ascites  EXT: No clubbing, cyanosis, edema, or tenderness  NEURO: Awake, alert, appropriate, speech is fluent       Last Labs:  CBC -  Lab Results   Component Value Date    WBC 7.9 12/29/2023    HGB 10.8 (L) 12/29/2023    HCT 34.1 (L) 12/29/2023    MCV 95 12/29/2023     12/29/2023       CMP -  Lab Results   Component Value Date    CALCIUM 8.9 12/29/2023    PHOS 3.8 12/29/2023    PROT 6.5 12/13/2023    ALBUMIN 3.4 12/29/2023    AST 26 12/13/2023    ALT 26 12/13/2023    ALKPHOS 58 12/13/2023    BILITOT 1.3 (H) 12/13/2023       LIPID PANEL -   Lab Results   Component Value Date    CHOL 145 12/10/2023    TRIG 343 (H) 12/10/2023    HDL 25.2 12/10/2023    CHHDL 5.8 12/10/2023    LDLF 82 08/27/2021    VLDL 69 (H) 12/10/2023    NHDL 120 12/10/2023       RENAL FUNCTION PANEL -   Lab Results   Component Value Date    GLUCOSE 95 12/29/2023     12/29/2023    K 3.7 12/29/2023      12/29/2023    CO2 28 12/29/2023    ANIONGAP 11 12/29/2023    BUN 21 12/29/2023    CREATININE 0.77 12/29/2023    GFRMALE >90 07/07/2023    CALCIUM 8.9 12/29/2023    PHOS 3.8 12/29/2023    ALBUMIN 3.4 12/29/2023        Lab Results   Component Value Date    BNP 91 12/13/2023    HGBA1C 11.0 (H) 12/10/2023    HGBA1C 10.8 (A) 08/25/2023         No recent results to review    Assessment/Plan   Assessment & Plan  Coronary artery disease involving native coronary artery of native heart without angina pectoris  1.  CAD: The patient has stable, functional class I CAD, and is doing well.  No additional testing is necessary at present. Important aspects of lifestyle modification were discussed in detail with the patient.  Recent labs and testing have been reviewed.  Orders:    Follow Up In Cardiology    Follow Up In Cardiology; Future    Other hyperlipidemia  Recent labs reviewed.       BMI 40.0-44.9, adult (Multi)  Advised the patient to focus on diet, exercise, and weight loss, according to the guidelines of the American Heart Association.               Avtar Venegas MD

## 2024-07-22 NOTE — PATIENT INSTRUCTIONS

## 2024-07-26 PROCEDURE — RXMED WILLOW AMBULATORY MEDICATION CHARGE

## 2024-07-30 ENCOUNTER — PHARMACY VISIT (OUTPATIENT)
Dept: PHARMACY | Facility: CLINIC | Age: 59
End: 2024-07-30
Payer: COMMERCIAL

## 2024-07-31 ENCOUNTER — LAB (OUTPATIENT)
Dept: LAB | Facility: LAB | Age: 59
End: 2024-07-31
Payer: COMMERCIAL

## 2024-07-31 DIAGNOSIS — Z12.5 SCREENING FOR PROSTATE CANCER: ICD-10-CM

## 2024-07-31 DIAGNOSIS — M46.40 DISCITIS, UNSPECIFIED SPINAL REGION: ICD-10-CM

## 2024-07-31 DIAGNOSIS — Z00.00 PERIODIC HEALTH ASSESSMENT, GENERAL SCREENING, ADULT: ICD-10-CM

## 2024-07-31 LAB
EST. AVERAGE GLUCOSE BLD GHB EST-MCNC: 126 MG/DL
HBA1C MFR BLD: 6 %

## 2024-07-31 PROCEDURE — 84153 ASSAY OF PSA TOTAL: CPT

## 2024-07-31 PROCEDURE — 86140 C-REACTIVE PROTEIN: CPT

## 2024-07-31 PROCEDURE — 80061 LIPID PANEL: CPT

## 2024-07-31 PROCEDURE — 36415 COLL VENOUS BLD VENIPUNCTURE: CPT

## 2024-07-31 PROCEDURE — 84443 ASSAY THYROID STIM HORMONE: CPT

## 2024-07-31 PROCEDURE — 80053 COMPREHEN METABOLIC PANEL: CPT

## 2024-07-31 PROCEDURE — 83036 HEMOGLOBIN GLYCOSYLATED A1C: CPT

## 2024-08-01 LAB
ALBUMIN SERPL BCP-MCNC: 4.5 G/DL (ref 3.4–5)
ALP SERPL-CCNC: 81 U/L (ref 33–120)
ALT SERPL W P-5'-P-CCNC: 49 U/L (ref 10–52)
ANION GAP SERPL CALC-SCNC: 19 MMOL/L (ref 10–20)
AST SERPL W P-5'-P-CCNC: 33 U/L (ref 9–39)
BILIRUB SERPL-MCNC: 1.4 MG/DL (ref 0–1.2)
BUN SERPL-MCNC: 16 MG/DL (ref 6–23)
CALCIUM SERPL-MCNC: 10.1 MG/DL (ref 8.6–10.6)
CHLORIDE SERPL-SCNC: 101 MMOL/L (ref 98–107)
CHOLEST SERPL-MCNC: 115 MG/DL (ref 0–199)
CHOLESTEROL/HDL RATIO: 2.8
CO2 SERPL-SCNC: 21 MMOL/L (ref 21–32)
CREAT SERPL-MCNC: 0.88 MG/DL (ref 0.5–1.3)
CRP SERPL-MCNC: 0.21 MG/DL
EGFRCR SERPLBLD CKD-EPI 2021: >90 ML/MIN/1.73M*2
GLUCOSE SERPL-MCNC: 145 MG/DL (ref 74–99)
HDLC SERPL-MCNC: 41.3 MG/DL
LDLC SERPL CALC-MCNC: 48 MG/DL
NON HDL CHOLESTEROL: 74 MG/DL (ref 0–149)
POTASSIUM SERPL-SCNC: 4.6 MMOL/L (ref 3.5–5.3)
PROT SERPL-MCNC: 7.1 G/DL (ref 6.4–8.2)
PSA SERPL-MCNC: 0.52 NG/ML
SODIUM SERPL-SCNC: 136 MMOL/L (ref 136–145)
TRIGL SERPL-MCNC: 127 MG/DL (ref 0–149)
TSH SERPL-ACNC: 4.06 MIU/L (ref 0.44–3.98)
VLDL: 25 MG/DL (ref 0–40)

## 2024-08-07 PROCEDURE — RXMED WILLOW AMBULATORY MEDICATION CHARGE

## 2024-08-10 ENCOUNTER — PHARMACY VISIT (OUTPATIENT)
Dept: PHARMACY | Facility: CLINIC | Age: 59
End: 2024-08-10
Payer: COMMERCIAL

## 2024-08-16 ENCOUNTER — APPOINTMENT (OUTPATIENT)
Dept: PRIMARY CARE | Facility: CLINIC | Age: 59
End: 2024-08-16
Payer: COMMERCIAL

## 2024-08-16 VITALS
WEIGHT: 308 LBS | OXYGEN SATURATION: 96 % | HEIGHT: 73 IN | SYSTOLIC BLOOD PRESSURE: 120 MMHG | HEART RATE: 70 BPM | DIASTOLIC BLOOD PRESSURE: 70 MMHG | BODY MASS INDEX: 40.82 KG/M2 | TEMPERATURE: 96.6 F | RESPIRATION RATE: 14 BRPM

## 2024-08-16 DIAGNOSIS — I25.5 ISCHEMIC CARDIOMYOPATHY: ICD-10-CM

## 2024-08-16 DIAGNOSIS — Z00.00 PERIODIC HEALTH ASSESSMENT, GENERAL SCREENING, ADULT: Primary | ICD-10-CM

## 2024-08-16 DIAGNOSIS — I10 ESSENTIAL (PRIMARY) HYPERTENSION: ICD-10-CM

## 2024-08-16 DIAGNOSIS — E11.59 TYPE 2 DIABETES MELLITUS WITH OTHER CIRCULATORY COMPLICATION, WITHOUT LONG-TERM CURRENT USE OF INSULIN (MULTI): ICD-10-CM

## 2024-08-16 PROCEDURE — 3044F HG A1C LEVEL LT 7.0%: CPT | Performed by: INTERNAL MEDICINE

## 2024-08-16 PROCEDURE — 3074F SYST BP LT 130 MM HG: CPT | Performed by: INTERNAL MEDICINE

## 2024-08-16 PROCEDURE — 3048F LDL-C <100 MG/DL: CPT | Performed by: INTERNAL MEDICINE

## 2024-08-16 PROCEDURE — 99396 PREV VISIT EST AGE 40-64: CPT | Performed by: INTERNAL MEDICINE

## 2024-08-16 PROCEDURE — 3078F DIAST BP <80 MM HG: CPT | Performed by: INTERNAL MEDICINE

## 2024-08-16 PROCEDURE — 3008F BODY MASS INDEX DOCD: CPT | Performed by: INTERNAL MEDICINE

## 2024-08-16 RX ORDER — AMOXICILLIN 500 MG/1
500 CAPSULE ORAL EVERY 12 HOURS SCHEDULED
COMMUNITY

## 2024-08-16 ASSESSMENT — ENCOUNTER SYMPTOMS
WHEEZING: 0
CONSTIPATION: 0
SHORTNESS OF BREATH: 0
PALPITATIONS: 0
ABDOMINAL PAIN: 0
COUGH: 0
DIARRHEA: 0

## 2024-08-16 NOTE — PROGRESS NOTES
"Subjective   Patient ID: Behzad Diaz is a 59 y.o. male who presents for APE and Hypertension and Diabetes.    Overall doing well.  Patient is fairly active.  On the treadmill 3x/week for 20-30 minutes.  Denies any issues with CP,SOB or dizzy spells.  No issues with anxiety, depression or sleep related problems. Denies any issues with HA, numbness or tingling.  No issues or changes with bowel or bladder habits.       Review of Systems   Respiratory:  Negative for cough, shortness of breath and wheezing.    Cardiovascular:  Negative for chest pain and palpitations.   Gastrointestinal:  Negative for abdominal pain, constipation and diarrhea.   ROS is otherwise unremarkable.      Objective   /70 (BP Location: Left arm, Patient Position: Sitting, BP Cuff Size: Adult)   Pulse 70   Temp 35.9 °C (96.6 °F) (Tympanic)   Resp 14   Ht 1.854 m (6' 1\")   Wt 140 kg (308 lb)   SpO2 96%   BMI 40.64 kg/m²     Physical Exam  Vitals reviewed.   Constitutional:       Appearance: Normal appearance.   HENT:      Head: Normocephalic.   Cardiovascular:      Rate and Rhythm: Normal rate and regular rhythm.   Pulmonary:      Effort: Pulmonary effort is normal.      Breath sounds: Normal breath sounds.   Musculoskeletal:         General: Normal range of motion.   Neurological:      General: No focal deficit present.      Mental Status: He is alert.   Psychiatric:         Mood and Affect: Mood normal.         Assessment/Plan   Problem List Items Addressed This Visit             ICD-10-CM    Type 2 diabetes mellitus with circulatory disorder, without long-term current use of insulin (Multi) E11.59    BMI 40.0-44.9, adult (Multi) Z68.41    Ischemic cardiomyopathy I25.5     Other Visit Diagnoses         Codes    Periodic health assessment, general screening, adult    -  Primary Z00.00    Essential (primary) hypertension     I10        Physical exam is unremarkable.  We reviewed and discussed all the above.  We discussed current " medications as well as most recent test results.  HTN/DM/ HLD all at goal.    Tolerating medications.  No changes.  We will continue to work on weight loss as well.    We discussed the importance and benefits of a healthy diet that is both low in sugars and low in saturated fats.  We reviewed and discussed the benefits of regular physical exercise especially when at or above a level of 150 minutes/week.  We also discussed the importance of stress management and good sleep hygiene.  We will continue to work on lifestyle improvements and follow-up in 6 to 12 months, sooner if any issues should arise.

## 2024-08-26 ENCOUNTER — PHARMACY VISIT (OUTPATIENT)
Dept: PHARMACY | Facility: CLINIC | Age: 59
End: 2024-08-26
Payer: COMMERCIAL

## 2024-08-26 PROCEDURE — RXMED WILLOW AMBULATORY MEDICATION CHARGE

## 2024-09-14 ENCOUNTER — OFFICE VISIT (OUTPATIENT)
Dept: URGENT CARE | Age: 59
End: 2024-09-14
Payer: COMMERCIAL

## 2024-09-14 VITALS
HEART RATE: 79 BPM | BODY MASS INDEX: 39.58 KG/M2 | WEIGHT: 300 LBS | SYSTOLIC BLOOD PRESSURE: 118 MMHG | TEMPERATURE: 98.1 F | RESPIRATION RATE: 16 BRPM | DIASTOLIC BLOOD PRESSURE: 79 MMHG | OXYGEN SATURATION: 98 %

## 2024-09-14 DIAGNOSIS — R05.9 COUGH, UNSPECIFIED TYPE: ICD-10-CM

## 2024-09-14 DIAGNOSIS — J40 BRONCHITIS: Primary | ICD-10-CM

## 2024-09-14 RX ORDER — BENZONATATE 200 MG/1
200 CAPSULE ORAL 3 TIMES DAILY PRN
Qty: 30 CAPSULE | Refills: 0 | Status: SHIPPED | OUTPATIENT
Start: 2024-09-14 | End: 2024-09-24

## 2024-09-14 ASSESSMENT — ENCOUNTER SYMPTOMS
RHINORRHEA: 0
CHILLS: 0
VOMITING: 0
JOINT SWELLING: 0
EYE ITCHING: 0
ARTHRALGIAS: 0
EYE REDNESS: 0
COUGH: 1
FATIGUE: 0
ABDOMINAL PAIN: 0
NAUSEA: 0
SHORTNESS OF BREATH: 0
SINUS PAIN: 0
EYE DISCHARGE: 0
FEVER: 0
EYE PAIN: 0
SORE THROAT: 0
DIARRHEA: 0
CHEST TIGHTNESS: 0

## 2024-09-14 ASSESSMENT — PAIN SCALES - GENERAL: PAINLEVEL: 0-NO PAIN

## 2024-09-14 NOTE — PROGRESS NOTES
Subjective   Patient ID: Behzad Diaz is a 59 y.o. male. They present today with a chief complaint of Cough (X3 weeks).    History of Present Illness  Pt with PMHx of DM, CAD. Pt denies hx of chronic lung disease, GERD, or allergy. Denies change of home meds. Denies other symptoms. States cough worse after eating.       Cough  Pertinent negatives include no chest pain, chills, ear pain, eye redness, fever, rash, rhinorrhea, sore throat or shortness of breath.       Past Medical History  Allergies as of 09/14/2024    (No Known Allergies)       (Not in a hospital admission)       Past Medical History:   Diagnosis Date    Discitis     Discitis     HLD (hyperlipidemia)     HTN (hypertension)     T2DM (type 2 diabetes mellitus) (Multi)        Past Surgical History:   Procedure Laterality Date    BACK SURGERY      CARDIAC CATHETERIZATION N/A 12/11/2023    Procedure: Left Heart Cath, With LV;  Surgeon: Kaleb Lim MD;  Location: [facility] Cardiac Cath Lab;  Service: Cardiovascular;  Laterality: N/A;    CORONARY ARTERY BYPASS GRAFT N/A 12/20/2023    CT GUIDED PERCUTANEOUS BIOPSY BONE DEEP  09/10/2021    CT GUIDED PERCUTANEOUS BIOPSY BONE DEEP 9/10/2021 [facility] CLINICAL LEGACY    OTHER SURGICAL HISTORY  07/30/2020    Scrotal surgery    OTHER SURGICAL HISTORY  07/30/2020    Back surgery        reports that he has quit smoking. His smoking use included cigars. He has never used smokeless tobacco. He reports current alcohol use. He reports that he does not use drugs.    Review of Systems  Review of Systems   Constitutional:  Negative for chills, fatigue and fever.   HENT:  Negative for ear discharge, ear pain, rhinorrhea, sinus pain, sneezing and sore throat.    Eyes:  Negative for pain, discharge, redness and itching.   Respiratory:  Positive for cough. Negative for chest tightness and shortness of breath.    Cardiovascular:  Negative for chest pain.   Gastrointestinal:  Negative for abdominal pain, diarrhea, nausea and vomiting.    Musculoskeletal:  Negative for arthralgias and joint swelling.   Skin:  Negative for rash.                                  Objective    Vitals:    09/14/24 1222   BP: 118/79   BP Location: Left arm   Patient Position: Sitting   BP Cuff Size: Large adult   Pulse: 79   Resp: 16   Temp: 36.7 °C (98.1 °F)   TempSrc: Oral   SpO2: 98%   Weight: 136 kg (300 lb)     No LMP for male patient.    Physical Exam  Constitutional:       Appearance: Normal appearance.   HENT:      Head: Normocephalic and atraumatic.      Right Ear: Tympanic membrane, ear canal and external ear normal.      Left Ear: Tympanic membrane, ear canal and external ear normal.      Nose: No congestion or rhinorrhea.   Cardiovascular:      Rate and Rhythm: Normal rate and regular rhythm.      Heart sounds: No murmur heard.  Pulmonary:      Effort: Pulmonary effort is normal. No respiratory distress.      Breath sounds: No stridor. No wheezing, rhonchi or rales.   Musculoskeletal:         General: No swelling or tenderness.   Skin:     General: Skin is warm and dry.      Findings: No bruising, erythema, lesion or rash.   Neurological:      Mental Status: He is alert and oriented to person, place, and time.   Psychiatric:         Mood and Affect: Mood normal.         Procedures    Point of Care Test & Imaging Results from this visit  No results found for this visit on 09/14/24.   No results found.    Diagnostic study results (if any) were reviewed by Su Wilson PA-C.    Assessment/Plan   Allergies, medications, history, and pertinent labs/EKGs/Imaging reviewed by Su Wilson PA-C.     Medical Decision Making  No acute abnormalities on exam  Possible bronchitis related to viral syndrome or allergy symptoms  Recommended f/U visit with PCP       Orders and Diagnoses  There are no diagnoses linked to this encounter.    Medical Admin Record      Follow Up Instructions  No follow-ups on file.    Patient disposition: Home    Electronically signed by Su Wilson  ILIA  12:30 PM

## 2024-09-14 NOTE — PATIENT INSTRUCTIONS
Take cough med as needed  Can add allergy med daily  F/U with PCP to better evaluate condition if symptoms linger

## 2024-09-16 PROCEDURE — RXMED WILLOW AMBULATORY MEDICATION CHARGE

## 2024-09-17 ENCOUNTER — PHARMACY VISIT (OUTPATIENT)
Dept: PHARMACY | Facility: CLINIC | Age: 59
End: 2024-09-17
Payer: COMMERCIAL

## 2024-09-17 PROCEDURE — RXMED WILLOW AMBULATORY MEDICATION CHARGE

## 2024-09-27 PROCEDURE — RXMED WILLOW AMBULATORY MEDICATION CHARGE

## 2024-09-28 ENCOUNTER — PHARMACY VISIT (OUTPATIENT)
Dept: PHARMACY | Facility: CLINIC | Age: 59
End: 2024-09-28
Payer: COMMERCIAL

## 2024-09-30 DIAGNOSIS — Z95.1 S/P CABG X 4: ICD-10-CM

## 2024-10-02 DIAGNOSIS — Z95.1 S/P CABG X 4: ICD-10-CM

## 2024-10-02 RX ORDER — CLOPIDOGREL BISULFATE 75 MG/1
75 TABLET ORAL DAILY
Qty: 90 TABLET | Refills: 2 | OUTPATIENT
Start: 2024-10-02

## 2024-10-02 RX ORDER — CLOPIDOGREL BISULFATE 75 MG/1
75 TABLET ORAL DAILY
Qty: 90 TABLET | Refills: 3 | Status: SHIPPED | OUTPATIENT
Start: 2024-10-02

## 2024-10-02 NOTE — TELEPHONE ENCOUNTER
Patient requesting a 90 day refill on his Clopidogrel [facility] daily to local [facility].    To Dr. Venegas for approval.  ---ssd.

## 2024-10-15 PROCEDURE — RXMED WILLOW AMBULATORY MEDICATION CHARGE

## 2024-10-17 RX ORDER — CARVEDILOL 3.12 MG/1
3.12 TABLET ORAL 2 TIMES DAILY
Qty: 60 TABLET | OUTPATIENT
Start: 2024-10-17

## 2024-10-18 ENCOUNTER — PHARMACY VISIT (OUTPATIENT)
Dept: PHARMACY | Facility: CLINIC | Age: 59
End: 2024-10-18
Payer: COMMERCIAL

## 2024-10-20 ENCOUNTER — OFFICE VISIT (OUTPATIENT)
Dept: URGENT CARE | Age: 59
End: 2024-10-20
Payer: COMMERCIAL

## 2024-10-20 VITALS
BODY MASS INDEX: 39.76 KG/M2 | OXYGEN SATURATION: 97 % | HEIGHT: 73 IN | SYSTOLIC BLOOD PRESSURE: 136 MMHG | HEART RATE: 91 BPM | DIASTOLIC BLOOD PRESSURE: 81 MMHG | WEIGHT: 300 LBS | RESPIRATION RATE: 18 BRPM | TEMPERATURE: 97.4 F

## 2024-10-20 DIAGNOSIS — N39.0 URINARY TRACT INFECTION WITH HEMATURIA, SITE UNSPECIFIED: Primary | ICD-10-CM

## 2024-10-20 DIAGNOSIS — R31.9 URINARY TRACT INFECTION WITH HEMATURIA, SITE UNSPECIFIED: Primary | ICD-10-CM

## 2024-10-20 DIAGNOSIS — K42.9 PERIUMBILICAL HERNIA: ICD-10-CM

## 2024-10-20 LAB
POC APPEARANCE, URINE: ABNORMAL
POC BILIRUBIN, URINE: NEGATIVE
POC BLOOD, URINE: ABNORMAL
POC COLOR, URINE: YELLOW
POC GLUCOSE, URINE: ABNORMAL MG/DL
POC KETONES, URINE: NEGATIVE MG/DL
POC LEUKOCYTES, URINE: ABNORMAL
POC NITRITE,URINE: NEGATIVE
POC PH, URINE: 5.5 PH
POC PROTEIN, URINE: ABNORMAL MG/DL
POC SPECIFIC GRAVITY, URINE: 1.01
POC UROBILINOGEN, URINE: 0.2 EU/DL

## 2024-10-20 PROCEDURE — 3044F HG A1C LEVEL LT 7.0%: CPT | Performed by: STUDENT IN AN ORGANIZED HEALTH CARE EDUCATION/TRAINING PROGRAM

## 2024-10-20 PROCEDURE — 99070 SPECIAL SUPPLIES PHYS/QHP: CPT | Performed by: STUDENT IN AN ORGANIZED HEALTH CARE EDUCATION/TRAINING PROGRAM

## 2024-10-20 PROCEDURE — 3048F LDL-C <100 MG/DL: CPT | Performed by: STUDENT IN AN ORGANIZED HEALTH CARE EDUCATION/TRAINING PROGRAM

## 2024-10-20 PROCEDURE — 87086 URINE CULTURE/COLONY COUNT: CPT

## 2024-10-20 PROCEDURE — 3075F SYST BP GE 130 - 139MM HG: CPT | Performed by: STUDENT IN AN ORGANIZED HEALTH CARE EDUCATION/TRAINING PROGRAM

## 2024-10-20 PROCEDURE — 3079F DIAST BP 80-89 MM HG: CPT | Performed by: STUDENT IN AN ORGANIZED HEALTH CARE EDUCATION/TRAINING PROGRAM

## 2024-10-20 PROCEDURE — RXMED WILLOW AMBULATORY MEDICATION CHARGE

## 2024-10-20 PROCEDURE — 3008F BODY MASS INDEX DOCD: CPT | Performed by: STUDENT IN AN ORGANIZED HEALTH CARE EDUCATION/TRAINING PROGRAM

## 2024-10-20 RX ORDER — SULFAMETHOXAZOLE AND TRIMETHOPRIM 800; 160 MG/1; MG/1
1 TABLET ORAL 2 TIMES DAILY
Qty: 10 TABLET | Refills: 0 | Status: SHIPPED | OUTPATIENT
Start: 2024-10-20 | End: 2024-10-25

## 2024-10-20 NOTE — PROGRESS NOTES
Subjective   Patient ID: Behzad Diaz is a 59 y.o. male. They present today with a chief complaint of UTI (Urinary frequency and possible blood in urine  last night. Going on x2 weeks.).    History of Present Illness  Behzad is a 59-year-old male who presents to the urgent care for evaluation of 2 weeks of off-and-on urinary frequency and blood in the urine that he noticed last night.  Patient denies fever, significant pain, vomiting, diarrhea or nausea.  Patient denies severe abdominal pain.  Patient denies history of prostate issues, concern for STI or recurrent UTIs.  Patient does of note have an umbilical hernia that is been present for many years and is monitored by his primary care provider.  No other symptoms or concerns otherwise reported.    Past Medical History  Allergies as of 10/20/2024    (No Known Allergies)       (Not in a hospital admission)       Past Medical History:   Diagnosis Date    Discitis     Discitis     HLD (hyperlipidemia)     HTN (hypertension)     T2DM (type 2 diabetes mellitus) (Multi)        Past Surgical History:   Procedure Laterality Date    BACK SURGERY      CARDIAC CATHETERIZATION N/A 12/11/2023    Procedure: Left Heart Cath, With LV;  Surgeon: Kaleb Lim MD;  Location: [facility] Cardiac Cath Lab;  Service: Cardiovascular;  Laterality: N/A;    CORONARY ARTERY BYPASS GRAFT N/A 12/20/2023    CT GUIDED PERCUTANEOUS BIOPSY BONE DEEP  09/10/2021    CT GUIDED PERCUTANEOUS BIOPSY BONE DEEP 9/10/2021 [facility] CLINICAL LEGACY    OTHER SURGICAL HISTORY  07/30/2020    Scrotal surgery    OTHER SURGICAL HISTORY  07/30/2020    Back surgery        reports that he has quit smoking. His smoking use included cigars. He has never used smokeless tobacco. He reports current alcohol use. He reports that he does not use drugs.    Review of Systems  A 10-point review of systems was performed, otherwise unremarkable unless stated in the history of present illness.                Objective    Vitals:    10/20/24  "0831   BP: 136/81   BP Location: Left arm   Patient Position: Sitting   Pulse: 91   Resp: 18   Temp: 36.3 °C (97.4 °F)   TempSrc: Oral   SpO2: 97%   Weight: 136 kg (300 lb)   Height: 1.854 m (6' 1\")     No LMP for male patient.    Gen: Vitals noted and reviewed, no evidence of acute distress, well developed and afebrile.   Psych: Mood and affect appropriate for setting.  Head/Face: Atraumatic and normocephalic.   Neuro: No focal deficits noted.  Cardiac: Regular rate and rhythm no murmur.   Lungs: Clear to auscultation throughout, No evidence of wheezing, rhonchi or crackles. Good aeration throughout.   Abdomen: Soft non-tender throughout. Periumbilical hernia present on right, nonpainful, no erythema, reducible. Normoactive bowel sounds. No evidence of masses. No CVA tenderness     Extremities: Symmetrical, No peripheral edema  Skin: Without evidence of ecchymosis, wounds, or rashes.      Point of Care Test & Imaging Results from this visit  Results for orders placed or performed in visit on 10/20/24   POCT UA Automated manually resulted   Result Value Ref Range    POC Color, Urine Yellow Straw, Yellow, Light-Yellow    POC Appearance, Urine Cloudy (A) Clear    POC Glucose, Urine 500 (3+) (A) NEGATIVE mg/dl    POC Bilirubin, Urine NEGATIVE NEGATIVE    POC Ketones, Urine NEGATIVE NEGATIVE mg/dl    POC Specific Gravity, Urine 1.015 1.005 - 1.035    POC Blood, Urine LARGE (3+) (A) NEGATIVE    POC PH, Urine 5.5 No Reference Range Established PH    POC Protein, Urine 15 (1+) (A) NEGATIVE, 30 (1+) mg/dl    POC Urobilinogen, Urine 0.2 0.2, 1.0 EU/DL    Poc Nitrite, Urine NEGATIVE NEGATIVE    POC Leukocytes, Urine TRACE (A) NEGATIVE      No results found.    Diagnostic study results (if any) were reviewed by Deo Way DO.    Assessment/Plan   Allergies, medications, history, and pertinent labs/EKGs/Imaging reviewed by Deo Way DO.     Medical Decision Making  Discussed with the patient symptoms and clinical " presentation findings suggestive of an acute cystitis with hematuria of unclear etiology.  We advise close symptom monitoring supportive treatment.  We reviewed patient allergies medications and prescribed Bactrim for antimicrobial coverage.  Will follow-up on culture and sensitivities and adjust treatment accordingly. Follow up with PCP. We advised seeking immediate emergency medical attention if symptoms fail to improve, worsen or any concerning symptoms arise. Patient voiced full understanding and agreement to plan.      Orders and Diagnoses  Diagnoses and all orders for this visit:  Urinary tract infection with hematuria, site unspecified  -     POCT UA Automated manually resulted  -     sulfamethoxazole-trimethoprim (Bactrim DS) 800-160 mg tablet; Take 1 tablet by mouth 2 times a day for 5 days.  -     Urine Culture  Periumbilical hernia      Medical Admin Record      Patient disposition: Home    Electronically signed by Deo Way DO  7:41 PM

## 2024-10-22 LAB — BACTERIA UR CULT: NORMAL

## 2024-10-28 ENCOUNTER — PHARMACY VISIT (OUTPATIENT)
Dept: PHARMACY | Facility: CLINIC | Age: 59
End: 2024-10-28
Payer: COMMERCIAL

## 2024-11-11 PROCEDURE — RXMED WILLOW AMBULATORY MEDICATION CHARGE

## 2024-11-15 ENCOUNTER — PHARMACY VISIT (OUTPATIENT)
Dept: PHARMACY | Facility: CLINIC | Age: 59
End: 2024-11-15
Payer: COMMERCIAL

## 2024-11-22 ENCOUNTER — TELEPHONE (OUTPATIENT)
Dept: PRIMARY CARE | Facility: CLINIC | Age: 59
End: 2024-11-22
Payer: COMMERCIAL

## 2024-11-22 DIAGNOSIS — Z95.1 S/P CABG X 4: ICD-10-CM

## 2024-11-22 RX ORDER — CARVEDILOL 3.12 MG/1
3.12 TABLET ORAL 2 TIMES DAILY
Qty: 60 TABLET | OUTPATIENT
Start: 2024-11-22

## 2024-11-22 NOTE — TELEPHONE ENCOUNTER
Patient  left MSG  needs refill on carvedilol please  [facility]  Filiberto , thank you !

## 2024-11-25 DIAGNOSIS — Z95.1 S/P CABG X 4: ICD-10-CM

## 2024-11-25 RX ORDER — CARVEDILOL 6.25 MG/1
6.25 TABLET ORAL 2 TIMES DAILY
Qty: 180 TABLET | Refills: 3 | Status: SHIPPED | OUTPATIENT
Start: 2024-11-25 | End: 2025-11-25

## 2024-12-02 ENCOUNTER — PHARMACY VISIT (OUTPATIENT)
Dept: PHARMACY | Facility: CLINIC | Age: 59
End: 2024-12-02
Payer: COMMERCIAL

## 2024-12-02 PROCEDURE — RXMED WILLOW AMBULATORY MEDICATION CHARGE

## 2024-12-16 ENCOUNTER — PHARMACY VISIT (OUTPATIENT)
Dept: PHARMACY | Facility: CLINIC | Age: 59
End: 2024-12-16
Payer: COMMERCIAL

## 2024-12-16 PROCEDURE — RXMED WILLOW AMBULATORY MEDICATION CHARGE

## 2024-12-24 PROCEDURE — RXMED WILLOW AMBULATORY MEDICATION CHARGE

## 2024-12-26 ENCOUNTER — PHARMACY VISIT (OUTPATIENT)
Dept: PHARMACY | Facility: CLINIC | Age: 59
End: 2024-12-26
Payer: COMMERCIAL

## 2025-01-03 ENCOUNTER — APPOINTMENT (OUTPATIENT)
Dept: PRIMARY CARE | Facility: CLINIC | Age: 60
End: 2025-01-03
Payer: COMMERCIAL

## 2025-01-03 VITALS
TEMPERATURE: 97.9 F | WEIGHT: 301 LBS | SYSTOLIC BLOOD PRESSURE: 134 MMHG | BODY MASS INDEX: 39.89 KG/M2 | RESPIRATION RATE: 14 BRPM | HEART RATE: 90 BPM | DIASTOLIC BLOOD PRESSURE: 80 MMHG | OXYGEN SATURATION: 98 % | HEIGHT: 73 IN

## 2025-01-03 DIAGNOSIS — E66.01 SEVERE OBESITY (BMI 35.0-39.9) WITH COMORBIDITY (MULTI): ICD-10-CM

## 2025-01-03 DIAGNOSIS — I10 ESSENTIAL (PRIMARY) HYPERTENSION: ICD-10-CM

## 2025-01-03 DIAGNOSIS — E11.9 TYPE 2 DIABETES MELLITUS WITHOUT COMPLICATION, WITHOUT LONG-TERM CURRENT USE OF INSULIN (MULTI): ICD-10-CM

## 2025-01-03 DIAGNOSIS — E03.8 SUBCLINICAL HYPOTHYROIDISM: Primary | ICD-10-CM

## 2025-01-03 LAB — POC HEMOGLOBIN A1C: 5.9 % (ref 4.2–6.5)

## 2025-01-03 PROCEDURE — 99214 OFFICE O/P EST MOD 30 MIN: CPT | Performed by: INTERNAL MEDICINE

## 2025-01-03 PROCEDURE — 3079F DIAST BP 80-89 MM HG: CPT | Performed by: INTERNAL MEDICINE

## 2025-01-03 PROCEDURE — 83036 HEMOGLOBIN GLYCOSYLATED A1C: CPT | Performed by: INTERNAL MEDICINE

## 2025-01-03 PROCEDURE — 3075F SYST BP GE 130 - 139MM HG: CPT | Performed by: INTERNAL MEDICINE

## 2025-01-03 PROCEDURE — 3008F BODY MASS INDEX DOCD: CPT | Performed by: INTERNAL MEDICINE

## 2025-01-03 ASSESSMENT — ENCOUNTER SYMPTOMS
COUGH: 0
SHORTNESS OF BREATH: 0
DIARRHEA: 0
HYPERTENSION: 1
NAUSEA: 0
ABDOMINAL PAIN: 0
PALPITATIONS: 0
WHEEZING: 0
CONSTIPATION: 0

## 2025-01-03 NOTE — PROGRESS NOTES
"Subjective   Patient ID: Behzad Diaz is a 59 y.o. male who presents for Hypertension and Diabetes.    Hypertension  Pertinent negatives include no chest pain, palpitations or shortness of breath.   Overall feeling very well.  He has been watching his diet and trying to stay active.    No issues with CP, SOB or dizzy spells.      Review of Systems   Respiratory:  Negative for cough, shortness of breath and wheezing.    Cardiovascular:  Negative for chest pain and palpitations.   Gastrointestinal:  Negative for abdominal pain, constipation, diarrhea and nausea.       Objective   /80 (BP Location: Left arm, Patient Position: Sitting, BP Cuff Size: Adult)   Pulse 90   Temp 36.6 °C (97.9 °F) (Tympanic)   Resp 14   Ht 1.854 m (6' 1\")   Wt 137 kg (301 lb)   SpO2 98%   BMI 39.71 kg/m²     Physical Exam  Vitals reviewed.   Constitutional:       Appearance: Normal appearance.   HENT:      Head: Normocephalic.   Cardiovascular:      Rate and Rhythm: Normal rate and regular rhythm.   Pulmonary:      Effort: Pulmonary effort is normal.      Breath sounds: Normal breath sounds.   Musculoskeletal:         General: Normal range of motion.   Neurological:      General: No focal deficit present.      Mental Status: He is alert.   Psychiatric:         Mood and Affect: Mood normal.         Assessment/Plan   Problem List Items Addressed This Visit    None  Visit Diagnoses         Codes    Subclinical hypothyroidism    -  Primary E03.8    Relevant Orders    Thyroid Stimulating Hormone    Type 2 diabetes mellitus without complication, without long-term current use of insulin (Multi)     E11.9    Relevant Orders    POCT glycosylated hemoglobin (Hb A1C) manually resulted (Completed)    Hemoglobin A1C    Essential (primary) hypertension     I10    Relevant Orders    Basic Metabolic Panel    Severe obesity (BMI 35.0-39.9) with comorbidity (Multi)     E66.01        Discussed all of the above.    HTN controlled.    DM/A1c " controlled.    No changes in medications needed.  However, stressed need for continued efforts and focus on diet, exercise and weight loss.   Previous TSH was slightly elevated.  No overt symptoms of hypothyroid.  We will monitor TSH and treat if continued elevation.    Follow up in 3 months - sooner if any issues.

## 2025-01-20 NOTE — TELEPHONE ENCOUNTER
[facility] called request HOME CARE ORDERS pt will go home today [phone]   pollen

## 2025-01-30 PROCEDURE — RXMED WILLOW AMBULATORY MEDICATION CHARGE

## 2025-01-31 ENCOUNTER — PHARMACY VISIT (OUTPATIENT)
Dept: PHARMACY | Facility: CLINIC | Age: 60
End: 2025-01-31
Payer: MEDICARE

## 2025-02-07 DIAGNOSIS — M46.40 DISCITIS, UNSPECIFIED SPINAL REGION: ICD-10-CM

## 2025-02-07 DIAGNOSIS — M46.20 OSTEOMYELITIS OF SPINE (MULTI): ICD-10-CM

## 2025-02-07 DIAGNOSIS — A49.8 PROTEUS MIRABILIS INFECTION: Primary | ICD-10-CM

## 2025-02-07 RX ORDER — AMOXICILLIN 500 MG/1
500 CAPSULE ORAL EVERY 12 HOURS SCHEDULED
Qty: 180 CAPSULE | Refills: 1 | Status: SHIPPED | OUTPATIENT
Start: 2025-02-07 | End: 2025-05-09

## 2025-02-08 PROCEDURE — RXMED WILLOW AMBULATORY MEDICATION CHARGE

## 2025-02-08 NOTE — TELEPHONE ENCOUNTER
2/7/2025 infectious disease  Refilled amoxicillin 500 mg p.o. twice daily.  3-month supply provided.  1 refill  Patient needs 1 year follow-up.  Message sent to patient

## 2025-02-10 ENCOUNTER — PHARMACY VISIT (OUTPATIENT)
Dept: PHARMACY | Facility: CLINIC | Age: 60
End: 2025-02-10
Payer: MEDICARE

## 2025-03-07 PROCEDURE — RXMED WILLOW AMBULATORY MEDICATION CHARGE

## 2025-03-08 ENCOUNTER — PHARMACY VISIT (OUTPATIENT)
Dept: PHARMACY | Facility: CLINIC | Age: 60
End: 2025-03-08
Payer: MEDICARE

## 2025-03-20 LAB
ANION GAP SERPL CALCULATED.4IONS-SCNC: 10 MMOL/L (CALC) (ref 7–17)
BUN SERPL-MCNC: 18 MG/DL (ref 7–25)
BUN/CREAT SERPL: ABNORMAL (CALC) (ref 6–22)
CALCIUM SERPL-MCNC: 9.1 MG/DL (ref 8.6–10.3)
CHLORIDE SERPL-SCNC: 107 MMOL/L (ref 98–110)
CO2 SERPL-SCNC: 26 MMOL/L (ref 20–32)
CREAT SERPL-MCNC: 0.95 MG/DL (ref 0.7–1.3)
EGFRCR SERPLBLD CKD-EPI 2021: 93 ML/MIN/1.73M2
EST. AVERAGE GLUCOSE BLD GHB EST-MCNC: 131 MG/DL
EST. AVERAGE GLUCOSE BLD GHB EST-SCNC: 7.3 MMOL/L
GLUCOSE SERPL-MCNC: 133 MG/DL (ref 65–99)
HBA1C MFR BLD: 6.2 % OF TOTAL HGB
POTASSIUM SERPL-SCNC: 4.4 MMOL/L (ref 3.5–5.3)
SODIUM SERPL-SCNC: 143 MMOL/L (ref 135–146)
TSH SERPL-ACNC: 2.7 MIU/L (ref 0.4–4.5)

## 2025-04-04 ENCOUNTER — APPOINTMENT (OUTPATIENT)
Dept: PRIMARY CARE | Facility: CLINIC | Age: 60
End: 2025-04-04
Payer: COMMERCIAL

## 2025-04-04 VITALS
BODY MASS INDEX: 40.82 KG/M2 | HEART RATE: 75 BPM | DIASTOLIC BLOOD PRESSURE: 80 MMHG | WEIGHT: 308 LBS | RESPIRATION RATE: 14 BRPM | TEMPERATURE: 97.7 F | OXYGEN SATURATION: 98 % | HEIGHT: 73 IN | SYSTOLIC BLOOD PRESSURE: 140 MMHG

## 2025-04-04 DIAGNOSIS — I10 PRIMARY HYPERTENSION: ICD-10-CM

## 2025-04-04 DIAGNOSIS — Z79.4 TYPE 2 DIABETES MELLITUS WITH OTHER CIRCULATORY COMPLICATION, WITH LONG-TERM CURRENT USE OF INSULIN: ICD-10-CM

## 2025-04-04 DIAGNOSIS — I25.10 CORONARY ARTERY DISEASE INVOLVING NATIVE CORONARY ARTERY OF NATIVE HEART WITHOUT ANGINA PECTORIS: ICD-10-CM

## 2025-04-04 DIAGNOSIS — E11.59 TYPE 2 DIABETES MELLITUS WITH OTHER CIRCULATORY COMPLICATION, WITHOUT LONG-TERM CURRENT USE OF INSULIN: ICD-10-CM

## 2025-04-04 DIAGNOSIS — E11.59 TYPE 2 DIABETES MELLITUS WITH OTHER CIRCULATORY COMPLICATION, WITH LONG-TERM CURRENT USE OF INSULIN: ICD-10-CM

## 2025-04-04 DIAGNOSIS — E78.5 HYPERLIPIDEMIA, UNSPECIFIED HYPERLIPIDEMIA TYPE: Primary | ICD-10-CM

## 2025-04-04 PROCEDURE — RXMED WILLOW AMBULATORY MEDICATION CHARGE

## 2025-04-04 PROCEDURE — 3079F DIAST BP 80-89 MM HG: CPT | Performed by: INTERNAL MEDICINE

## 2025-04-04 PROCEDURE — 3077F SYST BP >= 140 MM HG: CPT | Performed by: INTERNAL MEDICINE

## 2025-04-04 PROCEDURE — 3008F BODY MASS INDEX DOCD: CPT | Performed by: INTERNAL MEDICINE

## 2025-04-04 PROCEDURE — 1036F TOBACCO NON-USER: CPT | Performed by: INTERNAL MEDICINE

## 2025-04-04 PROCEDURE — 99214 OFFICE O/P EST MOD 30 MIN: CPT | Performed by: INTERNAL MEDICINE

## 2025-04-04 ASSESSMENT — ENCOUNTER SYMPTOMS
CONSTIPATION: 0
PALPITATIONS: 0
NAUSEA: 0
WHEEZING: 0
COUGH: 0
HYPERTENSION: 1
ABDOMINAL PAIN: 0
SHORTNESS OF BREATH: 0
DIARRHEA: 0

## 2025-04-04 ASSESSMENT — PATIENT HEALTH QUESTIONNAIRE - PHQ9
SUM OF ALL RESPONSES TO PHQ9 QUESTIONS 1 AND 2: 0
1. LITTLE INTEREST OR PLEASURE IN DOING THINGS: NOT AT ALL
2. FEELING DOWN, DEPRESSED OR HOPELESS: NOT AT ALL

## 2025-04-04 NOTE — PROGRESS NOTES
"Subjective   Patient ID: Behzad Diaz is a 59 y.o. male who presents for Hypertension.    Hypertension  Pertinent negatives include no chest pain, palpitations or shortness of breath.   Diabetes  Pertinent negatives for diabetes include no chest pain.   He states he has been traveling a lot for work which makes it harder to have his strict diet.  His weight is up a little bit and he had trouble finding time for exercise as well.  Despite all of this he denies any issues with chest pain, shortness of breath or dizzy spells.  We did review and discuss his current medications as well as his most recent blood test.    Review of Systems   Respiratory:  Negative for cough, shortness of breath and wheezing.    Cardiovascular:  Negative for chest pain and palpitations.   Gastrointestinal:  Negative for abdominal pain, constipation, diarrhea and nausea.       Objective   /80 (BP Location: Left arm, Patient Position: Sitting, BP Cuff Size: Adult)   Pulse 75   Temp 36.5 °C (97.7 °F) (Tympanic)   Resp 14   Ht 1.854 m (6' 1\")   Wt 140 kg (308 lb)   SpO2 98%   BMI 40.64 kg/m²     Physical Exam  Vitals reviewed.   Constitutional:       Appearance: Normal appearance.   HENT:      Head: Normocephalic.   Cardiovascular:      Rate and Rhythm: Normal rate.   Pulmonary:      Effort: Pulmonary effort is normal.   Musculoskeletal:         General: Normal range of motion.   Neurological:      General: No focal deficit present.      Mental Status: He is alert.   Psychiatric:         Mood and Affect: Mood normal.         Assessment/Plan   Problem List Items Addressed This Visit             ICD-10-CM    Type 2 diabetes mellitus with circulatory disorder, without long-term current use of insulin E11.59    Relevant Medications    empagliflozin (Jardiance) 25 mg tablet    RESOLVED: Primary hypertension I10    BMI 40.0-44.9, adult (Multi) Z68.41    HLD (hyperlipidemia) - Primary E78.5    Coronary artery disease involving native " coronary artery of native heart without angina pectoris I25.10     Other Visit Diagnoses         Codes    Type 2 diabetes mellitus with other circulatory complication, with long-term current use of insulin     E11.59, Z79.4    Relevant Medications    empagliflozin (Jardiance) 25 mg tablet        Diabetes is controlled with an A1c of 6.2.  Weight is up.  We discussed possibly increasing his Mounjaro; ever, he would prefer not to do this.  Will continue to work on improved lifestyle modifications with low sugar and low calorie diet and increasing exercise size.  Hypertension is borderline.  However, he does state that blood pressures at home are significantly improved.  Will continue to monitor blood pressures at home and he is to call if he gets consistent numbers of 130 or higher.  We did discuss avoiding salt in addition to increasing activity levels.  We will continue to treat his other ongoing cardiac risk factors including his cholesterol.  No changes are needed there.  Follow-up in 3 months for an in office A1c, sooner if any issues

## 2025-04-05 ENCOUNTER — PHARMACY VISIT (OUTPATIENT)
Dept: PHARMACY | Facility: CLINIC | Age: 60
End: 2025-04-05
Payer: MEDICARE

## 2025-04-08 DIAGNOSIS — E11.59 TYPE 2 DIABETES MELLITUS WITH OTHER CIRCULATORY COMPLICATION, WITHOUT LONG-TERM CURRENT USE OF INSULIN: Primary | ICD-10-CM

## 2025-04-24 NOTE — PROGRESS NOTES
Infectious Diseases Clinic Follow-up:    Reason for Visit: Scheduled yearly visit    History of Current Issue  Patient was last evaluated 4/26/2024 when he transferred care to [facility] for insurance reasons.  4/26/2024 note excerpt follows:  *12/15/2020 Outpatient ID note excerpt (Dr. Grullon)               54yo M with L4-5 osteo/discitis and epidural abscess s/p evacuation and instrumentation, now has completed a 6 week course of antibiotics.         # Proteus mirabilis osteomyelitis/discitis:   Pt has completed a 6 wk course of ceftriaxone, however he is still having some mild drainage. No labs have been sent from pt's facility, will contact Maryann Gallagher to have them send results. If CRP has downtrended from hospitalization and labs otherwise are reassuring, can DC antibiotics and pull PICC.       *04/03/2021 Inpatient ID progress note (Dr. 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  # Recurrent OM at L4-L5/with possible epidural and paravertebral abscess.        04/01/2021 Removal of prior lumbar  hardware and redo L4-L5 decompression and L2 to pelvis instrumentation and fusion       04/01/2021 Intraoperative cultures grew ampicillin susceptible Proteus mirabilis.    *09/10/2021 Lumbar disc/bone biopsy culture NEGATIVE    *03/26/2023 Amoxicillin refill (and switching to new ID physician)                *07/06/2023 Outpatient ID note (Dr. Dumont, at [place])       Had been followed by Dr. Dumont 12/2/2021, 01/06/2022, 04/14/2022       L4-L5  discitis/osteomyelitis with multiple debridement and hardware in place, multiple cultures grew Proteus mirabilis in the past.        Repeat MRI showed improvement of the previous findings. He completed almost 10 weeks of IV Ceftriaxone.        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  Previously followed by infectious disease at [place] ()       Continues on amoxicillin 500 mg p.o. twice daily.  Still has some chronic lower back pain but nothing like when he had active infection.         Uses a cane periodically for gait stabilization.  Has had no falls.  # L4-L5 discitis/osteomyelitis with multiple debridement and hardware in place, multiple cultures grew Proteus mirabilis.    Assuming care of chronic osteomyelitis of the L3/4/5 area.  9/10/2021 flare with MRI changes, and biopsy culture negative.    Patient has continued on lifelong oral amoxicillin 500 mg p.o. twice daily.       I discussed indeterminate nature of suppression and duration of treatment.  Patient has been doing well.  Has retained hardware    For now plan to continue indefinite antibiotic suppression.  Could consider decreasing to once daily dosing of amoxicillin at next visit.    TODAY 04/25/2025:     Patient last evaluated 4/26/2024.  Today here for routine yearly follow-up.  Patient continues on amoxicillin 500 mg p.o. twice daily.  Previously discussed reducing to once daily suppression (or reduced dosage twice daily).       07/31/2024 CRP normal at 0.21 mg/dL ([facility])    Patient has been doing well.  Patient has chronic stable unchanged lower back pain and chronic bilateral lower extremity paresthesias below the knee and including the toes.  No history of  falls.  Still walking with a cane as needed for gait stability.  No issues with ADLs.  Also patient has had no fever, chills, night sweats, nausea, vomiting, diarrhea, rash, shortness of breath, chest pain, orthopnea or PND.  (Patient to contact cardiology about continuation of Plavix).              PAST MEDICAL HISTORY:  Medical History[1]    PAST SURGICAL HISTORY:  Surgical History[2]    ALLERGIES:    Allergies[3]    MEDICATIONS:    Current Medications[4]    PHYSICAL EXAMINATION:    Visit Vitals  Smoking Status Former   EXAM:   Blood pressure is 122/79  Healthy-appearing gentleman in no acute distress  Alert and oriented x 3  Extraocular muscles are intact  No muscle tenderness  No peripheral rash  Lungs clear, S1, S2, I did not hear murmur  Normal lumbar flexion  No tenderness to percussion of the cervical thoracic or lumbar spine  Normal lower extremity strength  Normal alternating foot tap  Tandem gait, using cane  Normal conversation      ASSESSMENT / RECOMMENDATIONS:    # L4-L5 discitis/osteomyelitis with multiple debridement and hardware in place, multiple cultures grew Proteus mirabilis.              4/26/2024 assuming care of chronic osteomyelitis of the L3/4/5 area.  Status post multiple surgical revisions with retained hardware.    Original surgery 11/2/2020.  Revisions in April 2021 with positive cultures growing Proteus.    Clinical flare with MRI changes in September 2021 but 9/21/2021 biopsy negative.  Patient is continued on lifelong oral amoxicillin 500 mg p.o. twice daily.   Previously, on 04/26/2024, I discussed indeterminate nature of suppression and duration of treatment.  Patient has been doing well.  Has retained hardware  For now plan to continue indefinite antibiotic suppression.      Today discussed concept of chronic antibiotic suppression.  Reviewed dosing options of once daily amoxicillin which patient is interested in trying.  Can monitor clinically thereafter and by checking  C-reactive protein.  Thus we will decrease amoxicillin to 500 mg p.o. every 24 hours which we will continue lifelong suppression if tolerated and successful.  After decreasing antibiotic dosing will monitor CRP and have patient return to clinic for 4 to 6-month follow-up.    PLAN:  1.  Decrease amoxicillin to 500 mg once daily  2.  I sent prescription electronically  3.  Check CBC plus differential and CRP today.  Phone follow-up with laboratory results  4.  While on reduced antibiotic dosing, monitor for changes in pain or the occurrence of fever, or chills for example.  5.  Call with questions and follow-up in 6 months  6.  Patient to follow-up with cardiology regarding need for Plavix status post CABG in December 2023.      I spent 45 minutes in the professional and overall care of this patient.      Fritz Duff MD                         [1]   Past Medical History:  Diagnosis Date    Discitis     Discitis     HLD (hyperlipidemia)     HTN (hypertension)     T2DM (type 2 diabetes mellitus) (Multi)    [2]   Past Surgical History:  Procedure Laterality Date    BACK SURGERY      CARDIAC CATHETERIZATION N/A 12/11/2023    Procedure: Left Heart Cath, With LV;  Surgeon: Kaleb Lim MD;  Location: [facility] Cardiac Cath Lab;  Service: Cardiovascular;  Laterality: N/A;    CORONARY ARTERY BYPASS GRAFT N/A 12/20/2023    CT GUIDED PERCUTANEOUS BIOPSY BONE DEEP  09/10/2021    CT GUIDED PERCUTANEOUS BIOPSY BONE DEEP 9/10/2021 [facility] CLINICAL LEGACY    OTHER SURGICAL HISTORY  07/30/2020    Scrotal surgery    OTHER SURGICAL HISTORY  07/30/2020    Back surgery   [3] No Known Allergies  [4]   Current Outpatient Medications:     amoxicillin (Amoxil) 500 mg capsule, Take 1 capsule (500 mg) by mouth every 12 hours., Disp: 180 capsule, Rfl: 1    aspirin 81 mg EC tablet, Take 1 tablet (81 mg) by mouth once daily. Do not start before December 30, 2023., Disp: , Rfl:     atorvastatin (Lipitor) 80 mg tablet, Take 1 tablet (80 mg) by mouth  once daily at bedtime., Disp: 90 tablet, Rfl: 3    blood-glucose sensor (FreeStyle Jhonatan 3 Sensor) device, Use as directed. Replace every 14 days, Disp: 2 each, Rfl: 11    carvedilol (Coreg) 6.25 mg tablet, Take 1 tablet (6.25 mg) by mouth 2 times a day., Disp: 180 tablet, Rfl: 3    clopidogrel (Plavix) 75 mg tablet, Take 1 tablet (75 mg) by mouth once daily., Disp: 90 tablet, Rfl: 3    cyclobenzaprine (Flexeril) 10 mg tablet, Take 1 tablet (10 mg) by mouth 2 times a day as needed for muscle spasms., Disp: , Rfl:     empagliflozin (Jardiance) 25 mg tablet, Take 1 tablet (25 mg) by mouth once daily., Disp: 90 tablet, Rfl: 3    multivitamin with minerals tablet, Take 1 tablet by mouth once daily. Do not start before December 30, 2023., Disp: , Rfl:     tirzepatide (Mounjaro) 10 mg/0.5 mL pen injector, Inject 10 mg under the skin 1 (one) time per week., Disp: 2 mL, Rfl: 11

## 2025-04-25 ENCOUNTER — OFFICE VISIT (OUTPATIENT)
Facility: CLINIC | Age: 60
End: 2025-04-25
Payer: COMMERCIAL

## 2025-04-25 ENCOUNTER — APPOINTMENT (OUTPATIENT)
Dept: INFECTIOUS DISEASES | Facility: CLINIC | Age: 60
End: 2025-04-25
Payer: COMMERCIAL

## 2025-04-25 VITALS
HEIGHT: 73 IN | WEIGHT: 305 LBS | SYSTOLIC BLOOD PRESSURE: 122 MMHG | BODY MASS INDEX: 40.42 KG/M2 | HEART RATE: 82 BPM | DIASTOLIC BLOOD PRESSURE: 79 MMHG

## 2025-04-25 DIAGNOSIS — A49.8 PROTEUS MIRABILIS INFECTION: ICD-10-CM

## 2025-04-25 DIAGNOSIS — M46.40 DISCITIS, UNSPECIFIED SPINAL REGION: Primary | ICD-10-CM

## 2025-04-25 PROCEDURE — 99213 OFFICE O/P EST LOW 20 MIN: CPT | Performed by: INTERNAL MEDICINE

## 2025-04-25 PROCEDURE — 3074F SYST BP LT 130 MM HG: CPT | Performed by: INTERNAL MEDICINE

## 2025-04-25 PROCEDURE — 3008F BODY MASS INDEX DOCD: CPT | Performed by: INTERNAL MEDICINE

## 2025-04-25 PROCEDURE — 3044F HG A1C LEVEL LT 7.0%: CPT | Performed by: INTERNAL MEDICINE

## 2025-04-25 PROCEDURE — RXMED WILLOW AMBULATORY MEDICATION CHARGE

## 2025-04-25 PROCEDURE — 3078F DIAST BP <80 MM HG: CPT | Performed by: INTERNAL MEDICINE

## 2025-04-25 RX ORDER — AMOXICILLIN 500 MG/1
500 CAPSULE ORAL DAILY
Qty: 90 CAPSULE | Refills: 1 | Status: SHIPPED | OUTPATIENT
Start: 2025-05-19 | End: 2025-08-17

## 2025-04-25 NOTE — LETTER
04/25/25    Tomer Arce DO  [address] [facility] [place] [address]      Dear Dr. Tomer Arce DO,    I am writing to confirm that your patient, Behzad Diaz, received care in my office on 04/25/25. I have enclosed a summary of the care provided to Behzad for your reference.    Please contact me with any questions you may have regarding the visit.    Sincerely,         Fritz Duff MD  [address] [facility] [address]  [facility] [address]  [phone]    CC: No Recipients

## 2025-04-26 ENCOUNTER — PHARMACY VISIT (OUTPATIENT)
Dept: PHARMACY | Facility: CLINIC | Age: 60
End: 2025-04-26
Payer: MEDICARE

## 2025-04-26 LAB
BASOPHILS # BLD AUTO: 57 CELLS/UL (ref 0–200)
BASOPHILS NFR BLD AUTO: 0.8 %
CRP SERPL-MCNC: NORMAL MG/L
EOSINOPHIL # BLD AUTO: 213 CELLS/UL (ref 15–500)
EOSINOPHIL NFR BLD AUTO: 3 %
ERYTHROCYTE [DISTWIDTH] IN BLOOD BY AUTOMATED COUNT: 12.8 % (ref 11–15)
HCT VFR BLD AUTO: 51.5 % (ref 38.5–50)
HGB BLD-MCNC: 17.6 G/DL (ref 13.2–17.1)
LYMPHOCYTES # BLD AUTO: 2506 CELLS/UL (ref 850–3900)
LYMPHOCYTES NFR BLD AUTO: 35.3 %
MCH RBC QN AUTO: 32.1 PG (ref 27–33)
MCHC RBC AUTO-ENTMCNC: 34.2 G/DL (ref 32–36)
MCV RBC AUTO: 93.8 FL (ref 80–100)
MONOCYTES # BLD AUTO: 447 CELLS/UL (ref 200–950)
MONOCYTES NFR BLD AUTO: 6.3 %
NEUTROPHILS # BLD AUTO: 3877 CELLS/UL (ref 1500–7800)
NEUTROPHILS NFR BLD AUTO: 54.6 %
PLATELET # BLD AUTO: 214 THOUSAND/UL (ref 140–400)
PMV BLD REES-ECKER: 10.7 FL (ref 7.5–12.5)
RBC # BLD AUTO: 5.49 MILLION/UL (ref 4.2–5.8)
WBC # BLD AUTO: 7.1 THOUSAND/UL (ref 3.8–10.8)

## 2025-04-28 LAB
BASOPHILS # BLD AUTO: 57 CELLS/UL (ref 0–200)
BASOPHILS NFR BLD AUTO: 0.8 %
CRP SERPL-MCNC: <3 MG/L
EOSINOPHIL # BLD AUTO: 213 CELLS/UL (ref 15–500)
EOSINOPHIL NFR BLD AUTO: 3 %
ERYTHROCYTE [DISTWIDTH] IN BLOOD BY AUTOMATED COUNT: 12.8 % (ref 11–15)
HCT VFR BLD AUTO: 51.5 % (ref 38.5–50)
HGB BLD-MCNC: 17.6 G/DL (ref 13.2–17.1)
LYMPHOCYTES # BLD AUTO: 2506 CELLS/UL (ref 850–3900)
LYMPHOCYTES NFR BLD AUTO: 35.3 %
MCH RBC QN AUTO: 32.1 PG (ref 27–33)
MCHC RBC AUTO-ENTMCNC: 34.2 G/DL (ref 32–36)
MCV RBC AUTO: 93.8 FL (ref 80–100)
MONOCYTES # BLD AUTO: 447 CELLS/UL (ref 200–950)
MONOCYTES NFR BLD AUTO: 6.3 %
NEUTROPHILS # BLD AUTO: 3877 CELLS/UL (ref 1500–7800)
NEUTROPHILS NFR BLD AUTO: 54.6 %
PLATELET # BLD AUTO: 214 THOUSAND/UL (ref 140–400)
PMV BLD REES-ECKER: 10.7 FL (ref 7.5–12.5)
RBC # BLD AUTO: 5.49 MILLION/UL (ref 4.2–5.8)
WBC # BLD AUTO: 7.1 THOUSAND/UL (ref 3.8–10.8)

## 2025-05-02 ENCOUNTER — APPOINTMENT (OUTPATIENT)
Dept: PHARMACY | Facility: HOSPITAL | Age: 60
End: 2025-05-02
Payer: COMMERCIAL

## 2025-05-02 DIAGNOSIS — E11.59 TYPE 2 DIABETES MELLITUS WITH OTHER CIRCULATORY COMPLICATION, WITHOUT LONG-TERM CURRENT USE OF INSULIN: ICD-10-CM

## 2025-05-23 ENCOUNTER — APPOINTMENT (OUTPATIENT)
Dept: PHARMACY | Facility: HOSPITAL | Age: 60
End: 2025-05-23
Payer: COMMERCIAL

## 2025-05-23 DIAGNOSIS — I24.9 ACS (ACUTE CORONARY SYNDROME) (MULTI): ICD-10-CM

## 2025-05-23 DIAGNOSIS — E11.59 TYPE 2 DIABETES MELLITUS WITH OTHER CIRCULATORY COMPLICATION, WITHOUT LONG-TERM CURRENT USE OF INSULIN: ICD-10-CM

## 2025-05-23 RX ORDER — ATORVASTATIN CALCIUM 80 MG/1
80 TABLET, FILM COATED ORAL NIGHTLY
Qty: 90 TABLET | Refills: 3 | Status: SHIPPED | OUTPATIENT
Start: 2025-05-23 | End: 2026-05-23

## 2025-05-23 NOTE — PROGRESS NOTES
Subjective   Patient ID: Behzad Diaz is a 59 y.o. male who presents for No chief complaint on file..      Re-run test claims for discounts on mounjaro/jardiance    Assessment/Plan   Problem List Items Addressed This Visit    None      LAB RESULTS:  Lab Results   Component Value Date    HGBA1C 6.2 (H) 03/19/2025    HGBA1C 5.9 01/03/2025    HGBA1C 6.0 (H) 07/31/2024     Lab Results   Component Value Date    LDLCALC 48 07/31/2024    CREATININE 0.95 03/19/2025      Lab Results   Component Value Date    CHOL 115 07/31/2024    CHOL 145 12/10/2023    CHOL 138 08/27/2021     Lab Results   Component Value Date    HDL 41.3 07/31/2024    HDL 25.2 12/10/2023    HDL 32.0 (A) 08/27/2021       Lab Results   Component Value Date    LDLCALC 48 07/31/2024    LDLCALC 51 12/10/2023     Lab Results   Component Value Date    TRIG 127 07/31/2024    TRIG 343 (H) 12/10/2023    TRIG 120 08/27/2021                 Continue all meds under the continuation of care with the referring provider and clinical pharmacy team.  Reviewed cost, A1c. Patient stable on regimen and will reach out with any future concerns.

## 2025-05-28 DIAGNOSIS — E11.59 TYPE 2 DIABETES MELLITUS WITH OTHER CIRCULATORY COMPLICATION, WITHOUT LONG-TERM CURRENT USE OF INSULIN: ICD-10-CM

## 2025-05-28 RX ORDER — BLOOD-GLUCOSE SENSOR
EACH MISCELLANEOUS
Qty: 2 EACH | Refills: 11 | Status: CANCELLED | OUTPATIENT
Start: 2025-05-28

## 2025-05-28 RX ORDER — TIRZEPATIDE 10 MG/.5ML
10 INJECTION, SOLUTION SUBCUTANEOUS
Qty: 2 ML | Refills: 11 | Status: CANCELLED | OUTPATIENT
Start: 2025-06-01

## 2025-05-29 DIAGNOSIS — E11.59 TYPE 2 DIABETES MELLITUS WITH OTHER CIRCULATORY COMPLICATION, WITHOUT LONG-TERM CURRENT USE OF INSULIN: ICD-10-CM

## 2025-05-29 PROCEDURE — RXMED WILLOW AMBULATORY MEDICATION CHARGE

## 2025-05-29 RX ORDER — TIRZEPATIDE 10 MG/.5ML
10 INJECTION, SOLUTION SUBCUTANEOUS
Qty: 2 ML | Refills: 11 | OUTPATIENT
Start: 2025-06-01

## 2025-05-29 RX ORDER — BLOOD-GLUCOSE SENSOR
EACH MISCELLANEOUS
Qty: 2 EACH | Refills: 11 | OUTPATIENT
Start: 2025-05-29

## 2025-05-30 ENCOUNTER — TELEPHONE (OUTPATIENT)
Dept: PRIMARY CARE | Facility: CLINIC | Age: 60
End: 2025-05-30
Payer: COMMERCIAL

## 2025-05-30 DIAGNOSIS — E11.59 TYPE 2 DIABETES MELLITUS WITH OTHER CIRCULATORY COMPLICATION, WITHOUT LONG-TERM CURRENT USE OF INSULIN: ICD-10-CM

## 2025-05-30 PROCEDURE — RXMED WILLOW AMBULATORY MEDICATION CHARGE

## 2025-05-30 RX ORDER — TIRZEPATIDE 10 MG/.5ML
10 INJECTION, SOLUTION SUBCUTANEOUS
Qty: 2 ML | Refills: 11 | Status: SHIPPED | OUTPATIENT
Start: 2025-06-01

## 2025-05-30 RX ORDER — BLOOD-GLUCOSE SENSOR
EACH MISCELLANEOUS
Qty: 2 EACH | Refills: 11 | Status: SHIPPED | OUTPATIENT
Start: 2025-05-30

## 2025-05-30 RX ORDER — TIRZEPATIDE 10 MG/.5ML
10 INJECTION, SOLUTION SUBCUTANEOUS
Qty: 2 ML | Refills: 11 | Status: CANCELLED | OUTPATIENT
Start: 2025-06-01

## 2025-05-31 ENCOUNTER — PHARMACY VISIT (OUTPATIENT)
Dept: PHARMACY | Facility: CLINIC | Age: 60
End: 2025-05-31
Payer: MEDICARE

## 2025-06-23 PROCEDURE — RXMED WILLOW AMBULATORY MEDICATION CHARGE

## 2025-06-28 ENCOUNTER — PHARMACY VISIT (OUTPATIENT)
Dept: PHARMACY | Facility: CLINIC | Age: 60
End: 2025-06-28
Payer: MEDICARE

## 2025-06-28 PROCEDURE — RXMED WILLOW AMBULATORY MEDICATION CHARGE

## 2025-07-02 ENCOUNTER — APPOINTMENT (OUTPATIENT)
Dept: PRIMARY CARE | Facility: CLINIC | Age: 60
End: 2025-07-02
Payer: COMMERCIAL

## 2025-07-02 ENCOUNTER — PHARMACY VISIT (OUTPATIENT)
Dept: PHARMACY | Facility: CLINIC | Age: 60
End: 2025-07-02
Payer: MEDICARE

## 2025-07-02 VITALS
OXYGEN SATURATION: 97 % | BODY MASS INDEX: 40.95 KG/M2 | RESPIRATION RATE: 14 BRPM | TEMPERATURE: 98.7 F | HEIGHT: 73 IN | WEIGHT: 309 LBS | DIASTOLIC BLOOD PRESSURE: 70 MMHG | SYSTOLIC BLOOD PRESSURE: 130 MMHG | HEART RATE: 79 BPM

## 2025-07-02 DIAGNOSIS — E11.9 TYPE 2 DIABETES MELLITUS WITHOUT COMPLICATION, WITHOUT LONG-TERM CURRENT USE OF INSULIN: Primary | ICD-10-CM

## 2025-07-02 DIAGNOSIS — I10 ESSENTIAL (PRIMARY) HYPERTENSION: ICD-10-CM

## 2025-07-02 DIAGNOSIS — E11.59 TYPE 2 DIABETES MELLITUS WITH OTHER CIRCULATORY COMPLICATION, WITHOUT LONG-TERM CURRENT USE OF INSULIN: ICD-10-CM

## 2025-07-02 DIAGNOSIS — M48.8X2 OTHER SPECIFIED SPONDYLOPATHIES, CERVICAL REGION: ICD-10-CM

## 2025-07-02 DIAGNOSIS — I25.10 CORONARY ARTERY DISEASE INVOLVING NATIVE CORONARY ARTERY OF NATIVE HEART WITHOUT ANGINA PECTORIS: ICD-10-CM

## 2025-07-02 LAB — POC HEMOGLOBIN A1C: 13 % (ref 4.2–6.5)

## 2025-07-02 PROCEDURE — 3046F HEMOGLOBIN A1C LEVEL >9.0%: CPT | Performed by: INTERNAL MEDICINE

## 2025-07-02 PROCEDURE — 99213 OFFICE O/P EST LOW 20 MIN: CPT | Performed by: INTERNAL MEDICINE

## 2025-07-02 PROCEDURE — 83036 HEMOGLOBIN GLYCOSYLATED A1C: CPT | Performed by: INTERNAL MEDICINE

## 2025-07-02 PROCEDURE — 3008F BODY MASS INDEX DOCD: CPT | Performed by: INTERNAL MEDICINE

## 2025-07-02 PROCEDURE — RXMED WILLOW AMBULATORY MEDICATION CHARGE

## 2025-07-02 PROCEDURE — 3078F DIAST BP <80 MM HG: CPT | Performed by: INTERNAL MEDICINE

## 2025-07-02 PROCEDURE — 3075F SYST BP GE 130 - 139MM HG: CPT | Performed by: INTERNAL MEDICINE

## 2025-07-02 RX ORDER — TIRZEPATIDE 10 MG/.5ML
10 INJECTION, SOLUTION SUBCUTANEOUS
Qty: 2 ML | Refills: 11 | Status: SHIPPED | OUTPATIENT
Start: 2025-07-06

## 2025-07-02 ASSESSMENT — ENCOUNTER SYMPTOMS
WHEEZING: 0
SHORTNESS OF BREATH: 0
COUGH: 0
CONSTIPATION: 0
PALPITATIONS: 0
DIARRHEA: 0
ABDOMINAL PAIN: 0
NAUSEA: 0

## 2025-07-02 NOTE — PROGRESS NOTES
"Subjective   Patient ID: Behzad Diaz is a 60 y.o. male who presents for Diabetes.    Overall he has been feeling well.  He has been using his CGM and is in range 90% of the time.    He denies any issues with CP, SOB or dizzy spells.      Review of Systems   Respiratory:  Negative for cough, shortness of breath and wheezing.    Cardiovascular:  Negative for chest pain and palpitations.   Gastrointestinal:  Negative for abdominal pain, constipation, diarrhea and nausea.       Objective   /70 (BP Location: Left arm, Patient Position: Sitting, BP Cuff Size: Adult)   Pulse 79   Temp 37.1 °C (98.7 °F) (Tympanic)   Resp 14   Ht 1.854 m (6' 1\")   Wt 140 kg (309 lb)   SpO2 97%   BMI 40.77 kg/m²     Physical Exam  Vitals reviewed.   Constitutional:       Appearance: Normal appearance.   HENT:      Head: Normocephalic.   Cardiovascular:      Rate and Rhythm: Normal rate.   Pulmonary:      Effort: Pulmonary effort is normal.   Musculoskeletal:         General: Normal range of motion.   Neurological:      General: No focal deficit present.      Mental Status: He is alert.   Psychiatric:         Mood and Affect: Mood normal.         Assessment/Plan   Problem List Items Addressed This Visit           ICD-10-CM    Type 2 diabetes mellitus with circulatory disorder, without long-term current use of insulin E11.59    Relevant Medications    tirzepatide (Mounjaro) 10 mg/0.5 mL pen injector (Start on 7/6/2025)    Coronary artery disease involving native coronary artery of native heart without angina pectoris I25.10    Relevant Medications    tirzepatide (Mounjaro) 10 mg/0.5 mL pen injector (Start on 7/6/2025)    Other specified spondylopathies, cervical region M48.8X2     Other Visit Diagnoses         Codes      Type 2 diabetes mellitus without complication, without long-term current use of insulin    -  Primary E11.9    Relevant Orders    POCT glycosylated hemoglobin (Hb A1C) manually resulted (Completed)    " Albumin-Creatinine Ratio, Urine Random    Comprehensive Metabolic Panel    Hemoglobin A1C        CGM - generally in 110-120.  No hypoglycemia.  Some postprandial spikes.  No changes,  A1c here does not correlate.  Continue to monitor by CGM and if consistently over 150 he is to call.  HTN controlled.  No changes needed.    Follow up int 3 months - sooner if any issues

## 2025-07-03 LAB
ALBUMIN/CREAT UR: 5 MG/G CREAT
CREAT UR-MCNC: 88 MG/DL (ref 20–320)
MICROALBUMIN UR-MCNC: 0.4 MG/DL

## 2025-07-11 ENCOUNTER — APPOINTMENT (OUTPATIENT)
Dept: PRIMARY CARE | Facility: CLINIC | Age: 60
End: 2025-07-11
Payer: COMMERCIAL

## 2025-07-27 NOTE — PROGRESS NOTES
"Chief Complaint:   Please see below.     History Of Present Illness:    Behzad Diaz is a 60 y.o. male presenting with CAD.    I last saw the patient in July 2024.  This 60-year-old hypertensive, diabetic, hyperlipidemic man with a BMI of 41.3 is S/P a non-ST segment elevation MI in early December 2023 he stabilized on medical therapy, underwent cardiac catheterization, and transferred to [facility] for bypass surgery. On December 20, 2023 he underwent CABGx 4 (LIMA to LAD, SVG to diagonal, SVG sequential to OM and PDA taken off of the other vein. His echo ejection fraction was 40 to 45%.     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      The patient denies chest discomfort, dyspnea, palpitations, orthopnea, PND, syncope, and near syncope  He is not exercising regularly.             Last Recorded Vitals:  Vitals:    07/28/25 1300   BP: 98/62   BP Location: Left arm   Patient Position: Sitting   Pulse: 76   Weight: 140 kg (309 lb)   Height: 1.854 m (6' 1\")         Past Medical History:  He has a past medical history of Discitis, Discitis, HLD (hyperlipidemia), HTN (hypertension), and T2DM (type 2 diabetes mellitus) (Multi).    Past Surgical History:  He has a past surgical history that includes Other surgical history (07/30/2020); Other surgical history (07/30/2020); CT guided percutaneous biopsy bone deep (09/10/2021); Cardiac catheterization (N/A, 12/11/2023); Back surgery; and Coronary artery bypass graft (N/A, 12/20/2023).      Social History:  He reports that he has been smoking cigars. He has never used smokeless tobacco. He reports current alcohol use. He reports that he does not use drugs.    Family History:  Family History   Problem Relation Name Age of Onset    Coronary artery disease Mother      Heart disease Mother Aisha Diaz     Diabetes Father Dario Diaz     Diabetes Father Dario Diaz       "   Allergies:  Patient has no known allergies.    Outpatient Medications:  Current Outpatient Medications   Medication Instructions    amoxicillin (Amoxil) 500 mg capsule Take 1 capsule (500 mg) by mouth once daily.    aspirin 81 mg, oral, Daily    atorvastatin (LIPITOR) 80 mg, oral, Nightly    blood-glucose sensor (FreeStyle Jhonatan 3 Sensor) device Use as directed. Replace every 14 days    carvedilol (COREG) 6.25 mg, oral, 2 times daily    clopidogrel (PLAVIX) 75 mg, oral, Daily    Jardiance 25 mg, oral, Daily    Mounjaro 10 mg, subcutaneous, Once Weekly    multivitamin with minerals tablet 1 tablet, oral, Daily       Physical Exam:  GENERAL:  pleasant 60 year-old  HEENT: No xanthelasma  NECK: Supple, no palpable adenopathy or thyromegaly  CHEST: Clear to auscultation, respiratory effort unlabored  CARDIAC: RRR, normal S1 and S2, no audible murmur, rub, gallop, carotids are brisk, PMI is not displaced  ABD: Active bowel sounds, nontender, no organomegaly, no evidence of ascites  EXT: No clubbing, cyanosis, edema, or tenderness  NEURO: Awake, alert, appropriate, speech is fluent       Last Labs:  CBC -  Lab Results   Component Value Date    WBC 7.1 04/25/2025    HGB 17.6 (H) 04/25/2025    HCT 51.5 (H) 04/25/2025    MCV 93.8 04/25/2025     04/25/2025       CMP -  Lab Results   Component Value Date    CALCIUM 9.1 03/19/2025    PHOS 3.8 12/29/2023    PROT 7.1 07/31/2024    ALBUMIN 4.5 07/31/2024    AST 33 07/31/2024    ALT 49 07/31/2024    ALKPHOS 81 07/31/2024    BILITOT 1.4 (H) 07/31/2024       LIPID PANEL -   Lab Results   Component Value Date    CHOL 115 07/31/2024    TRIG 127 07/31/2024    HDL 41.3 07/31/2024    CHHDL 2.8 07/31/2024    LDLF 82 08/27/2021    VLDL 25 07/31/2024    NHDL 74 07/31/2024       RENAL FUNCTION PANEL -   Lab Results   Component Value Date    GLUCOSE 133 (H) 03/19/2025     03/19/2025    K 4.4 03/19/2025     03/19/2025    CO2 26 03/19/2025    ANIONGAP 10 03/19/2025    BUN 18  03/19/2025    CREATININE 0.95 03/19/2025    GFRMALE >90 07/07/2023    CALCIUM 9.1 03/19/2025    PHOS 3.8 12/29/2023    ALBUMIN 4.5 07/31/2024        Lab Results   Component Value Date    BNP 91 12/13/2023    HGBA1C 13 (A) 07/02/2025         Lab review: I have Chemistry CMP:   Lab Results   Component Value Date    ALBUMIN 4.5 07/31/2024    CALCIUM 9.1 03/19/2025    CO2 26 03/19/2025    CREATININE 0.95 03/19/2025    GLUCOSE 133 (H) 03/19/2025    BILITOT 1.4 (H) 07/31/2024    PROT 7.1 07/31/2024    ALT 49 07/31/2024    AST 33 07/31/2024    ALKPHOS 81 07/31/2024   , Chemistry BMP   Lab Results   Component Value Date    GLUCOSE 133 (H) 03/19/2025    CALCIUM 9.1 03/19/2025    CO2 26 03/19/2025    CREATININE 0.95 03/19/2025   , CBC:  Lab Results   Component Value Date    WBC 7.1 04/25/2025    RBC 5.49 04/25/2025    HGB 17.6 (H) 04/25/2025    HCT 51.5 (H) 04/25/2025    MCV 93.8 04/25/2025    MCH 32.1 04/25/2025    MCHC 34.2 04/25/2025    RDW 12.8 04/25/2025    MPV 10.7 04/25/2025    NRBC 0.0 12/29/2023   , and Lipids:   Lab Results   Component Value Date    CHOL 115 07/31/2024    HDL 41.3 07/31/2024    LDLCALC 48 07/31/2024    TRIG 127 07/31/2024         Assessment/Plan   Assessment & Plan  Coronary artery disease involving native coronary artery of native heart without angina pectoris    Orders:    Lipid panel; Future    Comprehensive metabolic panel; Future    Follow Up In Cardiology; Future    Ischemic cardiomyopathy    Orders:    Transthoracic Echo Complete; Future    Follow Up In Cardiology; Future    Other hyperlipidemia  We provided the patient educational materials through [employer].    Orders:    Follow Up In Cardiology; Future    BMI 40.0-44.9, adult (Multi)  We provided the patient educational materials through MyChart.    Orders:    Follow Up In Cardiology; Future            Avtar Venegas MD

## 2025-07-28 ENCOUNTER — APPOINTMENT (OUTPATIENT)
Dept: CARDIOLOGY | Facility: CLINIC | Age: 60
End: 2025-07-28
Payer: COMMERCIAL

## 2025-07-28 ENCOUNTER — PHARMACY VISIT (OUTPATIENT)
Dept: PHARMACY | Facility: CLINIC | Age: 60
End: 2025-07-28
Payer: MEDICARE

## 2025-07-28 VITALS
BODY MASS INDEX: 40.95 KG/M2 | DIASTOLIC BLOOD PRESSURE: 62 MMHG | HEART RATE: 76 BPM | SYSTOLIC BLOOD PRESSURE: 98 MMHG | HEIGHT: 73 IN | WEIGHT: 309 LBS

## 2025-07-28 DIAGNOSIS — I25.10 CORONARY ARTERY DISEASE INVOLVING NATIVE CORONARY ARTERY OF NATIVE HEART WITHOUT ANGINA PECTORIS: Primary | ICD-10-CM

## 2025-07-28 DIAGNOSIS — E78.49 OTHER HYPERLIPIDEMIA: ICD-10-CM

## 2025-07-28 DIAGNOSIS — I25.5 ISCHEMIC CARDIOMYOPATHY: ICD-10-CM

## 2025-07-28 PROCEDURE — 3008F BODY MASS INDEX DOCD: CPT | Performed by: INTERNAL MEDICINE

## 2025-07-28 PROCEDURE — 3074F SYST BP LT 130 MM HG: CPT | Performed by: INTERNAL MEDICINE

## 2025-07-28 PROCEDURE — 3078F DIAST BP <80 MM HG: CPT | Performed by: INTERNAL MEDICINE

## 2025-07-28 PROCEDURE — RXMED WILLOW AMBULATORY MEDICATION CHARGE

## 2025-07-28 PROCEDURE — 3046F HEMOGLOBIN A1C LEVEL >9.0%: CPT | Performed by: INTERNAL MEDICINE

## 2025-07-28 PROCEDURE — 99214 OFFICE O/P EST MOD 30 MIN: CPT | Performed by: INTERNAL MEDICINE

## 2025-07-28 NOTE — ASSESSMENT & PLAN NOTE
We provided the patient educational materials through [employer].    Orders:    Follow Up In Cardiology; Future

## 2025-07-28 NOTE — ASSESSMENT & PLAN NOTE
We provided the patient educational materials through [employer].    Orders:    Follow Up In Cardiology; Future

## 2025-07-28 NOTE — ASSESSMENT & PLAN NOTE
Orders:    Lipid panel; Future    Comprehensive metabolic panel; Future    Follow Up In Cardiology; Future

## 2025-08-17 PROCEDURE — RXMED WILLOW AMBULATORY MEDICATION CHARGE

## 2025-08-22 PROCEDURE — RXMED WILLOW AMBULATORY MEDICATION CHARGE

## 2025-08-23 ENCOUNTER — PHARMACY VISIT (OUTPATIENT)
Dept: PHARMACY | Facility: CLINIC | Age: 60
End: 2025-08-23
Payer: MEDICARE

## 2025-10-03 ENCOUNTER — APPOINTMENT (OUTPATIENT)
Dept: PRIMARY CARE | Facility: CLINIC | Age: 60
End: 2025-10-03
Payer: COMMERCIAL

## 2025-10-14 ENCOUNTER — APPOINTMENT (OUTPATIENT)
Dept: CARDIOLOGY | Facility: CLINIC | Age: 60
End: 2025-10-14
Payer: COMMERCIAL

## 2025-10-24 ENCOUNTER — APPOINTMENT (OUTPATIENT)
Facility: CLINIC | Age: 60
End: 2025-10-24
Payer: COMMERCIAL

## (undated) DEVICE — SHUNT, SENSOR

## (undated) DEVICE — ELECTRODE, ELECTROSURGICAL, BLADE, INSULATED, ENT/IMA, STERILE

## (undated) DEVICE — SUTURE, PROLENE 4-0, TAPER POINT, SH-1 BLUE 30 INCH

## (undated) DEVICE — ATS SUCTION LINE

## (undated) DEVICE — KIT, COLLECTION, CARDIO

## (undated) DEVICE — OXYGENATOR FX 25, W/HR, ARTERIAL FILTER

## (undated) DEVICE — COVER, TABLE, UHC

## (undated) DEVICE — PACING CABLE, EXTENSION, 12 FT BLUE, DISPOSABLE

## (undated) DEVICE — SUTURE, PROLENE, 4-0, 36 IN, RB1, DA, BLUE

## (undated) DEVICE — KIT, TOURNIQUET, 7"

## (undated) DEVICE — GEL, ULTRASOUND, AQUASONIC 100, 20 GM, STERILE

## (undated) DEVICE — DRESSING, MEPILEX, BORDER, SACRUM, 8.7 X 9.8 IN

## (undated) DEVICE — SHUNT, INTRACORONARY, 2.0 MM, CLEARVIEW

## (undated) DEVICE — ANTIFOG, SOLUTION, FOG-OUT

## (undated) DEVICE — CANNULA, VESSEL, FREE FLOW, BLUNT TIP, 3 MM X 5 CM

## (undated) DEVICE — CANNULA, RETROGRADE, 14FR X 32CM

## (undated) DEVICE — SUTURE, VICRYL, 2-0, 27 IN, CT-1, VIOLET

## (undated) DEVICE — SUTURE, PROLENE, 6-0, 30 IN, RB-2, BLUE

## (undated) DEVICE — CATHETER, DRAINAGE, NASOGASTRIC, DOUBLE LUMEN, FUNNEL END, SUMP, SALEM, 18 FR, 48 IN, PVC, STERILE

## (undated) DEVICE — MARKER, SKIN, DUAL TIP INK W/9 LABEL AND REMOVABLE TIME OUT SLEEVE

## (undated) DEVICE — CANNULA, CARDIAC SUMP

## (undated) DEVICE — SYRINGE, 20 CC, LUER LOCK, MONOJECT, W/O CAP, LF

## (undated) DEVICE — WASH SET, XTRA, 125ML

## (undated) DEVICE — SUTURE, PROLENE, 4-0, 36 IN, BB, BLUE

## (undated) DEVICE — INTRODUCER SHEATH, GLIDESHEATH, 6FR 10CM

## (undated) DEVICE — MICROCOAGULATION TEST, ACT+ TEST CUVETTE

## (undated) DEVICE — Device

## (undated) DEVICE — SHUNT, INTRACORONARY, 1.75 MM, CLEARVIEW

## (undated) DEVICE — STOCKINETTE, IMPERVIOUS, 12 X 48 IN, LF, STERILE

## (undated) DEVICE — GOWN, SURGICAL, SMARTGOWN, XLARGE, STERILE

## (undated) DEVICE — TRAY, MINOR, SINGLE BASIN, STERILE

## (undated) DEVICE — DRAPE, INSTRUMENT, W/POUCH, STERI DRAPE, 7 X 11 IN, DISPOSABLE, STERILE

## (undated) DEVICE — TUBE SET, PNEUMOCLEAR, SMOKE EVACU, HIGH-FLOW

## (undated) DEVICE — SPONGE, HEMOSTAT, SURGICEL FIBRILLAR, ABS, 4 X 4, LF

## (undated) DEVICE — BANDAGE, ESMARK, 3 IN X 9 FT, LF

## (undated) DEVICE — TUBING PACK, OXYGENATOR, ADULT

## (undated) DEVICE — FILTER, IV, BLOOD, MICROAGGREGATE, 40 MIC, RBC TRANSFUSION

## (undated) DEVICE — SUTURE, VICRYL, 4-0, 18 IN, PS2, UNDYED

## (undated) DEVICE — CLIPPER, SURGICAL BLADE ASSEMBLY, GENERAL PURPOSE, SINGLE USE

## (undated) DEVICE — DRAPE, SHEET, UTILITY, NON ABSORBENT, 18 X 26 IN, LF

## (undated) DEVICE — MONITORING KIT, TRANSDUCER, RETROGRADE, MPS, W/EXTENSION, LF

## (undated) DEVICE — CASSETTE, BLOOD, PLEGIC SET

## (undated) DEVICE — MAYO TRAY, SMALL

## (undated) DEVICE — DRAPE, FLUID WARMER

## (undated) DEVICE — MARKER, SKIN, RULER AND LABEL PACK, CUSTOM

## (undated) DEVICE — TOWEL, SURGICAL, NEURO, O/R, 16 X 26, BLUE, STERILE

## (undated) DEVICE — BANDAGE, COFLEX, 4 X 5 YDS, TAN, STERILE, LF

## (undated) DEVICE — SUTURE, P6 STERNUM DOUBLE WIRE, CCS P 2 NEEDLE

## (undated) DEVICE — SUTURE, PROLENE, 5-0, 36 IN, RB1, DA, BLUE

## (undated) DEVICE — CLIP, LIGATING, HORIZON, LARGE, TITANIUM

## (undated) DEVICE — CLIP, LIGATING, W/ADHESIVE PAD, MEDIUM, TITANIUM

## (undated) DEVICE — PERFUSION SERVICES

## (undated) DEVICE — INSERT, CLAMP, SURGICAL, SOFT/TRACTION, STEALTH, 5 MM

## (undated) DEVICE — SUTURE, ETHIBOND, XTRA, 30 IN, 0, CT-1, GREEN

## (undated) DEVICE — CLIP, LIGATING, W/ADHESIVE, WIDE SLOT, SMALL, TITANIUM

## (undated) DEVICE — POSITONER, URCHIN

## (undated) DEVICE — DRESSING, ISLAND, TELFA, 4 X 5 IN

## (undated) DEVICE — TUBE SET, PNEUMOLAR HEATED, SMOKE EVACU, HIGH-FLOW

## (undated) DEVICE — CLIP, SPRING, BULLDOG, FOGARTY, SOFT JAW, 6 MM, LF

## (undated) DEVICE — BANDAGE, ELASTIC, ACE, ACE, DOUBLE LENGTH, 6 X 550 IN, LF

## (undated) DEVICE — CUFF, TOURNIQUET, 18 X 4, SNGL PORT/SNGL BLADDER, DISP, LF

## (undated) DEVICE — SUTURE, PROLENE, 5-0, 36 IN, C-1, CV-11, BLUE

## (undated) DEVICE — SYSTEM, OCTOPUS EVOLUTION AS TISSUE STABILIZER 4.3

## (undated) DEVICE — ADAPTER, CARDIOPLEGIA, VENTING, Y, 19.1 CM

## (undated) DEVICE — MANIFOLD, 4 PORT NEPTUNE STANDARD

## (undated) DEVICE — MANIFOLD KIT, CUSTOM (SJM)

## (undated) DEVICE — DRESSING, ADHESIVE, ISLAND, TELFA, 4 X 14 IN

## (undated) DEVICE — SYRINGE, 60 CC, IRRIGATION, BULB, CONTRO-BULB, PAPER POUCH

## (undated) DEVICE — DRESSING, MEPILEX, BORDER, HEEL, 8.7 X 9.1 IN

## (undated) DEVICE — CATHETER, OPTITORQUE, 5FR, JACKY, 3.5/ 2H/110CM, CURVED

## (undated) DEVICE — DRAPE, SHEET, CARDIOVASCULAR, ANTIMICROBIAL, W/ANESTHESIA SCREEN, IOBAN 2, STERI DRAPE, 107 X 133 IN, DISPOSABLE, FABRIC, BLUE, STERILE

## (undated) DEVICE — CONNECTOR, STRAIGHT, 0.375 X 0.375 IN

## (undated) DEVICE — SUTURE, PROLENE, 7-0, 30 IN, BV1, DA, BLUE

## (undated) DEVICE — KIT, CELL SAVER, W/COLLECTION SET, 225ML WASH SET

## (undated) DEVICE — SPONGE, LAP, XRAY DECT, 18IN X 18IN, W/MASTER DMT, STERILE

## (undated) DEVICE — WASH SET, XTRA, 225ML

## (undated) DEVICE — DRESSING, ADHESIVE, ISLAND, TELFA, 2 X 3.75 IN, LF

## (undated) DEVICE — CLEANER, ELECTROSURGICAL, TIP, 5 X 5 CM, LF

## (undated) DEVICE — SPONGE, GAUZE, XRAY DECT, 16 PLY, 4 X 4, W/MASTER DMT,STERILE

## (undated) DEVICE — PITCHER, GRADUATE, 32 OZ (1200CC), STERILE

## (undated) DEVICE — SUTURE, PROLENE, 3-0, 36 IN, SH, DA, BLUE

## (undated) DEVICE — BATTERY, VARISPEED

## (undated) DEVICE — COVER, CART, 45 X 27 X 48 IN, CLEAR

## (undated) DEVICE — CONNECTOR, STRAIGHT, 0.5 X 0.5 IN

## (undated) DEVICE — TR BAND, RADIAL COMPRESSION, LONG, 29CM

## (undated) DEVICE — TIP, SUCTION, YANKAUER, FLEXIBLE

## (undated) DEVICE — COVER, MAYO STAND, W/PAD, 23 IN, DISPOSABLE, PLASTIC, LF, STERILE

## (undated) DEVICE — SYRINGE, LUER LOCK, 12ML

## (undated) DEVICE — SUTURE, SILK, 1, 30 IN, LABYRINTH, BLACK

## (undated) DEVICE — SHUNT, INTRACORONARY, 1.5 MM, CLEARVIEW